# Patient Record
Sex: MALE | Race: WHITE | NOT HISPANIC OR LATINO | Employment: UNEMPLOYED | URBAN - METROPOLITAN AREA
[De-identification: names, ages, dates, MRNs, and addresses within clinical notes are randomized per-mention and may not be internally consistent; named-entity substitution may affect disease eponyms.]

---

## 2017-01-13 ENCOUNTER — DOCUMENTATION ONLY (OUTPATIENT)
Dept: FAMILY MEDICINE | Facility: CLINIC | Age: 52
End: 2017-01-13

## 2017-01-13 ENCOUNTER — OFFICE VISIT (OUTPATIENT)
Dept: FAMILY MEDICINE | Facility: CLINIC | Age: 52
End: 2017-01-13
Payer: COMMERCIAL

## 2017-01-13 VITALS
BODY MASS INDEX: 29.38 KG/M2 | HEIGHT: 71 IN | WEIGHT: 209.88 LBS | TEMPERATURE: 98 F | SYSTOLIC BLOOD PRESSURE: 141 MMHG | DIASTOLIC BLOOD PRESSURE: 75 MMHG | HEART RATE: 69 BPM | OXYGEN SATURATION: 96 %

## 2017-01-13 DIAGNOSIS — J20.9 BRONCHITIS, ACUTE, WITH BRONCHOSPASM: ICD-10-CM

## 2017-01-13 DIAGNOSIS — J02.9 SORE THROAT: Primary | ICD-10-CM

## 2017-01-13 LAB
CTP QC/QA: YES
S PYO RRNA THROAT QL PROBE: NEGATIVE

## 2017-01-13 PROCEDURE — 3077F SYST BP >= 140 MM HG: CPT | Mod: S$GLB,,, | Performed by: NURSE PRACTITIONER

## 2017-01-13 PROCEDURE — 1159F MED LIST DOCD IN RCRD: CPT | Mod: S$GLB,,, | Performed by: NURSE PRACTITIONER

## 2017-01-13 PROCEDURE — 3078F DIAST BP <80 MM HG: CPT | Mod: S$GLB,,, | Performed by: NURSE PRACTITIONER

## 2017-01-13 PROCEDURE — 87880 STREP A ASSAY W/OPTIC: CPT | Mod: ,,, | Performed by: NURSE PRACTITIONER

## 2017-01-13 PROCEDURE — 99213 OFFICE O/P EST LOW 20 MIN: CPT | Mod: S$GLB,,, | Performed by: NURSE PRACTITIONER

## 2017-01-13 RX ORDER — PREDNISONE 5 MG/1
TABLET ORAL
Qty: 21 TABLET | Refills: 0 | Status: SHIPPED | OUTPATIENT
Start: 2017-01-13 | End: 2017-08-14 | Stop reason: ALTCHOICE

## 2017-01-13 RX ORDER — BENZONATATE 200 MG/1
200 CAPSULE ORAL 3 TIMES DAILY PRN
Qty: 30 CAPSULE | Refills: 0 | Status: SHIPPED | OUTPATIENT
Start: 2017-01-13 | End: 2017-08-14 | Stop reason: ALTCHOICE

## 2017-01-13 NOTE — PATIENT INSTRUCTIONS
Get generic Tussin cough syrup and use as directed on the bottle with the tessalon and it will help with cough.   Continue honey and lemon    Take a whole (washed) lemon, put in a sauce pan, cover with water, boil for 10 minutes, let it cool and when cool enough to handle the lemon, remove and cut in half. Put all the juice into the water in the pan. Mix the lemon juice/water with the same amount of honey. Put in a covered jar in the refrigerator and take 1 tablespoon every 4 hours as needed for cough.

## 2017-01-13 NOTE — PROGRESS NOTES
Subjective:       Patient ID: Abimael Armendariz is a 51 y.o. male.    Chief Complaint: Sore Throat and Cough    Sore Throat    This is a new problem. The current episode started in the past 7 days. The problem has been rapidly worsening. There has been no fever. The pain is at a severity of 4/10. Associated symptoms include coughing and swollen glands. Pertinent negatives include no headaches.   Cough   This is a new problem. The current episode started in the past 7 days. The problem has been rapidly worsening. Associated symptoms include a sore throat. Pertinent negatives include no headaches. He has tried OTC cough suppressant (used jessica seltzer plus for cold and cough) for the symptoms. The treatment provided mild relief.     Review of Systems   HENT: Positive for sore throat.    Respiratory: Positive for cough.    Neurological: Negative for headaches.       Objective:    strep negative  Physical Exam   Constitutional: He appears well-developed and well-nourished. No distress.   HENT:   Head: Normocephalic and atraumatic.   Right Ear: External ear normal.   Left Ear: External ear normal.   Nose: Nose normal.   Mouth/Throat: No oropharyngeal exudate.   Oropharynx erythematous   Eyes: Conjunctivae are normal. Right eye exhibits no discharge. Left eye exhibits no discharge. No scleral icterus.   Neck: Normal range of motion. Neck supple.   Cardiovascular: Normal rate and regular rhythm.  Exam reveals no gallop and no friction rub.    Murmur heard.  Pulmonary/Chest: Effort normal and breath sounds normal. No respiratory distress. He has no wheezes. He has no rales.   Lymphadenopathy:     He has no cervical adenopathy.   Skin: Skin is warm and dry. He is not diaphoretic.   Psychiatric: He has a normal mood and affect. His behavior is normal.       Assessment:       1. Sore throat    2. Bronchitis, acute, with bronchospasm        Plan:       Sore throat  -     POCT Rapid Strep A    Bronchitis, acute, with  bronchospasm  -     benzonatate (TESSALON) 200 MG capsule; Take 1 capsule (200 mg total) by mouth 3 (three) times daily as needed for Cough.  Dispense: 30 capsule; Refill: 0  -     predniSONE (DELTASONE) 5 MG tablet; 6 tabs the first day, then 5 the next, then 4, 3, 2, 1  Dispense: 21 tablet; Refill: 0    Get generic Tussin cough syrup and use as directed on the bottle with the tessalon and it will help with cough.      Told by his physician not to use dextromethorphan.  1 week if not better

## 2017-01-13 NOTE — MR AVS SNAPSHOT
[facility]  [address] [address] [address]  Phone: [phone]  Fax: [phone]                  Abimael Armendariz   2017 2:00 PM   Office Visit    Description:  Male : 1965   Provider:  LINN Allen   Department:  [facility]           Reason for Visit     Sore Throat     Cough           Diagnoses this Visit        Comments    Sore throat    -  Primary     Bronchitis, acute, with bronchospasm                To Do List           Goals (5 Years of Data)     None      Follow-Up and Disposition     Return if symptoms worsen or fail to improve in 1 week.    Follow-up and Disposition History       These Medications        Disp Refills Start End    benzonatate (TESSALON) 200 MG capsule 30 capsule 0 2017     Take 1 capsule (200 mg total) by mouth 3 (three) times daily as needed for Cough. - Oral    Pharmacy: [facility]/pharmacy #[id number] - [place] [address] Ph #: [phone]       predniSONE (DELTASONE) 5 MG tablet 21 tablet 0 2017     6 tabs the first day, then 5 the next, then 4, 3, 2, 1    Pharmacy: [facility]/pharmacy #[id number] - [place], [place] - [address] [facility]. Ph #: [phone]         [facility] On Call     [facility] On Call Nurse Care Line - 24/ Assistance  Registered nurses in the [facility] provide clinical advisement, health education, appointment booking, and other advisory services.  Call for this free service at [phone].             Medications           Message regarding Medications     Verify the changes and/or additions to your medication regime listed below are the same as discussed with your clinician today.  If any of these changes or additions are incorrect, please notify your healthcare provider.        START taking these NEW medications        Refills    benzonatate (TESSALON) 200 MG capsule 0    Sig: Take 1 capsule (200 mg total) by mouth 3 (three) times daily as needed for Cough.    Class: Normal  "   Route: Oral    predniSONE (DELTASONE) 5 MG tablet 0    Si tabs the first day, then 5 the next, then 4, 3, 2, 1    Class: Normal           Verify that the below list of medications is an accurate representation of the medications you are currently taking.  If none reported, the list may be blank. If incorrect, please contact your healthcare provider. Carry this list with you in case of emergency.           Current Medications     aspirin 81 mg Tab Take 81 mg by mouth once daily. Every day    buPROPion (WELLBUTRIN XL) 150 MG TB24 tablet Take 1 tablet (150 mg total) by mouth once daily.    cetirizine (ZYRTEC) 10 MG tablet Take 10 mg by mouth once daily. Every day    citalopram (CELEXA) 20 MG tablet Take 1 tablet (20 mg total) by mouth once daily.    fosinopril (MONOPRIL) 40 MG tablet Take 1 tablet (40 mg total) by mouth once daily.    pramipexole (MIRAPEX) 0.25 MG tablet TAKE 1 TABLET (0.25 MG TOTAL) BY MOUTH EVERY EVENING.    trazodone (DESYREL) 50 MG tablet TAKE 1 TABLET (50 MG TOTAL) BY MOUTH EVERY EVENING.    benzonatate (TESSALON) 200 MG capsule Take 1 capsule (200 mg total) by mouth 3 (three) times daily as needed for Cough.    predniSONE (DELTASONE) 5 MG tablet 6 tabs the first day, then 5 the next, then 4, 3, 2, 1           Clinical Reference Information           Vital Signs - Last Recorded  Most recent update: 2017  2:10 PM by Kristina Andino MA    BP Pulse Temp Ht    (!) 141/75 (BP Location: Left arm, Patient Position: Sitting, BP Method: Automatic) 69 97.6 °F (36.4 °C) (Oral) 5' 11" (1.803 m)    Wt SpO2 BMI    95.2 kg (209 lb 14.1 oz) 96% 29.27 kg/m2      Blood Pressure          Most Recent Value    BP  (!)  141/75      Allergies as of 2017     No Known Drug Allergies      Immunizations Administered on Date of Encounter - 2017     None      Orders Placed During Today's Visit      Normal Orders This Visit    POCT Rapid Strep A       Instructions    Get generic Tussin cough syrup and use " as directed on the bottle with the tessalon and it will help with cough.   Continue honey and lemon    Take a whole (washed) lemon, put in a sauce pan, cover with water, boil for 10 minutes, let it cool and when cool enough to handle the lemon, remove and cut in half. Put all the juice into the water in the pan. Mix the lemon juice/water with the same amount of honey. Put in a covered jar in the refrigerator and take 1 tablespoon every 4 hours as needed for cough.        Smoking Cessation     If you would like to quit smoking:   You may be eligible for free services if you are a [place] resident and started smoking cigarettes before September 1, 1988.  Call the [employer]oking [employer]essation [employer] (S[employer]) toll free at (644) 892-3076 or (786) 544-6764.   Call 4-800-QUIT-NOW if you do not meet the above criteria.

## 2017-01-13 NOTE — PROGRESS NOTES
Pre-Visit Chart Review  For Appointment Scheduled on 1/13/17    Health Maintenance Due   Topic Date Due    TETANUS VACCINE  02/06/1982    Colonoscopy  02/06/2014    Influenza Vaccine  08/01/2016

## 2017-03-20 ENCOUNTER — TELEPHONE (OUTPATIENT)
Dept: FAMILY MEDICINE | Facility: CLINIC | Age: 52
End: 2017-03-20

## 2017-03-20 NOTE — TELEPHONE ENCOUNTER
----- Message from Eulalia Abreu sent at 3/20/2017 10:05 AM CDT -----  Contact: self  Patient called regarding enrolling in a  school. A form was submitted but not received back in order for the physical information needed. Cannot complete the  school without it. Please contact [phone] (home)

## 2017-03-20 NOTE — TELEPHONE ENCOUNTER
Patient request for Dr. Stone's staff to call him about clearance for [employer] -call [phone]

## 2017-03-20 NOTE — TELEPHONE ENCOUNTER
----- Message from Eulalia Abreu sent at 3/20/2017 10:03 AM CDT -----  Contact: self  Patient called regarding enrolling in a  school. A form was submitted but not received back in order for the physical information needed. Cannot complete the  school without it. Please contact [phone] (home)

## 2017-03-20 NOTE — TELEPHONE ENCOUNTER
Left voice mail for patient-form filled out and faxed back to [facility] at [phone]-copy sent to scanning.

## 2017-03-20 NOTE — TELEPHONE ENCOUNTER
Patient informed fax received today and will call him when Dr. Trinidad has a chance to look at it.

## 2017-03-21 ENCOUNTER — TELEPHONE (OUTPATIENT)
Dept: CARDIOLOGY | Facility: CLINIC | Age: 52
End: 2017-03-21

## 2017-03-21 NOTE — TELEPHONE ENCOUNTER
----- Message from Eulalia Abreu sent at 3/21/2017  8:16 AM CDT -----  Contact: self  Patient called regarding paperwork to be faxed over for physical to be sent to [employer] so he can finish the program. Need it to be sent over today or will no longer be able to participate in the program. Sent numerous messages yesterday regarding. Patient called regarding [phone] (home) and leave vm no answer.

## 2017-03-21 NOTE — TELEPHONE ENCOUNTER
Spoke to pt and advised that it has been faxed a totaol of 6 times to provided number. Requested another fax number. Will send to original number and new fax number

## 2017-03-21 NOTE — TELEPHONE ENCOUNTER
----- Message from Jennie Lyles sent at 3/21/2017  2:41 PM CDT -----  Contact: elio   Needs to try to fax document   School states they have not received     [phone]     Call back

## 2017-03-21 NOTE — TELEPHONE ENCOUNTER
----- Message from Yarelis Zimmerman sent at 3/21/2017  2:45 PM CDT -----  Patient just missed a call from office. He says he is by the phone waiting. Please call back at [phone].

## 2017-03-21 NOTE — TELEPHONE ENCOUNTER
----- Message from Gina Bland sent at 3/20/2017  2:24 PM CDT -----  Patient is requesting a call back to confirm status of a fax he sent this morning regarding his medication, his company is requesting a release concerning his medication, contact patient at [phone].    Thank you

## 2017-03-21 NOTE — TELEPHONE ENCOUNTER
----- Message from Sharri Landin sent at 3/21/2017 10:04 AM CDT -----  Contact: self  Patient is returning call regarding paper work. His employer leaves before 4:00.  Please call patient at [phone].  Thanks!

## 2017-03-21 NOTE — TELEPHONE ENCOUNTER
----- Message from Gina Bland sent at 3/20/2017  2:24 PM CDT -----  Patient is requesting a call back to confirm status of a fax he sent this morning regarding his medication, his company is requesting a release concerning his medication, contact patient at [phone].    Thank you

## 2017-06-04 DIAGNOSIS — G47.00 INSOMNIA: ICD-10-CM

## 2017-06-05 RX ORDER — TRAZODONE HYDROCHLORIDE 50 MG/1
TABLET ORAL
Qty: 30 TABLET | Refills: 1 | Status: SHIPPED | OUTPATIENT
Start: 2017-06-05 | End: 2017-08-06 | Stop reason: SDUPTHER

## 2017-06-12 ENCOUNTER — TELEPHONE (OUTPATIENT)
Dept: FAMILY MEDICINE | Facility: CLINIC | Age: 52
End: 2017-06-12

## 2017-06-12 NOTE — TELEPHONE ENCOUNTER
----- Message from Eva Murray sent at 6/12/2017 12:39 PM CDT -----  Contact: self   Patient need orders for blood work, any questions please call back at [phone] (home)

## 2017-06-28 DIAGNOSIS — F32.A DEPRESSION: ICD-10-CM

## 2017-06-28 DIAGNOSIS — F41.9 ANXIETY: ICD-10-CM

## 2017-06-28 RX ORDER — CITALOPRAM 20 MG/1
20 TABLET, FILM COATED ORAL DAILY
Qty: 90 TABLET | Refills: 0 | Status: SHIPPED | OUTPATIENT
Start: 2017-06-28 | End: 2017-08-14 | Stop reason: SDUPTHER

## 2017-06-28 RX ORDER — BUPROPION HYDROCHLORIDE 150 MG/1
150 TABLET ORAL DAILY
Qty: 90 TABLET | Refills: 0 | Status: SHIPPED | OUTPATIENT
Start: 2017-06-28 | End: 2017-08-14 | Stop reason: SDUPTHER

## 2017-07-03 DIAGNOSIS — G25.81 RESTLESS LEG SYNDROME: ICD-10-CM

## 2017-07-05 RX ORDER — PRAMIPEXOLE DIHYDROCHLORIDE 0.25 MG/1
TABLET ORAL
Qty: 90 TABLET | Refills: 0 | Status: SHIPPED | OUTPATIENT
Start: 2017-07-05 | End: 2017-08-14 | Stop reason: SDUPTHER

## 2017-07-10 RX ORDER — FOSINOPRIL SODIUM 40 MG/1
40 TABLET ORAL DAILY
Qty: 90 TABLET | Refills: 0 | Status: SHIPPED | OUTPATIENT
Start: 2017-07-10 | End: 2017-08-14 | Stop reason: SDUPTHER

## 2017-08-06 DIAGNOSIS — G47.00 INSOMNIA: ICD-10-CM

## 2017-08-06 RX ORDER — TRAZODONE HYDROCHLORIDE 50 MG/1
TABLET ORAL
Qty: 30 TABLET | Refills: 0 | Status: SHIPPED | OUTPATIENT
Start: 2017-08-06 | End: 2017-08-14 | Stop reason: SDUPTHER

## 2017-08-11 ENCOUNTER — DOCUMENTATION ONLY (OUTPATIENT)
Dept: FAMILY MEDICINE | Facility: CLINIC | Age: 52
End: 2017-08-11

## 2017-08-11 NOTE — PROGRESS NOTES
Pre-Visit Chart Review  For Appointment Scheduled on 08/14/2017      Health Maintenance Due   Topic Date Due    TETANUS VACCINE  02/06/1982    Colonoscopy  02/06/2014    Lipid Panel  06/15/2017    Influenza Vaccine  08/01/2017

## 2017-08-14 ENCOUNTER — OFFICE VISIT (OUTPATIENT)
Dept: CARDIOLOGY | Facility: CLINIC | Age: 52
End: 2017-08-14
Payer: COMMERCIAL

## 2017-08-14 ENCOUNTER — LAB VISIT (OUTPATIENT)
Dept: LAB | Facility: HOSPITAL | Age: 52
End: 2017-08-14
Attending: PHYSICIAN ASSISTANT
Payer: COMMERCIAL

## 2017-08-14 ENCOUNTER — OFFICE VISIT (OUTPATIENT)
Dept: FAMILY MEDICINE | Facility: CLINIC | Age: 52
End: 2017-08-14
Payer: COMMERCIAL

## 2017-08-14 VITALS
WEIGHT: 201.75 LBS | TEMPERATURE: 98 F | HEIGHT: 71 IN | HEART RATE: 59 BPM | DIASTOLIC BLOOD PRESSURE: 72 MMHG | SYSTOLIC BLOOD PRESSURE: 124 MMHG | BODY MASS INDEX: 28.24 KG/M2

## 2017-08-14 VITALS
HEART RATE: 63 BPM | SYSTOLIC BLOOD PRESSURE: 138 MMHG | WEIGHT: 202.63 LBS | HEIGHT: 71 IN | DIASTOLIC BLOOD PRESSURE: 81 MMHG | RESPIRATION RATE: 15 BRPM | BODY MASS INDEX: 28.37 KG/M2

## 2017-08-14 DIAGNOSIS — Z00.00 ANNUAL PHYSICAL EXAM: Primary | ICD-10-CM

## 2017-08-14 DIAGNOSIS — F41.9 ANXIETY: ICD-10-CM

## 2017-08-14 DIAGNOSIS — Z23 NEED FOR DIPHTHERIA-TETANUS-PERTUSSIS (TDAP) VACCINE: ICD-10-CM

## 2017-08-14 DIAGNOSIS — I34.0 MITRAL VALVE INSUFFICIENCY, UNSPECIFIED ETIOLOGY: Primary | ICD-10-CM

## 2017-08-14 DIAGNOSIS — Z00.00 ANNUAL PHYSICAL EXAM: ICD-10-CM

## 2017-08-14 DIAGNOSIS — G25.81 RESTLESS LEG SYNDROME: ICD-10-CM

## 2017-08-14 DIAGNOSIS — G47.00 INSOMNIA, UNSPECIFIED TYPE: ICD-10-CM

## 2017-08-14 DIAGNOSIS — F32.A DEPRESSION, UNSPECIFIED DEPRESSION TYPE: ICD-10-CM

## 2017-08-14 LAB
ALBUMIN SERPL BCP-MCNC: 4 G/DL
ALP SERPL-CCNC: 78 U/L
ALT SERPL W/O P-5'-P-CCNC: 20 U/L
ANION GAP SERPL CALC-SCNC: 8 MMOL/L
AST SERPL-CCNC: 19 U/L
BASOPHILS # BLD AUTO: 0.03 K/UL
BASOPHILS NFR BLD: 0.5 %
BILIRUB SERPL-MCNC: 0.8 MG/DL
BUN SERPL-MCNC: 22 MG/DL
CALCIUM SERPL-MCNC: 9.3 MG/DL
CHLORIDE SERPL-SCNC: 106 MMOL/L
CHOLEST/HDLC SERPL: 5.3 {RATIO}
CO2 SERPL-SCNC: 28 MMOL/L
COMPLEXED PSA SERPL-MCNC: 0.57 NG/ML
CREAT SERPL-MCNC: 1.3 MG/DL
DIFFERENTIAL METHOD: ABNORMAL
EOSINOPHIL # BLD AUTO: 0.1 K/UL
EOSINOPHIL NFR BLD: 2.3 %
ERYTHROCYTE [DISTWIDTH] IN BLOOD BY AUTOMATED COUNT: 12.7 %
EST. GFR  (AFRICAN AMERICAN): >60 ML/MIN/1.73 M^2
EST. GFR  (NON AFRICAN AMERICAN): >60 ML/MIN/1.73 M^2
GLUCOSE SERPL-MCNC: 93 MG/DL
HCT VFR BLD AUTO: 45 %
HDL/CHOLESTEROL RATIO: 19 %
HDLC SERPL-MCNC: 142 MG/DL
HDLC SERPL-MCNC: 27 MG/DL
HGB BLD-MCNC: 15.2 G/DL
LDLC SERPL CALC-MCNC: 67.6 MG/DL
LYMPHOCYTES # BLD AUTO: 2.4 K/UL
LYMPHOCYTES NFR BLD: 38 %
MCH RBC QN AUTO: 30.1 PG
MCHC RBC AUTO-ENTMCNC: 33.8 G/DL
MCV RBC AUTO: 89 FL
MONOCYTES # BLD AUTO: 0.5 K/UL
MONOCYTES NFR BLD: 7.9 %
NEUTROPHILS # BLD AUTO: 3.2 K/UL
NEUTROPHILS NFR BLD: 51.1 %
NONHDLC SERPL-MCNC: 115 MG/DL
PLATELET # BLD AUTO: 141 K/UL
PMV BLD AUTO: 10.9 FL
POTASSIUM SERPL-SCNC: 4.6 MMOL/L
PROT SERPL-MCNC: 6.6 G/DL
RBC # BLD AUTO: 5.05 M/UL
SODIUM SERPL-SCNC: 142 MMOL/L
TRIGL SERPL-MCNC: 237 MG/DL
WBC # BLD AUTO: 6.18 K/UL

## 2017-08-14 PROCEDURE — 90715 TDAP VACCINE 7 YRS/> IM: CPT | Mod: S$GLB,,, | Performed by: PHYSICIAN ASSISTANT

## 2017-08-14 PROCEDURE — 3079F DIAST BP 80-89 MM HG: CPT | Mod: S$GLB,,, | Performed by: INTERNAL MEDICINE

## 2017-08-14 PROCEDURE — 3008F BODY MASS INDEX DOCD: CPT | Mod: S$GLB,,, | Performed by: PHYSICIAN ASSISTANT

## 2017-08-14 PROCEDURE — 99214 OFFICE O/P EST MOD 30 MIN: CPT | Mod: S$GLB,,, | Performed by: INTERNAL MEDICINE

## 2017-08-14 PROCEDURE — 3074F SYST BP LT 130 MM HG: CPT | Mod: S$GLB,,, | Performed by: PHYSICIAN ASSISTANT

## 2017-08-14 PROCEDURE — 80061 LIPID PANEL: CPT

## 2017-08-14 PROCEDURE — 3078F DIAST BP <80 MM HG: CPT | Mod: S$GLB,,, | Performed by: PHYSICIAN ASSISTANT

## 2017-08-14 PROCEDURE — 99999 PR PBB SHADOW E&M-EST. PATIENT-LVL III: CPT | Mod: PBBFAC,,, | Performed by: PHYSICIAN ASSISTANT

## 2017-08-14 PROCEDURE — 90471 IMMUNIZATION ADMIN: CPT | Mod: S$GLB,,, | Performed by: PHYSICIAN ASSISTANT

## 2017-08-14 PROCEDURE — 84153 ASSAY OF PSA TOTAL: CPT

## 2017-08-14 PROCEDURE — 36415 COLL VENOUS BLD VENIPUNCTURE: CPT | Mod: PO

## 2017-08-14 PROCEDURE — 99396 PREV VISIT EST AGE 40-64: CPT | Mod: 25,S$GLB,, | Performed by: PHYSICIAN ASSISTANT

## 2017-08-14 PROCEDURE — 99999 PR PBB SHADOW E&M-EST. PATIENT-LVL II: CPT | Mod: PBBFAC,,, | Performed by: INTERNAL MEDICINE

## 2017-08-14 PROCEDURE — 3075F SYST BP GE 130 - 139MM HG: CPT | Mod: S$GLB,,, | Performed by: INTERNAL MEDICINE

## 2017-08-14 PROCEDURE — 80053 COMPREHEN METABOLIC PANEL: CPT

## 2017-08-14 PROCEDURE — 85025 COMPLETE CBC W/AUTO DIFF WBC: CPT

## 2017-08-14 PROCEDURE — 3008F BODY MASS INDEX DOCD: CPT | Mod: S$GLB,,, | Performed by: INTERNAL MEDICINE

## 2017-08-14 RX ORDER — CITALOPRAM 20 MG/1
20 TABLET, FILM COATED ORAL DAILY
Qty: 90 TABLET | Refills: 3 | Status: SHIPPED | OUTPATIENT
Start: 2017-08-14 | End: 2018-07-17

## 2017-08-14 RX ORDER — PRAMIPEXOLE DIHYDROCHLORIDE 0.25 MG/1
TABLET ORAL
Qty: 90 TABLET | Refills: 3 | Status: SHIPPED | OUTPATIENT
Start: 2017-08-14 | End: 2018-08-27 | Stop reason: SDUPTHER

## 2017-08-14 RX ORDER — FOSINOPRIL SODIUM 40 MG/1
40 TABLET ORAL DAILY
Qty: 90 TABLET | Refills: 3 | Status: SHIPPED | OUTPATIENT
Start: 2017-08-14 | End: 2018-08-09 | Stop reason: SDUPTHER

## 2017-08-14 RX ORDER — BUPROPION HYDROCHLORIDE 150 MG/1
150 TABLET ORAL DAILY
Qty: 90 TABLET | Refills: 3 | Status: SHIPPED | OUTPATIENT
Start: 2017-08-14 | End: 2018-04-24 | Stop reason: SDUPTHER

## 2017-08-14 RX ORDER — TRAZODONE HYDROCHLORIDE 50 MG/1
TABLET ORAL
Qty: 90 TABLET | Refills: 3 | Status: SHIPPED | OUTPATIENT
Start: 2017-08-14 | End: 2018-08-09 | Stop reason: SDUPTHER

## 2017-08-14 NOTE — PROGRESS NOTES
Subjective:       Patient ID: Abimael Armendariz is a 52 y.o. male.    Chief Complaint: Annual Exam and Medication Refill    Patient is here for annual exam.  He is due for med refills and labs.  He is out of date for tetanus.  He refuses colonoscopy.      Review of Systems   Constitutional: Negative for activity change, appetite change, chills, fatigue, fever and unexpected weight change.   HENT: Negative.    Eyes: Negative for photophobia, pain, discharge, redness and visual disturbance.   Respiratory: Negative for cough, chest tightness and shortness of breath.    Cardiovascular: Negative for chest pain, palpitations and leg swelling.   Gastrointestinal: Negative for abdominal distention, abdominal pain, blood in stool, constipation, diarrhea, nausea and vomiting.   Genitourinary: Negative for decreased urine volume, difficulty urinating, dysuria, frequency, hematuria and urgency.   Musculoskeletal: Negative for arthralgias, back pain, gait problem and myalgias.   Neurological: Negative for dizziness, weakness, light-headedness, numbness and headaches.       Objective:      Physical Exam   Constitutional: He is oriented to person, place, and time. He appears well-developed and well-nourished. No distress.   HENT:   Head: Normocephalic and atraumatic.   Eyes: Conjunctivae are normal. Pupils are equal, round, and reactive to light.   Neck: Normal range of motion. Neck supple. No JVD present. No thyromegaly present.   Cardiovascular: Normal rate and regular rhythm.  Exam reveals no gallop and no friction rub.    Murmur heard.  Pulmonary/Chest: Effort normal. No respiratory distress. He has no wheezes. He has no rales. He exhibits no tenderness.   Neurological: He is alert and oriented to person, place, and time.   Skin: He is not diaphoretic.       Assessment:       1. Annual physical exam    2. Depression, unspecified depression type    3. Anxiety    4. Restless leg syndrome    5. Insomnia, unspecified type    6.  Need for diphtheria-tetanus-pertussis (Tdap) vaccine        Plan:       Abimael was seen today for annual exam and medication refill.    Diagnoses and all orders for this visit:    Annual physical exam  -     CBC auto differential; Future  -     Comprehensive metabolic panel; Future  -     Lipid panel; Future  -     PSA, Screening; Future    Depression, unspecified depression type  -     buPROPion (WELLBUTRIN XL) 150 MG TB24 tablet; Take 1 tablet (150 mg total) by mouth once daily.    Anxiety  -     citalopram (CELEXA) 20 MG tablet; Take 1 tablet (20 mg total) by mouth once daily.    Restless leg syndrome  -     pramipexole (MIRAPEX) 0.25 MG tablet; TAKE 1 TABLET (0.25 MG TOTAL) BY MOUTH EVERY EVENING.    Insomnia, unspecified type  -     trazodone (DESYREL) 50 MG tablet; TAKE 1 TABLET (50 MG TOTAL) BY MOUTH EVERY EVENING.    Need for diphtheria-tetanus-pertussis (Tdap) vaccine  -     (In Office Administered) Tdap Vaccine    Other orders  -     fosinopril (MONOPRIL) 40 MG tablet; Take 1 tablet (40 mg total) by mouth once daily.    Patient readiness: acceptance and barriers:none    During the course of the visit the patient was educated and counseled about the following:     Hypertension:   Regular aerobic exercise.    Goals: Hypertension: Reduce Blood Pressure    Did patient meet goals/outcomes: Yes    The following self management tools provided: declined    Patient Instructions (the written plan) was given to the patient/family.     Time spent with patient: 30 minutes

## 2017-08-14 NOTE — PROGRESS NOTES
Two Patient identifiers used, Name and . VIS information given to patient and procedure was explained. Patient verbalized understanding. Tdap administered IM in the left deltoid using sterile technique.  No residual bleeding noted. Patient tolerated procedure well.

## 2017-08-14 NOTE — PROGRESS NOTES
Subjective:    Patient ID:  Abimael Armendariz is a 52 y.o. male who presents for follow-up of mitral regurgitation    HPI  He comes for follow up with no major problems, no chest pain, no shortness of breath.  FC I-II    Review of Systems   Constitution: Negative for decreased appetite, weakness, malaise/fatigue, weight gain and weight loss.   Cardiovascular: Negative for chest pain, dyspnea on exertion, leg swelling, palpitations and syncope.   Respiratory: Negative for cough and shortness of breath.    Gastrointestinal: Negative.    All other systems reviewed and are negative.       Objective:    Physical Exam   Constitutional: He is oriented to person, place, and time. He appears well-developed and well-nourished.   HENT:   Head: Normocephalic.   Eyes: Pupils are equal, round, and reactive to light.   Neck: Normal range of motion. Neck supple. No JVD present. Carotid bruit is not present. No thyromegaly present.   Cardiovascular: Normal rate, regular rhythm, intact distal pulses and normal pulses.  PMI is not displaced.  Exam reveals no gallop.    Murmur heard.  High-pitched blowing holosystolic murmur is present with a grade of 3/6  at the apex  Pulmonary/Chest: Effort normal and breath sounds normal.   Abdominal: Soft. Normal appearance. He exhibits no mass. There is no hepatosplenomegaly. There is no tenderness.   Musculoskeletal: Normal range of motion. He exhibits no edema.   Neurological: He is alert and oriented to person, place, and time. He has normal strength and normal reflexes. No sensory deficit.   Skin: Skin is warm and intact.   Psychiatric: He has a normal mood and affect.   Nursing note and vitals reviewed.        Assessment:       1. Mitral valve insufficiency, unspecified etiology         Plan:   Echo; call with results  Continue all cardiac medications  Regular exercise program  Weight loss  1 yr f/u if echo unchanged

## 2018-04-18 DIAGNOSIS — M79.604 RIGHT LEG PAIN: Primary | ICD-10-CM

## 2018-04-19 ENCOUNTER — HOSPITAL ENCOUNTER (OUTPATIENT)
Dept: RADIOLOGY | Facility: HOSPITAL | Age: 53
Discharge: HOME | End: 2018-04-19
Attending: ORTHOPAEDIC SURGERY
Payer: COMMERCIAL

## 2018-04-19 ENCOUNTER — OFFICE VISIT (OUTPATIENT)
Dept: ORTHOPEDICS | Facility: CLINIC | Age: 53
End: 2018-04-19
Payer: COMMERCIAL

## 2018-04-19 VITALS — HEIGHT: 71 IN | BODY MASS INDEX: 28.28 KG/M2 | WEIGHT: 202 LBS

## 2018-04-19 DIAGNOSIS — S80.812A: Primary | ICD-10-CM

## 2018-04-19 DIAGNOSIS — M79.605 LEFT LEG PAIN: ICD-10-CM

## 2018-04-19 DIAGNOSIS — M79.604 RIGHT LEG PAIN: ICD-10-CM

## 2018-04-19 PROCEDURE — 99203 OFFICE O/P NEW LOW 30 MIN: CPT | Mod: S$GLB,,, | Performed by: ORTHOPAEDIC SURGERY

## 2018-04-19 PROCEDURE — 99999 PR PBB SHADOW E&M-EST. PATIENT-LVL II: CPT | Mod: PBBFAC,,, | Performed by: ORTHOPAEDIC SURGERY

## 2018-04-19 PROCEDURE — 73590 X-RAY EXAM OF LOWER LEG: CPT | Mod: TC,PO,LT

## 2018-04-19 PROCEDURE — 73590 X-RAY EXAM OF LOWER LEG: CPT | Mod: 26,LT,, | Performed by: RADIOLOGY

## 2018-04-19 NOTE — PROGRESS NOTES
Abimael Armendariz, 53 years old, complaining of pain in his left calf.  He has had   this now for a couple of weeks' time, at work, slipped, twisted his leg.  He   has some aching pain since then lot of swelling, which is improved.  Pain is   1/10 on good days, 10/10 on bad days.  Recently had an MRI.    PHYSICAL EXAMINATION:  Today shows he is tender at the proximal calf.  Knee exam   has good motion and strength.  He has laxity with Lachman testing.  Patellar   crunch test is positive.  Well perfused distally.  Compartment are soft.    X-rays negative.    ASSESSMENT:  Calf strain.    PLAN:  Symptomatic care, relative rest, reassurance.  Follow up as needed.      PBB/HN  dd: 04/19/2018 16:14:34 (CDT)  td: 04/20/2018 06:20:30 (CDT)  Doc ID   #[id number]  Job ID #[id number]    CC:     Further History  Aching pain  Worse with activity  Relieved with rest  No other associated symptoms  No other radiation    Further Exam  Alert and oriented  Pleasant  Contralateral limb has appropriate range of motion for age and condition  Contralateral limb has appropriate strength for age and condition  Contralateral limb has appropriate stability  for age and condition  No adenopathy  Pulses are appropriate for current condition  Skin is intact        Chief Complaint    Chief Complaint   Patient presents with    Left Leg - Pain, Injury       HPI  Abimael Armendariz is a 53 y.o.  male who presents with       Past Medical History  Past Medical History:   Diagnosis Date    ADHD (attention deficit hyperactivity disorder)     Anxiety     Depression     Hyperlipidemia     Hypertension     Insomnia     Mitral regurgitation        Past Surgical History  Past Surgical History:   Procedure Laterality Date    KNEE ARTHROSCOPY W/ ACL RECONSTRUCTION AND HAMSTRING GRAFT  2003    right       Medications  Current Outpatient Prescriptions   Medication Sig    aspirin 81 mg Tab Take 81 mg by mouth once daily. Every day    buPROPion (WELLBUTRIN XL)  150 MG TB24 tablet Take 1 tablet (150 mg total) by mouth once daily.    cetirizine (ZYRTEC) 10 MG tablet Take 10 mg by mouth once daily. Every day    citalopram (CELEXA) 20 MG tablet Take 1 tablet (20 mg total) by mouth once daily.    fosinopril (MONOPRIL) 40 MG tablet Take 1 tablet (40 mg total) by mouth once daily.    pramipexole (MIRAPEX) 0.25 MG tablet TAKE 1 TABLET (0.25 MG TOTAL) BY MOUTH EVERY EVENING.    trazodone (DESYREL) 50 MG tablet TAKE 1 TABLET (50 MG TOTAL) BY MOUTH EVERY EVENING.     No current facility-administered medications for this visit.        Allergies  Review of patient's allergies indicates:   Allergen Reactions    No known drug allergies        Family History  Family History   Problem Relation Age of Onset    Stroke Mother     Heart attack Father     Heart disease Father     Heart attack Paternal Grandfather     No Known Problems Sister     No Known Problems Brother     No Known Problems Daughter     No Known Problems Son     Alzheimer's disease Maternal Grandmother     No Known Problems Maternal Grandfather     No Known Problems Paternal Grandmother     No Known Problems Maternal Aunt     No Known Problems Maternal Uncle     No Known Problems Paternal Aunt     No Known Problems Paternal Uncle        Social History  Social History     Social History    Marital status:      Spouse name: N/A    Number of children: N/A    Years of education: N/A     Occupational History     [employer]     Social History Main Topics    Smoking status: Never Smoker    Smokeless tobacco: Current User     Types: Chew    Alcohol use No    Drug use: No    Sexual activity: Yes     Partners: Female     Other Topics Concern    Not on file     Social History Narrative    No narrative on file               Review of Systems     Constitutional: Negative    HENT: Negative  Eyes: Negative  Respiratory: Negative  Cardiovascular: Negative  Musculoskeletal: HPI  Skin:  Negative  Neurological: Negative  Hematological: Negative  Endocrine: Negative                 Physical Exam    There were no vitals filed for this visit.  Body mass index is 28.17 kg/m².  Physical Examination:     General appearance -  well appearing, and in no distress  Mental status - awake  Neck - supple  Chest -  symmetric air entry  Heart - normal rate   Abdomen - soft      Assessment     1. Calf abrasion, left, initial encounter    2. Left leg pain          Plan

## 2018-04-23 ENCOUNTER — PATIENT MESSAGE (OUTPATIENT)
Dept: ORTHOPEDICS | Facility: CLINIC | Age: 53
End: 2018-04-23

## 2018-04-24 ENCOUNTER — TELEPHONE (OUTPATIENT)
Dept: ORTHOPEDICS | Facility: CLINIC | Age: 53
End: 2018-04-24

## 2018-04-24 DIAGNOSIS — F32.A DEPRESSION, UNSPECIFIED DEPRESSION TYPE: ICD-10-CM

## 2018-04-24 RX ORDER — BUPROPION HYDROCHLORIDE 150 MG/1
150 TABLET ORAL DAILY
Qty: 90 TABLET | Refills: 0 | Status: SHIPPED | OUTPATIENT
Start: 2018-04-24 | End: 2018-08-27 | Stop reason: SDUPTHER

## 2018-04-24 NOTE — TELEPHONE ENCOUNTER
----- Message from Raya Anaya sent at 4/24/2018  3:40 PM CDT -----  Contact: Patient's wife, Radha  Patient's wife would like to speak with someone regarding patient getting a light duty letter for work.  Call Back#[phone]  Thanks

## 2018-04-25 ENCOUNTER — TELEPHONE (OUTPATIENT)
Dept: ORTHOPEDICS | Facility: CLINIC | Age: 53
End: 2018-04-25

## 2018-04-25 NOTE — TELEPHONE ENCOUNTER
Spoke with Radha, pts wife,  requested the work note for the pt be faxed to PCP Dr. Trinidad. Note faxed.

## 2018-05-17 ENCOUNTER — OFFICE VISIT (OUTPATIENT)
Dept: SPINE | Facility: CLINIC | Age: 53
End: 2018-05-17
Payer: COMMERCIAL

## 2018-05-17 ENCOUNTER — OFFICE VISIT (OUTPATIENT)
Dept: ORTHOPEDICS | Facility: CLINIC | Age: 53
End: 2018-05-17
Payer: COMMERCIAL

## 2018-05-17 VITALS
BODY MASS INDEX: 28.27 KG/M2 | WEIGHT: 201.94 LBS | HEART RATE: 60 BPM | HEIGHT: 71 IN | SYSTOLIC BLOOD PRESSURE: 120 MMHG | DIASTOLIC BLOOD PRESSURE: 72 MMHG

## 2018-05-17 VITALS — BODY MASS INDEX: 28.28 KG/M2 | HEIGHT: 71 IN | WEIGHT: 202 LBS

## 2018-05-17 DIAGNOSIS — M79.606 PAIN OF LOWER EXTREMITY, UNSPECIFIED LATERALITY: Primary | ICD-10-CM

## 2018-05-17 DIAGNOSIS — M54.42 ACUTE LEFT-SIDED LOW BACK PAIN WITH LEFT-SIDED SCIATICA: Primary | ICD-10-CM

## 2018-05-17 DIAGNOSIS — R20.0 NUMBNESS OF LEFT FOOT: ICD-10-CM

## 2018-05-17 PROCEDURE — 99243 OFF/OP CNSLTJ NEW/EST LOW 30: CPT | Mod: S$GLB,,, | Performed by: PHYSICIAN ASSISTANT

## 2018-05-17 PROCEDURE — 99999 PR PBB SHADOW E&M-EST. PATIENT-LVL IV: CPT | Mod: PBBFAC,,, | Performed by: PHYSICIAN ASSISTANT

## 2018-05-17 PROCEDURE — 99213 OFFICE O/P EST LOW 20 MIN: CPT | Mod: S$GLB,,, | Performed by: ORTHOPAEDIC SURGERY

## 2018-05-17 PROCEDURE — 99999 PR PBB SHADOW E&M-EST. PATIENT-LVL II: CPT | Mod: PBBFAC,,, | Performed by: ORTHOPAEDIC SURGERY

## 2018-05-17 PROCEDURE — 3008F BODY MASS INDEX DOCD: CPT | Mod: CPTII,S$GLB,, | Performed by: ORTHOPAEDIC SURGERY

## 2018-05-17 NOTE — LETTER
May 17, 2018      Jonatan Reich MD  [address] [facility] [address] [place] [address]           [facility]  [address] [facility] [address]  [facility] [address]  Phone: [phone]  Fax: [phone]          Patient: Abimael Armendariz   MR Number: [id number]   YOB: 1965   Date of Visit: 5/17/2018       Dear Dr. Jonatan Reich:    Thank you for referring Abimael Armendariz to me for evaluation. Attached you will find relevant portions of my assessment and plan of care.    If you have questions, please do not hesitate to call me. I look forward to following Abimael Armendariz along with you.    Sincerely,    TANA Bowlingosure  CC:  No Recipients    If you would like to receive this communication electronically, please contact [email or web address] or [phone] to request more information on [facility] access.    For providers and/or their staff who would like to refer a patient to [facility], please contact us through our one-stop-shop provider referral line, [facility], at [phone].    If you feel you have received this communication in error or would no longer like to receive these types of communications, please e-mail [email or web address]

## 2018-05-17 NOTE — PROGRESS NOTES
HISTORY OF PRESENT ILLNESS:  53 years old, followup of his left calf pain.  It   is getting much better.  Does describe the numbness in the bottom of his foot   and pain that radiates from his low back down the back of his leg and into his   foot.  He is about six weeks out from his injury.    PLAN:  At this point, we are going to continue with conservative care.  We will   check him back in our clinic in about six weeks' time.      AUDRA/ESTER  dd: 05/17/2018 14:40:49 (CDT)  td: 05/18/2018 01:41:58 (CDT)  Doc ID   #[id number]  Job ID #[id number]    CC:

## 2018-05-17 NOTE — PROGRESS NOTES
Neurosurgery History & Physical    Patient ID: Abimael Armendariz is a 53 y.o. male.    Chief Complaint   Patient presents with    Low-back Pain     Has had pain in left side low back for 1 month. Bottom of left foot is numb. Pain comes and goes. Certain positions make pain better and some make it worse.       Review of Systems   Constitutional: Negative for activity change, chills, fatigue and unexpected weight change.   HENT: Negative for hearing loss, tinnitus, trouble swallowing and voice change.    Eyes: Negative for visual disturbance.   Respiratory: Negative for apnea, chest tightness and shortness of breath.    Cardiovascular: Negative for chest pain and palpitations.   Gastrointestinal: Negative for abdominal pain, constipation, diarrhea, nausea and vomiting.   Genitourinary: Negative for difficulty urinating, dysuria and frequency.   Musculoskeletal: Positive for back pain and myalgias. Negative for gait problem, neck pain and neck stiffness.   Skin: Negative for wound.   Neurological: Positive for numbness. Negative for dizziness, tremors, seizures, facial asymmetry, speech difficulty, weakness, light-headedness and headaches.   Psychiatric/Behavioral: Negative for confusion and decreased concentration.       Past Medical History:   Diagnosis Date    ADHD (attention deficit hyperactivity disorder)     Anxiety     Depression     Hyperlipidemia     Hypertension     Insomnia     Mitral regurgitation      Social History     Social History    Marital status:      Spouse name: N/A    Number of children: N/A    Years of education: N/A     Occupational History     [employer]     Social History Main Topics    Smoking status: Never Smoker    Smokeless tobacco: Current User     Types: Chew    Alcohol use No    Drug use: No    Sexual activity: Yes     Partners: Female     Other Topics Concern    Not on file     Social History Narrative    No narrative on file     Family History   Problem  "Relation Age of Onset    Stroke Mother     Heart attack Father     Heart disease Father     Heart attack Paternal Grandfather     No Known Problems Sister     No Known Problems Brother     No Known Problems Daughter     No Known Problems Son     Alzheimer's disease Maternal Grandmother     No Known Problems Maternal Grandfather     No Known Problems Paternal Grandmother     No Known Problems Maternal Aunt     No Known Problems Maternal Uncle     No Known Problems Paternal Aunt     No Known Problems Paternal Uncle      Review of patient's allergies indicates:   Allergen Reactions    No known drug allergies        Current Outpatient Prescriptions:     aspirin 81 mg Tab, Take 81 mg by mouth once daily. Every day, Disp: , Rfl:     buPROPion (WELLBUTRIN XL) 150 MG TB24 tablet, Take 1 tablet (150 mg total) by mouth once daily., Disp: 90 tablet, Rfl: 0    cetirizine (ZYRTEC) 10 MG tablet, Take 10 mg by mouth once daily. Every day, Disp: , Rfl:     citalopram (CELEXA) 20 MG tablet, Take 1 tablet (20 mg total) by mouth once daily., Disp: 90 tablet, Rfl: 3    fosinopril (MONOPRIL) 40 MG tablet, Take 1 tablet (40 mg total) by mouth once daily., Disp: 90 tablet, Rfl: 3    pramipexole (MIRAPEX) 0.25 MG tablet, TAKE 1 TABLET (0.25 MG TOTAL) BY MOUTH EVERY EVENING., Disp: 90 tablet, Rfl: 3    trazodone (DESYREL) 50 MG tablet, TAKE 1 TABLET (50 MG TOTAL) BY MOUTH EVERY EVENING., Disp: 90 tablet, Rfl: 3    Vitals:    05/17/18 1446   BP: 120/72   BP Location: Right arm   Patient Position: Sitting   BP Method: Medium (Manual)   Pulse: 60   Weight: 91.6 kg (201 lb 15.1 oz)   Height: 5' 11" (1.803 m)       Physical Exam   Constitutional: He is oriented to person, place, and time. He appears well-developed and well-nourished.   HENT:   Head: Normocephalic and atraumatic.   Eyes: Pupils are equal, round, and reactive to light.   Neck: Normal range of motion. Neck supple.   Cardiovascular: Normal rate.  "   Pulmonary/Chest: Effort normal.   Abdominal: He exhibits no distension.   Musculoskeletal: Normal range of motion. He exhibits no edema.   Neurological: He is alert and oriented to person, place, and time. He has a normal Finger-Nose-Finger Test, a normal Heel to Shin Test, a normal Romberg Test and a normal Tandem Gait Test. Gait normal.   Reflex Scores:       Tricep reflexes are 2+ on the right side and 2+ on the left side.       Bicep reflexes are 2+ on the right side and 2+ on the left side.       Brachioradialis reflexes are 2+ on the right side and 2+ on the left side.       Patellar reflexes are 2+ on the right side and 2+ on the left side.       Achilles reflexes are 2+ on the right side and 2+ on the left side.  Skin: Skin is warm and dry.   Psychiatric: He has a normal mood and affect. His speech is normal and behavior is normal. Judgment and thought content normal.   Nursing note and vitals reviewed.      Neurologic Exam     Mental Status   Oriented to person, place, and time.   Oriented to person.   Oriented to place.   Oriented to time.   Follows 3 step commands.   Attention: normal. Concentration: normal.   Speech: speech is normal   Level of consciousness: alert  Knowledge: consistent with education.   Able to name object. Able to read. Able to repeat. Able to write. Normal comprehension.     Cranial Nerves     CN II   Visual acuity: normal  Right visual field deficit: none  Left visual field deficit: none     CN III, IV, VI   Pupils are equal, round, and reactive to light.  Right pupil: Size: 3 mm. Shape: regular. Reactivity: brisk. Consensual response: intact.   Left pupil: Size: 3 mm. Shape: regular. Reactivity: brisk. Consensual response: intact.   CN III: no CN III palsy  CN VI: no CN VI palsy  Nystagmus: none   Diplopia: none  Ophthalmoparesis: none  Conjugate gaze: present    CN V   Right facial sensation deficit: none  Left facial sensation deficit: none    CN VII   Right facial weakness:  none  Left facial weakness: none    CN VIII   Hearing: intact    CN IX, X   CN IX normal.   CN X normal.     CN XI   Right sternocleidomastoid strength: normal  Left sternocleidomastoid strength: normal  Right trapezius strength: normal  Left trapezius strength: normal    CN XII   Fasciculations: absent  Tongue deviation: none    Motor Exam   Muscle bulk: normal  Overall muscle tone: normal  Right arm pronator drift: absent  Left arm pronator drift: absent    Strength   Right neck flexion: 5/5  Left neck flexion: 5/5  Right neck extension: 5/5  Left neck extension: 5/5  Right deltoid: 5/5  Left deltoid: 5/5  Right biceps: 5/5  Left biceps: 5/5  Right triceps: 5/5  Left triceps: 5/5  Right wrist flexion: 5/5  Left wrist flexion: 5/5  Right wrist extension: 5/5  Left wrist extension: 5/5  Right interossei: 5/5  Left interossei: 5/5  Right abdominals: 5/5  Left abdominals: 5/5  Right iliopsoas: 5/5  Left iliopsoas: 5/5  Right quadriceps: 5/5  Left quadriceps: 5/5  Right hamstrin/5  Left hamstrin/5  Right glutei: 5/5  Left glutei: 5/5  Right anterior tibial: 5/5  Left anterior tibial: 5/5  Right posterior tibial: 5/5  Left posterior tibial: 5/5  Right peroneal: 5/5  Left peroneal: 5/5  Right gastroc: 5/5  Left gastroc: 5/5    Sensory Exam   Right arm light touch: normal  Left arm light touch: normal  Right leg light touch: normal  Left leg light touch: normal  Right arm vibration: normal  Left arm vibration: normal  Right arm pinprick: normal  Left arm pinprick: normal  Sensory deficit distribution on left: S1    Gait, Coordination, and Reflexes     Gait  Gait: normal    Coordination   Romberg: negative  Finger to nose coordination: normal  Heel to shin coordination: normal  Tandem walking coordination: normal    Tremor   Resting tremor: absent  Intention tremor: absent  Action tremor: absent    Reflexes   Right brachioradialis: 2+  Left brachioradialis: 2+  Right biceps: 2+  Left biceps: 2+  Right triceps:  2+  Left triceps: 2+  Right patellar: 2+  Left patellar: 2+  Right achilles: 2+  Left achilles: 2+  Right Beltran: absent  Left Beltran: absent  Right ankle clonus: absent  Left ankle clonus: absent      Provider dictation:  53 year old male with history of anxiety, depression and hypertesnion is referred by Dr. Reich for evaluation of lower back and left leg pain.  Pain started 1 month ago after pulling a 100-150lb weight at work.  He tore his left calf muscle for which he has seen orthopedics for and it has healed.  He has continue to have left lower back pain with radiation to the left lateral leg.  It is associated with numbness/ tingling in the left plantar surface of the foot.  Aleve helps some with pain.  He has not had PT or NICOLE.  Oswestry score: 22%.  PHQ:  0.    He has decreased sensation a left L5 and S1 distribution with hyperesthesias in the left foot.    He has not had any imaging.    Mr. Armendariz likely has left L5 and/ or S1 radiculopathy/ nerve irritation.  He has numbness on exam.  At this time we will further assess with an MRI and xray of the lumbar spine to confirm any left L5, S1 neural compression.  Follow up in clinic after imaging is obtained to discuss results and further plan of care.    Visit Diagnosis:  Acute left-sided low back pain with left-sided sciatica  -     X-Ray Lumbar Complete With Flex And Ext; Future; Expected date: 05/17/2018  -     MRI Lumbar Spine Without Contrast; Future; Expected date: 05/17/2018    Numbness of left foot  -     X-Ray Lumbar Complete With Flex And Ext; Future; Expected date: 05/17/2018  -     MRI Lumbar Spine Without Contrast; Future; Expected date: 05/17/2018        Total time spent counseling greater than fifty percent of total visit time.  Counseling included discussion regarding imaging findings, diagnosis possibilities, treatment options, risks and benefits.   The patient had many questions regarding the options and long-term effects.

## 2018-05-21 ENCOUNTER — TELEPHONE (OUTPATIENT)
Dept: ORTHOPEDICS | Facility: CLINIC | Age: 53
End: 2018-05-21

## 2018-05-21 NOTE — TELEPHONE ENCOUNTER
----- Message from Marissa Chris LPN sent at 5/18/2018  3:21 PM CDT -----  Contact: Christina Whatley      ----- Message -----  From: Carmen Kaye  Sent: 5/18/2018   2:58 PM  To: Dam[facility] Staff    Christina Whatley with [facility] [phone]  Ext [phone] is calling to speak with the Nurse concerning this patient/she is also faxing information to nurse

## 2018-05-28 ENCOUNTER — HOSPITAL ENCOUNTER (OUTPATIENT)
Dept: RADIOLOGY | Facility: HOSPITAL | Age: 53
Discharge: HOME | End: 2018-05-28
Attending: PHYSICIAN ASSISTANT
Payer: COMMERCIAL

## 2018-05-28 DIAGNOSIS — R20.0 NUMBNESS OF LEFT FOOT: ICD-10-CM

## 2018-05-28 DIAGNOSIS — M54.42 ACUTE LEFT-SIDED LOW BACK PAIN WITH LEFT-SIDED SCIATICA: ICD-10-CM

## 2018-05-28 PROCEDURE — 72114 X-RAY EXAM L-S SPINE BENDING: CPT | Mod: 26,,, | Performed by: RADIOLOGY

## 2018-05-28 PROCEDURE — 72114 X-RAY EXAM L-S SPINE BENDING: CPT | Mod: TC,FY

## 2018-05-28 PROCEDURE — 72148 MRI LUMBAR SPINE W/O DYE: CPT | Mod: 26,,, | Performed by: RADIOLOGY

## 2018-05-28 PROCEDURE — 72148 MRI LUMBAR SPINE W/O DYE: CPT | Mod: TC

## 2018-05-30 ENCOUNTER — OFFICE VISIT (OUTPATIENT)
Dept: SPINE | Facility: CLINIC | Age: 53
End: 2018-05-30
Payer: COMMERCIAL

## 2018-05-30 VITALS
SYSTOLIC BLOOD PRESSURE: 140 MMHG | WEIGHT: 201.94 LBS | BODY MASS INDEX: 28.27 KG/M2 | HEIGHT: 71 IN | HEART RATE: 55 BPM | DIASTOLIC BLOOD PRESSURE: 68 MMHG

## 2018-05-30 DIAGNOSIS — R20.0 NUMBNESS OF LEFT FOOT: ICD-10-CM

## 2018-05-30 DIAGNOSIS — M54.42 ACUTE LEFT-SIDED LOW BACK PAIN WITH LEFT-SIDED SCIATICA: Primary | ICD-10-CM

## 2018-05-30 DIAGNOSIS — M47.816 LUMBAR SPONDYLOSIS: ICD-10-CM

## 2018-05-30 PROCEDURE — 99214 OFFICE O/P EST MOD 30 MIN: CPT | Mod: S$GLB,,, | Performed by: PHYSICIAN ASSISTANT

## 2018-05-30 PROCEDURE — 99999 PR PBB SHADOW E&M-EST. PATIENT-LVL III: CPT | Mod: PBBFAC,,, | Performed by: PHYSICIAN ASSISTANT

## 2018-05-30 NOTE — LETTER
June 2, 2018      Jonatan Reich MD  [address] [facility] [address] [place] [address]           [place] [facility]  [address] [facility] [address]  [facility] [address]  Phone: [phone]  Fax: [phone]          Patient: Abimael Armendariz   MR Number: [id number]   YOB: 1965   Date of Visit: 5/30/2018       Dear Dr. Jonatan Reich:    Thank you for referring Abimael Armendariz to me for evaluation. Attached you will find relevant portions of my assessment and plan of care.    If you have questions, please do not hesitate to call me. I look forward to following Abimael Armendariz along with you.    Sincerely,        Enclosure  CC:  No Recipients    If you would like to receive this communication electronically, please contact [email or web address] or [phone] to request more information on [employer] access.    For providers and/or their staff who would like to refer a patient to [facility], please contact us through our one-stop-shop provider referral line, [facility], at [phone].    If you feel you have received this communication in error or would no longer like to receive these types of communications, please e-mail [email or web address]

## 2018-05-31 ENCOUNTER — TELEPHONE (OUTPATIENT)
Dept: RHEUMATOLOGY | Facility: CLINIC | Age: 53
End: 2018-05-31

## 2018-05-31 NOTE — TELEPHONE ENCOUNTER
Spoke with patient and scheduled him for an EMG 6-22-18 with Dr. Carroll, appointment mailed, he indicated understanding.

## 2018-05-31 NOTE — TELEPHONE ENCOUNTER
----- Message from Adelfo Singh sent at 5/31/2018 12:20 PM CDT -----  Contact: Pt  Name of Who is Calling: Varghese      What is the request in detail: Pt is calling requesting to speak with a nurse about appointment access      Can the clinic reply by [facility]: no      [facility]: [phone] (home)

## 2018-06-04 NOTE — PROGRESS NOTES
Neurosurgery History & Physical    Patient ID: Abimael Armendariz is a 53 y.o. male.    Chief Complaint   Patient presents with    Follow-up     MRI AND X-ray       Review of Systems   Constitutional: Negative for activity change, chills, fatigue and unexpected weight change.   HENT: Negative for hearing loss, tinnitus, trouble swallowing and voice change.    Eyes: Negative for visual disturbance.   Respiratory: Negative for apnea, chest tightness and shortness of breath.    Cardiovascular: Negative for chest pain and palpitations.   Gastrointestinal: Negative for abdominal pain, constipation, diarrhea, nausea and vomiting.   Genitourinary: Negative for difficulty urinating, dysuria and frequency.   Musculoskeletal: Positive for back pain and myalgias. Negative for gait problem, neck pain and neck stiffness.   Skin: Negative for wound.   Neurological: Positive for numbness. Negative for dizziness, tremors, seizures, facial asymmetry, speech difficulty, weakness, light-headedness and headaches.   Psychiatric/Behavioral: Negative for confusion and decreased concentration.       Past Medical History:   Diagnosis Date    ADHD (attention deficit hyperactivity disorder)     Anxiety     Depression     Hyperlipidemia     Hypertension     Insomnia     Mitral regurgitation      Social History     Social History    Marital status:      Spouse name: N/A    Number of children: N/A    Years of education: N/A     Occupational History     [employer]     Social History Main Topics    Smoking status: Never Smoker    Smokeless tobacco: Current User     Types: Chew    Alcohol use No    Drug use: No    Sexual activity: Yes     Partners: Female     Other Topics Concern    Not on file     Social History Narrative    No narrative on file     Family History   Problem Relation Age of Onset    Stroke Mother     Heart attack Father     Heart disease Father     Heart attack Paternal Grandfather     No Known  "Problems Sister     No Known Problems Brother     No Known Problems Daughter     No Known Problems Son     Alzheimer's disease Maternal Grandmother     No Known Problems Maternal Grandfather     No Known Problems Paternal Grandmother     No Known Problems Maternal Aunt     No Known Problems Maternal Uncle     No Known Problems Paternal Aunt     No Known Problems Paternal Uncle      Review of patient's allergies indicates:   Allergen Reactions    No known drug allergies        Current Outpatient Prescriptions:     aspirin 81 mg Tab, Take 81 mg by mouth once daily. Every day, Disp: , Rfl:     buPROPion (WELLBUTRIN XL) 150 MG TB24 tablet, Take 1 tablet (150 mg total) by mouth once daily., Disp: 90 tablet, Rfl: 0    cetirizine (ZYRTEC) 10 MG tablet, Take 10 mg by mouth once daily. Every day, Disp: , Rfl:     citalopram (CELEXA) 20 MG tablet, Take 1 tablet (20 mg total) by mouth once daily., Disp: 90 tablet, Rfl: 3    fosinopril (MONOPRIL) 40 MG tablet, Take 1 tablet (40 mg total) by mouth once daily., Disp: 90 tablet, Rfl: 3    pramipexole (MIRAPEX) 0.25 MG tablet, TAKE 1 TABLET (0.25 MG TOTAL) BY MOUTH EVERY EVENING., Disp: 90 tablet, Rfl: 3    trazodone (DESYREL) 50 MG tablet, TAKE 1 TABLET (50 MG TOTAL) BY MOUTH EVERY EVENING., Disp: 90 tablet, Rfl: 3    Vitals:    05/30/18 1447   BP: (!) 140/68   BP Location: Right arm   Patient Position: Sitting   BP Method: Medium (Manual)   Pulse: (!) 55   Weight: 91.6 kg (201 lb 15.1 oz)   Height: 5' 11" (1.803 m)       Physical Exam   Constitutional: He is oriented to person, place, and time. He appears well-developed and well-nourished.   HENT:   Head: Normocephalic and atraumatic.   Eyes: Pupils are equal, round, and reactive to light.   Neck: Normal range of motion. Neck supple.   Cardiovascular: Normal rate.    Pulmonary/Chest: Effort normal.   Abdominal: He exhibits no distension.   Musculoskeletal: Normal range of motion. He exhibits no edema. "   Neurological: He is alert and oriented to person, place, and time. He has a normal Finger-Nose-Finger Test, a normal Heel to Shin Test, a normal Romberg Test and a normal Tandem Gait Test. Gait normal.   Reflex Scores:       Tricep reflexes are 2+ on the right side and 2+ on the left side.       Bicep reflexes are 2+ on the right side and 2+ on the left side.       Brachioradialis reflexes are 2+ on the right side and 2+ on the left side.       Patellar reflexes are 2+ on the right side and 2+ on the left side.       Achilles reflexes are 2+ on the right side and 2+ on the left side.  Skin: Skin is warm and dry.   Psychiatric: He has a normal mood and affect. His speech is normal and behavior is normal. Judgment and thought content normal.   Nursing note and vitals reviewed.      Neurologic Exam     Mental Status   Oriented to person, place, and time.   Oriented to person.   Oriented to place.   Oriented to time.   Follows 3 step commands.   Attention: normal. Concentration: normal.   Speech: speech is normal   Level of consciousness: alert  Knowledge: consistent with education.   Able to name object. Able to read. Able to repeat. Able to write. Normal comprehension.     Cranial Nerves     CN II   Visual acuity: normal  Right visual field deficit: none  Left visual field deficit: none     CN III, IV, VI   Pupils are equal, round, and reactive to light.  Right pupil: Size: 3 mm. Shape: regular. Reactivity: brisk. Consensual response: intact.   Left pupil: Size: 3 mm. Shape: regular. Reactivity: brisk. Consensual response: intact.   CN III: no CN III palsy  CN VI: no CN VI palsy  Nystagmus: none   Diplopia: none  Ophthalmoparesis: none  Conjugate gaze: present    CN V   Right facial sensation deficit: none  Left facial sensation deficit: none    CN VII   Right facial weakness: none  Left facial weakness: none    CN VIII   Hearing: intact    CN IX, X   CN IX normal.   CN X normal.     CN XI   Right  sternocleidomastoid strength: normal  Left sternocleidomastoid strength: normal  Right trapezius strength: normal  Left trapezius strength: normal    CN XII   Fasciculations: absent  Tongue deviation: none    Motor Exam   Muscle bulk: normal  Overall muscle tone: normal  Right arm pronator drift: absent  Left arm pronator drift: absent    Strength   Right neck flexion: 5/5  Left neck flexion: 5/5  Right neck extension: 5/5  Left neck extension: 5/5  Right deltoid: 5/5  Left deltoid: 5/5  Right biceps: 5/5  Left biceps: 5/5  Right triceps: 5/5  Left triceps: 5/5  Right wrist flexion: 5/5  Left wrist flexion: 5/5  Right wrist extension: 5/5  Left wrist extension: 5/5  Right interossei: 5/5  Left interossei: 5/5  Right abdominals: 5/5  Left abdominals: 5/5  Right iliopsoas: 5/5  Left iliopsoas: 5/5  Right quadriceps: 5/5  Left quadriceps: 5/5  Right hamstrin/5  Left hamstrin/5  Right glutei: 5/5  Left glutei: 5/5  Right anterior tibial: 5/5  Left anterior tibial: 5/5  Right posterior tibial: 5/5  Left posterior tibial: 5/5  Right peroneal: 5/5  Left peroneal: 5/5  Right gastroc: 5/5  Left gastroc: 5/5    Sensory Exam   Right arm light touch: normal  Left arm light touch: normal  Right leg light touch: normal  Left leg light touch: normal  Right arm vibration: normal  Left arm vibration: normal  Right arm pinprick: normal  Left arm pinprick: normal  Sensory deficit distribution on left: S1    Gait, Coordination, and Reflexes     Gait  Gait: normal    Coordination   Romberg: negative  Finger to nose coordination: normal  Heel to shin coordination: normal  Tandem walking coordination: normal    Tremor   Resting tremor: absent  Intention tremor: absent  Action tremor: absent    Reflexes   Right brachioradialis: 2+  Left brachioradialis: 2+  Right biceps: 2+  Left biceps: 2+  Right triceps: 2+  Left triceps: 2+  Right patellar: 2+  Left patellar: 2+  Right achilles: 2+  Left achilles: 2+  Right Beltran: absent  Left  "Beltran: absent  Right ankle clonus: absent  Left ankle clonus: absent      Provider dictation:  53 year old male with history of anxiety, depression and hypertesnion presents for follow up of lower back and left leg pain after undergoing and MRI and xrays of the lumbar spine.  He has not had any significant changes since my last visit.  Per my last note:  "Pain started 1 month ago after pulling a 100-150lb weight at work.  He tore his left calf muscle for which he has seen orthopedics (Dr. Reich) for and it has healed.  He has continue to have left lower back pain with radiation to the left lateral leg.  It is associated with numbness/ tingling in the left plantar surface of the foot.  Aleve helps some with pain.  He has not had PT or NICOLE."  Oswestry score: 22%.  PHQ:  0.    He has decreased sensation a left L5 and S1 distribution with hyperesthesias in the left foot.    I have reviewed xrays and an MRI lumbar spine from 5-28-18 demonstrating:  Good alignment of the spine without evidence of instability.  There is mild multilevel lumbar spondylosis with mild disc hernias and mild facet arthropathy.  There is no significant left sided foraminal narrowing seen.    Mr. Armendariz likely has left leg radicular pain in a left L5, S1 distribution, without focal left sided foraminal narrowing in the lumbar spine to explain his symptoms .  He has numbness on exam.  Pain may be myofascial.  The only further test to assess for cause of leg symptoms is an EMG NCV test - to determine if there is active lumbar radiculoapthy vs any peripheral nerve damage associated with the calf injury.  We will seek worker's comp authorization for the nerve testing.  PT could help back pain.  Follow up after nerve testing to discuss results and further recommendation.    Visit Diagnosis:  Acute left-sided low back pain with left-sided sciatica  -     EMG W/ ULTRASOUND AND NERVE CONDUCTION TEST 2 Extremities; Future    Numbness of left foot  -  "    EMG W/ ULTRASOUND AND NERVE CONDUCTION TEST 2 Extremities; Future    Lumbar spondylosis  -     EMG W/ ULTRASOUND AND NERVE CONDUCTION TEST 2 Extremities; Future        Total time spent counseling greater than fifty percent of total visit time.  Counseling included discussion regarding imaging findings, diagnosis possibilities, treatment options, risks and benefits.   The patient had many questions regarding the options and long-term effects.

## 2018-06-22 ENCOUNTER — OFFICE VISIT (OUTPATIENT)
Dept: PHYSICAL MEDICINE AND REHAB | Facility: CLINIC | Age: 53
End: 2018-06-22
Payer: COMMERCIAL

## 2018-06-22 ENCOUNTER — TELEPHONE (OUTPATIENT)
Dept: SPINE | Facility: CLINIC | Age: 53
End: 2018-06-22

## 2018-06-22 DIAGNOSIS — M47.816 LUMBAR SPONDYLOSIS: ICD-10-CM

## 2018-06-22 DIAGNOSIS — M54.42 ACUTE LEFT-SIDED LOW BACK PAIN WITH LEFT-SIDED SCIATICA: ICD-10-CM

## 2018-06-22 DIAGNOSIS — R20.0 NUMBNESS OF LEFT FOOT: ICD-10-CM

## 2018-06-22 PROCEDURE — 95886 MUSC TEST DONE W/N TEST COMP: CPT | Mod: S$GLB,,, | Performed by: PHYSICAL MEDICINE & REHABILITATION

## 2018-06-22 PROCEDURE — 95908 NRV CNDJ TST 3-4 STUDIES: CPT | Mod: S$GLB,,, | Performed by: PHYSICAL MEDICINE & REHABILITATION

## 2018-06-22 PROCEDURE — 99499 UNLISTED E&M SERVICE: CPT | Mod: S$GLB,,, | Performed by: PHYSICAL MEDICINE & REHABILITATION

## 2018-06-22 NOTE — PROGRESS NOTES
[facility]  Elizabeth Carroll D.O.  Physical Medicine and Rehab  Phone: [phone]  Fax: [phone]              Patient: Kala Varghese   Patient ID: [id number]   Sex:     Date of Birth:     Age:     Notes:     Last visit date: 6/22/2018             Visit date and time: 6/22/2018 07:49   Patient Age on Visit Date:     Referring Physician:     Diagnoses:               Sensory NCS      Nerve / Sites Rec. Site Onset Lat Peak Lat Ref. NP Amp Ref. PP Amp Ref. Segments Distance Velocity     ms ms ms µV µV µV µV  cm m/s   L Sural - Ankle (Calf)      Calf Ankle 3.07 3.96 ?4.20 10.7 ?5.0 10.1 ?5.0 Calf - Ankle 14 46   L Superficial peroneal - Ankle      Lat leg Ankle 1.67 2.55 ?4.40 37.4 ?5.0 21.8 ?5.0 Lat leg - Ankle 14 84           Motor NCS      Nerve / Sites Muscle Latency Ref. Amplitude Ref. Amp % Duration Segments Distance Lat Diff Velocity Ref.     ms ms mV mV % ms  cm ms m/s m/s   L Peroneal - EDB      Ankle EDB 3.96 ?6.20 5.5 ?2.0 100 6.56 Ankle - EDB 8         Fib head EDB 12.76  5.2  94.8 7.14 Fib head - Ankle 36 8.80 41 ?39      Pop fossa EDB 14.06  5.1  92.8  Pop fossa - Fib head 10 1.30 77 ?39   L Tibial - AH      Ankle AH 3.44 ?6.00 7.0 ?3.0 100 6.41 Ankle - AH 8         Pop fossa AH 11.51  2.7  38.1  Pop fossa - Ankle 34 8.07 42 ?39           F  Wave      Nerve Fmin Ref.    ms ms   L Tibial - AH 56.77 ?58.00           EMG          EMG Summary Table     Spontaneous MUAP Recruitment   Muscle IA Fib PSW Fasc H.F. Amp Dur. PPP Pattern   L. Vastus lateralis N None None None None 1+ 1+ 1+ Reduced   L. Biceps femoris (short head) N None None None None 1+ 1+ 1+ Reduced   L. Gastrocnemius (Medial head) N None None None None 1+ 1+ 1+ Reduced   L. Tibialis anterior N None None None None 1+ 1+ 1+ Reduced   L. Gluteus medius N None None None None 1+ 1+ 1+ Reduced   L. Lumbar paraspinals (mid) 1+ None None None None N N N N   L. Lumbar paraspinals (low) 1+ None None None None N N N N           Summary    The  motor conduction test was normal in all 2 of the tested nerves: L Peroneal - EDB, L Tibial - AH.    The sensory conduction test was normal in all 2 of the tested nerves: L Sural - Ankle (Calf), L Superficial peroneal - Ankle.    The F wave study was normal in all 1 of the tested nerves: L Tibial - AH.    The needle EMG study was abnormal in all 7 tested muscles.   Abnormal spontaneous/insertional activity was found in L. Lumbar paraspinals (mid), L. Lumbar paraspinals (low).   The MUP waveform abnormality was found in L. Vastus lateralis, L. Biceps femoris (short head), L. Gastrocnemius (Medial head), L. Tibialis anterior, L. Gluteus medius.   Abnormal interference pattern was found in L. Vastus lateralis, L. Biceps femoris (short head), L. Gastrocnemius (Medial head), L. Tibialis anterior, L. Gluteus medius.              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

## 2018-06-22 NOTE — TELEPHONE ENCOUNTER
Spoke with Abdirizak Kala and scheduled an appointment for the patient to see Faby Osorio 6-28-18 to discuss results of EMG, she indicated understanding.

## 2018-06-22 NOTE — LETTER
June 22, 2018      Faby Oosrio PA-C  [address] [facility] [address]  [address]  [facility] [address]           [facility]  [address] [place] [address]  [address]  [facility] [address]  Phone: [phone]  Fax: [phone]          Patient: Abimael Armendariz   MR Number: [id number]   YOB: 1965   Date of Visit: 6/22/2018       Dear Faby Osorio:    Thank you for referring Abimael Armendariz to me for evaluation. Attached you will find relevant portions of my assessment and plan of care.    If you have questions, please do not hesitate to call me. I look forward to following Aibmael Armendariz along with you.    Sincerely,    Elizabeth Carroll,     Enclosure  CC:  No Recipients    If you would like to receive this communication electronically, please contact [email or web address] or [phone] to request more information on [employer] access.    For providers and/or their staff who would like to refer a patient to [facility], please contact us through our one-stop-shop provider referral line, [facility], at [phone].    If you feel you have received this communication in error or would no longer like to receive these types of communications, please e-mail [email or web address]

## 2018-06-22 NOTE — TELEPHONE ENCOUNTER
----- Message from Faby Osorio PA-C sent at 6/22/2018  1:10 PM CDT -----  Patient is workers comp.     He has had nerve testing with Dr. Carroll.    He needs a follow up in clinic to discuss the results and final recommendations.  Please schedule appointment.    Thanks.

## 2018-06-28 ENCOUNTER — OFFICE VISIT (OUTPATIENT)
Dept: SPINE | Facility: CLINIC | Age: 53
End: 2018-06-28
Payer: COMMERCIAL

## 2018-06-28 ENCOUNTER — OFFICE VISIT (OUTPATIENT)
Dept: ORTHOPEDICS | Facility: CLINIC | Age: 53
End: 2018-06-28
Payer: COMMERCIAL

## 2018-06-28 VITALS
WEIGHT: 201.94 LBS | DIASTOLIC BLOOD PRESSURE: 70 MMHG | BODY MASS INDEX: 28.14 KG/M2 | HEIGHT: 71 IN | BODY MASS INDEX: 28.27 KG/M2 | SYSTOLIC BLOOD PRESSURE: 116 MMHG | HEART RATE: 72 BPM | WEIGHT: 201 LBS | HEIGHT: 71 IN

## 2018-06-28 DIAGNOSIS — M79.605 LEFT LEG PAIN: ICD-10-CM

## 2018-06-28 DIAGNOSIS — Z71.2 ENCOUNTER TO DISCUSS TEST RESULTS: Primary | ICD-10-CM

## 2018-06-28 DIAGNOSIS — S89.90XA INJURY OF CALF: ICD-10-CM

## 2018-06-28 DIAGNOSIS — M54.42 ACUTE LEFT-SIDED LOW BACK PAIN WITH LEFT-SIDED SCIATICA: Primary | ICD-10-CM

## 2018-06-28 DIAGNOSIS — R20.0 NUMBNESS OF LEFT FOOT: ICD-10-CM

## 2018-06-28 DIAGNOSIS — R20.0 NUMBNESS: ICD-10-CM

## 2018-06-28 PROCEDURE — 99999 PR PBB SHADOW E&M-EST. PATIENT-LVL II: CPT | Mod: PBBFAC,,, | Performed by: ORTHOPAEDIC SURGERY

## 2018-06-28 PROCEDURE — 99213 OFFICE O/P EST LOW 20 MIN: CPT | Mod: S$GLB,,, | Performed by: ORTHOPAEDIC SURGERY

## 2018-06-28 PROCEDURE — 99213 OFFICE O/P EST LOW 20 MIN: CPT | Mod: S$GLB,,, | Performed by: PHYSICIAN ASSISTANT

## 2018-06-28 PROCEDURE — 99999 PR PBB SHADOW E&M-EST. PATIENT-LVL III: CPT | Mod: PBBFAC,,, | Performed by: PHYSICIAN ASSISTANT

## 2018-06-28 NOTE — PROGRESS NOTES
Ms. Armendariz complaining of numbness in the plantar aspect of his left foot, has   been present now for several weeks' time after an injury that he had sustained   to his calf, who recently got a nerve conduction study, which showed   polyradiculopathies at L4, L5, and S1 with no active denervation.  At this   point, we are going to continue the basic conservative care, observation,   symptomatic care.  Recommend exercise for his back.  He did have MRI of his   lumbar spine already and we will see him back in several weeks' time, possibly   refer him for a second opinion elsewhere if needed.      AUDRA/ESTER  dd: 06/28/2018 14:28:45 (CDT)  td: 06/29/2018 01:39:08 (CDT)  Doc ID   #[id number]  Job ID #[id number]    CC:

## 2018-06-28 NOTE — PROGRESS NOTES
Neurosurgery History & Physical    Patient ID: Abimael Armendariz is a 53 y.o. male.    Chief Complaint   Patient presents with    Follow-up     EMG       Review of Systems   Constitutional: Negative for activity change, chills, fatigue and unexpected weight change.   HENT: Negative for hearing loss, tinnitus, trouble swallowing and voice change.    Eyes: Negative for visual disturbance.   Respiratory: Negative for apnea, chest tightness and shortness of breath.    Cardiovascular: Negative for chest pain and palpitations.   Gastrointestinal: Negative for abdominal pain, constipation, diarrhea, nausea and vomiting.   Genitourinary: Negative for difficulty urinating, dysuria and frequency.   Musculoskeletal: Positive for back pain and myalgias. Negative for gait problem, neck pain and neck stiffness.   Skin: Negative for wound.   Neurological: Positive for numbness. Negative for dizziness, tremors, seizures, facial asymmetry, speech difficulty, weakness, light-headedness and headaches.   Psychiatric/Behavioral: Negative for confusion and decreased concentration.       Past Medical History:   Diagnosis Date    ADHD (attention deficit hyperactivity disorder)     Anxiety     Depression     Hyperlipidemia     Hypertension     Insomnia     Mitral regurgitation      Social History     Social History    Marital status:      Spouse name: N/A    Number of children: N/A    Years of education: N/A     Occupational History     [employer]     Social History Main Topics    Smoking status: Never Smoker    Smokeless tobacco: Current User     Types: Chew    Alcohol use No    Drug use: No    Sexual activity: Yes     Partners: Female     Other Topics Concern    Not on file     Social History Narrative    No narrative on file     Family History   Problem Relation Age of Onset    Stroke Mother     Heart attack Father     Heart disease Father     Heart attack Paternal Grandfather     No Known Problems  "Sister     No Known Problems Brother     No Known Problems Daughter     No Known Problems Son     Alzheimer's disease Maternal Grandmother     No Known Problems Maternal Grandfather     No Known Problems Paternal Grandmother     No Known Problems Maternal Aunt     No Known Problems Maternal Uncle     No Known Problems Paternal Aunt     No Known Problems Paternal Uncle      Review of patient's allergies indicates:   Allergen Reactions    No known drug allergies        Current Outpatient Prescriptions:     aspirin 81 mg Tab, Take 81 mg by mouth once daily. Every day, Disp: , Rfl:     buPROPion (WELLBUTRIN XL) 150 MG TB24 tablet, Take 1 tablet (150 mg total) by mouth once daily., Disp: 90 tablet, Rfl: 0    cetirizine (ZYRTEC) 10 MG tablet, Take 10 mg by mouth once daily. Every day, Disp: , Rfl:     citalopram (CELEXA) 20 MG tablet, Take 1 tablet (20 mg total) by mouth once daily., Disp: 90 tablet, Rfl: 3    fosinopril (MONOPRIL) 40 MG tablet, Take 1 tablet (40 mg total) by mouth once daily., Disp: 90 tablet, Rfl: 3    pramipexole (MIRAPEX) 0.25 MG tablet, TAKE 1 TABLET (0.25 MG TOTAL) BY MOUTH EVERY EVENING., Disp: 90 tablet, Rfl: 3    trazodone (DESYREL) 50 MG tablet, TAKE 1 TABLET (50 MG TOTAL) BY MOUTH EVERY EVENING., Disp: 90 tablet, Rfl: 3    Vitals:    06/28/18 1352   BP: 116/70   BP Location: Right arm   Patient Position: Sitting   BP Method: Large (Automatic)   Pulse: 72   Weight: 91.6 kg (201 lb 15.1 oz)   Height: 5' 11" (1.803 m)       Physical Exam   Constitutional: He is oriented to person, place, and time. He appears well-developed and well-nourished.   HENT:   Head: Normocephalic and atraumatic.   Eyes: Pupils are equal, round, and reactive to light.   Neck: Normal range of motion. Neck supple.   Cardiovascular: Normal rate.    Pulmonary/Chest: Effort normal.   Abdominal: He exhibits no distension.   Musculoskeletal: Normal range of motion. He exhibits no edema.   Neurological: He is " alert and oriented to person, place, and time. He has a normal Finger-Nose-Finger Test, a normal Heel to Shin Test, a normal Romberg Test and a normal Tandem Gait Test. Gait normal.   Reflex Scores:       Tricep reflexes are 2+ on the right side and 2+ on the left side.       Bicep reflexes are 2+ on the right side and 2+ on the left side.       Brachioradialis reflexes are 2+ on the right side and 2+ on the left side.       Patellar reflexes are 2+ on the right side and 2+ on the left side.       Achilles reflexes are 2+ on the right side and 2+ on the left side.  Skin: Skin is warm and dry.   Psychiatric: He has a normal mood and affect. His speech is normal and behavior is normal. Judgment and thought content normal.   Nursing note and vitals reviewed.      Neurologic Exam     Mental Status   Oriented to person, place, and time.   Oriented to person.   Oriented to place.   Oriented to time.   Follows 3 step commands.   Attention: normal. Concentration: normal.   Speech: speech is normal   Level of consciousness: alert  Knowledge: consistent with education.   Able to name object. Able to read. Able to repeat. Able to write. Normal comprehension.     Cranial Nerves     CN II   Visual acuity: normal  Right visual field deficit: none  Left visual field deficit: none     CN III, IV, VI   Pupils are equal, round, and reactive to light.  Right pupil: Size: 3 mm. Shape: regular. Reactivity: brisk. Consensual response: intact.   Left pupil: Size: 3 mm. Shape: regular. Reactivity: brisk. Consensual response: intact.   CN III: no CN III palsy  CN VI: no CN VI palsy  Nystagmus: none   Diplopia: none  Ophthalmoparesis: none  Conjugate gaze: present    CN V   Right facial sensation deficit: none  Left facial sensation deficit: none    CN VII   Right facial weakness: none  Left facial weakness: none    CN VIII   Hearing: intact    CN IX, X   CN IX normal.   CN X normal.     CN XI   Right sternocleidomastoid strength:  normal  Left sternocleidomastoid strength: normal  Right trapezius strength: normal  Left trapezius strength: normal    CN XII   Fasciculations: absent  Tongue deviation: none    Motor Exam   Muscle bulk: normal  Overall muscle tone: normal  Right arm pronator drift: absent  Left arm pronator drift: absent    Strength   Right neck flexion: 5/5  Left neck flexion: 5/5  Right neck extension: 5/5  Left neck extension: 5/5  Right deltoid: 5/5  Left deltoid: 5/5  Right biceps: 5/5  Left biceps: 5/5  Right triceps: 5/5  Left triceps: 5/5  Right wrist flexion: 5/5  Left wrist flexion: 5/5  Right wrist extension: 5/5  Left wrist extension: 5/5  Right interossei: 5/5  Left interossei: 5/5  Right abdominals: 5/5  Left abdominals: 5/5  Right iliopsoas: 5/5  Left iliopsoas: 5/  Right quadriceps: 5/5  Left quadriceps: 5/5  Right hamstrin/5  Left hamstrin/5  Right glutei: 5/5  Left glutei: 5/5  Right anterior tibial: 5/5  Left anterior tibial: 5/5  Right posterior tibial: 5/5  Left posterior tibial: 5/5  Right peroneal: 5/5  Left peroneal: 5/5  Right gastroc: 5/5  Left gastroc: 5/5    Sensory Exam   Right arm light touch: normal  Left arm light touch: normal  Right leg light touch: normal  Left leg light touch: normal  Right arm vibration: normal  Left arm vibration: normal  Right arm pinprick: normal  Left arm pinprick: normal  Sensory deficit distribution on left: S1    Gait, Coordination, and Reflexes     Gait  Gait: normal    Coordination   Romberg: negative  Finger to nose coordination: normal  Heel to shin coordination: normal  Tandem walking coordination: normal    Tremor   Resting tremor: absent  Intention tremor: absent  Action tremor: absent    Reflexes   Right brachioradialis: 2+  Left brachioradialis: 2+  Right biceps: 2+  Left biceps: 2+  Right triceps: 2+  Left triceps: 2+  Right patellar: 2+  Left patellar: 2+  Right achilles: 2+  Left achilles: 2+  Right Beltran: absent  Left Beltran: absent  Right ankle  "clonus: absent  Left ankle clonus: absent      Provider dictation:  53 year old male with history of anxiety, depression and hypertesnion presents for follow up of lower back and left leg pain after undergoing nerve testing.  He has not had any significant changes since his last visit.  Recall:  Pain started in April 2018 after pulling a 100-150lb weight at work.  He tore his left calf muscle for which he has seen orthopedics (Dr. Reich) for and it has healed.  He has continue to have left lower back pain with radiation to the left lateral leg.  It is associated with numbness/ tingling in the left plantar surface of the foot.  Aleve helps some with pain.  He has not had PT or NICOLE."  Oswestry score: 22%.  PHQ:  0.    He has decreased sensation a left L5 and S1 distribution with hyperesthesias in the left foot.    I have reviewed xrays and an MRI lumbar spine from 5-28-18 demonstrating:  Good alignment of the spine without evidence of instability.  There is mild multilevel lumbar spondylosis with mild disc hernias and mild facet arthropathy.  There is no significant left sided foraminal narrowing seen.    I have reviewed an EMG/ NCV study of the bilateral lower extremities from 6-22-18 revealing chronic left mild to moderate multilevel polyradiculopathies at L4/L5/S1 with NO active denervation.    Mr. Armendariz likely has left leg radicular pain in a left L5, S1 distribution and numbness left foot, without focal explanation from nerve pathology as there is no left sided foraminal narrowing in the lumbar spine nor active ongoing lumbar radiculopathy to explain his symptoms. Pain likely myofascial and he can follow up with his PCP for other causes of foot numbness.  Numbness possibly related to calf injury, but no peripheral nerve damage was seen on nerve testing. I recommend PT to help with pain.  if no improvement we can refer to pain management, but surgical intervention is not warranted.   Visit Diagnosis:  There " are no diagnoses linked to this encounter.    Total time spent counseling greater than fifty percent of total visit time.  Counseling included discussion regarding imaging findings, diagnosis possibilities, treatment options, risks and benefits.   The patient had many questions regarding the options and long-term effects.

## 2018-06-29 ENCOUNTER — TELEPHONE (OUTPATIENT)
Dept: FAMILY MEDICINE | Facility: CLINIC | Age: 53
End: 2018-06-29

## 2018-06-29 NOTE — TELEPHONE ENCOUNTER
Spoke to patient 6/28/18- he was advised to have his wc dept contact our wc dept to approve a visit and get back with us.

## 2018-06-29 NOTE — TELEPHONE ENCOUNTER
----- Message from Marcos Alan sent at 6/28/2018  2:27 PM CDT -----  Contact: Patient  Type:  Sooner Apoointment Request    Caller is requesting a sooner appointment.  Caller declined first available appointment listed below.  Caller will not accept being placed on the waitlist and is requesting a message be sent to doctor.    Name of Caller:  Varghese  When is the first available appointment?  Currently scheduled 10/5  Symptoms:  f/u  Best Call Back Number:  [phone]  Additional Information:  Would like sometime in between 7/10 and 7/23. Out on workers comp and was told by Dr. Reich to follow up with his PCP

## 2018-07-10 ENCOUNTER — TELEPHONE (OUTPATIENT)
Dept: ADMINISTRATIVE | Facility: OTHER | Age: 53
End: 2018-07-10

## 2018-07-10 NOTE — TELEPHONE ENCOUNTER
----- Message from Amy Dooley sent at 7/10/2018  8:29 AM CDT -----  Good morning,    Received letter from Adj Christina Whatley @ [facility] clarifying actual body parts covered on claim#[id number]  Left leg/left knee-for 4/9/18 injury--left foot/low back not compensable on this claim per Christina  Letter Scanned in Media tab of chart for review      Thanks  Ivan  [facility]

## 2018-07-12 DIAGNOSIS — Z12.11 COLON CANCER SCREENING: Primary | ICD-10-CM

## 2018-07-12 DIAGNOSIS — Z11.59 NEED FOR HEPATITIS C SCREENING TEST: ICD-10-CM

## 2018-07-16 ENCOUNTER — TELEPHONE (OUTPATIENT)
Dept: SPINE | Facility: CLINIC | Age: 53
End: 2018-07-16

## 2018-07-16 NOTE — TELEPHONE ENCOUNTER
Spoke with patient and he said due to the way the clinic notes were worded Worker's Comp is denying his case. He will email me the letter he received so I can show it to Faby and set the record straight.

## 2018-07-16 NOTE — TELEPHONE ENCOUNTER
----- Message from Edel Maciel sent at 7/16/2018  4:29 PM CDT -----  Contact: self [phone]  Please call him regarding the workers comp forms that you completed.  He said there was a discrepancy.  Thank you!

## 2018-07-17 ENCOUNTER — OFFICE VISIT (OUTPATIENT)
Dept: FAMILY MEDICINE | Facility: CLINIC | Age: 53
End: 2018-07-17
Attending: FAMILY MEDICINE
Payer: COMMERCIAL

## 2018-07-17 VITALS
RESPIRATION RATE: 17 BRPM | DIASTOLIC BLOOD PRESSURE: 58 MMHG | TEMPERATURE: 98 F | WEIGHT: 203.06 LBS | HEART RATE: 78 BPM | BODY MASS INDEX: 28.43 KG/M2 | SYSTOLIC BLOOD PRESSURE: 122 MMHG | OXYGEN SATURATION: 95 % | HEIGHT: 71 IN

## 2018-07-17 DIAGNOSIS — G57.92 NEUROPATHY OF FOOT, LEFT: Primary | ICD-10-CM

## 2018-07-17 DIAGNOSIS — T79.A22A TRAUMATIC COMPARTMENT SYNDROME OF LEFT LOWER EXTREMITY, INITIAL ENCOUNTER: ICD-10-CM

## 2018-07-17 PROCEDURE — 3074F SYST BP LT 130 MM HG: CPT | Mod: CPTII,S$GLB,, | Performed by: FAMILY MEDICINE

## 2018-07-17 PROCEDURE — 3008F BODY MASS INDEX DOCD: CPT | Mod: CPTII,S$GLB,, | Performed by: FAMILY MEDICINE

## 2018-07-17 PROCEDURE — 99999 PR PBB SHADOW E&M-EST. PATIENT-LVL IV: CPT | Mod: PBBFAC,,, | Performed by: FAMILY MEDICINE

## 2018-07-17 PROCEDURE — 3078F DIAST BP <80 MM HG: CPT | Mod: CPTII,S$GLB,, | Performed by: FAMILY MEDICINE

## 2018-07-17 PROCEDURE — 99214 OFFICE O/P EST MOD 30 MIN: CPT | Mod: S$GLB,,, | Performed by: FAMILY MEDICINE

## 2018-07-17 RX ORDER — GABAPENTIN 300 MG/1
300 CAPSULE ORAL 3 TIMES DAILY
Qty: 90 CAPSULE | Refills: 5 | Status: SHIPPED | OUTPATIENT
Start: 2018-07-17 | End: 2018-08-27 | Stop reason: SINTOL

## 2018-07-17 NOTE — PROGRESS NOTES
Subjective:       Patient ID: Abimael Armendariz is a 53 y.o. male.    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  He felt an immediate pain in his left calf soon followed by numbness and burning in the sole of the left foot.  He dropped the load, got off the foot elevated and iced it.  A video conference with a medical office resulted in instructions to keep it elevated and iced and it took 4 days to get him out of the area he was in and another 5 days to get him home where Dr. Reich saw him on April 18.  At that time he reported only some mild swelling, most of the swelling having resolved in the interim.  Examination was relatively unremarkable.  An MRI of the lumbar spine was done which showed some diffuse disease mostly mild to moderate or moderate and mostly involving the right lumbar neural foramina.  There was some mild neural foraminal narrowing on several left-sided nerves most notably the S1.  When the patient continued having discomfort in his left foot including numbness and paresthesias across a nerve conduction was done by Dr. Carroll.  This showed L4, L5, and S1 polyneuropathy on the left.  The patient has been told to await spontaneous resolution.    Past Medical History:  No date: ADHD (attention deficit hyperactivity disorder)  No date: Anxiety  No date: Depression  No date: Hyperlipidemia  No date: Hypertension  No date: Insomnia  No date: Mitral regurgitation    Past Surgical History:  2003: KNEE ARTHROSCOPY W/ ACL RECONSTRUCTION AND HAM*      Comment: right    Current Outpatient Prescriptions on File Prior to Visit:  aspirin 81 mg Tab, Take 81 mg by mouth once daily. Every day, Disp: , Rfl:   buPROPion (WELLBUTRIN XL) 150 MG TB24 tablet, Take 1 tablet (150 mg total) by mouth once daily., Disp: 90 tablet, Rfl:  0  cetirizine (ZYRTEC) 10 MG tablet, Take 10 mg by mouth once daily. Every day, Disp: , Rfl:   fosinopril (MONOPRIL) 40 MG tablet, Take 1 tablet (40 mg total) by mouth once daily., Disp: 90 tablet, Rfl: 3  pramipexole (MIRAPEX) 0.25 MG tablet, TAKE 1 TABLET (0.25 MG TOTAL) BY MOUTH EVERY EVENING., Disp: 90 tablet, Rfl: 3  trazodone (DESYREL) 50 MG tablet, TAKE 1 TABLET (50 MG TOTAL) BY MOUTH EVERY EVENING., Disp: 90 tablet, Rfl: 3  (DISCONTINUED) citalopram (CELEXA) 20 MG tablet, Take 1 tablet (20 mg total) by mouth once daily., Disp: 90 tablet, Rfl: 3    No current facility-administered medications on file prior to visit.           Review of Systems   Constitutional: Positive for activity change. Negative for unexpected weight change.   HENT: Positive for rhinorrhea. Negative for hearing loss and trouble swallowing.    Eyes: Negative for discharge and visual disturbance.   Respiratory: Negative for chest tightness and wheezing.    Cardiovascular: Negative for chest pain and palpitations.   Gastrointestinal: Negative for blood in stool, constipation, diarrhea and vomiting.   Endocrine: Negative for polydipsia and polyuria.   Genitourinary: Negative for difficulty urinating, hematuria and urgency.   Musculoskeletal: Negative for arthralgias, joint swelling and neck pain.   Neurological: Positive for numbness (with paresthesias, tingling, burning). Negative for weakness and headaches.   Psychiatric/Behavioral: Negative for confusion and dysphoric mood.       Objective:      Physical Exam   Constitutional: He is oriented to person, place, and time. He appears well-developed. No distress.   Good blood pressure control  Mildly overweight with a BMI of 28.3 he is down 6.1 pounds from his last visit with me April 15, 2015   Musculoskeletal:        Left knee: Normal.        Left ankle: Normal.        Left lower leg: He exhibits no tenderness, no bony tenderness, no swelling, no edema and no deformity.        Left foot:  There is decreased range of motion and swelling (Very mild relative to the right side). There is no tenderness, no bony tenderness, normal capillary refill, no crepitus and no deformity.   Neurological: He is alert and oriented to person, place, and time. He has normal strength. A sensory deficit (Decreased monofilament sensation over the plantar aspect of the left foot with no feeling at the heel and paresthesias and tingling over the forefoot and up the lateral left foot) is present.   No foot drop noted   Skin: He is not diaphoretic.   Nursing note and vitals reviewed.      Assessment:       1. Neuropathy of foot, left    2. Traumatic compartment syndrome of left lower extremity, initial encounter        Plan:       1. Neuropathy of foot, left  I suspect he is suffering from the aftermath of an unrecognized compartment syndrome.  We'll try to get him some relief of his paresthesias with gabapentin.  If not tolerated we can try Cymbalta and/or amitriptyline.  He seems to be making some slow improvement at this time and I expect that to continue over a period of months  - gabapentin (NEURONTIN) 300 MG capsule; Take 1 capsule (300 mg total) by mouth 3 (three) times daily.  Dispense: 90 capsule; Refill: 5  - Ambulatory referral to Neurology    2. Traumatic compartment syndrome of left lower extremity, initial encounter  - Ambulatory referral to Neurology

## 2018-07-23 ENCOUNTER — TELEPHONE (OUTPATIENT)
Dept: NEUROLOGY | Facility: CLINIC | Age: 53
End: 2018-07-23

## 2018-07-23 ENCOUNTER — OFFICE VISIT (OUTPATIENT)
Dept: NEUROLOGY | Facility: CLINIC | Age: 53
End: 2018-07-23
Attending: FAMILY MEDICINE
Payer: COMMERCIAL

## 2018-07-23 VITALS
HEIGHT: 71 IN | HEART RATE: 61 BPM | DIASTOLIC BLOOD PRESSURE: 84 MMHG | SYSTOLIC BLOOD PRESSURE: 143 MMHG | WEIGHT: 208 LBS | BODY MASS INDEX: 29.12 KG/M2 | RESPIRATION RATE: 17 BRPM

## 2018-07-23 DIAGNOSIS — G57.92 NEUROPATHY OF LEFT LOWER EXTREMITY: Primary | ICD-10-CM

## 2018-07-23 DIAGNOSIS — R20.0 NUMBNESS OF LEFT FOOT: Primary | ICD-10-CM

## 2018-07-23 PROCEDURE — 99245 OFF/OP CONSLTJ NEW/EST HI 55: CPT | Mod: S$GLB,,, | Performed by: PSYCHIATRY & NEUROLOGY

## 2018-07-23 PROCEDURE — 99999 PR PBB SHADOW E&M-EST. PATIENT-LVL IV: CPT | Mod: PBBFAC,,, | Performed by: PSYCHIATRY & NEUROLOGY

## 2018-07-23 NOTE — PATIENT INSTRUCTIONS
See if your primary doctors office can get your MRI of the leg downloaded into the system for Dr. Gee to view.  If not, it will have to be brought to the neurology clinic.    If your foot sensation is not improved in 3-4 weeks, would advise a repeat EMG of the left foot with comparison to the right foot.  This will be done in the neurology clinic here.

## 2018-07-23 NOTE — LETTER
July 23, 2018      Sammy Trinidad MD  [address] [facility] [address]           [facility]  [address] [facility] [address] [place] [address]  Phone: [phone]  Fax: [phone]          Patient: Abimael Armendariz   MR Number: [id number]   YOB: 1965   Date of Visit: 7/23/2018       Dear Dr. Sammy Trinidad:    Thank you for referring Abimael Armendariz to me for evaluation. Attached you will find relevant portions of my assessment and plan of care.    If you have questions, please do not hesitate to call me. I look forward to following Abimael Armendariz along with you.    Sincerely,    Jennifer Gee,     Enclosure  CC:  No Recipients    If you would like to receive this communication electronically, please contact [email or web address] or [phone] to request more information on [employer] access.    For providers and/or their staff who would like to refer a patient to [facility], please contact us through our one-stop-shop provider referral line, [facility], at [phone].    If you feel you have received this communication in error or would no longer like to receive these types of communications, please e-mail [email or web address]

## 2018-07-23 NOTE — PROGRESS NOTES
NEUROLOGY  Outpatient CONSULT    [facility]  [address], [place] [address]  [phone] (office) / [phone] (fax)    Patient Name:  Abimael Armendariz  :  1965  MR #:  [id number]  Acct #:  [id number]    Date of Neurology Consult: 2018  Name of Neurologist: Jennifer Gee D.O, ABPN, AOBNP, ABEM    Other Physicians:  Sammy Trinidad MD (Primary Care Physician); Sammy Trinidad MD (Referring)      Chief Complaint: Peripheral Neuropathy (post injury (calf muscle tear) 18)      History of Present Illness (HPI):  Abimael Armendariz is a 53 y.o. male referred here by his primary physician for consultation regarding left foot numbness.    On , he was lifting heavy equipment and felt a tear in his left calf.  He could not walk on it.  He was in serious pain, like he was shot in the calf.  His leg swelled significantly.  He was seen by a doctor on telemedicine near the job he was working and was sent for an MRI.  He reports this revealed the tear in the calf muscle.  He states he told people that his foot was numb, but there was no further evaluation of this at the time.  His foot has remained numb and in fact he feels like it may be worse.  The more he walks or stands on it, the more it hurts.  This improves when he gets off his feet.  It is not a sharp pain, it is like his foot is sore.  He has pins and needles as well as burning at night when trying to sleep.  Sometimes he notices this when walking as well.  He states he could not feel on the bottom of his feet.  The other foot is fine.  He does not recall having back pain with his injury.    His leg swelled significantly, he felt that the skin became extremely tight.      He feels like the bottom of his foot is 3 inches thick.    He has no weakness.    He tried Gabapentin, but this made him feel funny at night.  He started 300mg at night, but did not like this.    Treatment to date:    Gabapentin    Review  of Systems:   General: Weight gain: No, Weight Loss: No, Fatigue: No,   Fever: No, Chills: No, Night Sweats: No, Insomnia: No, Excessive sleeping: No   Respiratory:  Cough: No, Shortness of Breath: No,   Wheezing: No, Excessive Snoring: Yes, Coughing up blood: No  Endocrine: Heat Intolerance: No, Cold Intolerance: No,   Excessive Thirst: No, Excessive Hunger: No,   Eyes:  Blurred Vision: No, Double Vision: No,   Light Sensitivity: No, Eye pain: No  Musculoskeletal: Muscle Aches/Pain: No, Joint Pain/Swelling: No, Muscle Cramps: No, Muscle Weakness: No, Neck Pain: No, Back Pain: No   Neurological: Difficulty Walking/Falls: No, Headache Migraine: No, Dizziness/Vertigo: No, Fainting: No, Difficulty with Speech: No, Weakness: No, Tingling/Numbness: Yes, Tremors: No, Memory Problems: No, Seizures: No, Difficulty Swallowing: No, Altered Taste: No.  Cardiovascular: Chest Pain: No, Shortness of Breath: No,   Palpitations: No,  Gastrointestinal: Nausea/Vomiting: No, Constipation: No, Diarrhea: No, Bloody Stools: No   Psych/Cog:  Depression: No, Anxiety: No, Hallucinations: No, Problems Concentrating: No  : Frequent Urination: No, Incontinence: No, Blood of Urine: No, Urinary Infections: No, Changes in Sex Drive: No   ENT:Hearing Loss: No, Earache: No, Ringing in Ears: No,   Facial Pain: No, Chronic Congestion: No   Immune: Seasonal Allergies: No, Hives and/or Rashes: No  The remainder of the review of twelve body systems was reviewed and normal.    Past Medical, Surgical, Family & Social History:   Past Medical History:   Diagnosis Date    ADHD (attention deficit hyperactivity disorder)     Anxiety     Depression     Hyperlipidemia     Hypertension     Insomnia     Mitral regurgitation      Past Surgical History:   Procedure Laterality Date    KNEE ARTHROSCOPY W/ ACL RECONSTRUCTION AND HAMSTRING GRAFT  2003    right     Family History   Problem Relation Age of Onset    Stroke Mother     Heart attack Father      "Heart disease Father     Heart attack Paternal Grandfather     No Known Problems Sister     No Known Problems Brother     No Known Problems Daughter     No Known Problems Son     Alzheimer's disease Maternal Grandmother     No Known Problems Maternal Grandfather     No Known Problems Paternal Grandmother     No Known Problems Maternal Aunt     No Known Problems Maternal Uncle     No Known Problems Paternal Aunt     No Known Problems Paternal Uncle      Alcohol use:  reports that he does not drink alcohol.   (Of note, 0.6 oz = 1 beer or 6 oz = 10 beers).  Tobacco use:  reports that he has never smoked. His smokeless tobacco use includes Chew.  Street drug use:  reports that he does not use drugs.  Allergies: No known drug allergies.    Home Medications:     Current Outpatient Prescriptions:     aspirin 81 mg Tab, Take 81 mg by mouth once daily. Every day, Disp: , Rfl:     buPROPion (WELLBUTRIN XL) 150 MG TB24 tablet, Take 1 tablet (150 mg total) by mouth once daily., Disp: 90 tablet, Rfl: 0    cetirizine (ZYRTEC) 10 MG tablet, Take 10 mg by mouth once daily. Every day, Disp: , Rfl:     fosinopril (MONOPRIL) 40 MG tablet, Take 1 tablet (40 mg total) by mouth once daily., Disp: 90 tablet, Rfl: 3    pramipexole (MIRAPEX) 0.25 MG tablet, TAKE 1 TABLET (0.25 MG TOTAL) BY MOUTH EVERY EVENING., Disp: 90 tablet, Rfl: 3    trazodone (DESYREL) 50 MG tablet, TAKE 1 TABLET (50 MG TOTAL) BY MOUTH EVERY EVENING., Disp: 90 tablet, Rfl: 3    gabapentin (NEURONTIN) 300 MG capsule, Take 1 capsule (300 mg total) by mouth 3 (three) times daily., Disp: 90 capsule, Rfl: 5    Physical Examination:  BP (!) 143/84 (BP Location: Right arm, Patient Position: Sitting, BP Method: Medium (Automatic))   Pulse 61   Resp 17   Ht 5' 11" (1.803 m)   Wt 94.3 kg (208 lb)   BMI 29.01 kg/m²     GENERAL:  General appearance: Well, non-toxic appearing.  No apparent distress.  Fundi exam: normal.  Neck: supple.  Carotid auscultation: " normal.  Heart auscultation: normal.  Peripheral pulses: normal.  Extremities: violaceous left foot, cool to touch, but capillary refill intact.    MENTAL STATUS:  Alertness, attention span & concentration: normal.  Language: normal.  Orientation to self, place & time:  normal.  Memory, recent & remote: normal.  Fund of knowledge: normal.    SPEECH:  Clear and fluent.  Follows complex commands.    CRANIAL NERVES:  Cranial Nerves II-XII were examined.  II - Visual fields: normal.  III, IV, VI: PERRL, EOMI, No ptosis, No nystagmus.  V - Facial sensation: normal.  VII - Face symmetry & mobility: normal.  VIII - Hearing: normal.  IX, X - Palate: mobile & midline.  XI - Shoulder shrug: normal.  XII - Tongue protrusion: normal.    GROSS MOTOR:  Gait & station: normal.  Tone: normal.  Abnormal movements: none.  Finger-nose & Heel-knee-shin: normal.  Rapid alternating movements & drift: normal.  Romberg: absent    MUSCLE STRENGTH:     Fascics Atrophy RIGHT    LEFT Atrophy Fascics     5 Neck Ext. 5       5 Neck Flex 5       5 Deltoids 5       5 Sh.Ext.Rot. 5       5 Sh.Int.Rot. 5       5 Biceps 5       5 Triceps 5       5 Forearm.Pr. 5       5 Wrist Ext. 5       5 Wrist Flex 5       5 Finger Ext. 5       5 Finger Flex 5       5 FPL 5       5 Inteross. 5                         5 Iliopsoas 5       5 Hip Abduct 5       5 Hip Adduct 5       5 Quads 5       5 Hams 5       5 Dorsiflex 5       5 Plantar Flex 5       5 Ankle Mike 5       5 Ankle Invert 5       5 Toe Ext. 5       5 Toe Flex 5                         REFLEXES:    RIGHT Reflex   LEFT   2+ Biceps 2+   2+ Brachiorad. 2+   2+ Triceps 2+        2+ Patellar 2+   0 Ankle 0        Down PLANTAR Down     SENSORY:  Sharp touch: decreased to lost sensation on dorsum of all 5 toes, the web between 1 and 2, and the top of the foot proximal to toes 2 and 3. In addition, there is loss of sensation under the foot encompassing almost the whole plantar surface.  There is numbness to  pin posterior to the medial malleolus, but not the lateral.  There is numbness to pin on the medial, posterior calf.  Vibration: Normal throughout.    Diagnostic Data Reviewed:   Records were reviewed.  Patient was seen by sports medicine for his calf injury.  He was put on conservative therapy for this. It was noted that his foot was numb.  He was sent for an MRI of the lumbar spine and an EMG.  The EMG did not include evaluation of the medial and lateral plantar nerves.  The needle study did not include the muscles of the foot.   There was no comparison to his healthy foot.  Overall there were chronic lumosacral changes evident on the EMG.  The MRI of the lumbar spine revealed degenerative changes, but no significant foraminal stenosis or disc herniation.    MRI lumbar spine 5/28/18:  1.  There is mild degenerative change in the lumbar spine.  There is no fracture or malalignment.  There is some degree of disc bulge with osteophytic ridging (with a without superimposed disc protrusion) in addition to facet joint arthropathy contributing to stenosis.  The pertinent findings are summarized below.  2.  There is no spinal canal or foraminal stenosis at the T11-12, T12-L1, L1-2 levels.  3.  At the L2-3 level, there is borderline spinal stenosis with moderate bilateral, right greater than left, foraminal stenosis.  This could irritate the L2 nerve roots.  4.  At the L3-4 level, there is no spinal stenosis.  There is moderate right and mild left foraminal stenosis.  There may be contact with the right L3 root in the foramen.  5.  At the L4-5 level, there is no spinal stenosis.  There is mild-to-moderate bilateral foraminal stenosis.  6.  At the L5-S1 level, there is no spinal stenosis.  There is mild left foraminal stenosis.    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     Assessment and Plan:  Abimael Armendariz is a 53 y.o.,  right handed man who sustained an injury to the left leg while working. He had pain in the left calf with numbness of the left foot.  The left calf has healed, the numbness of the foot has remained and he fells may be slightly worse.  I do not have the MRI of the leg for review today.  This has been requested.  Based on the described mechanism of the injury, this may have resulted in edematous injuries similar to a mild compartment syndrome.  The sural and fibular nerves travel in the region of the gastrocnemius muscle.  Significant edema in this region could result in nerve injury.  A partial nerve tear along with the muscle cannot entirely be excluded, but typically this pain would be fairly distinct.  His EMG did not reveal any evidence of peripheral nerve injury, but the nerves were not assessed all the way into the foot.  He also continues to have some edema and color changes to the left foot.  It would be reasonable to get a vascular consultation with dopplers to be sure this is not secondary to a peripheral vascular injury.  His neurological exam reveals abnormal sensation in the peripheral sural and fibular nerves.   He will return to clinic in 3-4 weeks for a repeat EMG looking at the more distal nerves of the foot.  His prognosis is uncertain at this time.    Important to note, also  has a past medical history of ADHD (attention deficit hyperactivity disorder); Anxiety; Depression; Hyperlipidemia; Hypertension; Insomnia; and Mitral regurgitation.        Jennifer Gee D.O, ABPN, AOBNP, ABEM    This note was created with voice recognition software.  Grammatical, syntax and spelling errors may be inevitable.

## 2018-07-23 NOTE — TELEPHONE ENCOUNTER
This pt has been referred to Vascular Medicine for numbness to the left foot. Please see description in referral for further details. Please contact our office with any further questions.

## 2018-07-25 ENCOUNTER — TELEPHONE (OUTPATIENT)
Dept: NEUROLOGY | Facility: CLINIC | Age: 53
End: 2018-07-25

## 2018-07-25 NOTE — TELEPHONE ENCOUNTER
Spoke with the pt, informed him a message has been sent. Provided him with the contact infor for Dr. Canas to call and schedule.

## 2018-07-25 NOTE — TELEPHONE ENCOUNTER
----- Message from Herminia Dove sent at 7/25/2018  4:56 PM CDT -----  Contact: Self  Patient states  the office was supposed to call him back to schedule an ultrasound  on his foot and has not heard from anyone     Please call back [phone]

## 2018-07-26 NOTE — TELEPHONE ENCOUNTER
The referral is NOT for compartment syndrome.  He has already been discharged by orthopedics.   The referral is to see if he has a vascular occlusion or stenosis in the leg resulting in leg numbness.

## 2018-07-26 NOTE — TELEPHONE ENCOUNTER
Referral reviewed by Dr Peterson, states he does not treat compartment syndrome, he recommends referring patient to ortho.

## 2018-07-27 NOTE — TELEPHONE ENCOUNTER
Guerita/Zuleyka:  Order is in for arterial ultrasound of left lower extremity-please schedule      FYI Dr. Gee.  Thanks for the help.  REVA

## 2018-07-27 NOTE — TELEPHONE ENCOUNTER
I was the neurology consultant.  I was trying to be helpful in expediting the patient's workup.  I don't think I should be ordering vascular ultrasounds of the leg.  I will defer to the patient's primary physician to pursue that portion of the evaluation at their discretion.  Sorry for the inconvenience.

## 2018-07-27 NOTE — TELEPHONE ENCOUNTER
Dr Peterson requires vascular ultrasounds prior to consultation for possible occlusion or stenosis.  Please reach out to me once this has been completed and I will contact patient to schedule consultation.

## 2018-07-30 ENCOUNTER — HOSPITAL ENCOUNTER (OUTPATIENT)
Dept: RADIOLOGY | Facility: CLINIC | Age: 53
Discharge: HOME | End: 2018-07-30
Attending: FAMILY MEDICINE
Payer: COMMERCIAL

## 2018-07-30 ENCOUNTER — PATIENT MESSAGE (OUTPATIENT)
Dept: FAMILY MEDICINE | Facility: CLINIC | Age: 53
End: 2018-07-30

## 2018-07-30 DIAGNOSIS — G57.92 NEUROPATHY OF LEFT LOWER EXTREMITY: ICD-10-CM

## 2018-07-30 PROCEDURE — 93926 LOWER EXTREMITY STUDY: CPT | Mod: TC,PO

## 2018-07-30 PROCEDURE — 93922 UPR/L XTREMITY ART 2 LEVELS: CPT | Mod: 26,,, | Performed by: RADIOLOGY

## 2018-07-30 PROCEDURE — 93922 UPR/L XTREMITY ART 2 LEVELS: CPT | Mod: TC,PO

## 2018-07-30 PROCEDURE — 93926 LOWER EXTREMITY STUDY: CPT | Mod: 26,,, | Performed by: RADIOLOGY

## 2018-07-31 ENCOUNTER — TELEPHONE (OUTPATIENT)
Dept: FAMILY MEDICINE | Facility: CLINIC | Age: 53
End: 2018-07-31

## 2018-07-31 NOTE — TELEPHONE ENCOUNTER
----- Message from Sammy Trinidad MD sent at 7/30/2018  9:39 PM CDT -----  Arterial ultrasound of left leg and bilateral ankle-brachial indices shows no evidence of any significant blockage.    Patient was notified by email

## 2018-07-31 NOTE — TELEPHONE ENCOUNTER
Patient informed. What now, bottom of foot is still numb for 2 months. Tore calf muscle 4 months. Has seen Dr Reich and Dr Gee. They can't figure out why bottom of foot is numb. Please advise

## 2018-08-01 NOTE — TELEPHONE ENCOUNTER
That is a suspicion, not a conclusion.  Neurology is asking for his mri films and wants to recheck him and possible get another emg.

## 2018-08-01 NOTE — TELEPHONE ENCOUNTER
----- Message from Adelfo Singh sent at 8/1/2018 12:06 PM CDT -----  Contact: Pt  .Type:  Patient Returning Call    Who Called:  Zuleyka  Who Left Message for Patient:  Varghese  Does the patient know what this is regarding?:  Instructions on how to treat foot   Best Call Back Number: [phone] (home)   Additional Information:

## 2018-08-01 NOTE — TELEPHONE ENCOUNTER
I think it is nerve damage from the crush injury.  Neurologist thought it was possible.  Nothing to be done, just wait and give it time for the nerves to grow back, if they do.  They grow slowly.

## 2018-08-01 NOTE — TELEPHONE ENCOUNTER
Patient says he stopped the Gabapentin because it was making his body jump when trying to sleep. He is asking for a letter stating his foot problem is caused by fall at work.

## 2018-08-09 ENCOUNTER — OFFICE VISIT (OUTPATIENT)
Dept: ORTHOPEDICS | Facility: CLINIC | Age: 53
End: 2018-08-09
Payer: COMMERCIAL

## 2018-08-09 VITALS — WEIGHT: 208 LBS | HEIGHT: 71 IN | BODY MASS INDEX: 29.12 KG/M2

## 2018-08-09 DIAGNOSIS — R20.0 NUMBNESS OF LEFT FOOT: Primary | ICD-10-CM

## 2018-08-09 DIAGNOSIS — S89.90XA INJURY OF CALF: ICD-10-CM

## 2018-08-09 DIAGNOSIS — G47.00 INSOMNIA, UNSPECIFIED TYPE: ICD-10-CM

## 2018-08-09 PROCEDURE — 99212 OFFICE O/P EST SF 10 MIN: CPT | Mod: S$GLB,,, | Performed by: ORTHOPAEDIC SURGERY

## 2018-08-09 PROCEDURE — 99999 PR PBB SHADOW E&M-EST. PATIENT-LVL II: CPT | Mod: PBBFAC,,, | Performed by: ORTHOPAEDIC SURGERY

## 2018-08-09 RX ORDER — FOSINOPRIL SODIUM 40 MG/1
TABLET ORAL
Qty: 90 TABLET | Refills: 2 | Status: SHIPPED | OUTPATIENT
Start: 2018-08-09 | End: 2018-08-27 | Stop reason: SDUPTHER

## 2018-08-09 RX ORDER — TRAZODONE HYDROCHLORIDE 50 MG/1
TABLET ORAL
Qty: 90 TABLET | Refills: 3 | Status: SHIPPED | OUTPATIENT
Start: 2018-08-09 | End: 2018-08-27 | Stop reason: SDUPTHER

## 2018-08-09 NOTE — PROGRESS NOTES
HISTORY OF PRESENT ILLNESS:  Abimael Armendariz is 53 years old, persistent   numbness in the plantar aspect of his foot, feels that it started after an   injury to his calf.  At this point, he does not feel comfortable returning to   work.  We are going to keep him on light duty.  Hopefully, this is a neuropraxia   that will resolve itself.  Recommend he follow with Neurology.  Will see back   in our clinic as needed.      AUDRA/ESTER  dd: 08/09/2018 14:08:57 (CDT)  td: 08/10/2018 04:05:27 (CDT)  Doc ID   #[id number]  Job ID #[id number]    CC:        Negative Tinnel's at the Tarsal Tunnel

## 2018-08-14 ENCOUNTER — TELEPHONE (OUTPATIENT)
Dept: NEUROLOGY | Facility: CLINIC | Age: 53
End: 2018-08-14

## 2018-08-14 NOTE — TELEPHONE ENCOUNTER
Spoke with the pt, informed him we do not yet have the EMG machine and that I have put him at the top of our wait list. Verbalized understanding.

## 2018-08-23 ENCOUNTER — DOCUMENTATION ONLY (OUTPATIENT)
Dept: FAMILY MEDICINE | Facility: CLINIC | Age: 53
End: 2018-08-23

## 2018-08-23 NOTE — PROGRESS NOTES
Pre-Visit Chart Review  For Appointment Scheduled on 8-27-18    Health Maintenance Due   Topic Date Due    Colonoscopy  02/06/2014    Influenza Vaccine  08/01/2018

## 2018-08-27 ENCOUNTER — OFFICE VISIT (OUTPATIENT)
Dept: FAMILY MEDICINE | Facility: CLINIC | Age: 53
End: 2018-08-27
Attending: FAMILY MEDICINE
Payer: COMMERCIAL

## 2018-08-27 VITALS
HEIGHT: 71 IN | SYSTOLIC BLOOD PRESSURE: 130 MMHG | TEMPERATURE: 98 F | RESPIRATION RATE: 12 BRPM | BODY MASS INDEX: 29.02 KG/M2 | HEART RATE: 67 BPM | DIASTOLIC BLOOD PRESSURE: 76 MMHG | WEIGHT: 207.25 LBS | OXYGEN SATURATION: 96 %

## 2018-08-27 DIAGNOSIS — G25.81 RESTLESS LEG SYNDROME: ICD-10-CM

## 2018-08-27 DIAGNOSIS — I34.0 MITRAL VALVE INSUFFICIENCY, UNSPECIFIED ETIOLOGY: ICD-10-CM

## 2018-08-27 DIAGNOSIS — Z00.00 ENCOUNTER FOR PREVENTIVE HEALTH EXAMINATION: Primary | ICD-10-CM

## 2018-08-27 DIAGNOSIS — Z12.11 COLON CANCER SCREENING: ICD-10-CM

## 2018-08-27 DIAGNOSIS — I10 HYPERTENSION, UNSPECIFIED TYPE: ICD-10-CM

## 2018-08-27 DIAGNOSIS — E78.5 HYPERLIPIDEMIA, UNSPECIFIED HYPERLIPIDEMIA TYPE: ICD-10-CM

## 2018-08-27 DIAGNOSIS — F32.A DEPRESSION, UNSPECIFIED DEPRESSION TYPE: ICD-10-CM

## 2018-08-27 DIAGNOSIS — G47.00 INSOMNIA, UNSPECIFIED TYPE: ICD-10-CM

## 2018-08-27 PROCEDURE — 3075F SYST BP GE 130 - 139MM HG: CPT | Mod: CPTII,S$GLB,, | Performed by: FAMILY MEDICINE

## 2018-08-27 PROCEDURE — 99396 PREV VISIT EST AGE 40-64: CPT | Mod: S$GLB,,, | Performed by: FAMILY MEDICINE

## 2018-08-27 PROCEDURE — 3078F DIAST BP <80 MM HG: CPT | Mod: CPTII,S$GLB,, | Performed by: FAMILY MEDICINE

## 2018-08-27 PROCEDURE — 99999 PR PBB SHADOW E&M-EST. PATIENT-LVL IV: CPT | Mod: PBBFAC,,, | Performed by: FAMILY MEDICINE

## 2018-08-27 RX ORDER — PRAMIPEXOLE DIHYDROCHLORIDE 0.25 MG/1
TABLET ORAL
Qty: 90 TABLET | Refills: 3 | Status: SHIPPED | OUTPATIENT
Start: 2018-08-27 | End: 2019-08-20 | Stop reason: SDUPTHER

## 2018-08-27 RX ORDER — TRAZODONE HYDROCHLORIDE 50 MG/1
50 TABLET ORAL NIGHTLY
Qty: 90 TABLET | Refills: 3 | Status: SHIPPED | OUTPATIENT
Start: 2018-08-27 | End: 2019-09-16 | Stop reason: SDUPTHER

## 2018-08-27 RX ORDER — FOSINOPRIL SODIUM 40 MG/1
40 TABLET ORAL DAILY
Qty: 90 TABLET | Refills: 3 | Status: SHIPPED | OUTPATIENT
Start: 2018-08-27 | End: 2018-10-24 | Stop reason: SDUPTHER

## 2018-08-27 RX ORDER — BUPROPION HYDROCHLORIDE 150 MG/1
150 TABLET ORAL DAILY
Qty: 90 TABLET | Refills: 3 | Status: SHIPPED | OUTPATIENT
Start: 2018-08-27 | End: 2019-08-13 | Stop reason: SDUPTHER

## 2018-08-27 NOTE — PROGRESS NOTES
Subjective:       Patient ID: Abimael Armendariz is a 53 y.o. male.    Chief Complaint: Annual Exam    53-year-old male coming in for annual exam.  He is due for a colonoscopy which needs to be done with Dr. Salter.  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  He will be off shore for 4-5 weeks at a time and then back home for an equal time.  The patient reports he is not taking fosinopril for hypertension but was put on it for mitral regurgitation.  Review of what records are available indicates multiple episodes of mild hypertension while on fosinopril currently at maximum dose.  History includes mitral valve prolapse and regurgitation, ADHD and anxiety and depression and hyperlipidemia.  He uses trazodone for insomnia.    Past Medical History:  No date: ADHD (attention deficit hyperactivity disorder)  No date: Anxiety  No date: Depression  No date: Hyperlipidemia  No date: Hypertension      Comment:  patient states he uses fosinopril for mitral                regurgitation, not hypertension  No date: Insomnia  No date: Mitral regurgitation    Past Surgical History:  2003: KNEE ARTHROSCOPY W/ ACL RECONSTRUCTION AND HAMSTRING GRAFT      Comment:  right    Review of patient's family history indicates:  Problem: Stroke      Relation: Mother          Age of Onset: (Not Specified)  Problem: Heart attack      Relation: Father          Age of Onset: (Not Specified)  Problem: Heart disease      Relation: Father          Age of Onset: (Not Specified)  Problem: Heart attack      Relation: Paternal Grandfather          Age of Onset: (Not Specified)  Problem: No Known Problems      Relation: Sister          Age of Onset: (Not Specified)  Problem: No Known Problems      Relation: Brother          Age of  Onset: (Not Specified)  Problem: No Known Problems      Relation: Daughter          Age of Onset: (Not Specified)  Problem: No Known Problems      Relation: Son          Age of Onset: (Not Specified)  Problem: Alzheimer's disease      Relation: Maternal Grandmother          Age of Onset: (Not Specified)    Social History    Tobacco Use      Smoking status: Never Smoker      Smokeless tobacco: Current User        Types: Chew    Alcohol use: No    Drug use: No    Current Outpatient Medications on File Prior to Visit:  aspirin 81 mg Tab, Take 81 mg by mouth once daily. Every day, Disp: , Rfl:   cetirizine (ZYRTEC) 10 MG tablet, Take 10 mg by mouth once daily. Every day, Disp: , Rfl:   (DISCONTINUED) buPROPion (WELLBUTRIN XL) 150 MG TB24 tablet, Take 1 tablet (150 mg total) by mouth once daily., Disp: 90 tablet, Rfl: 0  (DISCONTINUED) fosinopril (MONOPRIL) 40 MG tablet, TAKE 1 TABLET EVERY DAY, Disp: 90 tablet, Rfl: 2  (DISCONTINUED) pramipexole (MIRAPEX) 0.25 MG tablet, TAKE 1 TABLET (0.25 MG TOTAL) BY MOUTH EVERY EVENING., Disp: 90 tablet, Rfl: 3  (DISCONTINUED) traZODone (DESYREL) 50 MG tablet, TAKE 1 TABLET (50 MG TOTAL) BY MOUTH EVERY EVENING., Disp: 90 tablet, Rfl: 3  (DISCONTINUED) gabapentin (NEURONTIN) 300 MG capsule, Take 1 capsule (300 mg total) by mouth 3 (three) times daily., Disp: 90 capsule, Rfl: 5    No current facility-administered medications on file prior to visit.     Colonoscopy due on 02/06/2014  Influenza Vaccine due on 08/01/2018        Review of Systems   Constitutional: Negative for activity change and unexpected weight change.   HENT: Negative for hearing loss, rhinorrhea and trouble swallowing.    Eyes: Negative for discharge and visual disturbance.   Respiratory: Negative for chest tightness and wheezing.    Cardiovascular: Negative for chest pain and palpitations.   Gastrointestinal: Negative for blood in stool, constipation, diarrhea and vomiting.   Endocrine: Negative for polydipsia and  polyuria.   Genitourinary: Negative for difficulty urinating, hematuria and urgency.   Musculoskeletal: Negative for arthralgias, joint swelling and neck pain.   Neurological: Negative for weakness and headaches.   Psychiatric/Behavioral: Negative for confusion and dysphoric mood.       Objective:      Physical Exam   Constitutional: He is oriented to person, place, and time. He appears well-developed and well-nourished. No distress.   Good blood pressure control  Mildly overweight with a BMI of 28.9 is up 4.1 lb from his last visit July 17, 2018   HENT:   Head: Normocephalic and atraumatic.   Right Ear: External ear normal.   Left Ear: External ear normal.   Nose: Nose normal.   Mouth/Throat: Oropharynx is clear and moist. No oropharyngeal exudate.   Eyes: Conjunctivae and EOM are normal. Pupils are equal, round, and reactive to light. No scleral icterus.   Neck: Normal range of motion. Neck supple. No JVD present. No tracheal deviation present. No thyromegaly present.   Cardiovascular: Normal rate and regular rhythm. Exam reveals no gallop and no friction rub.   Murmur (Prominent 2 to 3/6 upper left sternal border holosystolic murmur) heard.  Pulmonary/Chest: Effort normal and breath sounds normal. No stridor. No respiratory distress. He has no wheezes. He has no rales. He exhibits no tenderness.   Abdominal: Soft. Bowel sounds are normal. He exhibits no distension and no mass. There is no tenderness. There is no rebound and no guarding.   Musculoskeletal: Normal range of motion. He exhibits no edema.   Lymphadenopathy:     He has no cervical adenopathy.   Neurological: He is alert and oriented to person, place, and time. He has normal reflexes. He displays normal reflexes. No cranial nerve deficit or sensory deficit. He exhibits normal muscle tone.   Skin: Skin is warm and dry. No rash noted. He is not diaphoretic. No erythema.   Psychiatric: He has a normal mood and affect. His behavior is normal. Judgment and  thought content normal.   Nursing note and vitals reviewed.      Assessment:       1. Encounter for preventive health examination    2. Hyperlipidemia, unspecified hyperlipidemia type    3. Mitral valve insufficiency, unspecified etiology    4. Insomnia, unspecified type    5. Depression, unspecified depression type    6. Restless leg syndrome    7. Colon cancer screening    8. Hypertension, unspecified type    9. BMI 28.0-28.9,adult        Plan:       1. Encounter for preventive health examination    2. Hyperlipidemia, unspecified hyperlipidemia type  No changes, patient will be getting lab and his employment physical tomorrow and will bring copies to us the    3. Mitral valve insufficiency, unspecified etiology  Followed by Dr. Pretty    4. Insomnia, unspecified type  Does well with trazodone  - traZODone (DESYREL) 50 MG tablet; Take 1 tablet (50 mg total) by mouth every evening.  Dispense: 90 tablet; Refill: 3    5. Depression, unspecified depression type  Controlled and stable  - buPROPion (WELLBUTRIN XL) 150 MG TB24 tablet; Take 1 tablet (150 mg total) by mouth once daily.  Dispense: 90 tablet; Refill: 3    6. Restless leg syndrome  Stable  - pramipexole (MIRAPEX) 0.25 MG tablet; TAKE 1 TABLET (0.25 MG TOTAL) BY MOUTH EVERY EVENING.  Dispense: 90 tablet; Refill: 3    7. Colon cancer screening  Referral placed for Dr. Salter  - Ambulatory referral to Gastroenterology    8. Hypertension, unspecified type  See note, continue Monopril 40 mg daily    9. BMI 28.0-28.9,adult

## 2018-08-28 ENCOUNTER — PATIENT MESSAGE (OUTPATIENT)
Dept: CARDIOLOGY | Facility: CLINIC | Age: 53
End: 2018-08-28

## 2018-08-28 ENCOUNTER — TELEPHONE (OUTPATIENT)
Dept: CARDIOLOGY | Facility: CLINIC | Age: 53
End: 2018-08-28

## 2018-08-28 NOTE — TELEPHONE ENCOUNTER
----- Message from Bela Mota sent at 8/28/2018 12:30 PM CDT -----  Contact: spouse   Patient's former employer is requesting a letter today regarding his heart murmur. Callback number [phone] or [phone]

## 2018-08-28 NOTE — TELEPHONE ENCOUNTER
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    Please call the patient upon request at phone number [phone].

## 2018-08-29 ENCOUNTER — OFFICE VISIT (OUTPATIENT)
Dept: CARDIOLOGY | Facility: CLINIC | Age: 53
End: 2018-08-29
Payer: COMMERCIAL

## 2018-08-29 VITALS
DIASTOLIC BLOOD PRESSURE: 84 MMHG | WEIGHT: 207.25 LBS | HEIGHT: 71 IN | BODY MASS INDEX: 29.02 KG/M2 | HEART RATE: 66 BPM | SYSTOLIC BLOOD PRESSURE: 141 MMHG

## 2018-08-29 DIAGNOSIS — I10 HYPERTENSION, UNSPECIFIED TYPE: ICD-10-CM

## 2018-08-29 DIAGNOSIS — E78.5 HYPERLIPIDEMIA, UNSPECIFIED HYPERLIPIDEMIA TYPE: ICD-10-CM

## 2018-08-29 DIAGNOSIS — I34.0 MITRAL VALVE INSUFFICIENCY, UNSPECIFIED ETIOLOGY: Primary | ICD-10-CM

## 2018-08-29 PROCEDURE — 3077F SYST BP >= 140 MM HG: CPT | Mod: CPTII,S$GLB,, | Performed by: INTERNAL MEDICINE

## 2018-08-29 PROCEDURE — 3079F DIAST BP 80-89 MM HG: CPT | Mod: CPTII,S$GLB,, | Performed by: INTERNAL MEDICINE

## 2018-08-29 PROCEDURE — 99214 OFFICE O/P EST MOD 30 MIN: CPT | Mod: S$GLB,,, | Performed by: INTERNAL MEDICINE

## 2018-08-29 PROCEDURE — 3008F BODY MASS INDEX DOCD: CPT | Mod: CPTII,S$GLB,, | Performed by: INTERNAL MEDICINE

## 2018-08-29 PROCEDURE — 99999 PR PBB SHADOW E&M-EST. PATIENT-LVL III: CPT | Mod: PBBFAC,,, | Performed by: INTERNAL MEDICINE

## 2018-08-29 NOTE — PROGRESS NOTES
Subjective:    Patient ID:  Abimael Armendariz is a 53 y.o. male who presents for follow-up of MR    URSULA  He comes for follow up with no major problems, no chest pain, no shortness of breath.  FC I-II    Review of Systems   Constitution: Negative for decreased appetite, weakness, malaise/fatigue, weight gain and weight loss.   Cardiovascular: Negative for chest pain, dyspnea on exertion, leg swelling, palpitations and syncope.   Respiratory: Negative for cough and shortness of breath.    Gastrointestinal: Negative.    All other systems reviewed and are negative.       Objective:      Physical Exam   Constitutional: He is oriented to person, place, and time. He appears well-developed and well-nourished.   HENT:   Head: Normocephalic.   Eyes: Pupils are equal, round, and reactive to light.   Neck: Normal range of motion. Neck supple. No JVD present. Carotid bruit is not present. No thyromegaly present.   Cardiovascular: Normal rate, regular rhythm, intact distal pulses and normal pulses. PMI is not displaced. Exam reveals no gallop.   Murmur heard.  High-pitched blowing holosystolic murmur is present with a grade of 3/6 at the apex.  Pulmonary/Chest: Effort normal and breath sounds normal.   Abdominal: Soft. Normal appearance. He exhibits no mass. There is no hepatosplenomegaly. There is no tenderness.   Musculoskeletal: Normal range of motion. He exhibits no edema.   Neurological: He is alert and oriented to person, place, and time. He has normal strength and normal reflexes. No sensory deficit.   Skin: Skin is warm and intact.   Psychiatric: He has a normal mood and affect.   Nursing note and vitals reviewed.        Assessment:       1. Mitral valve insufficiency, unspecified etiology    2. Hypertension, unspecified type    3. Hyperlipidemia, unspecified hyperlipidemia type         Plan:   Echocardiogram; call with results  Continue all cardiac medications  Regular exercise program  1 yr f/u

## 2018-08-31 ENCOUNTER — OFFICE VISIT (OUTPATIENT)
Dept: ORTHOPEDICS | Facility: CLINIC | Age: 53
End: 2018-08-31
Payer: COMMERCIAL

## 2018-08-31 VITALS — WEIGHT: 207 LBS | HEIGHT: 71 IN | BODY MASS INDEX: 28.98 KG/M2

## 2018-08-31 DIAGNOSIS — M79.673 PAIN OF FOOT, UNSPECIFIED LATERALITY: Primary | ICD-10-CM

## 2018-08-31 PROCEDURE — 99213 OFFICE O/P EST LOW 20 MIN: CPT | Mod: S$GLB,,, | Performed by: ORTHOPAEDIC SURGERY

## 2018-08-31 PROCEDURE — 99999 PR PBB SHADOW E&M-EST. PATIENT-LVL II: CPT | Mod: PBBFAC,,, | Performed by: ORTHOPAEDIC SURGERY

## 2018-08-31 NOTE — PROGRESS NOTES
Mr. Armendarzi is seen here in followup.  He is requesting a full release to work.    He says he is asymptomatic and doing well.  Does not report any numbness in   his foot.  He has no pain.    PLAN:  At this point, we will allow him to resume activities to tolerance.  We   will give him release to work.  We will see him back as needed.      AUDRA/SETER  dd: 08/31/2018 12:31:16 (CDT)  td: 09/01/2018 07:08:10 (CDT)  Doc ID   #[id number]  Job ID #[id number]    CC:

## 2018-09-10 DIAGNOSIS — G25.81 RESTLESS LEG SYNDROME: ICD-10-CM

## 2018-09-10 RX ORDER — PRAMIPEXOLE DIHYDROCHLORIDE 0.25 MG/1
TABLET ORAL
Qty: 90 TABLET | Refills: 3 | OUTPATIENT
Start: 2018-09-10

## 2018-10-17 ENCOUNTER — TELEPHONE (OUTPATIENT)
Dept: FAMILY MEDICINE | Facility: CLINIC | Age: 53
End: 2018-10-17

## 2018-10-17 NOTE — TELEPHONE ENCOUNTER
----- Message from Shanique Rey sent at 10/17/2018 10:13 AM CDT -----  Contact: Letha/ Dr Lidia Salter [phone]  States that she is calling to speak to nurse regarding pt phone and ins. Please call back at [phone]//thank you acc

## 2018-10-24 NOTE — TELEPHONE ENCOUNTER
----- Message from Ya Agosto sent at 10/24/2018  1:56 PM CDT -----  Contact: Self   Patient says he need to speak with you about getting an ultrasound of his heart. Please call at [phone].

## 2018-10-25 RX ORDER — FOSINOPRIL SODIUM 40 MG/1
40 TABLET ORAL DAILY
Qty: 90 TABLET | Refills: 3 | Status: SHIPPED | OUTPATIENT
Start: 2018-10-25 | End: 2019-12-27

## 2018-10-30 ENCOUNTER — TELEPHONE (OUTPATIENT)
Dept: FAMILY MEDICINE | Facility: CLINIC | Age: 53
End: 2018-10-30

## 2018-10-30 NOTE — TELEPHONE ENCOUNTER
----- Message from Michaela Tobin sent at 10/30/2018  1:39 PM CDT -----  Contact: patient  Patient needs refill of pramipexole (MIRAPEX) 0.25 MG tablet    Thanks  KB

## 2018-12-27 ENCOUNTER — CLINICAL SUPPORT (OUTPATIENT)
Dept: CARDIOLOGY | Facility: CLINIC | Age: 53
End: 2018-12-27
Attending: INTERNAL MEDICINE
Payer: COMMERCIAL

## 2018-12-27 VITALS
WEIGHT: 207 LBS | DIASTOLIC BLOOD PRESSURE: 78 MMHG | HEIGHT: 71 IN | SYSTOLIC BLOOD PRESSURE: 137 MMHG | BODY MASS INDEX: 28.98 KG/M2 | HEART RATE: 71 BPM

## 2018-12-27 DIAGNOSIS — I34.0 MITRAL VALVE INSUFFICIENCY, UNSPECIFIED ETIOLOGY: ICD-10-CM

## 2018-12-27 LAB
ASCENDING AORTA: 2.42 CM
AV INDEX (PROSTH): 0.86
AV MEAN GRADIENT: 3.15 MMHG
AV PEAK GRADIENT: 5.2 MMHG
AV VALVE AREA: 3.52 CM2
BSA FOR ECHO PROCEDURE: 2.17 M2
CV ECHO LV RWT: 0.32 CM
DOP CALC AO PEAK VEL: 1.14 M/S
DOP CALC AO VTI: 20.18 CM
DOP CALC LVOT AREA: 4.08 CM2
DOP CALC LVOT DIAMETER: 2.28 CM
DOP CALC LVOT STROKE VOLUME: 71 CM3
DOP CALCLVOT PEAK VEL VTI: 17.4 CM
E WAVE DECELERATION TIME: 142.69 MSEC
E/A RATIO: 2.17
E/E' RATIO: 12.82
ECHO LV POSTERIOR WALL: 0.98 CM (ref 0.6–1.1)
FRACTIONAL SHORTENING: 46 % (ref 28–44)
INTERVENTRICULAR SEPTUM: 1 CM (ref 0.6–1.1)
IVRT: 0.07 MSEC
LA MAJOR: 4.73 CM
LA MINOR: 6.17 CM
LA WIDTH: 4.65 CM
LEFT ATRIUM SIZE: 4.81 CM
LEFT ATRIUM VOLUME INDEX: 47.6 ML/M2
LEFT ATRIUM VOLUME: 101.8 CM3
LEFT INTERNAL DIMENSION IN SYSTOLE: 3.34 CM (ref 2.1–4)
LEFT VENTRICLE DIASTOLIC VOLUME INDEX: 98.9 ML/M2
LEFT VENTRICLE DIASTOLIC VOLUME: 211.65 ML
LEFT VENTRICLE MASS INDEX: 120.4 G/M2
LEFT VENTRICLE SYSTOLIC VOLUME INDEX: 21.3 ML/M2
LEFT VENTRICLE SYSTOLIC VOLUME: 45.55 ML
LEFT VENTRICULAR INTERNAL DIMENSION IN DIASTOLE: 6.2 CM (ref 3.5–6)
LEFT VENTRICULAR MASS: 257.7 G
LV LATERAL E/E' RATIO: 10.85
LV SEPTAL E/E' RATIO: 15.67
MV PEAK A VEL: 0.65 M/S
MV PEAK E VEL: 1.41 M/S
PISA TR MAX VEL: 2.88 M/S
RA MAJOR: 5.42 CM
RA PRESSURE: 3 MMHG
RA WIDTH: 3.97 CM
RIGHT VENTRICULAR END-DIASTOLIC DIMENSION: 3.47 CM
RV TISSUE DOPPLER FREE WALL SYSTOLIC VELOCITY 1 (APICAL 4 CHAMBER VIEW): 14.15 M/S
SINUS: 3.17 CM
STJ: 2.57 CM
TDI LATERAL: 0.13
TDI SEPTAL: 0.09
TDI: 0.11
TR MAX PG: 33.18 MMHG
TRICUSPID ANNULAR PLANE SYSTOLIC EXCURSION: 2.47 CM
TV REST PULMONARY ARTERY PRESSURE: 36 MMHG

## 2018-12-27 PROCEDURE — 93306 TTE W/DOPPLER COMPLETE: CPT | Mod: S$GLB,,, | Performed by: INTERNAL MEDICINE

## 2018-12-27 PROCEDURE — 99999 PR PBB SHADOW E&M-EST. PATIENT-LVL II: CPT | Mod: PBBFAC,,,

## 2018-12-28 ENCOUNTER — TELEPHONE (OUTPATIENT)
Dept: CARDIOLOGY | Facility: CLINIC | Age: 53
End: 2018-12-28

## 2018-12-28 ENCOUNTER — PATIENT MESSAGE (OUTPATIENT)
Dept: CARDIOLOGY | Facility: CLINIC | Age: 53
End: 2018-12-28

## 2018-12-28 NOTE — TELEPHONE ENCOUNTER
----- Message from Ilsa Jones sent at 12/28/2018  3:08 PM CST -----  Contact: patient  Type: Needs Medical Advice    Who Called:  patient  Symptoms (please be specific):    How long has patient had these symptoms:    Pharmacy name and phone #:    Best Call Back Number: [phone]  Additional Information: requesting a call back regarding ultrasound results,patient is leaving for [facility] on 12/31/18, place call to pod,

## 2019-02-04 ENCOUNTER — OFFICE VISIT (OUTPATIENT)
Dept: CARDIOLOGY | Facility: CLINIC | Age: 54
End: 2019-02-04
Payer: COMMERCIAL

## 2019-02-04 VITALS
HEART RATE: 65 BPM | SYSTOLIC BLOOD PRESSURE: 137 MMHG | DIASTOLIC BLOOD PRESSURE: 81 MMHG | HEIGHT: 71 IN | BODY MASS INDEX: 29.38 KG/M2 | WEIGHT: 209.88 LBS

## 2019-02-04 DIAGNOSIS — E78.5 HYPERLIPIDEMIA, UNSPECIFIED HYPERLIPIDEMIA TYPE: ICD-10-CM

## 2019-02-04 DIAGNOSIS — I34.0 MITRAL VALVE INSUFFICIENCY, UNSPECIFIED ETIOLOGY: Primary | ICD-10-CM

## 2019-02-04 DIAGNOSIS — I10 ESSENTIAL HYPERTENSION: ICD-10-CM

## 2019-02-04 PROCEDURE — 99214 PR OFFICE/OUTPT VISIT, EST, LEVL IV, 30-39 MIN: ICD-10-PCS | Mod: S$GLB,,, | Performed by: INTERNAL MEDICINE

## 2019-02-04 PROCEDURE — 99999 PR PBB SHADOW E&M-EST. PATIENT-LVL III: ICD-10-PCS | Mod: PBBFAC,,, | Performed by: INTERNAL MEDICINE

## 2019-02-04 PROCEDURE — 99999 PR PBB SHADOW E&M-EST. PATIENT-LVL III: CPT | Mod: PBBFAC,,, | Performed by: INTERNAL MEDICINE

## 2019-02-04 PROCEDURE — 99214 OFFICE O/P EST MOD 30 MIN: CPT | Mod: S$GLB,,, | Performed by: INTERNAL MEDICINE

## 2019-02-04 NOTE — PROGRESS NOTES
Subjective:    Patient ID:  Abimael Armendariz is a 53 y.o. male who presents for follow-up of MR    URSULA  He comes for follow up with no major problems, no chest pain, no shortness of breath.  FC I    Review of Systems   Constitution: Negative for decreased appetite, weakness, malaise/fatigue, weight gain and weight loss.   Cardiovascular: Negative for chest pain, dyspnea on exertion, leg swelling, palpitations and syncope.   Respiratory: Negative for cough and shortness of breath.    Gastrointestinal: Negative.    All other systems reviewed and are negative.       Objective:      Physical Exam   Constitutional: He is oriented to person, place, and time. He appears well-developed and well-nourished.   HENT:   Head: Normocephalic.   Eyes: Pupils are equal, round, and reactive to light.   Neck: Normal range of motion. Neck supple. No JVD present. Carotid bruit is not present. No thyromegaly present.   Cardiovascular: Normal rate, regular rhythm, intact distal pulses and normal pulses. PMI is not displaced. Exam reveals no gallop.   Murmur heard.  High-pitched blowing holosystolic murmur is present with a grade of 3/6 at the apex.  Pulmonary/Chest: Effort normal and breath sounds normal.   Abdominal: Soft. Normal appearance. He exhibits no mass. There is no hepatosplenomegaly. There is no tenderness.   Musculoskeletal: Normal range of motion. He exhibits no edema.   Neurological: He is alert and oriented to person, place, and time. He has normal strength and normal reflexes. No sensory deficit.   Skin: Skin is warm and intact.   Psychiatric: He has a normal mood and affect.   Nursing note and vitals reviewed.    Echo reviewed      Assessment:       1. Mitral valve insufficiency, unspecified etiology    2. Essential hypertension    3. Hyperlipidemia, unspecified hyperlipidemia type         Plan:     Continue all cardiac medications  Regular exercise program  6 m f/u with [facility]

## 2019-03-04 ENCOUNTER — DOCUMENTATION ONLY (OUTPATIENT)
Dept: FAMILY MEDICINE | Facility: CLINIC | Age: 54
End: 2019-03-04

## 2019-03-04 NOTE — NURSING
Letter written for light duty x 3 weeks at work placed at  for pt to  04/25/2018.  Letter never picked up and has been shredded.

## 2019-08-06 ENCOUNTER — CLINICAL SUPPORT (OUTPATIENT)
Dept: CARDIOLOGY | Facility: CLINIC | Age: 54
End: 2019-08-06
Attending: INTERNAL MEDICINE
Payer: COMMERCIAL

## 2019-08-06 VITALS
HEART RATE: 65 BPM | DIASTOLIC BLOOD PRESSURE: 81 MMHG | HEIGHT: 71 IN | WEIGHT: 209 LBS | SYSTOLIC BLOOD PRESSURE: 137 MMHG | BODY MASS INDEX: 29.26 KG/M2

## 2019-08-06 DIAGNOSIS — I10 ESSENTIAL HYPERTENSION: ICD-10-CM

## 2019-08-06 DIAGNOSIS — I34.0 MITRAL VALVE INSUFFICIENCY, UNSPECIFIED ETIOLOGY: ICD-10-CM

## 2019-08-06 DIAGNOSIS — E78.5 HYPERLIPIDEMIA, UNSPECIFIED HYPERLIPIDEMIA TYPE: ICD-10-CM

## 2019-08-06 PROCEDURE — 93306 TRANSTHORACIC ECHO (TTE) COMPLETE (CUPID ONLY): ICD-10-PCS | Mod: S$GLB,,, | Performed by: INTERNAL MEDICINE

## 2019-08-06 PROCEDURE — 93306 TTE W/DOPPLER COMPLETE: CPT | Mod: S$GLB,,, | Performed by: INTERNAL MEDICINE

## 2019-08-06 PROCEDURE — 99999 PR PBB SHADOW E&M-EST. PATIENT-LVL II: CPT | Mod: PBBFAC,,,

## 2019-08-06 PROCEDURE — 99999 PR PBB SHADOW E&M-EST. PATIENT-LVL II: ICD-10-PCS | Mod: PBBFAC,,,

## 2019-08-07 LAB
ASCENDING AORTA: 2.91 CM
AV INDEX (PROSTH): 0.92
AV MEAN GRADIENT: 3 MMHG
AV PEAK GRADIENT: 7 MMHG
AV VALVE AREA: 3.8 CM2
AV VELOCITY RATIO: 0.85
BSA FOR ECHO PROCEDURE: 2.18 M2
CV ECHO LV RWT: 0.35 CM
DOP CALC AO PEAK VEL: 1.29 M/S
DOP CALC AO VTI: 22.17 CM
DOP CALC LVOT AREA: 4.2 CM2
DOP CALC LVOT DIAMETER: 2.3 CM
DOP CALC LVOT PEAK VEL: 1.1 M/S
DOP CALC LVOT STROKE VOLUME: 84.3 CM3
DOP CALCLVOT PEAK VEL VTI: 20.3 CM
E WAVE DECELERATION TIME: 158.87 MSEC
E/A RATIO: 1.86
E/E' RATIO: 8.29 M/S
ECHO LV POSTERIOR WALL: 1.01 CM (ref 0.6–1.1)
FRACTIONAL SHORTENING: 39 % (ref 28–44)
INTERVENTRICULAR SEPTUM: 1.09 CM (ref 0.6–1.1)
IVRT: 0.07 MSEC
LA MAJOR: 6.4 CM
LA MINOR: 5.94 CM
LA WIDTH: 6.2 CM
LEFT ATRIUM SIZE: 5.5 CM
LEFT ATRIUM VOLUME INDEX: 83.1 ML/M2
LEFT ATRIUM VOLUME: 178.59 CM3
LEFT INTERNAL DIMENSION IN SYSTOLE: 3.53 CM (ref 2.1–4)
LEFT VENTRICLE DIASTOLIC VOLUME INDEX: 106.91 ML/M2
LEFT VENTRICLE DIASTOLIC VOLUME: 229.71 ML
LEFT VENTRICLE MASS INDEX: 116 G/M2
LEFT VENTRICLE SYSTOLIC VOLUME INDEX: 24.2 ML/M2
LEFT VENTRICLE SYSTOLIC VOLUME: 51.9 ML
LEFT VENTRICULAR INTERNAL DIMENSION IN DIASTOLE: 5.8 CM (ref 3.5–6)
LEFT VENTRICULAR MASS: 248.48 G
LV LATERAL E/E' RATIO: 6.71 M/S
LV SEPTAL E/E' RATIO: 10.85 M/S
MV PEAK A VEL: 0.76 M/S
MV PEAK E VEL: 1.41 M/S
PISA TR MAX VEL: 2.34 M/S
PULM VEIN S/D RATIO: 0.96
PV PEAK D VEL: 0.57 M/S
PV PEAK S VEL: 0.55 M/S
RA MAJOR: 4.98 CM
RA PRESSURE: 3 MMHG
RA WIDTH: 2.8 CM
RIGHT VENTRICULAR END-DIASTOLIC DIMENSION: 3.86 CM
RV TISSUE DOPPLER FREE WALL SYSTOLIC VELOCITY 1 (APICAL 4 CHAMBER VIEW): 19.75 CM/S
SINUS: 3.1 CM
STJ: 2.66 CM
TDI LATERAL: 0.21 M/S
TDI SEPTAL: 0.13 M/S
TDI: 0.17 M/S
TR MAX PG: 22 MMHG
TRICUSPID ANNULAR PLANE SYSTOLIC EXCURSION: 2.43 CM
TV REST PULMONARY ARTERY PRESSURE: 25 MMHG

## 2019-08-09 ENCOUNTER — OFFICE VISIT (OUTPATIENT)
Dept: CARDIOLOGY | Facility: CLINIC | Age: 54
End: 2019-08-09
Attending: INTERNAL MEDICINE
Payer: COMMERCIAL

## 2019-08-09 VITALS
WEIGHT: 213.19 LBS | BODY MASS INDEX: 29.85 KG/M2 | HEIGHT: 71 IN | DIASTOLIC BLOOD PRESSURE: 85 MMHG | HEART RATE: 70 BPM | SYSTOLIC BLOOD PRESSURE: 139 MMHG

## 2019-08-09 DIAGNOSIS — I34.0 MITRAL VALVE INSUFFICIENCY, UNSPECIFIED ETIOLOGY: Primary | ICD-10-CM

## 2019-08-09 DIAGNOSIS — I10 ESSENTIAL HYPERTENSION: ICD-10-CM

## 2019-08-09 DIAGNOSIS — E78.5 HYPERLIPIDEMIA, UNSPECIFIED HYPERLIPIDEMIA TYPE: ICD-10-CM

## 2019-08-09 PROCEDURE — 99214 PR OFFICE/OUTPT VISIT, EST, LEVL IV, 30-39 MIN: ICD-10-PCS | Mod: S$GLB,,, | Performed by: INTERNAL MEDICINE

## 2019-08-09 PROCEDURE — 99999 PR PBB SHADOW E&M-EST. PATIENT-LVL III: CPT | Mod: PBBFAC,,, | Performed by: INTERNAL MEDICINE

## 2019-08-09 PROCEDURE — 99999 PR PBB SHADOW E&M-EST. PATIENT-LVL III: ICD-10-PCS | Mod: PBBFAC,,, | Performed by: INTERNAL MEDICINE

## 2019-08-09 PROCEDURE — 99214 OFFICE O/P EST MOD 30 MIN: CPT | Mod: S$GLB,,, | Performed by: INTERNAL MEDICINE

## 2019-08-09 NOTE — PROGRESS NOTES
Subjective:    Patient ID:  Abimael Armendariz is a 54 y.o. male who presents for follow-up of MR    URSULA  He comes for follow up with no major problems, no chest pain, no shortness of breath.  FC II    Review of Systems   Constitution: Negative for decreased appetite, malaise/fatigue, weight gain and weight loss.   Cardiovascular: Negative for chest pain, dyspnea on exertion, leg swelling, palpitations and syncope.   Respiratory: Negative for cough and shortness of breath.    Gastrointestinal: Negative.    Neurological: Negative for weakness.   All other systems reviewed and are negative.       Objective:      Physical Exam   Constitutional: He is oriented to person, place, and time. He appears well-developed and well-nourished.   HENT:   Head: Normocephalic.   Eyes: Pupils are equal, round, and reactive to light.   Neck: Normal range of motion. Neck supple. No JVD present. Carotid bruit is not present. No thyromegaly present.   Cardiovascular: Normal rate, regular rhythm, intact distal pulses and normal pulses. PMI is not displaced. Exam reveals no gallop.   Murmur heard.  High-pitched blowing holosystolic murmur is present with a grade of 3/6 at the apex.  Pulmonary/Chest: Effort normal and breath sounds normal.   Abdominal: Soft. Normal appearance. He exhibits no mass. There is no hepatosplenomegaly. There is no tenderness.   Musculoskeletal: Normal range of motion. He exhibits no edema.   Neurological: He is alert and oriented to person, place, and time. He has normal strength and normal reflexes. No sensory deficit.   Skin: Skin is warm and intact.   Psychiatric: He has a normal mood and affect.   Nursing note and vitals reviewed.    Echo reviewed      Assessment:       1. Mitral valve insufficiency, unspecified etiology    2. Essential hypertension    3. Hyperlipidemia, unspecified hyperlipidemia type         Plan:     Continue all cardiac medications  Regular exercise program  Weight loss  9 m f/u with  [facility]

## 2019-08-13 ENCOUNTER — PATIENT MESSAGE (OUTPATIENT)
Dept: FAMILY MEDICINE | Facility: CLINIC | Age: 54
End: 2019-08-13

## 2019-08-13 DIAGNOSIS — F32.A DEPRESSION, UNSPECIFIED DEPRESSION TYPE: ICD-10-CM

## 2019-08-13 RX ORDER — BUPROPION HYDROCHLORIDE 150 MG/1
150 TABLET ORAL DAILY
Qty: 90 TABLET | Refills: 0 | Status: SHIPPED | OUTPATIENT
Start: 2019-08-13 | End: 2019-10-17 | Stop reason: SDUPTHER

## 2019-08-20 DIAGNOSIS — G25.81 RESTLESS LEG SYNDROME: ICD-10-CM

## 2019-08-20 RX ORDER — PRAMIPEXOLE DIHYDROCHLORIDE 0.25 MG/1
TABLET ORAL
Qty: 90 TABLET | Refills: 0 | Status: SHIPPED | OUTPATIENT
Start: 2019-08-20 | End: 2019-10-17 | Stop reason: SDUPTHER

## 2019-09-16 DIAGNOSIS — G47.00 INSOMNIA, UNSPECIFIED TYPE: ICD-10-CM

## 2019-09-16 RX ORDER — TRAZODONE HYDROCHLORIDE 50 MG/1
50 TABLET ORAL NIGHTLY
Qty: 90 TABLET | Refills: 0 | Status: SHIPPED | OUTPATIENT
Start: 2019-09-16 | End: 2019-10-17 | Stop reason: SDUPTHER

## 2019-09-27 DIAGNOSIS — Z12.11 COLON CANCER SCREENING: ICD-10-CM

## 2019-10-03 ENCOUNTER — PATIENT OUTREACH (OUTPATIENT)
Dept: ADMINISTRATIVE | Facility: HOSPITAL | Age: 54
End: 2019-10-03

## 2019-10-03 NOTE — LETTER
AUTHORIZATION FOR RELEASE OF   CONFIDENTIAL INFORMATION    Dear Dr. Lidia Salter,    We are seeing Abimael Armendariz, date of birth 1965, in the clinic at [facility]. Sammy Trinidad MD is the patient's PCP. Abimael Armendariz has an outstanding lab/procedure at the time we reviewed his chart. In order to help keep his health information updated, he has authorized us to request the following medical record(s):        (  )  MAMMOGRAM                                      ( X )  COLONOSCOPY      (  )  PAP SMEAR                                          (  )  OUTSIDE LAB RESULTS     (  )  DEXA SCAN                                          (  )  EYE EXAM            (  )  FOOT EXAM                                          (  )  ENTIRE RECORD     (  )  OUTSIDE IMMUNIZATIONS                 (  )  _______________         Please fax records to [facility], Robert W Taylor, MD, [phone]    Bebe Hoffman LPN  Clinical Care Coordinator  [address] [address]  P: [phone]  F: [phone]            Patient Name: Abimael Armendariz  : 1965  Patient Phone #: [phone]

## 2019-10-03 NOTE — LETTER
"October 10, 2019    Abimael Armendariz  [address] [place] [address]             [facility]  [address]  [facility] [address]  Phone: [phone] [facility] is committed to your overall health.  To help you get the most out of each of your visits, we will review your information to make sure you are up to date on all of your recommended tests and/or procedures.       Dr. Sammy Trinidad MD has found that your chart shows you may be due for a:     COLORECTAL CANCER SCREENING     Our records show you are due for colon cancer screening.  If you have already had your screening, or you have made an appointment for your screening, congratulations!  You're on the road to good health. If you haven't signed up for a colorectal screening please accept this invitation to be screened.       According to the American Cancer Society, colon cancer is the third most common cancer for people in the [place].  A simple screening test "Fit Kit" - done in your own home - can help find colon cancer at an e sonia stage when it can be treated, even before any signs or symptoms develop. THIS IS A YEARLY TEST.     Testing for blood in your stool (feces or bowel movement) is the first step. If you have an upcoming visit with your Primary Care Physician you can  a Fit Kit during your visit or you can  a Fit Kit at your Primary Care Clinic prior to your visit.     The Fit Kit includes:     Instructions on how to perform the test   (1) Sheet of tissue paper   (1) Small Absorption pad   (1) Bottle to hold the sample and a small probe to help you take the sample   (1) Small plastic bio-hazard bag   (1) Postage-paid return envelope     Please do your test (the instructions will tell you how) and then return your sample in the postage-paid return envelope within 24 hours of collection.     If your test results are negative, you won't need testing again for another year.  If results show you need more " "testing, we will call you with next steps.     Regular colorectal cancer screening is one of the most powerful weapons for preventing colon cancer.  The website https://www.cancer.org/cancer/colon-rectal-cancer.html can answer many of your questions about this cancer and its prevention.  Just search for "colorectal cancer".         If you have had any of the above done at another facility, please bring the records or information with you so that your record at [facility] will be complete.  If you would like to schedule any of these, please contact the clinic at [phone].     If you are currently taking medication, please bring it with you to your appointment for review.     Also, if you have any type of Advanced Directives, please bring them with you to your office visit so we may scan them into your chart.     Thank You,     Your [facility] Team,   MD Bebe Lewis LPN Clinical Care Coordinator   [facility] [address] [place] [address]   Phone [phone]   Fax [phone]        "

## 2019-10-03 NOTE — PROGRESS NOTES
Health Maintenance Due   Topic Date Due    Shingles Vaccine (1 of 2) 02/06/2014    Colonoscopy  02/06/2014    Influenza Vaccine (1) 09/01/2019

## 2019-10-10 ENCOUNTER — PATIENT OUTREACH (OUTPATIENT)
Dept: ADMINISTRATIVE | Facility: HOSPITAL | Age: 54
End: 2019-10-10

## 2019-10-17 ENCOUNTER — OFFICE VISIT (OUTPATIENT)
Dept: FAMILY MEDICINE | Facility: CLINIC | Age: 54
End: 2019-10-17
Payer: COMMERCIAL

## 2019-10-17 ENCOUNTER — LAB VISIT (OUTPATIENT)
Dept: LAB | Facility: HOSPITAL | Age: 54
End: 2019-10-17
Attending: NURSE PRACTITIONER
Payer: COMMERCIAL

## 2019-10-17 VITALS
HEIGHT: 71 IN | HEART RATE: 58 BPM | DIASTOLIC BLOOD PRESSURE: 72 MMHG | TEMPERATURE: 98 F | SYSTOLIC BLOOD PRESSURE: 134 MMHG | BODY MASS INDEX: 29.54 KG/M2 | OXYGEN SATURATION: 96 % | WEIGHT: 211 LBS

## 2019-10-17 DIAGNOSIS — F51.01 PRIMARY INSOMNIA: ICD-10-CM

## 2019-10-17 DIAGNOSIS — I10 ESSENTIAL HYPERTENSION: ICD-10-CM

## 2019-10-17 DIAGNOSIS — Z12.11 COLON CANCER SCREENING: ICD-10-CM

## 2019-10-17 DIAGNOSIS — Z00.00 ANNUAL PHYSICAL EXAM: Primary | ICD-10-CM

## 2019-10-17 DIAGNOSIS — Z12.5 SCREENING FOR PROSTATE CANCER: ICD-10-CM

## 2019-10-17 DIAGNOSIS — I34.0 MITRAL VALVE INSUFFICIENCY, UNSPECIFIED ETIOLOGY: ICD-10-CM

## 2019-10-17 DIAGNOSIS — Z79.01 ANTICOAGULANT LONG-TERM USE: ICD-10-CM

## 2019-10-17 DIAGNOSIS — J30.9 ALLERGIC SINUSITIS: ICD-10-CM

## 2019-10-17 DIAGNOSIS — Z23 NEEDS FLU SHOT: ICD-10-CM

## 2019-10-17 DIAGNOSIS — E78.5 DYSLIPIDEMIA: ICD-10-CM

## 2019-10-17 DIAGNOSIS — Z00.00 ANNUAL PHYSICAL EXAM: ICD-10-CM

## 2019-10-17 DIAGNOSIS — Z79.899 HIGH RISK MEDICATION USE: ICD-10-CM

## 2019-10-17 DIAGNOSIS — G25.81 RESTLESS LEG SYNDROME: ICD-10-CM

## 2019-10-17 DIAGNOSIS — F32.5 MAJOR DEPRESSIVE DISORDER WITH SINGLE EPISODE, IN FULL REMISSION: ICD-10-CM

## 2019-10-17 LAB
ALBUMIN SERPL BCP-MCNC: 4.2 G/DL (ref 3.5–5.2)
ALP SERPL-CCNC: 80 U/L (ref 55–135)
ALT SERPL W/O P-5'-P-CCNC: 31 U/L (ref 10–44)
ANION GAP SERPL CALC-SCNC: 11 MMOL/L (ref 8–16)
AST SERPL-CCNC: 27 U/L (ref 10–40)
BASOPHILS # BLD AUTO: 0.06 K/UL (ref 0–0.2)
BASOPHILS NFR BLD: 0.9 % (ref 0–1.9)
BILIRUB SERPL-MCNC: 0.7 MG/DL (ref 0.1–1)
BUN SERPL-MCNC: 12 MG/DL (ref 6–20)
CALCIUM SERPL-MCNC: 9.6 MG/DL (ref 8.7–10.5)
CHLORIDE SERPL-SCNC: 104 MMOL/L (ref 95–110)
CHOLEST SERPL-MCNC: 152 MG/DL (ref 120–199)
CHOLEST/HDLC SERPL: 5.6 {RATIO} (ref 2–5)
CO2 SERPL-SCNC: 28 MMOL/L (ref 23–29)
COMPLEXED PSA SERPL-MCNC: 0.52 NG/ML (ref 0–4)
CREAT SERPL-MCNC: 1.1 MG/DL (ref 0.5–1.4)
DIFFERENTIAL METHOD: NORMAL
EOSINOPHIL # BLD AUTO: 0.2 K/UL (ref 0–0.5)
EOSINOPHIL NFR BLD: 3.3 % (ref 0–8)
ERYTHROCYTE [DISTWIDTH] IN BLOOD BY AUTOMATED COUNT: 12.3 % (ref 11.5–14.5)
EST. GFR  (AFRICAN AMERICAN): >60 ML/MIN/1.73 M^2
EST. GFR  (NON AFRICAN AMERICAN): >60 ML/MIN/1.73 M^2
GLUCOSE SERPL-MCNC: 80 MG/DL (ref 70–110)
HCT VFR BLD AUTO: 49.3 % (ref 40–54)
HDLC SERPL-MCNC: 27 MG/DL (ref 40–75)
HDLC SERPL: 17.8 % (ref 20–50)
HGB BLD-MCNC: 15.9 G/DL (ref 14–18)
IMM GRANULOCYTES # BLD AUTO: 0.02 K/UL (ref 0–0.04)
IMM GRANULOCYTES NFR BLD AUTO: 0.3 % (ref 0–0.5)
LDLC SERPL CALC-MCNC: 69.6 MG/DL (ref 63–159)
LYMPHOCYTES # BLD AUTO: 2.9 K/UL (ref 1–4.8)
LYMPHOCYTES NFR BLD: 41.6 % (ref 18–48)
MCH RBC QN AUTO: 30.4 PG (ref 27–31)
MCHC RBC AUTO-ENTMCNC: 32.3 G/DL (ref 32–36)
MCV RBC AUTO: 94 FL (ref 82–98)
MONOCYTES # BLD AUTO: 0.6 K/UL (ref 0.3–1)
MONOCYTES NFR BLD: 8.8 % (ref 4–15)
NEUTROPHILS # BLD AUTO: 3.2 K/UL (ref 1.8–7.7)
NEUTROPHILS NFR BLD: 45.1 % (ref 38–73)
NONHDLC SERPL-MCNC: 125 MG/DL
NRBC BLD-RTO: 0 /100 WBC
PLATELET # BLD AUTO: 153 K/UL (ref 150–350)
PMV BLD AUTO: 10.7 FL (ref 9.2–12.9)
POTASSIUM SERPL-SCNC: 4.3 MMOL/L (ref 3.5–5.1)
PROT SERPL-MCNC: 6.9 G/DL (ref 6–8.4)
RBC # BLD AUTO: 5.23 M/UL (ref 4.6–6.2)
SODIUM SERPL-SCNC: 143 MMOL/L (ref 136–145)
TRIGL SERPL-MCNC: 277 MG/DL (ref 30–150)
WBC # BLD AUTO: 7.05 K/UL (ref 3.9–12.7)

## 2019-10-17 PROCEDURE — 90471 FLU VACCINE (QUAD) GREATER THAN OR EQUAL TO 3YO PRESERVATIVE FREE IM: ICD-10-PCS | Mod: S$GLB,,, | Performed by: NURSE PRACTITIONER

## 2019-10-17 PROCEDURE — 80053 COMPREHEN METABOLIC PANEL: CPT

## 2019-10-17 PROCEDURE — 99999 PR PBB SHADOW E&M-EST. PATIENT-LVL IV: ICD-10-PCS | Mod: PBBFAC,,, | Performed by: NURSE PRACTITIONER

## 2019-10-17 PROCEDURE — 90471 IMMUNIZATION ADMIN: CPT | Mod: S$GLB,,, | Performed by: NURSE PRACTITIONER

## 2019-10-17 PROCEDURE — 99396 PR PREVENTIVE VISIT,EST,40-64: ICD-10-PCS | Mod: 25,S$GLB,, | Performed by: NURSE PRACTITIONER

## 2019-10-17 PROCEDURE — 80061 LIPID PANEL: CPT

## 2019-10-17 PROCEDURE — 99999 PR PBB SHADOW E&M-EST. PATIENT-LVL IV: CPT | Mod: PBBFAC,,, | Performed by: NURSE PRACTITIONER

## 2019-10-17 PROCEDURE — 90686 IIV4 VACC NO PRSV 0.5 ML IM: CPT | Mod: S$GLB,,, | Performed by: NURSE PRACTITIONER

## 2019-10-17 PROCEDURE — 90686 FLU VACCINE (QUAD) GREATER THAN OR EQUAL TO 3YO PRESERVATIVE FREE IM: ICD-10-PCS | Mod: S$GLB,,, | Performed by: NURSE PRACTITIONER

## 2019-10-17 PROCEDURE — 84153 ASSAY OF PSA TOTAL: CPT

## 2019-10-17 PROCEDURE — 36415 COLL VENOUS BLD VENIPUNCTURE: CPT | Mod: PO

## 2019-10-17 PROCEDURE — 85025 COMPLETE CBC W/AUTO DIFF WBC: CPT

## 2019-10-17 PROCEDURE — 99396 PREV VISIT EST AGE 40-64: CPT | Mod: 25,S$GLB,, | Performed by: NURSE PRACTITIONER

## 2019-10-17 RX ORDER — TRAZODONE HYDROCHLORIDE 100 MG/1
100 TABLET ORAL NIGHTLY
Qty: 90 TABLET | Refills: 3 | Status: SHIPPED | OUTPATIENT
Start: 2019-10-17 | End: 2020-09-11 | Stop reason: SDUPTHER

## 2019-10-17 RX ORDER — MONTELUKAST SODIUM 10 MG/1
10 TABLET ORAL NIGHTLY
Qty: 30 TABLET | Refills: 0 | Status: SHIPPED | OUTPATIENT
Start: 2019-10-17 | End: 2019-10-25 | Stop reason: SDUPTHER

## 2019-10-17 RX ORDER — PRAMIPEXOLE DIHYDROCHLORIDE 0.25 MG/1
TABLET ORAL
Qty: 90 TABLET | Refills: 3 | Status: SHIPPED | OUTPATIENT
Start: 2019-10-17 | End: 2020-10-15 | Stop reason: SDUPTHER

## 2019-10-17 RX ORDER — FOSINOPRIL SODIUM 40 MG/1
40 TABLET ORAL DAILY
Qty: 90 TABLET | Refills: 3 | Status: CANCELLED | OUTPATIENT
Start: 2019-10-17

## 2019-10-17 RX ORDER — FLUTICASONE PROPIONATE 50 MCG
1 SPRAY, SUSPENSION (ML) NASAL DAILY PRN
COMMUNITY

## 2019-10-17 RX ORDER — BUPROPION HYDROCHLORIDE 150 MG/1
150 TABLET ORAL DAILY
Qty: 90 TABLET | Refills: 3 | Status: SHIPPED | OUTPATIENT
Start: 2019-10-17 | End: 2020-10-19 | Stop reason: SDUPTHER

## 2019-10-17 NOTE — PATIENT INSTRUCTIONS

## 2019-10-17 NOTE — PROGRESS NOTES
Subjective:       Patient ID: Abimael Armendariz is a 54 y.o. male.    Chief Complaint: Annual Exam    Chief Complaint  Chief Complaint   Patient presents with    Annual Exam       HPI  Abimael Armendariz is a 54 y.o. male with medical diagnoses as listed in the medical history and problem list that presents for an annual exam. Patient is routinely followed for hypertension, dyslipidemia, depression, anxiety, and insomnia. Today's blood pressure is 134/72. Current BMI of 29.43 and the patient has gained 4 lbs since his last visit on 8/27/18.  Patient was recently treated for URI by urgent care with amoxicillin which he finished a few days ago.       PAST MEDICAL HISTORY:  Past Medical History:   Diagnosis Date    ADHD (attention deficit hyperactivity disorder)     Anxiety     Depression     Hyperlipidemia     Hypertension     patient states he uses fosinopril for mitral regurgitation, not hypertension    Insomnia     Mitral regurgitation        PAST SURGICAL HISTORY:  Past Surgical History:   Procedure Laterality Date    KNEE ARTHROSCOPY W/ ACL RECONSTRUCTION AND HAMSTRING GRAFT  2003    right       SOCIAL HISTORY:  Social History     Socioeconomic History    Marital status:      Spouse name: Not on file    Number of children: Not on file    Years of education: Not on file    Highest education level: Not on file   Occupational History     Employer: [employer]   Social Needs    Financial resource strain: Not hard at all    Food insecurity:     Worry: Never true     Inability: Never true    Transportation needs:     Medical: No     Non-medical: No   Tobacco Use    Smoking status: Never Smoker    Smokeless tobacco: Current User     Types: Chew   Substance and Sexual Activity    Alcohol use: No     Frequency: Never     Drinks per session: Patient refused     Binge frequency: Never    Drug use: No    Sexual activity: Yes     Partners: Female   Lifestyle    Physical activity:     Days per  week: 7 days     Minutes per session: 90 min    Stress: Only a little   Relationships    Social connections:     Talks on phone: More than three times a week     Gets together: More than three times a week     Attends [employer] service: Not on file     Active member of club or organization: No     Attends meetings of clubs or organizations: Never     Relationship status:    Other Topics Concern    Not on file   Social History Narrative    Not on file       FAMILY HISTORY:  Family History   Problem Relation Age of Onset    Stroke Mother     Heart attack Father     Heart disease Father     Heart attack Paternal Grandfather     No Known Problems Sister     No Known Problems Brother     No Known Problems Daughter     No Known Problems Son     Alzheimer's disease Maternal Grandmother        ALLERGIES AND MEDICATIONS: updated and reviewed.  Review of patient's allergies indicates:  No Known Allergies  Current Outpatient Medications   Medication Sig Dispense Refill    aspirin 81 mg Tab Take 81 mg by mouth once daily. Every day      buPROPion (WELLBUTRIN XL) 150 MG TB24 tablet Take 1 tablet (150 mg total) by mouth once daily. 90 tablet 3    cetirizine (ZYRTEC) 10 MG tablet Take 10 mg by mouth once daily. Every day      fluticasone propionate (FLONASE) 50 mcg/actuation nasal spray 1 spray by Each Nostril route once daily.      fosinopril (MONOPRIL) 40 MG tablet Take 1 tablet (40 mg total) by mouth once daily. 90 tablet 3    pramipexole (MIRAPEX) 0.25 MG tablet Take one tablet daily 90 tablet 3    traZODone (DESYREL) 100 MG tablet Take 1 tablet (100 mg total) by mouth every evening. 90 tablet 3    montelukast (SINGULAIR) 10 mg tablet Take 1 tablet (10 mg total) by mouth every evening. 30 tablet 0     No current facility-administered medications for this visit.        I have reviewed the patient's medical, family, and social history in detail and updated the computerized patient record.    Review of  Systems   Constitutional: Negative for activity change, appetite change, chills, fatigue, fever and unexpected weight change.        No regular exercise, active lifestyle.   HENT: Positive for postnasal drip, sinus pressure and sinus pain. Negative for congestion, ear pain, hearing loss, rhinorrhea, sneezing, sore throat and trouble swallowing.         Patient states he has seasonal allergies, using zyrtec daily with minimal relief.   Eyes: Negative for discharge and visual disturbance.        Wears glasses for reading.    Respiratory: Positive for cough. Negative for chest tightness, shortness of breath, wheezing and stridor.         Patient states he has a cough, non-productive during the day but productive at night and in the morning, white sputum.    Cardiovascular: Positive for leg swelling. Negative for chest pain and palpitations.        Patient states his legs swell when he flies on airplane about once a month.    Gastrointestinal: Negative for abdominal pain, blood in stool, constipation, diarrhea, nausea and vomiting.   Endocrine: Negative for polydipsia, polyphagia and polyuria.   Genitourinary: Negative for difficulty urinating, dysuria, hematuria and urgency.        Nocturia x1.    Musculoskeletal: Negative for arthralgias, back pain, gait problem, joint swelling, myalgias and neck pain.   Skin: Negative for rash and wound.   Neurological: Positive for numbness. Negative for dizziness, weakness, light-headedness and headaches.        Patient states his arms go to sleep while sleeping, states he sleeps on his arms.    Hematological: Does not bruise/bleed easily.   Psychiatric/Behavioral: Negative for confusion, dysphoric mood and sleep disturbance. The patient is not nervous/anxious.         Patient states that he feels that his trazodone needs to be increased, states is sleeping through the night but does not feel that he is sleeping as well as he would like.         Objective:      Vitals:    10/17/19  "1052   BP: 134/72   BP Location: Right arm   Patient Position: Sitting   BP Method: Large (Manual)   Pulse: (!) 58   Temp: 98 °F (36.7 °C)   TempSrc: Oral   SpO2: 96%   Weight: 95.7 kg (211 lb)   Height: 5' 11" (1.803 m)     Physical Exam   Constitutional: He is oriented to person, place, and time. Vital signs are normal. He appears well-developed. No distress.   blood pressure 134/72.    HENT:   Head: Normocephalic and atraumatic.   Right Ear: Hearing, external ear and ear canal normal. Tympanic membrane is erythematous.   Left Ear: Hearing, tympanic membrane, external ear and ear canal normal.   Nose: Mucosal edema present. Right sinus exhibits no maxillary sinus tenderness and no frontal sinus tenderness. Left sinus exhibits no maxillary sinus tenderness and no frontal sinus tenderness.   Mouth/Throat: Oropharynx is clear and moist and mucous membranes are normal. No oropharyngeal exudate.   Eyes: Pupils are equal, round, and reactive to light. Conjunctivae and lids are normal. Right eye exhibits no discharge. Left eye exhibits no discharge. Right conjunctiva is not injected. Left conjunctiva is not injected.   Neck: Normal range of motion. Neck supple. Normal carotid pulses present. Carotid bruit is present. No thyromegaly present.   Bilateral carotid bruits, referred from heart murmur.    Cardiovascular: Normal rate, regular rhythm, S1 normal, S2 normal, intact distal pulses and normal pulses.   Murmur heard.   Systolic murmur is present with a grade of 3/6.  Pulses:       Carotid pulses are 2+ on the right side, and 2+ on the left side.       Radial pulses are 2+ on the right side, and 2+ on the left side.        Posterior tibial pulses are 2+ on the right side, and 2+ on the left side.   No edema. Systolic murmur 3/6 to RUSB and LUSB   Pulmonary/Chest: Effort normal and breath sounds normal. No respiratory distress. He has no decreased breath sounds. He has no wheezes. He has no rhonchi. He has no rales. "   Abdominal: Soft. Normal appearance and bowel sounds are normal. He exhibits no distension, no abdominal bruit, no pulsatile midline mass and no mass. There is no hepatosplenomegaly. There is no tenderness. No hernia.   Musculoskeletal: Normal range of motion. He exhibits no edema, tenderness or deformity.   Lymphadenopathy:        Head (right side): No submental, no submandibular and no tonsillar adenopathy present.        Head (left side): No submental, no submandibular and no tonsillar adenopathy present.     He has no cervical adenopathy.        Right: No supraclavicular adenopathy present.        Left: No supraclavicular adenopathy present.   Neurological: He is alert and oriented to person, place, and time. He has normal strength.   Skin: Skin is warm, dry and intact. No rash noted. He is not diaphoretic. No erythema. No pallor.   Psychiatric: He has a normal mood and affect. His speech is normal and behavior is normal. Judgment and thought content normal. His mood appears not anxious. Cognition and memory are normal. He does not exhibit a depressed mood.   Nursing note and vitals reviewed.        Assessment:       1. Annual physical exam    2. Essential hypertension    3. Mitral valve insufficiency, unspecified etiology    4. Dyslipidemia    5. Restless leg syndrome    6. Major depressive disorder with single episode, in full remission    7. Primary insomnia    8. Allergic sinusitis    9. Anticoagulant long-term use    10. BMI 29.0-29.9,adult    11. Needs flu shot    12. High risk medication use    13. Colon cancer screening    14. Screening for prostate cancer          Plan:       Abimael was seen today for annual exam.  Discussed age and gender appropriate health maintenance recommendations, healthy diet, regular exercise, necessary labs, age appropriate cancer screening, and routine vaccinations.  Discussed recommendation for Shingrix, check with health insurance provider for coverage.    Diagnoses and all  orders for this visit:    Annual physical exam  -     PSA, Screening; Future        - Discussed age and gender appropriate health maintenance recommendations, healthy diet, regular exercise, necessary labs, age appropriate cancer screening, and routine vaccinations.  Discussed recommendation for Shingrix, check with health insurance provider for coverage, not available in office today. The natural history of prostate cancer and ongoing controversy regarding screening and potential treatment outcomes of prostate cancer has been discussed with the patient. The meaning of a false positive PSA and a false negative PSA has been discussed. He indicates understanding of the limitations of this screening test and wishes to proceed with screening PSA testing.        - Educational handouts provided.  Prevention guidelines men age 50-64    Essential hypertension  -     CBC auto differential; Future  -     Comprehensive metabolic panel; Future  - Blood pressure controlled 134/72, continue current medication without change.   - Discussed changing fosinopril which is an ACE inhibitor due to possible increased risk of lung cancer. Patient will discuss the change with his cardiologist, Dr. Stone at his visit in December. Patient states fosinopril is for treatment of mitral regurgitation and that he does not have hypertension. However, cardiology office note includes a diagnosis hypertension  - Continue routine follow-ups with cardiology.     Mitral valve insufficiency, unspecified etiology   Continue current medications and routine follow-ups with cardiology.     Dyslipidemia  -     Comprehensive metabolic panel; Future  -     Lipid panel; Future  Lab Results   Component Value Date    CHOL 142 08/14/2017    CHOL 173 06/15/2016    CHOL 151 04/15/2015     Lab Results   Component Value Date    HDL 27 (L) 08/14/2017    HDL 31 (L) 06/15/2016    HDL 25 (L) 04/15/2015     Lab Results   Component Value Date    LDLCALC 67.6 08/14/2017     LDLCALC 89.4 06/15/2016    LDLCALC 100.8 04/15/2015     Lab Results   Component Value Date    TRIG 237 (H) 08/14/2017    TRIG 263 (H) 06/15/2016    TRIG 126 04/15/2015     Lab Results   Component Value Date    CHOLHDL 19.0 (L) 08/14/2017    CHOLHDL 17.9 (L) 06/15/2016    CHOLHDL 16.6 (L) 04/15/2015   The 10-year ASCVD risk score (Lakshmi TINSLEY Jr., et al., 2013) is: 8.2%    Values used to calculate the score:      Age: 54 years      Sex: Male      Is Non- : No      Diabetic: No      Tobacco smoker: No      Systolic Blood Pressure: 134 mmHg      Is BP treated: Yes      HDL Cholesterol: 27 mg/dL      Total Cholesterol: 142 mg/dL   Will reassess need for statin when new lipid panel results are available    Restless leg syndrome  -     pramipexole (MIRAPEX) 0.25 MG tablet; Take one tablet daily  - Medication effective in relieving symptoms, continue as is    Major depressive disorder with single episode, in full remission  -     buPROPion (WELLBUTRIN XL) 150 MG TB24 tablet; Take 1 tablet (150 mg total) by mouth once daily.  - Symptoms in remission on current medication, continue medication as prescribed.     Primary insomnia  -     traZODone (DESYREL) 100 MG tablet; Take 1 tablet (100 mg total) by mouth every evening.  - Dose increased from 50 mg to 100 mg at HS per patient request    Allergic sinusitis  -     montelukast (SINGULAIR) 10 mg tablet; Take 1 tablet (10 mg total) by mouth every evening.  - Has been taking zyrtec without relief, will try singulair    Anticoagulant long-term use  -     CBC auto differential; Future  - Stable on aspirin 81 mg daily, continue as is    BMI 29.0-29.9,adult   Current BMI of 29.43 and the patient has gained 4 lbs since his last visit on 8/27/18.     Weight loss recommended with well balanced diet and portion controlled meals and increased activity.     Needs flu shot  -     Influenza - Quadrivalent (6 months+) (PF)    High risk medication use  -     CBC auto  differential; Future  -     Comprehensive metabolic panel; Future    Colon cancer screening  -     Fecal Immunochemical Test (iFOBT); Future    Screening for prostate cancer  -     PSA, Screening; Future  -   Lab Results   Component Value Date    PSA 0.57 08/14/2017    PSA 0.52 06/15/2016    PSA 0.46 04/15/2015       Follow up in about 1 year (around 10/17/2020) for Dr. Trinidad, annual, Labs today.      Patient readiness: acceptance and barriers:none    During the course of the visit the patient was educated and counseled about the following:     Hypertension:   Medication: no change.  Dietary sodium restriction.  Regular aerobic exercise.  Follow up: 1 year and as needed.    Goals: Hypertension: Reduce Blood Pressure    Did patient meet goals/outcomes: Yes    The following self management tools provided: declined    Patient Instructions (the written plan) was given to the patient/family.     Time spent with patient: 30 minutes    Barriers to medications present (no )    Adverse reactions to current medications (no)    Over the counter medications reviewed (Yes)

## 2019-10-18 ENCOUNTER — TELEPHONE (OUTPATIENT)
Dept: FAMILY MEDICINE | Facility: CLINIC | Age: 54
End: 2019-10-18

## 2019-10-18 ENCOUNTER — LAB VISIT (OUTPATIENT)
Dept: LAB | Facility: HOSPITAL | Age: 54
End: 2019-10-18
Attending: FAMILY MEDICINE
Payer: COMMERCIAL

## 2019-10-18 DIAGNOSIS — Z12.11 COLON CANCER SCREENING: ICD-10-CM

## 2019-10-18 PROCEDURE — 82274 ASSAY TEST FOR BLOOD FECAL: CPT

## 2019-10-18 NOTE — TELEPHONE ENCOUNTER
----- Message from Xin Andersen NP sent at 10/18/2019  7:41 AM CDT -----  ## is he willing to start Crestor 10 mg at this time?##    Dear Mr. Armendariz,   I have reviewed the results of your recent blood work.  The prostate specific antigen or PSA is normal at 0.52.  There is no indication of an enlarged prostate or prostate cancer.  The blood sugar is normal at 80.  All other electrolytes, kidney function tests, and liver function tests are also normal.  The complete blood count is normal.  There is no anemia.  The white blood cell and platelet counts are normal.  Previously, your platelet count had been a bit decreased but is normal with this blood work.  Your total cholesterol is normal at 152.  Your triglyceride level is elevated at 277, goal is less than 150.  The HDL, or good cholesterol, is very low at 27, goal in men is greater than 40.  Having a low HDL cholesterol increases your risk of heart disease. HDL cholesterol may be increased by eating good fats like fatty fish such as fresh tuna or salmon and with exercise.  The LDL, or bad cholesterol, is excellent at 69.6.  The 10-year ASCVD risk score (Lakshmi ALVINA Jr., et al., 2013) is: 8.8% which indicates that you have an 8.8% chance of having a heart attack or stroke in the next 10 years.  Whenever the atherosclerotic cardiovascular disease risk score is 7.5% or higher statin medication to lower cardiovascular disease risk, lower bad cholesterol, and increase good cholesterol is recommended.  Crestor is a statin that usually raise his HDL.  Are you willing to start a statin medication at this time?    Values used to calculate the score:      Age: 54 years      Sex: Male      Is Non- : No      Diabetic: No      Tobacco smoker: No      Systolic Blood Pressure: 134 mmHg      Is BP treated: Yes      HDL Cholesterol: 27 mg/dL      Total Cholesterol: 152 mg/dL  If you have any questions, please call or email the office.  Thank  you.  Xin

## 2019-10-18 NOTE — TELEPHONE ENCOUNTER
Patient of notified of results, patient does not want to start a statin medication at this time.           ----- Message from Xin Andersen NP sent at 10/18/2019  7:41 AM CDT -----  ## is he willing to start Crestor 10 mg at this time?##    Dear Mr. Armendariz,   I have reviewed the results of your recent blood work.  The prostate specific antigen or PSA is normal at 0.52.  There is no indication of an enlarged prostate or prostate cancer.  The blood sugar is normal at 80.  All other electrolytes, kidney function tests, and liver function tests are also normal.  The complete blood count is normal.  There is no anemia.  The white blood cell and platelet counts are normal.  Previously, your platelet count had been a bit decreased but is normal with this blood work.  Your total cholesterol is normal at 152.  Your triglyceride level is elevated at 277, goal is less than 150.  The HDL, or good cholesterol, is very low at 27, goal in men is greater than 40.  Having a low HDL cholesterol increases your risk of heart disease. HDL cholesterol may be increased by eating good fats like fatty fish such as fresh tuna or salmon and with exercise.  The LDL, or bad cholesterol, is excellent at 69.6.  The 10-year ASCVD risk score (Lakshmi DC Jr., et al., 2013) is: 8.8% which indicates that you have an 8.8% chance of having a heart attack or stroke in the next 10 years.  Whenever the atherosclerotic cardiovascular disease risk score is 7.5% or higher statin medication to lower cardiovascular disease risk, lower bad cholesterol, and increase good cholesterol is recommended.  Crestor is a statin that usually raise his HDL.  Are you willing to start a statin medication at this time?    Values used to calculate the score:      Age: 54 years      Sex: Male      Is Non- : No      Diabetic: No      Tobacco smoker: No      Systolic Blood Pressure: 134 mmHg      Is BP treated: Yes      HDL Cholesterol: 27 mg/dL       Total Cholesterol: 152 mg/dL  If you have any questions, please call or email the office.  Thank you.  Xin

## 2019-10-23 ENCOUNTER — TELEPHONE (OUTPATIENT)
Dept: FAMILY MEDICINE | Facility: CLINIC | Age: 54
End: 2019-10-23

## 2019-10-23 DIAGNOSIS — J30.9 ALLERGIC SINUSITIS: Primary | ICD-10-CM

## 2019-10-23 DIAGNOSIS — F51.01 PRIMARY INSOMNIA: ICD-10-CM

## 2019-10-23 NOTE — TELEPHONE ENCOUNTER
----- Message from Eva Murray sent at 10/23/2019  3:43 PM CDT -----  Contact: Wife, Jonas  Patient need a refill on montelukast 10 mg please send to [facility], any questions please call back at [phone] (home)     [id number] - [place] - [address]  [address] [address]  Phone: [phone] Fax: [phone]

## 2019-10-24 LAB — HEMOCCULT STL QL IA: NEGATIVE

## 2019-10-24 NOTE — TELEPHONE ENCOUNTER
The prescription was sent to that pharmacy on 10/17/19. He should not need a refill yet. This was a new prescription that he was trying in place of zyrtec. It has only been a week.   Thanks.  Xin

## 2019-10-25 ENCOUNTER — PATIENT MESSAGE (OUTPATIENT)
Dept: FAMILY MEDICINE | Facility: CLINIC | Age: 54
End: 2019-10-25

## 2019-10-25 ENCOUNTER — TELEPHONE (OUTPATIENT)
Dept: FAMILY MEDICINE | Facility: CLINIC | Age: 54
End: 2019-10-25

## 2019-10-25 DIAGNOSIS — Z76.0 MEDICATION REFILL: Primary | ICD-10-CM

## 2019-10-25 DIAGNOSIS — J30.9 ALLERGIC SINUSITIS: ICD-10-CM

## 2019-10-25 NOTE — TELEPHONE ENCOUNTER
Patient needs RX refilled of his singular 10 mg nightly LOV 10/17/2019 with Ms. Andersen please advise

## 2019-10-25 NOTE — TELEPHONE ENCOUNTER
Patient is wanting a 90 day supply sent into his mail order of the Zyrtec he stated it was working good for him. please advise

## 2019-10-25 NOTE — TELEPHONE ENCOUNTER
Patient emailed stating that the medicine is working fine for his sinus. He would like a 90 day supply.

## 2019-10-27 RX ORDER — MONTELUKAST SODIUM 10 MG/1
10 TABLET ORAL NIGHTLY
Qty: 30 TABLET | Refills: 0 | Status: SHIPPED | OUTPATIENT
Start: 2019-10-27 | End: 2019-10-28 | Stop reason: SDUPTHER

## 2019-10-28 RX ORDER — MONTELUKAST SODIUM 10 MG/1
10 TABLET ORAL NIGHTLY
Qty: 90 TABLET | Refills: 1 | Status: SHIPPED | OUTPATIENT
Start: 2019-10-28 | End: 2020-03-23 | Stop reason: SDUPTHER

## 2019-11-11 RX ORDER — CETIRIZINE HYDROCHLORIDE 10 MG/1
10 TABLET ORAL DAILY
Qty: 90 TABLET | Refills: 1 | Status: SHIPPED | OUTPATIENT
Start: 2019-11-11 | End: 2019-12-10 | Stop reason: ALTCHOICE

## 2019-11-21 ENCOUNTER — PATIENT OUTREACH (OUTPATIENT)
Dept: ADMINISTRATIVE | Facility: HOSPITAL | Age: 54
End: 2019-11-21

## 2019-12-04 ENCOUNTER — OFFICE VISIT (OUTPATIENT)
Dept: FAMILY MEDICINE | Facility: CLINIC | Age: 54
End: 2019-12-04
Payer: COMMERCIAL

## 2019-12-04 VITALS
BODY MASS INDEX: 29.14 KG/M2 | WEIGHT: 208.13 LBS | HEIGHT: 71 IN | RESPIRATION RATE: 18 BRPM | HEART RATE: 63 BPM | SYSTOLIC BLOOD PRESSURE: 128 MMHG | TEMPERATURE: 98 F | DIASTOLIC BLOOD PRESSURE: 74 MMHG | OXYGEN SATURATION: 95 %

## 2019-12-04 DIAGNOSIS — N48.89 PENILE LUMP: Primary | ICD-10-CM

## 2019-12-04 PROCEDURE — 99214 PR OFFICE/OUTPT VISIT, EST, LEVL IV, 30-39 MIN: ICD-10-PCS | Mod: S$GLB,,, | Performed by: NURSE PRACTITIONER

## 2019-12-04 PROCEDURE — 99999 PR PBB SHADOW E&M-EST. PATIENT-LVL V: ICD-10-PCS | Mod: PBBFAC,,, | Performed by: NURSE PRACTITIONER

## 2019-12-04 PROCEDURE — 99999 PR PBB SHADOW E&M-EST. PATIENT-LVL V: CPT | Mod: PBBFAC,,, | Performed by: NURSE PRACTITIONER

## 2019-12-04 PROCEDURE — 99214 OFFICE O/P EST MOD 30 MIN: CPT | Mod: S$GLB,,, | Performed by: NURSE PRACTITIONER

## 2019-12-04 NOTE — PROGRESS NOTES
"Subjective:       Patient ID: Abmiael Armendariz is a 54 y.o. male.    Chief Complaint: No chief complaint on file.    Patient who is new to me presents with a nodule to the penis. Patient states its hard "like a marble." He denies any discharge. He notices discomfort to his the area and gets numb at time. He has noticed this for about a week.     Review of Systems   Constitutional: Negative for activity change and unexpected weight change.   HENT: Negative for hearing loss, rhinorrhea and trouble swallowing.    Eyes: Negative for discharge and visual disturbance.   Respiratory: Negative for chest tightness and wheezing.    Cardiovascular: Negative for chest pain and palpitations.   Gastrointestinal: Negative for blood in stool, constipation, diarrhea and vomiting.   Endocrine: Negative for polydipsia and polyuria.   Genitourinary: Negative for difficulty urinating, hematuria and urgency.   Musculoskeletal: Negative for arthralgias, joint swelling and neck pain.   Neurological: Negative for weakness and headaches.   Psychiatric/Behavioral: Negative for confusion and dysphoric mood.       Objective:      Physical Exam   Constitutional: He is oriented to person, place, and time. He appears well-developed and well-nourished.   HENT:   Head: Normocephalic and atraumatic.   Right Ear: External ear normal.   Left Ear: External ear normal.   Eyes: Pupils are equal, round, and reactive to light. Conjunctivae and EOM are normal.   Neck: Normal range of motion. Neck supple.   Cardiovascular: Normal rate and regular rhythm.   Pulmonary/Chest: Effort normal and breath sounds normal.   Abdominal: Soft. Bowel sounds are normal.   Genitourinary: Circumcised. Penile tenderness present.         Musculoskeletal: Normal range of motion.   Neurological: He is alert and oriented to person, place, and time.   Skin: Skin is warm and dry.   Nursing note and vitals reviewed.      Assessment:       1. Penile lump        Plan:       Diagnoses " and all orders for this visit:    Penile lump  -     Cancel: US Soft Tissue Misc; Future  -     Ambulatory referral to Urology  -     US Soft Tissue Penis; Future      Ultrasound shows calcification to area of concern. Advised to follow up with urology for further work up.

## 2019-12-09 ENCOUNTER — HOSPITAL ENCOUNTER (OUTPATIENT)
Dept: RADIOLOGY | Facility: CLINIC | Age: 54
Discharge: HOME | End: 2019-12-09
Attending: NURSE PRACTITIONER
Payer: COMMERCIAL

## 2019-12-09 DIAGNOSIS — N48.89 PENILE LUMP: ICD-10-CM

## 2019-12-09 PROCEDURE — 76857 US EXAM PELVIC LIMITED: CPT | Mod: TC,PO

## 2019-12-09 PROCEDURE — 76857 US EXAM PELVIC LIMITED: CPT | Mod: 26,,, | Performed by: RADIOLOGY

## 2019-12-09 PROCEDURE — 76857 US SOFT TISSUE PENIS: ICD-10-PCS | Mod: 26,,, | Performed by: RADIOLOGY

## 2019-12-10 ENCOUNTER — OFFICE VISIT (OUTPATIENT)
Dept: UROLOGY | Facility: CLINIC | Age: 54
End: 2019-12-10
Payer: COMMERCIAL

## 2019-12-10 VITALS
DIASTOLIC BLOOD PRESSURE: 78 MMHG | HEART RATE: 74 BPM | WEIGHT: 211.44 LBS | BODY MASS INDEX: 29.6 KG/M2 | SYSTOLIC BLOOD PRESSURE: 141 MMHG | RESPIRATION RATE: 18 BRPM | TEMPERATURE: 98 F | HEIGHT: 71 IN

## 2019-12-10 DIAGNOSIS — N48.89 PENILE LUMP: Primary | ICD-10-CM

## 2019-12-10 DIAGNOSIS — N48.89 PENILE PAIN: ICD-10-CM

## 2019-12-10 DIAGNOSIS — G47.00 INSOMNIA, UNSPECIFIED TYPE: ICD-10-CM

## 2019-12-10 PROCEDURE — 99999 PR PBB SHADOW E&M-EST. PATIENT-LVL IV: ICD-10-PCS | Mod: PBBFAC,,, | Performed by: NURSE PRACTITIONER

## 2019-12-10 PROCEDURE — 99204 OFFICE O/P NEW MOD 45 MIN: CPT | Mod: S$GLB,,, | Performed by: NURSE PRACTITIONER

## 2019-12-10 PROCEDURE — 99204 PR OFFICE/OUTPT VISIT, NEW, LEVL IV, 45-59 MIN: ICD-10-PCS | Mod: S$GLB,,, | Performed by: NURSE PRACTITIONER

## 2019-12-10 PROCEDURE — 99999 PR PBB SHADOW E&M-EST. PATIENT-LVL IV: CPT | Mod: PBBFAC,,, | Performed by: NURSE PRACTITIONER

## 2019-12-10 RX ORDER — IBUPROFEN 800 MG/1
800 TABLET ORAL 3 TIMES DAILY
Qty: 90 TABLET | Refills: 0 | Status: SHIPPED | OUTPATIENT
Start: 2019-12-10 | End: 2020-01-09

## 2019-12-10 NOTE — Clinical Note
NP came in to see me for new finding on his US of Penis-significant calcification.  Can you please review US and let me know if we need to do any further treatment with this finding?  Thanks.

## 2019-12-10 NOTE — PROGRESS NOTES
"[facility] Note  Staff: ISABELA IsaacsC    PCP: Dr. Sammy Trinidad    Chief Complaint: Penile lump    Subjective:        HPI: Abimael Armendariz is a 54 y.o. male NEW PT presents for evaluation and US review of recent onset of lump within body of penis.    On 12/04/2019 pt was evaluated by PCP for "penile lump" that pt initially noticed few weeks prior to initial office visit.      "Patient states its hard "like a marble." He denies any discharge. He notices discomfort to his the area and gets numb at time. He has noticed this for about a week."      US of Soft Tissue of Penis done on 12/09/2019:  FINDINGS:  There is a 6 x 1 x 2 mm echogenic shadowing structure corresponding to the palpable abnormality consistent with a calcification.  There is no soft tissue mass.  There is no abnormal blood flow.      Impression       1. Calcification corresponds to the palpable abnormality.       *Pt did see Dr. Guzman once in 2016 for problems with ejaculation.    Family Hx of  Cancers:  None  No hx of kidney stones    Last PSA Screening:   Lab Results   Component Value Date    PSA 0.52 10/17/2019    PSA 0.57 08/14/2017    PSA 0.52 06/15/2016     REVIEW OF SYSTEMS:  A comprehensive 10 system review was performed and is negative except as noted above in HPI    PMHx:  Past Medical History:   Diagnosis Date    ADHD (attention deficit hyperactivity disorder)     Anxiety     Depression     Hyperlipidemia     Hypertension     patient states he uses fosinopril for mitral regurgitation, not hypertension    Insomnia     Mitral regurgitation      PSHx:  Past Surgical History:   Procedure Laterality Date    KNEE ARTHROSCOPY W/ ACL RECONSTRUCTION AND HAMSTRING GRAFT  2003    right     Allergies:  Patient has no known allergies.    Medications: reviewed   Objective:     Vitals:    12/10/19 1308   BP: (!) 141/78   Pulse: 74   Resp: 18   Temp: 97.8 °F (36.6 °C)     General:WDWN in NAD  Eyes: PERRLA, normal " conjunctiva  Respiratory: no increased work on breathing, clear to auscultation  Cardiovascular: regular rate and rhythm. No obvious extremity edema.  GI: palpation of masses. No tenderness. No hepatosplenomegaly to palpation.  Musculoskeletal: normal range of motion of bilateral upper extremities. Normal muscle strength and tone.  Skin: no obvious rashes or lesions. No tightening of skin noted.  Neurologic: CN grossly normal. Normal sensation.   Psychiatric: awake, alert and oriented x 3. Mood and affect normal. Cooperative.    :  No scrotal rashes, cysts or lesions  Epididymis normal in size, no tenderness  Testes normal and size, equal size bilaterally, no masses  Urethral meatus normal without discharge  Penis is circumcised, hardened area within body of penis-Mid-Left side palpated on exam today, with tenderness on palpation noted.     LABS REVIEW:  UA today:  Color:Clear, Yellow  Spec. Grav.  1.030  PH  5  Negative for leukocytes, nitrates, protein, glucose, ketones, urobili, bili, and blood.    Assessment:       1. Penile lump    2. Penile pain          Plan:     The following instructions were given to the patient to assist with discomfort:  -Use jockstrap or the best supportive underwear he can get for relief of pressure.  -Alternate ice packs/heating pad to areas.  -Take OTC anti-inflammatories  -Sit in a warm tub of water greater than 15 minutes  -Elevate Scrotal Sac      F/u should area get larger, pain worsen, or develop Peyronie's then will refer to specialist.    [facility]: Active    Bebe Aguilar, ISABELAC

## 2019-12-10 NOTE — LETTER
December 11, 2019      Rosa Jerez NP  [address] [facility] [address]  [place] [address]           [facility]  [address] [place] [address] DR. TRINIDAD  [facility] [address]  Phone: [phone]  Fax: [phone]          Patient: Abimael Armendariz   MR Number: [id number]   YOB: 1965   Date of Visit: 12/10/2019       Dear Rosa Jerez:    Thank you for referring Abimael Armendariz to me for evaluation. Attached you will find relevant portions of my assessment and plan of care.    If you have questions, please do not hesitate to call me. I look forward to following Abimael Armendariz along with you.    Sincerely,    Bebe Aguilar, [facility]    Enclosure  CC:  No Recipients    If you would like to receive this communication electronically, please contact [email or web address] or [phone] to request more information on [facility] Link access.    For providers and/or their staff who would like to refer a patient to [facility], please contact us through our one-stop-shop provider referral line, [facility], at [phone].    If you feel you have received this communication in error or would no longer like to receive these types of communications, please e-mail [email or web address]

## 2019-12-11 ENCOUNTER — TELEPHONE (OUTPATIENT)
Dept: UROLOGY | Facility: CLINIC | Age: 54
End: 2019-12-11

## 2019-12-11 RX ORDER — TRAZODONE HYDROCHLORIDE 50 MG/1
50 TABLET ORAL NIGHTLY
Qty: 90 TABLET | Refills: 0 | OUTPATIENT
Start: 2019-12-11

## 2019-12-11 NOTE — TELEPHONE ENCOUNTER
Please call and advise pt of the following:    I did have one of our Urologists review his recent ultrasound for their opinion (Dr. Guzman) on penile calcification.    Dr. Guzman states:  Calcifications are usually benign and no further treatment needed   Monitor for growth   If any penile curvature associated could consider specialist to eval for peyronies   If not, he should just observe and return if getting bigger or more uncomfortable.    Thanks.

## 2019-12-11 NOTE — TELEPHONE ENCOUNTER
Patient informed of imaging result and voiced understanding. He stated that he will keep track and inform office of any further abnormal changes.

## 2019-12-27 RX ORDER — FOSINOPRIL SODIUM 40 MG/1
TABLET ORAL
Qty: 90 TABLET | Refills: 3 | Status: SHIPPED | OUTPATIENT
Start: 2019-12-27 | End: 2020-10-19 | Stop reason: SDUPTHER

## 2020-02-06 RX ORDER — IBUPROFEN 800 MG/1
TABLET ORAL
Qty: 90 TABLET | Refills: 0 | OUTPATIENT
Start: 2020-02-06

## 2020-03-23 DIAGNOSIS — J30.9 ALLERGIC SINUSITIS: ICD-10-CM

## 2020-03-23 RX ORDER — MONTELUKAST SODIUM 10 MG/1
10 TABLET ORAL NIGHTLY
Qty: 90 TABLET | Refills: 1 | Status: SHIPPED | OUTPATIENT
Start: 2020-03-23 | End: 2020-09-11

## 2020-05-05 ENCOUNTER — PATIENT MESSAGE (OUTPATIENT)
Dept: ADMINISTRATIVE | Facility: HOSPITAL | Age: 55
End: 2020-05-05

## 2020-05-18 RX ORDER — IBUPROFEN 800 MG/1
TABLET ORAL
Qty: 90 TABLET | Refills: 2 | OUTPATIENT
Start: 2020-05-18

## 2020-09-11 DIAGNOSIS — F51.01 PRIMARY INSOMNIA: ICD-10-CM

## 2020-09-11 DIAGNOSIS — J30.9 ALLERGIC SINUSITIS: ICD-10-CM

## 2020-09-11 RX ORDER — MONTELUKAST SODIUM 10 MG/1
TABLET ORAL
Qty: 90 TABLET | Refills: 0 | Status: SHIPPED | OUTPATIENT
Start: 2020-09-11 | End: 2020-10-19 | Stop reason: SDUPTHER

## 2020-09-11 NOTE — TELEPHONE ENCOUNTER
No new care gaps identified.  Powered by [employer] [facility] [employer]. Reference number: [id number]. 9/11/2020 12:09:16 PM   CDT

## 2020-09-11 NOTE — PROGRESS NOTES
Refill Routing Note   Medication(s) are not appropriate for processing by [facility] for the following reason(s):        - Patient has not been seen in over 15 months by PCP         Medication Therapy Plan: CDMR. FOVS (10/2020-Vivi);  appt critieria > 15 months defer to PCP     Medication reconciliation completed: No   Automatic Epic Generated Protocol Data:        Requested Prescriptions   Pending Prescriptions Disp Refills    montelukast (SINGULAIR) 10 mg tablet [Pharmacy Med Name: MONTELUKAST SOD 10 MG TABLET] 90 tablet 0     Sig: TAKE 1 TABLET BY MOUTH EVERY DAY IN THE EVENING       Pulmonology:  Leukotriene Inhibitors Passed - 9/11/2020 11:19 AM        Passed - Patient is at least 18 years old        Passed - Office visit in past 12 months or future 90 days.     Recent Outpatient Visits            9 months ago Penile lump    [place] [facility] Bebe Aguilar, ASIAP    9 months ago Penile lump    [place] [facility] Rosa Jerez NP    11 months ago Annual physical exam    [place] [facility] Xin Andersen NP    1 year ago Mitral valve insufficiency, unspecified etiology    [place] [facility] Hilario Pretty MD    1 year ago Mitral valve insufficiency, unspecified etiology    [place] [facility] Hilario Pretty MD          Future Appointments              In 1 month MD [facility], [place]                Passed - An appropriate indication is on the problem list     Allergic Rhinitis  Sinusitis  Seasonal Allergies   Asthma                    Appointments     Date Provider   Last Visit   8/27/2018 Sammy Trinidad MD   Next Visit   10/19/2020 Sammy Trinidad MD   ED visits in past 90 days: 0    Note composed:2:48 PM 09/11/2020

## 2020-09-11 NOTE — TELEPHONE ENCOUNTER
No new care gaps identified.  Powered by [employer]. Reference number: [id number]. 9/11/2020 11:19:44 AM LIZZETHT

## 2020-09-12 NOTE — PROGRESS NOTES
Refill Routing Note   Medication(s) are not appropriate for processing by [facility]:       - Outside of protocol           Medication reconciliation completed: No      Automatic Epic Generated Protocol Data:        Requested Prescriptions   Pending Prescriptions Disp Refills    traZODone (DESYREL) 100 MG tablet 90 tablet 3     Sig: Take 1 tablet (100 mg total) by mouth every evening.       Psychiatry: Antidepressants - Serotonin Modulator Passed - 9/11/2020 12:08 PM        Passed - Patient is at least 18 years old        Passed - Office visit in past 6 months or future 90 days.     Recent Outpatient Visits            9 months ago Penile lump    [place] [facility] SHEYLA Lucas    9 months ago Penile lump    [place] [facility] Rosa Jerez, DANIELLA    11 months ago Annual physical exam    [place] [facility] Xin Andersen NP    1 year ago Mitral valve insufficiency, unspecified etiology    [place] - [facility] Hilario Pretty MD    1 year ago Mitral valve insufficiency, unspecified etiology    [place] [facility] Hilario Pretty MD          Future Appointments              In 1 month [facility]                      Appointments  past 12m or future 3m with PCP    Date Provider   Last Visit   8/27/2018 Sammy Trinidad MD   Next Visit   10/19/2020 Sammy Trinidad MD   ED visits in past 90 days: 0     Note composed:12:00 AM 09/12/2020

## 2020-09-13 RX ORDER — TRAZODONE HYDROCHLORIDE 100 MG/1
100 TABLET ORAL NIGHTLY
Qty: 90 TABLET | Refills: 3 | Status: SHIPPED | OUTPATIENT
Start: 2020-09-13 | End: 2020-10-19 | Stop reason: SDUPTHER

## 2020-10-03 ENCOUNTER — PATIENT MESSAGE (OUTPATIENT)
Dept: FAMILY MEDICINE | Facility: CLINIC | Age: 55
End: 2020-10-03

## 2020-10-06 ENCOUNTER — OFFICE VISIT (OUTPATIENT)
Dept: FAMILY MEDICINE | Facility: CLINIC | Age: 55
End: 2020-10-06
Attending: FAMILY MEDICINE
Payer: COMMERCIAL

## 2020-10-06 VITALS
BODY MASS INDEX: 27.4 KG/M2 | HEART RATE: 66 BPM | SYSTOLIC BLOOD PRESSURE: 110 MMHG | OXYGEN SATURATION: 97 % | HEIGHT: 71 IN | WEIGHT: 195.75 LBS | TEMPERATURE: 98 F | DIASTOLIC BLOOD PRESSURE: 64 MMHG

## 2020-10-06 DIAGNOSIS — K57.33 DIVERTICULITIS LARGE INTESTINE W/O PERFORATION OR ABSCESS W/BLEEDING: Primary | ICD-10-CM

## 2020-10-06 DIAGNOSIS — I10 ESSENTIAL HYPERTENSION: ICD-10-CM

## 2020-10-06 PROCEDURE — 99999 PR PBB SHADOW E&M-EST. PATIENT-LVL IV: ICD-10-PCS | Mod: PBBFAC,,, | Performed by: FAMILY MEDICINE

## 2020-10-06 PROCEDURE — 99213 OFFICE O/P EST LOW 20 MIN: CPT | Mod: S$GLB,,, | Performed by: FAMILY MEDICINE

## 2020-10-06 PROCEDURE — 99213 PR OFFICE/OUTPT VISIT, EST, LEVL III, 20-29 MIN: ICD-10-PCS | Mod: S$GLB,,, | Performed by: FAMILY MEDICINE

## 2020-10-06 PROCEDURE — 99999 PR PBB SHADOW E&M-EST. PATIENT-LVL IV: CPT | Mod: PBBFAC,,, | Performed by: FAMILY MEDICINE

## 2020-10-06 RX ORDER — CIPROFLOXACIN 500 MG/1
500 TABLET ORAL
COMMUNITY
Start: 2020-10-02 | End: 2020-10-19 | Stop reason: ALTCHOICE

## 2020-10-06 RX ORDER — METRONIDAZOLE 500 MG/1
500 TABLET ORAL
COMMUNITY
Start: 2020-10-02 | End: 2021-01-07

## 2020-10-06 RX ORDER — TRAMADOL HYDROCHLORIDE 50 MG/1
100 TABLET ORAL
COMMUNITY
Start: 2020-10-02 | End: 2021-12-07

## 2020-10-06 NOTE — PROGRESS NOTES
Subjective:       Patient ID: Abimael Armendariz is a 55 y.o. male.    Chief Complaint: Follow-up (diverticulitis flare up. 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  The medic offshore under the direction of video conference diagnosed him with diverticulitis and he was taken by helicopter for evaluation.  He was found to have diverticulitis without abscess and without bleeding.  Previously identified right inguinal hernia along with a varicocele and hydrocele were noted on ultrasound but we did not appear to be involved and were unchanged from previous evaluations here.  He was given IV Levaquin and IV Flagyl and discharged from the emergency room on p.o. Cipro and p.o. Flagyl.  He has one week of antibiotics which will be completed in the next two days.  His symptoms are improving.  He took some MiraLax yesterday and cleared out a large amount of stool which also helped him feel better.  He is still quite tender but has no fever, chills, or night sweats.    Past Medical History:  No date: ADHD (attention deficit hyperactivity disorder)  No date: Anxiety  No date: Depression  No date: Hyperlipidemia  No date: Hypertension      Comment:  patient states he uses fosinopril for mitral                regurgitation, not hypertension  No date: Insomnia  No date: Mitral regurgitation    Past Surgical History:  2003: KNEE ARTHROSCOPY W/ ACL RECONSTRUCTION AND HAMSTRING GRAFT      Comment:  right    Current Outpatient Medications on File Prior to Visit:  aspirin 81 mg Tab, Take 81 mg by mouth once daily. Every day, Disp: , Rfl:   buPROPion (WELLBUTRIN XL)  150 MG TB24 tablet, Take 1 tablet (150 mg total) by mouth once daily., Disp: 90 tablet, Rfl: 3  ciprofloxacin HCl (CIPRO) 500 MG tablet, Take 500 mg by mouth., Disp: , Rfl:   fluticasone propionate (FLONASE) 50 mcg/actuation nasal spray, 1 spray by Each Nostril route once daily., Disp: , Rfl:   fosinopril (MONOPRIL) 40 MG tablet, TAKE 1 TABLET BY MOUTH EVERY DAY, Disp: 90 tablet, Rfl: 3  metroNIDAZOLE (FLAGYL) 500 MG tablet, Take 500 mg by mouth., Disp: , Rfl:   montelukast (SINGULAIR) 10 mg tablet, TAKE 1 TABLET BY MOUTH EVERY DAY IN THE EVENING, Disp: 90 tablet, Rfl: 0  pramipexole (MIRAPEX) 0.25 MG tablet, Take one tablet daily, Disp: 90 tablet, Rfl: 3  traMADoL (ULTRAM) 50 mg tablet, Take 100 mg by mouth., Disp: , Rfl:   traZODone (DESYREL) 100 MG tablet, Take 1 tablet (100 mg total) by mouth every evening., Disp: 90 tablet, Rfl: 3    No current facility-administered medications on file prior to visit.         Abdominal Pain  This is a new problem. The current episode started in the past 7 days. The onset quality is gradual. The problem occurs constantly. The most recent episode lasted 8 hours. The problem has been gradually improving. The pain is located in the LLQ and LUQ. The pain is at a severity of 9/10. The pain is severe. The quality of the pain is cramping. Associated symptoms include constipation, flatus and myalgias. Pertinent negatives include no anorexia, arthralgias, diarrhea, fever, headaches, hematochezia, melena, vomiting or weight loss. The pain is aggravated by certain positions. The pain is relieved by recumbency. He has tried antibiotics for the symptoms. The treatment provided mild relief. Prior diagnostic workup includes CT scan. There is no history of abdominal surgery, colon cancer, Crohn's disease, gallstones, GERD or irritable bowel syndrome.     Review of Systems   Constitutional: Negative for chills, diaphoresis, fever and weight loss.   Gastrointestinal: Positive for abdominal pain,  constipation and flatus. Negative for anorexia, blood in stool, diarrhea, hematochezia, melena and vomiting.   Musculoskeletal: Positive for myalgias. Negative for arthralgias.   Neurological: Negative for headaches.       Objective:      Physical Exam  Vitals signs and nursing note reviewed.   Constitutional:       General: He is not in acute distress.     Appearance: He is not ill-appearing, toxic-appearing or diaphoretic.      Comments: Good blood pressure and pulse  Afebrile  Mildly overweight with a BMI of 27.3 he is down 15.3 lb since his last checkup with Xin October 17, 2019.  He reports 9 lb were lost since onset of his symptoms   Abdominal:      General: Abdomen is flat. Bowel sounds are decreased. There is no distension.      Palpations: Abdomen is soft. There is no hepatomegaly, splenomegaly, mass or pulsatile mass.      Tenderness: There is abdominal tenderness in the suprapubic area and left lower quadrant. Negative signs include Schneider's sign and McBurney's sign.   Skin:     General: Skin is warm and dry.      Findings: No erythema or rash.   Neurological:      Mental Status: He is alert.         Assessment:       1. Diverticulitis large intestine w/o perforation or abscess w/bleeding    2. Essential hypertension    3. BMI 27.0-27.9,adult        Plan:       1. Diverticulitis large intestine w/o perforation or abscess w/bleeding  Reviewed records labs and imaging from [facility].  Recommend stay on liquids and low residue diet until symptoms resolved after which he may advance diet gradually.  After 1 to 2 weeks he can start some fiber supplement such as Metamucil with one rounded tsp daily and push fluids.  Due to working offshore we want to be completely certain that he is resolved.  He is actually scheduled for a colonoscopy in the near future which I believe will have to be pushed out about six weeks.  Recommend early us return to work date as October 19.  He is usual end  of shift date would have been October 22nd.    2. Essential hypertension  Good control no changes needed    3. BMI 27.0-27.9,adult

## 2020-10-14 ENCOUNTER — LAB VISIT (OUTPATIENT)
Dept: LAB | Facility: HOSPITAL | Age: 55
End: 2020-10-14
Attending: INTERNAL MEDICINE
Payer: COMMERCIAL

## 2020-10-14 DIAGNOSIS — K57.32 DIVERTICULITIS OF LARGE INTESTINE: Primary | ICD-10-CM

## 2020-10-14 PROCEDURE — 80053 COMPREHEN METABOLIC PANEL: CPT

## 2020-10-14 PROCEDURE — 36415 COLL VENOUS BLD VENIPUNCTURE: CPT | Mod: PO

## 2020-10-14 PROCEDURE — 85025 COMPLETE CBC W/AUTO DIFF WBC: CPT

## 2020-10-15 DIAGNOSIS — G25.81 RESTLESS LEG SYNDROME: ICD-10-CM

## 2020-10-15 LAB
ALBUMIN SERPL BCP-MCNC: 4.2 G/DL (ref 3.5–5.2)
ALP SERPL-CCNC: 77 U/L (ref 55–135)
ALT SERPL W/O P-5'-P-CCNC: 35 U/L (ref 10–44)
ANION GAP SERPL CALC-SCNC: 8 MMOL/L (ref 8–16)
AST SERPL-CCNC: 22 U/L (ref 10–40)
BASOPHILS # BLD AUTO: 0.05 K/UL (ref 0–0.2)
BASOPHILS NFR BLD: 0.7 % (ref 0–1.9)
BILIRUB SERPL-MCNC: 0.7 MG/DL (ref 0.1–1)
BUN SERPL-MCNC: 11 MG/DL (ref 6–20)
CALCIUM SERPL-MCNC: 9.5 MG/DL (ref 8.7–10.5)
CHLORIDE SERPL-SCNC: 102 MMOL/L (ref 95–110)
CO2 SERPL-SCNC: 28 MMOL/L (ref 23–29)
CREAT SERPL-MCNC: 1 MG/DL (ref 0.5–1.4)
DIFFERENTIAL METHOD: NORMAL
EOSINOPHIL # BLD AUTO: 0.1 K/UL (ref 0–0.5)
EOSINOPHIL NFR BLD: 1.7 % (ref 0–8)
ERYTHROCYTE [DISTWIDTH] IN BLOOD BY AUTOMATED COUNT: 11.9 % (ref 11.5–14.5)
EST. GFR  (AFRICAN AMERICAN): >60 ML/MIN/1.73 M^2
EST. GFR  (NON AFRICAN AMERICAN): >60 ML/MIN/1.73 M^2
GLUCOSE SERPL-MCNC: 82 MG/DL (ref 70–110)
HCT VFR BLD AUTO: 48.5 % (ref 40–54)
HGB BLD-MCNC: 15.8 G/DL (ref 14–18)
IMM GRANULOCYTES # BLD AUTO: 0.02 K/UL (ref 0–0.04)
IMM GRANULOCYTES NFR BLD AUTO: 0.3 % (ref 0–0.5)
LYMPHOCYTES # BLD AUTO: 2.3 K/UL (ref 1–4.8)
LYMPHOCYTES NFR BLD: 33.3 % (ref 18–48)
MCH RBC QN AUTO: 29.9 PG (ref 27–31)
MCHC RBC AUTO-ENTMCNC: 32.6 G/DL (ref 32–36)
MCV RBC AUTO: 92 FL (ref 82–98)
MONOCYTES # BLD AUTO: 0.4 K/UL (ref 0.3–1)
MONOCYTES NFR BLD: 6.1 % (ref 4–15)
NEUTROPHILS # BLD AUTO: 4.1 K/UL (ref 1.8–7.7)
NEUTROPHILS NFR BLD: 57.9 % (ref 38–73)
NRBC BLD-RTO: 0 /100 WBC
PLATELET # BLD AUTO: 208 K/UL (ref 150–350)
PMV BLD AUTO: 10.7 FL (ref 9.2–12.9)
POTASSIUM SERPL-SCNC: 4.8 MMOL/L (ref 3.5–5.1)
PROT SERPL-MCNC: 7.1 G/DL (ref 6–8.4)
RBC # BLD AUTO: 5.29 M/UL (ref 4.6–6.2)
SODIUM SERPL-SCNC: 138 MMOL/L (ref 136–145)
WBC # BLD AUTO: 7.02 K/UL (ref 3.9–12.7)

## 2020-10-15 RX ORDER — PRAMIPEXOLE DIHYDROCHLORIDE 0.25 MG/1
TABLET ORAL
Qty: 90 TABLET | Refills: 3 | Status: SHIPPED | OUTPATIENT
Start: 2020-10-15 | End: 2020-10-19 | Stop reason: SDUPTHER

## 2020-10-15 NOTE — PROGRESS NOTES
Refill Routing Note   Medication(s) are not appropriate for processing by [facility] for the following reason(s):     - Outside of protocol    [facility] actions taken in this encounter: Route          Medication reconciliation completed: No   Automatic Epic Generated Protocol Data:        Requested Prescriptions   Pending Prescriptions Disp Refills    pramipexole (MIRAPEX) 0.25 MG tablet 90 tablet 3     Sig: Take one tablet daily       There is no refill protocol information for this order           Appointments  past 12m or future 3m with PCP    Date Provider   Last Visit   10/6/2020 Sammy Trinidad MD   Next Visit   10/19/2020 Sammy Trinidad MD   ED visits in past 90 days: 0        Note composed:3:58 PM 10/15/2020

## 2020-10-19 ENCOUNTER — HOSPITAL ENCOUNTER (OUTPATIENT)
Dept: RADIOLOGY | Facility: HOSPITAL | Age: 55
Discharge: HOME | End: 2020-10-19
Attending: INTERNAL MEDICINE
Payer: COMMERCIAL

## 2020-10-19 ENCOUNTER — OFFICE VISIT (OUTPATIENT)
Dept: FAMILY MEDICINE | Facility: CLINIC | Age: 55
End: 2020-10-19
Attending: FAMILY MEDICINE
Payer: COMMERCIAL

## 2020-10-19 VITALS
SYSTOLIC BLOOD PRESSURE: 102 MMHG | HEIGHT: 71 IN | TEMPERATURE: 97 F | WEIGHT: 193.56 LBS | OXYGEN SATURATION: 97 % | DIASTOLIC BLOOD PRESSURE: 64 MMHG | BODY MASS INDEX: 27.1 KG/M2 | HEART RATE: 57 BPM

## 2020-10-19 DIAGNOSIS — F32.5 MAJOR DEPRESSIVE DISORDER WITH SINGLE EPISODE, IN FULL REMISSION: ICD-10-CM

## 2020-10-19 DIAGNOSIS — K57.32 DIVERTICULITIS OF LARGE INTESTINE WITHOUT PERFORATION OR ABSCESS WITHOUT BLEEDING: ICD-10-CM

## 2020-10-19 DIAGNOSIS — I10 ESSENTIAL HYPERTENSION: ICD-10-CM

## 2020-10-19 DIAGNOSIS — Z12.5 PROSTATE CANCER SCREENING: ICD-10-CM

## 2020-10-19 DIAGNOSIS — J30.9 ALLERGIC SINUSITIS: ICD-10-CM

## 2020-10-19 DIAGNOSIS — F51.01 PRIMARY INSOMNIA: ICD-10-CM

## 2020-10-19 DIAGNOSIS — G25.81 RESTLESS LEG SYNDROME: ICD-10-CM

## 2020-10-19 DIAGNOSIS — E78.5 DYSLIPIDEMIA: ICD-10-CM

## 2020-10-19 DIAGNOSIS — Z00.00 ENCOUNTER FOR PREVENTIVE HEALTH EXAMINATION: Primary | ICD-10-CM

## 2020-10-19 DIAGNOSIS — F41.9 ANXIETY: ICD-10-CM

## 2020-10-19 DIAGNOSIS — I34.0 MITRAL VALVE INSUFFICIENCY, UNSPECIFIED ETIOLOGY: ICD-10-CM

## 2020-10-19 PROCEDURE — 99999 PR PBB SHADOW E&M-EST. PATIENT-LVL IV: CPT | Mod: PBBFAC,,, | Performed by: FAMILY MEDICINE

## 2020-10-19 PROCEDURE — 99999 PR PBB SHADOW E&M-EST. PATIENT-LVL IV: ICD-10-PCS | Mod: PBBFAC,,, | Performed by: FAMILY MEDICINE

## 2020-10-19 PROCEDURE — 25500020 PHARM REV CODE 255: Performed by: INTERNAL MEDICINE

## 2020-10-19 PROCEDURE — 74177 CT ABD & PELVIS W/CONTRAST: CPT | Mod: 26,,, | Performed by: RADIOLOGY

## 2020-10-19 PROCEDURE — A9698 NON-RAD CONTRAST MATERIALNOC: HCPCS | Performed by: INTERNAL MEDICINE

## 2020-10-19 PROCEDURE — 74177 CT ABDOMEN PELVIS WITH CONTRAST: ICD-10-PCS | Mod: 26,,, | Performed by: RADIOLOGY

## 2020-10-19 PROCEDURE — 99214 OFFICE O/P EST MOD 30 MIN: CPT | Mod: S$GLB,,, | Performed by: FAMILY MEDICINE

## 2020-10-19 PROCEDURE — 74177 CT ABD & PELVIS W/CONTRAST: CPT | Mod: TC

## 2020-10-19 PROCEDURE — 99214 PR OFFICE/OUTPT VISIT, EST, LEVL IV, 30-39 MIN: ICD-10-PCS | Mod: S$GLB,,, | Performed by: FAMILY MEDICINE

## 2020-10-19 RX ORDER — FOSINOPRIL SODIUM 40 MG/1
40 TABLET ORAL DAILY
Qty: 90 TABLET | Refills: 3 | Status: SHIPPED | OUTPATIENT
Start: 2020-10-19 | End: 2021-11-10 | Stop reason: SDUPTHER

## 2020-10-19 RX ORDER — TRAZODONE HYDROCHLORIDE 100 MG/1
100 TABLET ORAL NIGHTLY
Qty: 90 TABLET | Refills: 3 | Status: SHIPPED | OUTPATIENT
Start: 2020-10-19 | End: 2021-12-07

## 2020-10-19 RX ORDER — PRAMIPEXOLE DIHYDROCHLORIDE 0.25 MG/1
TABLET ORAL
Qty: 90 TABLET | Refills: 3 | Status: SHIPPED | OUTPATIENT
Start: 2020-10-19 | End: 2021-11-02

## 2020-10-19 RX ORDER — MONTELUKAST SODIUM 10 MG/1
10 TABLET ORAL NIGHTLY
Qty: 90 TABLET | Refills: 3 | Status: SHIPPED | OUTPATIENT
Start: 2020-10-19 | End: 2022-11-15

## 2020-10-19 RX ORDER — BUPROPION HYDROCHLORIDE 150 MG/1
150 TABLET ORAL DAILY
Qty: 90 TABLET | Refills: 3 | Status: SHIPPED | OUTPATIENT
Start: 2020-10-19 | End: 2021-10-05

## 2020-10-19 RX ADMIN — IOHEXOL 1000 ML: 9 SOLUTION ORAL at 02:10

## 2020-10-19 RX ADMIN — IOHEXOL 100 ML: 350 INJECTION, SOLUTION INTRAVENOUS at 02:10

## 2020-10-19 NOTE — PROGRESS NOTES
Subjective:       Patient ID: Abimael Armendariz is a 55 y.o. male.    Chief Complaint: Annual Exam    55-year-old male coming in for annual exam.  He is fasting for a CT scan he has this afternoon for his abdominal pain and diverticular disease.  He is scheduled for a colonoscopy with Dr. Johnson in December.  He is having persistent abdominal pain but it is shifted more to the right side in the right lower quadrant thus the CT scan that is scheduled this afternoon.  He has a history of anxiety and depression, insomnia, hyperlipidemia, hypertension, mitral regurgitation and will be seeing Dr. Pretty in the near future as well.  He has no blood in the stool, no melanotic stool, and no fever chills or night sweats.    Past Medical History:  No date: ADHD (attention deficit hyperactivity disorder)  No date: Anxiety  No date: Depression  No date: Hyperlipidemia  No date: Hypertension      Comment:  patient states he uses fosinopril for mitral                regurgitation, not hypertension  No date: Insomnia  No date: Mitral regurgitation    Past Surgical History:  2003: KNEE ARTHROSCOPY W/ ACL RECONSTRUCTION AND HAMSTRING GRAFT      Comment:  right    Review of patient's family history indicates:  Problem: Stroke      Relation: Mother          Age of Onset: (Not Specified)  Problem: Heart attack      Relation: Father          Age of Onset: (Not Specified)  Problem: Heart disease      Relation: Father          Age of Onset: (Not Specified)  Problem: Heart attack      Relation: Paternal Grandfather          Age of Onset: (Not Specified)  Problem: No Known Problems      Relation: Sister          Age of Onset: (Not Specified)  Problem: No Known Problems      Relation: Brother          Age of Onset: (Not Specified)  Problem: No Known Problems      Relation: Daughter          Age of Onset: (Not Specified)  Problem: No Known Problems      Relation: Son          Age of Onset: (Not Specified)  Problem: Alzheimer's disease       Relation: Maternal Grandmother          Age of Onset: (Not Specified)    Social History    Tobacco Use      Smoking status: Never Smoker      Smokeless tobacco: Current User        Types: Chew    Alcohol use: No      Frequency: Never      Drinks per session: Patient refused      Binge frequency: Never    Drug use: No    Current Outpatient Medications on File Prior to Visit:  aspirin 81 mg Tab, Take 81 mg by mouth once daily. Every day, Disp: , Rfl:   fluticasone propionate (FLONASE) 50 mcg/actuation nasal spray, 1 spray by Each Nostril route once daily., Disp: , Rfl:   metroNIDAZOLE (FLAGYL) 500 MG tablet, Take 500 mg by mouth., Disp: , Rfl:   traMADoL (ULTRAM) 50 mg tablet, Take 100 mg by mouth., Disp: , Rfl:   (DISCONTINUED) buPROPion (WELLBUTRIN XL) 150 MG TB24 tablet, Take 1 tablet (150 mg total) by mouth once daily., Disp: 90 tablet, Rfl: 3  (DISCONTINUED) fosinopril (MONOPRIL) 40 MG tablet, TAKE 1 TABLET BY MOUTH EVERY DAY, Disp: 90 tablet, Rfl: 3  (DISCONTINUED) montelukast (SINGULAIR) 10 mg tablet, TAKE 1 TABLET BY MOUTH EVERY DAY IN THE EVENING, Disp: 90 tablet, Rfl: 0  (DISCONTINUED) pramipexole (MIRAPEX) 0.25 MG tablet, Take one tablet daily, Disp: 90 tablet, Rfl: 3  (DISCONTINUED) traZODone (DESYREL) 100 MG tablet, Take 1 tablet (100 mg total) by mouth every evening., Disp: 90 tablet, Rfl: 3  (DISCONTINUED) ciprofloxacin HCl (CIPRO) 500 MG tablet, Take 500 mg by mouth., Disp: , Rfl:     No current facility-administered medications on file prior to visit.     HIV Screening due on 02/06/1979  Shingles Vaccine(1 of 2) due on 02/06/2014  Influenza Vaccine(1) due on 08/01/2020  PROSTATE-SPECIFIC ANTIGEN due on 10/17/2020  Colorectal Cancer Screening due on 10/18/2020      Review of Systems   Constitutional: Negative for activity change, chills, diaphoresis, fever and unexpected weight change.   HENT: Negative for hearing loss, rhinorrhea and trouble swallowing.    Eyes: Negative for discharge and visual  disturbance.   Respiratory: Negative for chest tightness and wheezing.    Cardiovascular: Negative for chest pain and palpitations.   Gastrointestinal: Positive for constipation. Negative for blood in stool, diarrhea and vomiting.   Endocrine: Negative for polydipsia and polyuria.   Genitourinary: Negative for difficulty urinating, hematuria and urgency.   Musculoskeletal: Negative for arthralgias, joint swelling and neck pain.   Neurological: Negative for weakness and headaches.   Psychiatric/Behavioral: Negative for confusion and dysphoric mood.       Objective:      Physical Exam  Vitals signs and nursing note reviewed.   Constitutional:       General: He is not in acute distress.     Appearance: Normal appearance. He is well-developed. He is not ill-appearing or toxic-appearing.      Comments: Good blood pressure control  Overweight with a BMI of 27.0 is down 2.1 lb from his October 6, 2020 visit   HENT:      Head: Normocephalic and atraumatic.      Right Ear: Tympanic membrane, ear canal and external ear normal. There is no impacted cerumen.      Left Ear: Ear canal and external ear normal. There is no impacted cerumen.      Nose: Nose normal. No congestion or rhinorrhea.      Mouth/Throat:      Mouth: Mucous membranes are moist.      Pharynx: Oropharynx is clear. No oropharyngeal exudate or posterior oropharyngeal erythema.   Eyes:      General: No scleral icterus.     Conjunctiva/sclera: Conjunctivae normal.      Pupils: Pupils are equal, round, and reactive to light.   Neck:      Musculoskeletal: Normal range of motion and neck supple. No neck rigidity or muscular tenderness.      Thyroid: No thyromegaly.      Vascular: No carotid bruit or JVD.      Trachea: No tracheal deviation.   Cardiovascular:      Rate and Rhythm: Normal rate and regular rhythm.      Heart sounds: Murmur (2/6 systolic murmur greatest in the upper left sternal border and not radiating to the right or neck) present. No friction rub. No  gallop.    Pulmonary:      Effort: Pulmonary effort is normal. No respiratory distress.      Breath sounds: Normal breath sounds. No stridor. No wheezing, rhonchi or rales.   Chest:      Chest wall: No tenderness.   Abdominal:      General: Bowel sounds are normal. There is no distension.      Palpations: Abdomen is soft. There is no mass.      Tenderness: There is abdominal tenderness (Greatest tenderness in left lower quadrant somewhat radiating to the right side) in the right lower quadrant and left lower quadrant. There is no guarding or rebound. Negative signs include Schneider's sign and McBurney's sign.      Hernia: No hernia is present.       Musculoskeletal: Normal range of motion.   Lymphadenopathy:      Cervical: No cervical adenopathy.   Skin:     General: Skin is warm and dry.      Coloration: Skin is not jaundiced or pale.      Findings: No bruising, erythema, lesion or rash.   Neurological:      General: No focal deficit present.      Mental Status: He is alert and oriented to person, place, and time. Mental status is at baseline.      Cranial Nerves: No cranial nerve deficit.      Sensory: No sensory deficit.      Motor: No weakness.      Coordination: Coordination normal.      Gait: Gait normal.      Deep Tendon Reflexes: Reflexes are normal and symmetric. Reflexes normal.   Psychiatric:         Mood and Affect: Mood normal.         Behavior: Behavior normal.         Thought Content: Thought content normal.         Judgment: Judgment normal.         Assessment:       1. Encounter for preventive health examination    2. Allergic sinusitis    3. Mitral valve insufficiency, unspecified etiology    4. Essential hypertension    5. Dyslipidemia    6. Major depressive disorder with single episode, in full remission    7. Anxiety    8. Prostate cancer screening    9. Primary insomnia    10. Restless leg syndrome    11. BMI 27.0-27.9,adult        Plan:       1. Encounter for preventive health examination  -  PSA, Screening; Future  - Lipid Panel; Future  - CBC auto differential; Future  - Comprehensive Metabolic Panel; Future    2. Allergic sinusitis  Refill Singulair, recommend he use saline spray as a cleaning agent, continue Flonase  - montelukast (SINGULAIR) 10 mg tablet; Take 1 tablet (10 mg total) by mouth every evening.  Dispense: 90 tablet; Refill: 3    3. Mitral valve insufficiency, unspecified etiology  Followed by Dr. Pretty in Cardiology    4. Essential hypertension  Well controlled  - Comprehensive Metabolic Panel; Future    5. Dyslipidemia  Await lab results  - Lipid Panel; Future  - Comprehensive Metabolic Panel; Future    6. Major depressive disorder with single episode, in full remission  Controlled stable  - buPROPion (WELLBUTRIN XL) 150 MG TB24 tablet; Take 1 tablet (150 mg total) by mouth once daily.  Dispense: 90 tablet; Refill: 3    7. Anxiety  Stable    8. Prostate cancer screening  Await lab  - PSA, Screening; Future    9. Primary insomnia  Refill trazodone  - traZODone (DESYREL) 100 MG tablet; Take 1 tablet (100 mg total) by mouth every evening.  Dispense: 90 tablet; Refill: 3    10. Restless leg syndrome  Refill Mirapex  - pramipexole (MIRAPEX) 0.25 MG tablet; Take one tablet daily  Dispense: 90 tablet; Refill: 3    11. BMI 27.0-27.9,adult  Continue slow weight loss    Will be following up with  after is CT scan this afternoon

## 2020-11-13 DIAGNOSIS — Z12.11 COLON CANCER SCREENING: ICD-10-CM

## 2020-12-02 ENCOUNTER — PATIENT MESSAGE (OUTPATIENT)
Dept: ADMINISTRATIVE | Facility: HOSPITAL | Age: 55
End: 2020-12-02

## 2020-12-31 ENCOUNTER — PATIENT OUTREACH (OUTPATIENT)
Dept: ADMINISTRATIVE | Facility: HOSPITAL | Age: 55
End: 2020-12-31

## 2021-01-07 ENCOUNTER — HOSPITAL ENCOUNTER (EMERGENCY)
Facility: HOSPITAL | Age: 56
Discharge: HOME | End: 2021-01-07
Attending: EMERGENCY MEDICINE
Payer: COMMERCIAL

## 2021-01-07 VITALS
HEART RATE: 69 BPM | RESPIRATION RATE: 18 BRPM | BODY MASS INDEX: 28.28 KG/M2 | TEMPERATURE: 98 F | WEIGHT: 202 LBS | OXYGEN SATURATION: 97 % | HEIGHT: 71 IN | DIASTOLIC BLOOD PRESSURE: 80 MMHG | SYSTOLIC BLOOD PRESSURE: 164 MMHG

## 2021-01-07 DIAGNOSIS — K57.32 DIVERTICULITIS OF LARGE INTESTINE WITHOUT PERFORATION OR ABSCESS WITHOUT BLEEDING: Primary | ICD-10-CM

## 2021-01-07 LAB
ALBUMIN SERPL BCP-MCNC: 4.2 G/DL (ref 3.5–5.2)
ALP SERPL-CCNC: 70 U/L (ref 55–135)
ALT SERPL W/O P-5'-P-CCNC: 27 U/L (ref 10–44)
ANION GAP SERPL CALC-SCNC: 9 MMOL/L (ref 8–16)
AST SERPL-CCNC: 20 U/L (ref 10–40)
BASOPHILS # BLD AUTO: 0.03 K/UL (ref 0–0.2)
BASOPHILS NFR BLD: 0.5 % (ref 0–1.9)
BILIRUB SERPL-MCNC: 1 MG/DL (ref 0.1–1)
BILIRUB UR QL STRIP: NEGATIVE
BUN SERPL-MCNC: 15 MG/DL (ref 6–20)
CALCIUM SERPL-MCNC: 9.3 MG/DL (ref 8.7–10.5)
CHLORIDE SERPL-SCNC: 100 MMOL/L (ref 95–110)
CLARITY UR: CLEAR
CO2 SERPL-SCNC: 27 MMOL/L (ref 23–29)
COLOR UR: YELLOW
CREAT SERPL-MCNC: 1 MG/DL (ref 0.5–1.4)
DIFFERENTIAL METHOD: NORMAL
EOSINOPHIL # BLD AUTO: 0.2 K/UL (ref 0–0.5)
EOSINOPHIL NFR BLD: 2.6 % (ref 0–8)
ERYTHROCYTE [DISTWIDTH] IN BLOOD BY AUTOMATED COUNT: 12.1 % (ref 11.5–14.5)
EST. GFR  (AFRICAN AMERICAN): >60 ML/MIN/1.73 M^2
EST. GFR  (NON AFRICAN AMERICAN): >60 ML/MIN/1.73 M^2
GLUCOSE SERPL-MCNC: 118 MG/DL (ref 70–110)
GLUCOSE UR QL STRIP: NEGATIVE
HCT VFR BLD AUTO: 46.8 % (ref 40–54)
HGB BLD-MCNC: 15.8 G/DL (ref 14–18)
HGB UR QL STRIP: NEGATIVE
IMM GRANULOCYTES # BLD AUTO: 0.02 K/UL (ref 0–0.04)
IMM GRANULOCYTES NFR BLD AUTO: 0.3 % (ref 0–0.5)
KETONES UR QL STRIP: NEGATIVE
LEUKOCYTE ESTERASE UR QL STRIP: NEGATIVE
LIPASE SERPL-CCNC: 34 U/L (ref 4–60)
LYMPHOCYTES # BLD AUTO: 2.3 K/UL (ref 1–4.8)
LYMPHOCYTES NFR BLD: 40 % (ref 18–48)
MCH RBC QN AUTO: 30.4 PG (ref 27–31)
MCHC RBC AUTO-ENTMCNC: 33.8 G/DL (ref 32–36)
MCV RBC AUTO: 90 FL (ref 82–98)
MONOCYTES # BLD AUTO: 0.5 K/UL (ref 0.3–1)
MONOCYTES NFR BLD: 9.4 % (ref 4–15)
NEUTROPHILS # BLD AUTO: 2.7 K/UL (ref 1.8–7.7)
NEUTROPHILS NFR BLD: 47.2 % (ref 38–73)
NITRITE UR QL STRIP: NEGATIVE
NRBC BLD-RTO: 0 /100 WBC
PH UR STRIP: 6 [PH] (ref 5–8)
PLATELET # BLD AUTO: 154 K/UL (ref 150–350)
PMV BLD AUTO: 10.3 FL (ref 9.2–12.9)
POTASSIUM SERPL-SCNC: 4.6 MMOL/L (ref 3.5–5.1)
PROT SERPL-MCNC: 6.9 G/DL (ref 6–8.4)
PROT UR QL STRIP: NEGATIVE
RBC # BLD AUTO: 5.2 M/UL (ref 4.6–6.2)
SODIUM SERPL-SCNC: 136 MMOL/L (ref 136–145)
SP GR UR STRIP: >=1.03 (ref 1–1.03)
URN SPEC COLLECT METH UR: ABNORMAL
UROBILINOGEN UR STRIP-ACNC: NEGATIVE EU/DL
WBC # BLD AUTO: 5.77 K/UL (ref 3.9–12.7)

## 2021-01-07 PROCEDURE — 99285 EMERGENCY DEPT VISIT HI MDM: CPT | Mod: 25

## 2021-01-07 PROCEDURE — 25500020 PHARM REV CODE 255: Performed by: NURSE PRACTITIONER

## 2021-01-07 PROCEDURE — 85025 COMPLETE CBC W/AUTO DIFF WBC: CPT

## 2021-01-07 PROCEDURE — 81003 URINALYSIS AUTO W/O SCOPE: CPT

## 2021-01-07 PROCEDURE — 36415 COLL VENOUS BLD VENIPUNCTURE: CPT

## 2021-01-07 PROCEDURE — 80053 COMPREHEN METABOLIC PANEL: CPT

## 2021-01-07 PROCEDURE — 83690 ASSAY OF LIPASE: CPT

## 2021-01-07 RX ORDER — METRONIDAZOLE 500 MG/1
500 TABLET ORAL 3 TIMES DAILY
Qty: 30 TABLET | Refills: 0 | Status: SHIPPED | OUTPATIENT
Start: 2021-01-07 | End: 2021-01-17

## 2021-01-07 RX ORDER — CIPROFLOXACIN 500 MG/1
500 TABLET ORAL 2 TIMES DAILY
Qty: 20 TABLET | Refills: 0 | Status: SHIPPED | OUTPATIENT
Start: 2021-01-07 | End: 2021-01-17

## 2021-01-07 RX ORDER — HYDROCODONE BITARTRATE AND ACETAMINOPHEN 5; 325 MG/1; MG/1
1 TABLET ORAL EVERY 6 HOURS PRN
Qty: 12 TABLET | Refills: 0 | Status: SHIPPED | OUTPATIENT
Start: 2021-01-07 | End: 2022-11-15

## 2021-01-07 RX ADMIN — IOHEXOL 100 ML: 350 INJECTION, SOLUTION INTRAVENOUS at 04:01

## 2021-03-31 ENCOUNTER — IMMUNIZATION (OUTPATIENT)
Dept: PRIMARY CARE CLINIC | Facility: CLINIC | Age: 56
End: 2021-03-31

## 2021-03-31 DIAGNOSIS — Z23 NEED FOR VACCINATION: Primary | ICD-10-CM

## 2021-03-31 PROCEDURE — 0031A PR IMMUNIZ ADMIN, SARS-COV-2 COVID-19 VACC, 5X10VP/0.5ML: CPT | Mod: CV19,S$GLB,, | Performed by: NURSE PRACTITIONER

## 2021-03-31 PROCEDURE — 0031A PR IMMUNIZ ADMIN, SARS-COV-2 COVID-19 VACC, 5X10VP/0.5ML: ICD-10-PCS | Mod: CV19,S$GLB,, | Performed by: NURSE PRACTITIONER

## 2021-03-31 PROCEDURE — 91303 PR SARSCOV2 VAC AD26 .5ML IM: ICD-10-PCS | Mod: S$GLB,,, | Performed by: NURSE PRACTITIONER

## 2021-03-31 PROCEDURE — 91303 PR SARSCOV2 VAC AD26 .5ML IM: CPT | Mod: S$GLB,,, | Performed by: NURSE PRACTITIONER

## 2021-04-14 ENCOUNTER — HOSPITAL ENCOUNTER (OUTPATIENT)
Dept: RADIOLOGY | Facility: CLINIC | Age: 56
Discharge: HOME | End: 2021-04-14
Attending: FAMILY MEDICINE
Payer: COMMERCIAL

## 2021-04-14 ENCOUNTER — OFFICE VISIT (OUTPATIENT)
Dept: FAMILY MEDICINE | Facility: CLINIC | Age: 56
End: 2021-04-14
Attending: FAMILY MEDICINE
Payer: COMMERCIAL

## 2021-04-14 VITALS
WEIGHT: 205.25 LBS | BODY MASS INDEX: 28.73 KG/M2 | HEART RATE: 68 BPM | DIASTOLIC BLOOD PRESSURE: 54 MMHG | TEMPERATURE: 98 F | SYSTOLIC BLOOD PRESSURE: 116 MMHG | OXYGEN SATURATION: 96 % | HEIGHT: 71 IN

## 2021-04-14 DIAGNOSIS — R07.89 LEFT-SIDED CHEST WALL PAIN: Primary | ICD-10-CM

## 2021-04-14 DIAGNOSIS — R07.89 LEFT-SIDED CHEST WALL PAIN: ICD-10-CM

## 2021-04-14 PROCEDURE — 71046 X-RAY EXAM CHEST 2 VIEWS: CPT | Mod: 26,,, | Performed by: RADIOLOGY

## 2021-04-14 PROCEDURE — 71100 X-RAY EXAM RIBS UNI 2 VIEWS: CPT | Mod: 26,LT,S$GLB, | Performed by: RADIOLOGY

## 2021-04-14 PROCEDURE — 99213 OFFICE O/P EST LOW 20 MIN: CPT | Mod: S$GLB,,, | Performed by: FAMILY MEDICINE

## 2021-04-14 PROCEDURE — 71046 XR CHEST PA AND LATERAL: ICD-10-PCS | Mod: 26,,, | Performed by: RADIOLOGY

## 2021-04-14 PROCEDURE — 71100 X-RAY EXAM RIBS UNI 2 VIEWS: CPT | Mod: TC,FY,PO,LT

## 2021-04-14 PROCEDURE — 99999 PR PBB SHADOW E&M-EST. PATIENT-LVL IV: ICD-10-PCS | Mod: PBBFAC,,, | Performed by: FAMILY MEDICINE

## 2021-04-14 PROCEDURE — 99999 PR PBB SHADOW E&M-EST. PATIENT-LVL IV: CPT | Mod: PBBFAC,,, | Performed by: FAMILY MEDICINE

## 2021-04-14 PROCEDURE — 71100 XR RIBS 2 VIEW LEFT: ICD-10-PCS | Mod: 26,LT,S$GLB, | Performed by: RADIOLOGY

## 2021-04-14 PROCEDURE — 99213 PR OFFICE/OUTPT VISIT, EST, LEVL III, 20-29 MIN: ICD-10-PCS | Mod: S$GLB,,, | Performed by: FAMILY MEDICINE

## 2021-04-14 PROCEDURE — 71046 X-RAY EXAM CHEST 2 VIEWS: CPT | Mod: TC,FY,PO

## 2021-09-02 ENCOUNTER — PATIENT MESSAGE (OUTPATIENT)
Dept: FAMILY MEDICINE | Facility: CLINIC | Age: 56
End: 2021-09-02

## 2021-09-03 ENCOUNTER — OFFICE VISIT (OUTPATIENT)
Dept: FAMILY MEDICINE | Facility: CLINIC | Age: 56
End: 2021-09-03
Attending: FAMILY MEDICINE
Payer: COMMERCIAL

## 2021-09-03 ENCOUNTER — TELEPHONE (OUTPATIENT)
Dept: FAMILY MEDICINE | Facility: CLINIC | Age: 56
End: 2021-09-03

## 2021-09-03 VITALS
BODY MASS INDEX: 27.72 KG/M2 | SYSTOLIC BLOOD PRESSURE: 132 MMHG | HEIGHT: 71 IN | OXYGEN SATURATION: 97 % | TEMPERATURE: 98 F | WEIGHT: 198 LBS | HEART RATE: 75 BPM | DIASTOLIC BLOOD PRESSURE: 78 MMHG

## 2021-09-03 DIAGNOSIS — S06.0X0A CONCUSSION WITHOUT LOSS OF CONSCIOUSNESS, INITIAL ENCOUNTER: ICD-10-CM

## 2021-09-03 DIAGNOSIS — S80.02XA CONTUSION OF LEFT KNEE, INITIAL ENCOUNTER: ICD-10-CM

## 2021-09-03 DIAGNOSIS — S80.01XA CONTUSION OF RIGHT KNEE, INITIAL ENCOUNTER: Primary | ICD-10-CM

## 2021-09-03 PROCEDURE — 99999 PR PBB SHADOW E&M-EST. PATIENT-LVL III: ICD-10-PCS | Mod: PBBFAC,,, | Performed by: FAMILY MEDICINE

## 2021-09-03 PROCEDURE — 99213 OFFICE O/P EST LOW 20 MIN: CPT | Mod: S$GLB,,, | Performed by: FAMILY MEDICINE

## 2021-09-03 PROCEDURE — 99213 PR OFFICE/OUTPT VISIT, EST, LEVL III, 20-29 MIN: ICD-10-PCS | Mod: S$GLB,,, | Performed by: FAMILY MEDICINE

## 2021-09-03 PROCEDURE — 99999 PR PBB SHADOW E&M-EST. PATIENT-LVL III: CPT | Mod: PBBFAC,,, | Performed by: FAMILY MEDICINE

## 2021-09-07 ENCOUNTER — PATIENT MESSAGE (OUTPATIENT)
Dept: FAMILY MEDICINE | Facility: CLINIC | Age: 56
End: 2021-09-07

## 2021-09-08 ENCOUNTER — PATIENT OUTREACH (OUTPATIENT)
Dept: ADMINISTRATIVE | Facility: OTHER | Age: 56
End: 2021-09-08

## 2021-09-10 DIAGNOSIS — M54.42 ACUTE MIDLINE LOW BACK PAIN WITH LEFT-SIDED SCIATICA: Primary | ICD-10-CM

## 2021-09-14 ENCOUNTER — TELEPHONE (OUTPATIENT)
Dept: ORTHOPEDICS | Facility: CLINIC | Age: 56
End: 2021-09-14

## 2021-10-04 DIAGNOSIS — F32.5 MAJOR DEPRESSIVE DISORDER WITH SINGLE EPISODE, IN FULL REMISSION: ICD-10-CM

## 2021-10-05 RX ORDER — BUPROPION HYDROCHLORIDE 150 MG/1
150 TABLET ORAL DAILY
Qty: 90 TABLET | Refills: 3 | Status: SHIPPED | OUTPATIENT
Start: 2021-10-05 | End: 2021-12-07 | Stop reason: SDUPTHER

## 2021-11-01 DIAGNOSIS — G25.81 RESTLESS LEG SYNDROME: ICD-10-CM

## 2021-11-02 RX ORDER — PRAMIPEXOLE DIHYDROCHLORIDE 0.25 MG/1
TABLET ORAL
Qty: 90 TABLET | Refills: 3 | Status: SHIPPED | OUTPATIENT
Start: 2021-11-02 | End: 2021-12-07 | Stop reason: SDUPTHER

## 2021-11-10 ENCOUNTER — OFFICE VISIT (OUTPATIENT)
Dept: CARDIOLOGY | Facility: CLINIC | Age: 56
End: 2021-11-10
Payer: COMMERCIAL

## 2021-11-10 VITALS
HEART RATE: 70 BPM | DIASTOLIC BLOOD PRESSURE: 78 MMHG | WEIGHT: 211.63 LBS | SYSTOLIC BLOOD PRESSURE: 143 MMHG | HEIGHT: 71 IN | BODY MASS INDEX: 29.63 KG/M2

## 2021-11-10 DIAGNOSIS — I10 PRIMARY HYPERTENSION: ICD-10-CM

## 2021-11-10 DIAGNOSIS — E78.5 DYSLIPIDEMIA: ICD-10-CM

## 2021-11-10 DIAGNOSIS — I34.0 NONRHEUMATIC MITRAL VALVE REGURGITATION: Primary | ICD-10-CM

## 2021-11-10 PROCEDURE — 99999 PR PBB SHADOW E&M-EST. PATIENT-LVL III: CPT | Mod: PBBFAC,,, | Performed by: INTERNAL MEDICINE

## 2021-11-10 PROCEDURE — 99999 PR PBB SHADOW E&M-EST. PATIENT-LVL III: ICD-10-PCS | Mod: PBBFAC,,, | Performed by: INTERNAL MEDICINE

## 2021-11-10 PROCEDURE — 99214 OFFICE O/P EST MOD 30 MIN: CPT | Mod: S$GLB,,, | Performed by: INTERNAL MEDICINE

## 2021-11-10 PROCEDURE — 99214 PR OFFICE/OUTPT VISIT, EST, LEVL IV, 30-39 MIN: ICD-10-PCS | Mod: S$GLB,,, | Performed by: INTERNAL MEDICINE

## 2021-11-10 RX ORDER — FOSINOPRIL SODIUM 40 MG/1
40 TABLET ORAL DAILY
Qty: 90 TABLET | Refills: 3 | Status: SHIPPED | OUTPATIENT
Start: 2021-11-10 | End: 2021-12-07 | Stop reason: SDUPTHER

## 2021-12-07 ENCOUNTER — OFFICE VISIT (OUTPATIENT)
Dept: FAMILY MEDICINE | Facility: CLINIC | Age: 56
End: 2021-12-07
Attending: FAMILY MEDICINE
Payer: COMMERCIAL

## 2021-12-07 VITALS
DIASTOLIC BLOOD PRESSURE: 84 MMHG | TEMPERATURE: 98 F | BODY MASS INDEX: 29.49 KG/M2 | WEIGHT: 210.63 LBS | SYSTOLIC BLOOD PRESSURE: 136 MMHG | RESPIRATION RATE: 18 BRPM | HEART RATE: 65 BPM | OXYGEN SATURATION: 95 % | HEIGHT: 71 IN

## 2021-12-07 DIAGNOSIS — I34.0 NONRHEUMATIC MITRAL VALVE REGURGITATION: ICD-10-CM

## 2021-12-07 DIAGNOSIS — F32.5 MAJOR DEPRESSIVE DISORDER WITH SINGLE EPISODE, IN FULL REMISSION: ICD-10-CM

## 2021-12-07 DIAGNOSIS — Z12.5 PROSTATE CANCER SCREENING: ICD-10-CM

## 2021-12-07 DIAGNOSIS — M25.561 CHRONIC PAIN OF RIGHT KNEE: ICD-10-CM

## 2021-12-07 DIAGNOSIS — F51.01 PRIMARY INSOMNIA: ICD-10-CM

## 2021-12-07 DIAGNOSIS — J30.9 ALLERGIC SINUSITIS: ICD-10-CM

## 2021-12-07 DIAGNOSIS — Z00.00 ENCOUNTER FOR PREVENTIVE HEALTH EXAMINATION: Primary | ICD-10-CM

## 2021-12-07 DIAGNOSIS — J32.3 CHRONIC SPHENOIDAL SINUSITIS: ICD-10-CM

## 2021-12-07 DIAGNOSIS — G89.29 CHRONIC PAIN OF RIGHT KNEE: ICD-10-CM

## 2021-12-07 DIAGNOSIS — E78.5 DYSLIPIDEMIA: ICD-10-CM

## 2021-12-07 DIAGNOSIS — K57.92 DIVERTICULITIS: ICD-10-CM

## 2021-12-07 DIAGNOSIS — F41.9 ANXIETY: ICD-10-CM

## 2021-12-07 DIAGNOSIS — G25.81 RESTLESS LEG SYNDROME: ICD-10-CM

## 2021-12-07 DIAGNOSIS — I10 PRIMARY HYPERTENSION: ICD-10-CM

## 2021-12-07 PROCEDURE — 90686 IIV4 VACC NO PRSV 0.5 ML IM: CPT | Mod: S$GLB,,, | Performed by: FAMILY MEDICINE

## 2021-12-07 PROCEDURE — 90471 FLU VACCINE (QUAD) GREATER THAN OR EQUAL TO 3YO PRESERVATIVE FREE IM: ICD-10-PCS | Mod: S$GLB,,, | Performed by: FAMILY MEDICINE

## 2021-12-07 PROCEDURE — 90471 IMMUNIZATION ADMIN: CPT | Mod: S$GLB,,, | Performed by: FAMILY MEDICINE

## 2021-12-07 PROCEDURE — 99214 PR OFFICE/OUTPT VISIT, EST, LEVL IV, 30-39 MIN: ICD-10-PCS | Mod: 25,S$GLB,, | Performed by: FAMILY MEDICINE

## 2021-12-07 PROCEDURE — 90686 FLU VACCINE (QUAD) GREATER THAN OR EQUAL TO 3YO PRESERVATIVE FREE IM: ICD-10-PCS | Mod: S$GLB,,, | Performed by: FAMILY MEDICINE

## 2021-12-07 PROCEDURE — 99999 PR PBB SHADOW E&M-EST. PATIENT-LVL III: CPT | Mod: PBBFAC,,, | Performed by: FAMILY MEDICINE

## 2021-12-07 PROCEDURE — 99214 OFFICE O/P EST MOD 30 MIN: CPT | Mod: 25,S$GLB,, | Performed by: FAMILY MEDICINE

## 2021-12-07 PROCEDURE — 99999 PR PBB SHADOW E&M-EST. PATIENT-LVL III: ICD-10-PCS | Mod: PBBFAC,,, | Performed by: FAMILY MEDICINE

## 2021-12-07 RX ORDER — TRAZODONE HYDROCHLORIDE 150 MG/1
150 TABLET ORAL NIGHTLY
Qty: 90 TABLET | Refills: 1 | Status: SHIPPED | OUTPATIENT
Start: 2021-12-07 | End: 2022-05-17

## 2021-12-07 RX ORDER — BUPROPION HYDROCHLORIDE 150 MG/1
150 TABLET ORAL DAILY
Qty: 90 TABLET | Refills: 3 | Status: SHIPPED | OUTPATIENT
Start: 2021-12-07 | End: 2022-11-15

## 2021-12-07 RX ORDER — PRAMIPEXOLE DIHYDROCHLORIDE 0.25 MG/1
TABLET ORAL
Qty: 90 TABLET | Refills: 3 | Status: SHIPPED | OUTPATIENT
Start: 2021-12-07 | End: 2022-11-15 | Stop reason: SDUPTHER

## 2021-12-07 RX ORDER — AMOXICILLIN AND CLAVULANATE POTASSIUM 875; 125 MG/1; MG/1
1 TABLET, FILM COATED ORAL 2 TIMES DAILY
Qty: 20 TABLET | Refills: 0 | Status: SHIPPED | OUTPATIENT
Start: 2021-12-07 | End: 2021-12-17

## 2021-12-07 RX ORDER — PREDNISONE 20 MG/1
TABLET ORAL
Qty: 8 TABLET | Refills: 0 | Status: SHIPPED | OUTPATIENT
Start: 2021-12-07 | End: 2022-11-15

## 2021-12-07 RX ORDER — FOSINOPRIL SODIUM 40 MG/1
40 TABLET ORAL DAILY
Qty: 90 TABLET | Refills: 3 | Status: SHIPPED | OUTPATIENT
Start: 2021-12-07 | End: 2022-11-15 | Stop reason: SDUPTHER

## 2021-12-07 RX ORDER — MULTIVITAMIN
1 TABLET ORAL NIGHTLY
COMMUNITY

## 2021-12-09 ENCOUNTER — LAB VISIT (OUTPATIENT)
Dept: LAB | Facility: HOSPITAL | Age: 56
End: 2021-12-09
Attending: FAMILY MEDICINE
Payer: COMMERCIAL

## 2021-12-09 DIAGNOSIS — Z00.00 ENCOUNTER FOR PREVENTIVE HEALTH EXAMINATION: ICD-10-CM

## 2021-12-09 DIAGNOSIS — E78.5 DYSLIPIDEMIA: ICD-10-CM

## 2021-12-09 DIAGNOSIS — Z12.5 PROSTATE CANCER SCREENING: ICD-10-CM

## 2021-12-09 DIAGNOSIS — I10 PRIMARY HYPERTENSION: ICD-10-CM

## 2021-12-09 LAB
ALBUMIN SERPL BCP-MCNC: 4.1 G/DL (ref 3.5–5.2)
ALP SERPL-CCNC: 66 U/L (ref 55–135)
ALT SERPL W/O P-5'-P-CCNC: 57 U/L (ref 10–44)
ANION GAP SERPL CALC-SCNC: 11 MMOL/L (ref 8–16)
AST SERPL-CCNC: 32 U/L (ref 10–40)
BILIRUB SERPL-MCNC: 1 MG/DL (ref 0.1–1)
BUN SERPL-MCNC: 12 MG/DL (ref 6–20)
CALCIUM SERPL-MCNC: 9.3 MG/DL (ref 8.7–10.5)
CHLORIDE SERPL-SCNC: 104 MMOL/L (ref 95–110)
CHOLEST SERPL-MCNC: 134 MG/DL (ref 120–199)
CHOLEST/HDLC SERPL: 4.2 {RATIO} (ref 2–5)
CO2 SERPL-SCNC: 27 MMOL/L (ref 23–29)
CREAT SERPL-MCNC: 1 MG/DL (ref 0.5–1.4)
EST. GFR  (AFRICAN AMERICAN): >60 ML/MIN/1.73 M^2
EST. GFR  (NON AFRICAN AMERICAN): >60 ML/MIN/1.73 M^2
GLUCOSE SERPL-MCNC: 108 MG/DL (ref 70–110)
HDLC SERPL-MCNC: 32 MG/DL (ref 40–75)
HDLC SERPL: 23.9 % (ref 20–50)
LDLC SERPL CALC-MCNC: 79 MG/DL (ref 63–159)
NONHDLC SERPL-MCNC: 102 MG/DL
POTASSIUM SERPL-SCNC: 4.4 MMOL/L (ref 3.5–5.1)
PROT SERPL-MCNC: 6.7 G/DL (ref 6–8.4)
SODIUM SERPL-SCNC: 142 MMOL/L (ref 136–145)
TRIGL SERPL-MCNC: 115 MG/DL (ref 30–150)

## 2021-12-09 PROCEDURE — 80053 COMPREHEN METABOLIC PANEL: CPT | Performed by: FAMILY MEDICINE

## 2021-12-09 PROCEDURE — 36415 COLL VENOUS BLD VENIPUNCTURE: CPT | Mod: PO | Performed by: FAMILY MEDICINE

## 2021-12-09 PROCEDURE — 80061 LIPID PANEL: CPT | Performed by: FAMILY MEDICINE

## 2021-12-09 PROCEDURE — 85025 COMPLETE CBC W/AUTO DIFF WBC: CPT | Performed by: FAMILY MEDICINE

## 2021-12-09 PROCEDURE — 84153 ASSAY OF PSA TOTAL: CPT | Performed by: FAMILY MEDICINE

## 2021-12-10 LAB
BASOPHILS # BLD AUTO: 0.05 K/UL (ref 0–0.2)
BASOPHILS NFR BLD: 0.7 % (ref 0–1.9)
COMPLEXED PSA SERPL-MCNC: 0.61 NG/ML (ref 0–4)
DIFFERENTIAL METHOD: ABNORMAL
EOSINOPHIL # BLD AUTO: 0.1 K/UL (ref 0–0.5)
EOSINOPHIL NFR BLD: 1.9 % (ref 0–8)
ERYTHROCYTE [DISTWIDTH] IN BLOOD BY AUTOMATED COUNT: 12.6 % (ref 11.5–14.5)
HCT VFR BLD AUTO: 46.6 % (ref 40–54)
HGB BLD-MCNC: 15.7 G/DL (ref 14–18)
IMM GRANULOCYTES # BLD AUTO: 0.02 K/UL (ref 0–0.04)
IMM GRANULOCYTES NFR BLD AUTO: 0.3 % (ref 0–0.5)
LYMPHOCYTES # BLD AUTO: 2.9 K/UL (ref 1–4.8)
LYMPHOCYTES NFR BLD: 39.5 % (ref 18–48)
MCH RBC QN AUTO: 30.7 PG (ref 27–31)
MCHC RBC AUTO-ENTMCNC: 33.7 G/DL (ref 32–36)
MCV RBC AUTO: 91 FL (ref 82–98)
MONOCYTES # BLD AUTO: 0.6 K/UL (ref 0.3–1)
MONOCYTES NFR BLD: 7.8 % (ref 4–15)
NEUTROPHILS # BLD AUTO: 3.7 K/UL (ref 1.8–7.7)
NEUTROPHILS NFR BLD: 49.8 % (ref 38–73)
NRBC BLD-RTO: 0 /100 WBC
PLATELET # BLD AUTO: 139 K/UL (ref 150–450)
PMV BLD AUTO: 11 FL (ref 9.2–12.9)
RBC # BLD AUTO: 5.11 M/UL (ref 4.6–6.2)
WBC # BLD AUTO: 7.41 K/UL (ref 3.9–12.7)

## 2021-12-14 ENCOUNTER — TELEPHONE (OUTPATIENT)
Dept: CARDIOLOGY | Facility: CLINIC | Age: 56
End: 2021-12-14
Payer: COMMERCIAL

## 2022-03-11 ENCOUNTER — TELEPHONE (OUTPATIENT)
Dept: CARDIOLOGY | Facility: CLINIC | Age: 57
End: 2022-03-11
Payer: COMMERCIAL

## 2022-03-11 NOTE — TELEPHONE ENCOUNTER
REC FAX FROM [facility] REQUESTING CLEARANCE FOR LUMBAR NICOLE AND LEFT SI JOINT INJ UNDER TIVA (ANESTHESIA) AND TO HOLD ASA X 5 DAYS PRIOR  THX

## 2022-05-11 ENCOUNTER — CLINICAL SUPPORT (OUTPATIENT)
Dept: CARDIOLOGY | Facility: HOSPITAL | Age: 57
End: 2022-05-11
Attending: INTERNAL MEDICINE
Payer: COMMERCIAL

## 2022-05-11 VITALS
BODY MASS INDEX: 29.4 KG/M2 | WEIGHT: 210 LBS | SYSTOLIC BLOOD PRESSURE: 138 MMHG | DIASTOLIC BLOOD PRESSURE: 76 MMHG | HEIGHT: 71 IN

## 2022-05-11 DIAGNOSIS — E78.5 DYSLIPIDEMIA: ICD-10-CM

## 2022-05-11 DIAGNOSIS — I34.0 NONRHEUMATIC MITRAL VALVE REGURGITATION: ICD-10-CM

## 2022-05-11 DIAGNOSIS — I10 PRIMARY HYPERTENSION: ICD-10-CM

## 2022-05-11 PROCEDURE — 93306 TTE W/DOPPLER COMPLETE: CPT | Mod: PO

## 2022-05-11 PROCEDURE — 93306 ECHO (CUPID ONLY): ICD-10-PCS | Mod: 26,,, | Performed by: INTERNAL MEDICINE

## 2022-05-11 PROCEDURE — 93306 TTE W/DOPPLER COMPLETE: CPT | Mod: 26,,, | Performed by: INTERNAL MEDICINE

## 2022-05-12 LAB
ASCENDING AORTA: 2.84 CM
AV INDEX (PROSTH): 0.72
AV MEAN GRADIENT: 4 MMHG
AV PEAK GRADIENT: 7 MMHG
AV VALVE AREA: 3.26 CM2
AV VELOCITY RATIO: 0.7
BSA FOR ECHO PROCEDURE: 2.18 M2
CV ECHO LV RWT: 0.3 CM
DOP CALC AO PEAK VEL: 1.36 M/S
DOP CALC AO VTI: 26.1 CM
DOP CALC LVOT AREA: 4.6 CM2
DOP CALC LVOT DIAMETER: 2.41 CM
DOP CALC LVOT PEAK VEL: 0.95 M/S
DOP CALC LVOT STROKE VOLUME: 85.17 CM3
DOP CALCLVOT PEAK VEL VTI: 18.68 CM
E WAVE DECELERATION TIME: 234.19 MSEC
E/A RATIO: 1.32
E/E' RATIO: 7.93 M/S
ECHO LV POSTERIOR WALL: 0.89 CM (ref 0.6–1.1)
EJECTION FRACTION: 65 %
FRACTIONAL SHORTENING: 38 % (ref 28–44)
INTERVENTRICULAR SEPTUM: 1.07 CM (ref 0.6–1.1)
IVRT: 85.63 MSEC
LA MAJOR: 6.04 CM
LA MINOR: 6.13 CM
LA WIDTH: 5.42 CM
LEFT ATRIUM SIZE: 5.93 CM
LEFT ATRIUM VOLUME INDEX: 77.3 ML/M2
LEFT ATRIUM VOLUME: 166.23 CM3
LEFT INTERNAL DIMENSION IN SYSTOLE: 3.68 CM (ref 2.1–4)
LEFT VENTRICLE DIASTOLIC VOLUME INDEX: 81.71 ML/M2
LEFT VENTRICLE DIASTOLIC VOLUME: 175.68 ML
LEFT VENTRICLE MASS INDEX: 110 G/M2
LEFT VENTRICLE SYSTOLIC VOLUME INDEX: 26.7 ML/M2
LEFT VENTRICLE SYSTOLIC VOLUME: 57.38 ML
LEFT VENTRICULAR INTERNAL DIMENSION IN DIASTOLE: 5.94 CM (ref 3.5–6)
LEFT VENTRICULAR MASS: 236.43 G
LV LATERAL E/E' RATIO: 6.76 M/S
LV SEPTAL E/E' RATIO: 9.58 M/S
MV PEAK A VEL: 0.87 M/S
MV PEAK E VEL: 1.15 M/S
MV STENOSIS PRESSURE HALF TIME: 67.91 MS
MV VALVE AREA P 1/2 METHOD: 3.24 CM2
PISA MRMAX VEL: 0.05 M/S
PISA RADIUS: 1.13 CM
PISA TR MAX VEL: 2.5 M/S
PISA TR VN NYQUIST: 0 M/S
RA MAJOR: 5.1 CM
RA PRESSURE: 8 MMHG
RA WIDTH: 3.73 CM
RIGHT VENTRICULAR END-DIASTOLIC DIMENSION: 4.17 CM
RV TISSUE DOPPLER FREE WALL SYSTOLIC VELOCITY 1 (APICAL 4 CHAMBER VIEW): 16.71 CM/S
SINUS: 3.62 CM
STJ: 2.79 CM
TDI LATERAL: 0.17 M/S
TDI SEPTAL: 0.12 M/S
TDI: 0.15 M/S
TR MAX PG: 25 MMHG
TRICUSPID ANNULAR PLANE SYSTOLIC EXCURSION: 2.59 CM
TV REST PULMONARY ARTERY PRESSURE: 33 MMHG

## 2022-05-16 DIAGNOSIS — F51.01 PRIMARY INSOMNIA: ICD-10-CM

## 2022-05-17 RX ORDER — TRAZODONE HYDROCHLORIDE 150 MG/1
TABLET ORAL
Qty: 90 TABLET | Refills: 1 | Status: SHIPPED | OUTPATIENT
Start: 2022-05-17 | End: 2022-11-06

## 2022-05-17 NOTE — TELEPHONE ENCOUNTER
No new care gaps identified.  [facility] Embedded Care Gaps. Reference number: [id number]. 5/16/2022   9:13:59 PM CDT

## 2022-05-17 NOTE — TELEPHONE ENCOUNTER
Refill Routing Note   Medication(s) are not appropriate for processing by [facility] for the following reason(s):      - Indication is outside of scope for [facility]    [facility] action(s):  Defer          Medication reconciliation completed: No     Appointments  past 12m or future 3m with PCP    Date Provider   Last Visit   12/7/2021 Sammy Trinidad MD   Next Visit   Visit date not found Sammy Trinidad MD   ED visits in past 90 days: 0        Note composed:4:57 PM 05/17/2022

## 2022-05-31 ENCOUNTER — PATIENT MESSAGE (OUTPATIENT)
Dept: CARDIOLOGY | Facility: CLINIC | Age: 57
End: 2022-05-31
Payer: COMMERCIAL

## 2022-05-31 ENCOUNTER — TELEPHONE (OUTPATIENT)
Dept: CARDIOLOGY | Facility: CLINIC | Age: 57
End: 2022-05-31
Payer: COMMERCIAL
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
## 2022-07-05 ENCOUNTER — OFFICE VISIT (OUTPATIENT)
Dept: CARDIOLOGY | Facility: CLINIC | Age: 57
End: 2022-07-05
Payer: COMMERCIAL

## 2022-07-05 VITALS
SYSTOLIC BLOOD PRESSURE: 145 MMHG | HEIGHT: 71 IN | DIASTOLIC BLOOD PRESSURE: 86 MMHG | HEART RATE: 70 BPM | WEIGHT: 210.13 LBS | BODY MASS INDEX: 29.42 KG/M2

## 2022-07-05 DIAGNOSIS — I34.0 NONRHEUMATIC MITRAL VALVE REGURGITATION: Primary | ICD-10-CM

## 2022-07-05 DIAGNOSIS — I10 PRIMARY HYPERTENSION: ICD-10-CM

## 2022-07-05 PROCEDURE — 99214 PR OFFICE/OUTPT VISIT, EST, LEVL IV, 30-39 MIN: ICD-10-PCS | Mod: S$GLB,,, | Performed by: INTERNAL MEDICINE

## 2022-07-05 PROCEDURE — 99214 OFFICE O/P EST MOD 30 MIN: CPT | Mod: S$GLB,,, | Performed by: INTERNAL MEDICINE

## 2022-07-05 PROCEDURE — 99999 PR PBB SHADOW E&M-EST. PATIENT-LVL III: CPT | Mod: PBBFAC,,, | Performed by: INTERNAL MEDICINE

## 2022-07-05 PROCEDURE — 99999 PR PBB SHADOW E&M-EST. PATIENT-LVL III: ICD-10-PCS | Mod: PBBFAC,,, | Performed by: INTERNAL MEDICINE

## 2022-07-05 RX ORDER — IBUPROFEN 800 MG/1
800 TABLET ORAL 2 TIMES DAILY
Status: ON HOLD | COMMUNITY
Start: 2022-04-26 | End: 2023-08-26 | Stop reason: HOSPADM

## 2022-07-05 RX ORDER — PREGABALIN 75 MG/1
75 CAPSULE ORAL 2 TIMES DAILY
COMMUNITY
Start: 2022-06-10 | End: 2023-02-27

## 2022-07-05 RX ORDER — MELOXICAM 15 MG/1
15 TABLET ORAL DAILY
COMMUNITY
Start: 2022-02-22 | End: 2022-11-15

## 2022-07-05 RX ORDER — METHYLPREDNISOLONE 4 MG/1
TABLET ORAL
COMMUNITY
Start: 2022-02-22 | End: 2022-11-15 | Stop reason: ALTCHOICE

## 2022-07-05 NOTE — PROGRESS NOTES
Subjective:    Patient ID:  Abimael Armendariz is a 57 y.o. male who presents for follow-up of MR    URSULA  He comes for follow up with no major problems, no chest pain, no shortness of breath. FC I  Back pain is main problem at this time      Review of Systems   Constitutional: Negative for decreased appetite, malaise/fatigue, weight gain and weight loss.   Cardiovascular: Negative for chest pain, dyspnea on exertion, leg swelling, palpitations and syncope.   Respiratory: Negative for cough and shortness of breath.    Gastrointestinal: Negative.    Neurological: Negative for weakness.   All other systems reviewed and are negative.       Objective:      Physical Exam  Vitals and nursing note reviewed.   Constitutional:       Appearance: Normal appearance. He is well-developed.   HENT:      Head: Normocephalic.   Eyes:      Pupils: Pupils are equal, round, and reactive to light.   Neck:      Thyroid: No thyromegaly.      Vascular: No carotid bruit or JVD.   Cardiovascular:      Rate and Rhythm: Normal rate and regular rhythm.      Chest Wall: PMI is not displaced.      Pulses: Normal pulses and intact distal pulses.      Heart sounds: Murmur heard.   High-pitched blowing holosystolic murmur is present with a grade of 3/6 at the apex.    No gallop.   Pulmonary:      Effort: Pulmonary effort is normal.      Breath sounds: Normal breath sounds.   Abdominal:      Palpations: Abdomen is soft. There is no mass.      Tenderness: There is no abdominal tenderness.   Musculoskeletal:         General: Normal range of motion.      Cervical back: Normal range of motion and neck supple.   Skin:     General: Skin is warm.   Neurological:      Mental Status: He is alert and oriented to person, place, and time.      Sensory: No sensory deficit.      Deep Tendon Reflexes: Reflexes are normal and symmetric.     echo reviewed      Assessment:       1. Nonrheumatic mitral valve regurgitation    2. Primary hypertension         Plan:      Continue all cardiac medications  Regular exercise program  Weight loss  9 m f/u with [facility]

## 2022-11-06 DIAGNOSIS — F51.01 PRIMARY INSOMNIA: ICD-10-CM

## 2022-11-06 RX ORDER — TRAZODONE HYDROCHLORIDE 150 MG/1
TABLET ORAL
Qty: 90 TABLET | Refills: 0 | Status: SHIPPED | OUTPATIENT
Start: 2022-11-06 | End: 2022-11-15

## 2022-11-06 NOTE — TELEPHONE ENCOUNTER
Care Due:                  Date            Visit Type   Department     Provider  --------------------------------------------------------------------------------                                MYCHART                              ANNUAL                              CHECKUP/PHY  [facility]  Last Visit: 12-      S            NANCY Trinidad  Next Visit: None Scheduled  None         None Found                                                            Last  Test          Frequency    Reason                     Performed    Due Date  --------------------------------------------------------------------------------    Office Visit  12 months..  buPROPion, fosinopriL,     12- 12-                             traZODone................    CMP.........  12 months..  fosinopriL...............  12- 12-    [facility] Embedded Care Gaps. Reference number: [id number]. 11/06/2022   12:15:12 AM CDT

## 2022-11-06 NOTE — TELEPHONE ENCOUNTER
Refill Decision Note   Abimael Armendariz  is requesting a refill authorization.  Brief Assessment and Rationale for Refill:  Approve    -Medication-Related Problems Identified:   Requires labs  Requires appointment  Medication Therapy Plan:       Medication Reconciliation Completed: No   Comments:     Provider Staff:     Action is required for this patient.   Please see care gap opportunities below in Care Due Message.     Thanks!  [facility]     Appointments      Date Provider   Last Visit   12/7/2021 Sammy Trinidad MD   Next Visit   Visit date not found Sammy Trinidad MD     Note composed:2:56 PM 11/06/2022           Note composed:2:56 PM 11/06/2022

## 2022-11-10 ENCOUNTER — TELEPHONE (OUTPATIENT)
Dept: CARDIOLOGY | Facility: CLINIC | Age: 57
End: 2022-11-10
Payer: COMMERCIAL

## 2022-11-11 ENCOUNTER — TELEPHONE (OUTPATIENT)
Dept: CARDIOLOGY | Facility: CLINIC | Age: 57
End: 2022-11-11
Payer: COMMERCIAL

## 2022-11-11 NOTE — TELEPHONE ENCOUNTER
REC FAX FROM [facility] / DR. AUSTIN REQUESTING CLEARANCE FOR LUMBAR LAMINECTOMY, FACETECTOMY, FORAMINOTOMY LEFT L4 - L5, L5-S1 SCHEDULED 12/12/22 AND TO HOLD ASA X 5 DAYS PRIOR  THX

## 2022-11-15 ENCOUNTER — OFFICE VISIT (OUTPATIENT)
Dept: FAMILY MEDICINE | Facility: CLINIC | Age: 57
End: 2022-11-15
Payer: COMMERCIAL

## 2022-11-15 ENCOUNTER — LAB VISIT (OUTPATIENT)
Dept: LAB | Facility: HOSPITAL | Age: 57
End: 2022-11-15
Attending: PHYSICIAN ASSISTANT
Payer: COMMERCIAL

## 2022-11-15 VITALS
TEMPERATURE: 98 F | SYSTOLIC BLOOD PRESSURE: 136 MMHG | OXYGEN SATURATION: 95 % | HEART RATE: 70 BPM | WEIGHT: 216.5 LBS | HEIGHT: 71 IN | DIASTOLIC BLOOD PRESSURE: 84 MMHG | BODY MASS INDEX: 30.31 KG/M2

## 2022-11-15 DIAGNOSIS — G47.00 INSOMNIA, UNSPECIFIED TYPE: ICD-10-CM

## 2022-11-15 DIAGNOSIS — F32.A DEPRESSION, UNSPECIFIED DEPRESSION TYPE: ICD-10-CM

## 2022-11-15 DIAGNOSIS — R41.840 ATTENTION DEFICIT: ICD-10-CM

## 2022-11-15 DIAGNOSIS — I10 PRIMARY HYPERTENSION: Primary | ICD-10-CM

## 2022-11-15 DIAGNOSIS — G25.81 RESTLESS LEG SYNDROME: ICD-10-CM

## 2022-11-15 DIAGNOSIS — I34.0 NONRHEUMATIC MITRAL VALVE REGURGITATION: ICD-10-CM

## 2022-11-15 DIAGNOSIS — I10 PRIMARY HYPERTENSION: ICD-10-CM

## 2022-11-15 LAB
ALBUMIN SERPL BCP-MCNC: 4.3 G/DL (ref 3.5–5.2)
ALP SERPL-CCNC: 75 U/L (ref 55–135)
ALT SERPL W/O P-5'-P-CCNC: 35 U/L (ref 10–44)
ANION GAP SERPL CALC-SCNC: 10 MMOL/L (ref 8–16)
AST SERPL-CCNC: 22 U/L (ref 10–40)
BASOPHILS # BLD AUTO: 0.05 K/UL (ref 0–0.2)
BASOPHILS NFR BLD: 0.8 % (ref 0–1.9)
BILIRUB SERPL-MCNC: 1.1 MG/DL (ref 0.1–1)
BUN SERPL-MCNC: 18 MG/DL (ref 6–20)
CALCIUM SERPL-MCNC: 9.7 MG/DL (ref 8.7–10.5)
CHLORIDE SERPL-SCNC: 102 MMOL/L (ref 95–110)
CHOLEST SERPL-MCNC: 154 MG/DL (ref 120–199)
CHOLEST/HDLC SERPL: 5.5 {RATIO} (ref 2–5)
CO2 SERPL-SCNC: 27 MMOL/L (ref 23–29)
CREAT SERPL-MCNC: 1.1 MG/DL (ref 0.5–1.4)
DIFFERENTIAL METHOD: ABNORMAL
EOSINOPHIL # BLD AUTO: 0.2 K/UL (ref 0–0.5)
EOSINOPHIL NFR BLD: 2.8 % (ref 0–8)
ERYTHROCYTE [DISTWIDTH] IN BLOOD BY AUTOMATED COUNT: 11.9 % (ref 11.5–14.5)
EST. GFR  (NO RACE VARIABLE): >60 ML/MIN/1.73 M^2
ESTIMATED AVG GLUCOSE: 160 MG/DL (ref 68–131)
GLUCOSE SERPL-MCNC: 145 MG/DL (ref 70–110)
HBA1C MFR BLD: 7.2 % (ref 4–5.6)
HCT VFR BLD AUTO: 47.2 % (ref 40–54)
HDLC SERPL-MCNC: 28 MG/DL (ref 40–75)
HDLC SERPL: 18.2 % (ref 20–50)
HGB BLD-MCNC: 15.8 G/DL (ref 14–18)
IMM GRANULOCYTES # BLD AUTO: 0.01 K/UL (ref 0–0.04)
IMM GRANULOCYTES NFR BLD AUTO: 0.2 % (ref 0–0.5)
LDLC SERPL CALC-MCNC: 76.2 MG/DL (ref 63–159)
LYMPHOCYTES # BLD AUTO: 2.3 K/UL (ref 1–4.8)
LYMPHOCYTES NFR BLD: 36.9 % (ref 18–48)
MCH RBC QN AUTO: 30.8 PG (ref 27–31)
MCHC RBC AUTO-ENTMCNC: 33.5 G/DL (ref 32–36)
MCV RBC AUTO: 92 FL (ref 82–98)
MONOCYTES # BLD AUTO: 0.5 K/UL (ref 0.3–1)
MONOCYTES NFR BLD: 8 % (ref 4–15)
NEUTROPHILS # BLD AUTO: 3.2 K/UL (ref 1.8–7.7)
NEUTROPHILS NFR BLD: 51.3 % (ref 38–73)
NONHDLC SERPL-MCNC: 126 MG/DL
NRBC BLD-RTO: 0 /100 WBC
PLATELET # BLD AUTO: 144 K/UL (ref 150–450)
PMV BLD AUTO: 11 FL (ref 9.2–12.9)
POTASSIUM SERPL-SCNC: 4.8 MMOL/L (ref 3.5–5.1)
PROT SERPL-MCNC: 6.9 G/DL (ref 6–8.4)
RBC # BLD AUTO: 5.13 M/UL (ref 4.6–6.2)
SODIUM SERPL-SCNC: 139 MMOL/L (ref 136–145)
TRIGL SERPL-MCNC: 249 MG/DL (ref 30–150)
TSH SERPL DL<=0.005 MIU/L-ACNC: 2.35 UIU/ML (ref 0.4–4)
WBC # BLD AUTO: 6.13 K/UL (ref 3.9–12.7)

## 2022-11-15 PROCEDURE — 80061 LIPID PANEL: CPT | Performed by: PHYSICIAN ASSISTANT

## 2022-11-15 PROCEDURE — 99214 OFFICE O/P EST MOD 30 MIN: CPT | Mod: S$GLB,,, | Performed by: PHYSICIAN ASSISTANT

## 2022-11-15 PROCEDURE — 99214 PR OFFICE/OUTPT VISIT, EST, LEVL IV, 30-39 MIN: ICD-10-PCS | Mod: S$GLB,,, | Performed by: PHYSICIAN ASSISTANT

## 2022-11-15 PROCEDURE — 84443 ASSAY THYROID STIM HORMONE: CPT | Performed by: PHYSICIAN ASSISTANT

## 2022-11-15 PROCEDURE — 99999 PR PBB SHADOW E&M-EST. PATIENT-LVL IV: ICD-10-PCS | Mod: PBBFAC,,, | Performed by: PHYSICIAN ASSISTANT

## 2022-11-15 PROCEDURE — 85025 COMPLETE CBC W/AUTO DIFF WBC: CPT | Performed by: PHYSICIAN ASSISTANT

## 2022-11-15 PROCEDURE — 80053 COMPREHEN METABOLIC PANEL: CPT | Performed by: PHYSICIAN ASSISTANT

## 2022-11-15 PROCEDURE — 36415 COLL VENOUS BLD VENIPUNCTURE: CPT | Mod: PO | Performed by: PHYSICIAN ASSISTANT

## 2022-11-15 PROCEDURE — 83036 HEMOGLOBIN GLYCOSYLATED A1C: CPT | Performed by: PHYSICIAN ASSISTANT

## 2022-11-15 PROCEDURE — 99999 PR PBB SHADOW E&M-EST. PATIENT-LVL IV: CPT | Mod: PBBFAC,,, | Performed by: PHYSICIAN ASSISTANT

## 2022-11-15 RX ORDER — TRAZODONE HYDROCHLORIDE 100 MG/1
200 TABLET ORAL NIGHTLY
Qty: 180 TABLET | Refills: 3 | Status: SHIPPED | OUTPATIENT
Start: 2022-11-15 | End: 2024-02-12 | Stop reason: SDUPTHER

## 2022-11-15 RX ORDER — BUPROPION HYDROCHLORIDE 300 MG/1
300 TABLET ORAL DAILY
Qty: 90 TABLET | Refills: 3 | Status: SHIPPED | OUTPATIENT
Start: 2022-11-15 | End: 2023-12-28

## 2022-11-15 RX ORDER — PRAMIPEXOLE DIHYDROCHLORIDE 0.25 MG/1
TABLET ORAL
Qty: 90 TABLET | Refills: 3 | Status: SHIPPED | OUTPATIENT
Start: 2022-11-15 | End: 2023-12-08 | Stop reason: SDUPTHER

## 2022-11-15 RX ORDER — FOSINOPRIL SODIUM 40 MG/1
40 TABLET ORAL DAILY
Qty: 90 TABLET | Refills: 3 | Status: ON HOLD | OUTPATIENT
Start: 2022-11-15 | End: 2023-08-26 | Stop reason: HOSPADM
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
## 2022-11-16 ENCOUNTER — TELEPHONE (OUTPATIENT)
Dept: FAMILY MEDICINE | Facility: CLINIC | Age: 57
End: 2022-11-16
Payer: COMMERCIAL

## 2022-11-16 ENCOUNTER — PATIENT MESSAGE (OUTPATIENT)
Dept: FAMILY MEDICINE | Facility: CLINIC | Age: 57
End: 2022-11-16
Payer: COMMERCIAL

## 2022-11-16 DIAGNOSIS — E11.9 TYPE 2 DIABETES MELLITUS WITHOUT COMPLICATION, WITHOUT LONG-TERM CURRENT USE OF INSULIN: Primary | ICD-10-CM

## 2022-11-16 RX ORDER — METFORMIN HYDROCHLORIDE 500 MG/1
500 TABLET ORAL 2 TIMES DAILY WITH MEALS
Qty: 180 TABLET | Refills: 3 | Status: SHIPPED | OUTPATIENT
Start: 2022-11-16 | End: 2023-08-29 | Stop reason: SDUPTHER

## 2022-11-16 RX ORDER — INSULIN PUMP SYRINGE, 3 ML
EACH MISCELLANEOUS
Qty: 1 EACH | Refills: 0 | Status: SHIPPED | OUTPATIENT
Start: 2022-11-16 | End: 2023-12-28

## 2022-11-16 RX ORDER — LANCETS
EACH MISCELLANEOUS
Qty: 100 EACH | Refills: 2 | Status: SHIPPED | OUTPATIENT
Start: 2022-11-16

## 2022-11-28 ENCOUNTER — PATIENT MESSAGE (OUTPATIENT)
Dept: FAMILY MEDICINE | Facility: CLINIC | Age: 57
End: 2022-11-28
Payer: COMMERCIAL

## 2022-11-30 ENCOUNTER — OFFICE VISIT (OUTPATIENT)
Dept: FAMILY MEDICINE | Facility: CLINIC | Age: 57
End: 2022-11-30
Payer: COMMERCIAL

## 2022-11-30 ENCOUNTER — HOSPITAL ENCOUNTER (OUTPATIENT)
Dept: RADIOLOGY | Facility: CLINIC | Age: 57
Discharge: HOME | End: 2022-11-30
Attending: PHYSICIAN ASSISTANT
Payer: COMMERCIAL

## 2022-11-30 VITALS
SYSTOLIC BLOOD PRESSURE: 128 MMHG | WEIGHT: 209.69 LBS | TEMPERATURE: 98 F | DIASTOLIC BLOOD PRESSURE: 82 MMHG | HEIGHT: 71 IN | HEART RATE: 78 BPM | BODY MASS INDEX: 29.35 KG/M2 | OXYGEN SATURATION: 96 %

## 2022-11-30 DIAGNOSIS — E11.9 NEWLY DIAGNOSED DIABETES: ICD-10-CM

## 2022-11-30 DIAGNOSIS — Z01.818 PRE-OP EVALUATION: ICD-10-CM

## 2022-11-30 DIAGNOSIS — I34.0 NONRHEUMATIC MITRAL VALVE REGURGITATION: ICD-10-CM

## 2022-11-30 DIAGNOSIS — Z01.818 PRE-OP EVALUATION: Primary | ICD-10-CM

## 2022-11-30 PROCEDURE — 99214 PR OFFICE/OUTPT VISIT, EST, LEVL IV, 30-39 MIN: ICD-10-PCS | Mod: S$GLB,,, | Performed by: PHYSICIAN ASSISTANT

## 2022-11-30 PROCEDURE — 99999 PR PBB SHADOW E&M-EST. PATIENT-LVL V: ICD-10-PCS | Mod: PBBFAC,,, | Performed by: PHYSICIAN ASSISTANT

## 2022-11-30 PROCEDURE — 93010 ELECTROCARDIOGRAM REPORT: CPT | Mod: S$GLB,,, | Performed by: INTERNAL MEDICINE

## 2022-11-30 PROCEDURE — 93005 ELECTROCARDIOGRAM TRACING: CPT | Mod: S$GLB,,, | Performed by: PHYSICIAN ASSISTANT

## 2022-11-30 PROCEDURE — 99999 PR PBB SHADOW E&M-EST. PATIENT-LVL V: CPT | Mod: PBBFAC,,, | Performed by: PHYSICIAN ASSISTANT

## 2022-11-30 PROCEDURE — 71046 X-RAY EXAM CHEST 2 VIEWS: CPT | Mod: 26,,, | Performed by: RADIOLOGY

## 2022-11-30 PROCEDURE — 93005 EKG 12-LEAD: ICD-10-PCS | Mod: S$GLB,,, | Performed by: PHYSICIAN ASSISTANT

## 2022-11-30 PROCEDURE — 71046 X-RAY EXAM CHEST 2 VIEWS: CPT | Mod: TC,FY,PO

## 2022-11-30 PROCEDURE — 93010 EKG 12-LEAD: ICD-10-PCS | Mod: S$GLB,,, | Performed by: INTERNAL MEDICINE

## 2022-11-30 PROCEDURE — 71046 XR CHEST PA AND LATERAL: ICD-10-PCS | Mod: 26,,, | Performed by: RADIOLOGY

## 2022-11-30 PROCEDURE — 99214 OFFICE O/P EST MOD 30 MIN: CPT | Mod: S$GLB,,, | Performed by: PHYSICIAN ASSISTANT

## 2022-11-30 NOTE — PROGRESS NOTES
Patient ID: Abimael Armendariz is a 57 y.o. male.    Chief Complaint: Pre-op Exam      Abimael Armendariz is in the office for medical optimization prior to lumbar surgery under general anesthesia.     Active Problem List with Overview Notes    Diagnosis Date Noted    Newly diagnosed diabetes 11/30/2022    Restless leg syndrome 10/17/2019    Allergic sinusitis 10/17/2019    Anticoagulant long-term use 10/17/2019    BMI 29.0-29.9,adult 10/17/2019    Insomnia     Hypertension      Patient states he does not have hypertension but uses fosinopril for mitral regurgitation      Depression     Anxiety     Mitral regurgitation     Dyslipidemia 04/25/2012         Past Medical History:   Diagnosis Date    ADHD (attention deficit hyperactivity disorder)     Anxiety     Depression     Hyperlipidemia     Hypertension     patient states he uses fosinopril for mitral regurgitation, not hypertension    Insomnia     Mitral regurgitation        STOP BANG Questionnaire  Patient diagnosed with Obstructive Sleep Apnea?: No  Has loud snoring: Yes  Disturbed sleep, daytime fatigue, daytime somnolence: No  Observed to have interrupted breathing during sleep: No  Takes medication for high blood pressure: Yes  Not taking BP medication but supposed to be: No  Recent BMI (Calculated): 29.3  Is BMI greater than 35 kg/m2?: 0=No  Age older than 50 years old?: 1=Yes  Has large neck size >40cm (15.7in., large male shirt size, large male collar size >16): No  Gender - Male: 1=Yes  STOP-Bang Total Score: 4        Current Outpatient Medications:     aspirin 81 mg Tab, Take 81 mg by mouth once daily. Every day, Disp: , Rfl:     blood sugar diagnostic Strp, To check BG qd times daily, to use with insurance preferred meter, Disp: 30 strip, Rfl: 11    blood-glucose meter kit, To check BG qd times daily, to use with insurance preferred meter, Disp: 1 each, Rfl: 0    buPROPion (WELLBUTRIN XL) 300 MG 24 hr tablet, Take 1 tablet (300 mg total) by mouth once  daily., Disp: 90 tablet, Rfl: 3    fluticasone propionate (FLONASE) 50 mcg/actuation nasal spray, 1 spray by Each Nostril route once daily., Disp: , Rfl:     fosinopriL (MONOPRIL) 40 MG tablet, Take 1 tablet (40 mg total) by mouth once daily., Disp: 90 tablet, Rfl: 3    ibuprofen (ADVIL,MOTRIN) 800 MG tablet, Take 800 mg by mouth 2 (two) times daily., Disp: , Rfl:     lancets Misc, To check BG qd times daily, to use with insurance preferred meter, Disp: 100 each, Rfl: 2    metFORMIN (GLUCOPHAGE) 500 MG tablet, Take 1 tablet (500 mg total) by mouth 2 (two) times daily with meals., Disp: 180 tablet, Rfl: 3    multivitamin (THERAGRAN) per tablet, Take 1 tablet by mouth once daily., Disp: , Rfl:     pramipexole (MIRAPEX) 0.25 MG tablet, TAKE 1 TABLET BY MOUTH EVERY DAY, Disp: 90 tablet, Rfl: 3    pregabalin (LYRICA) 75 MG capsule, Take 75 mg by mouth 2 (two) times daily., Disp: , Rfl:     traZODone (DESYREL) 100 MG tablet, Take 2 tablets (200 mg total) by mouth every evening., Disp: 180 tablet, Rfl: 3    The 10-year ASCVD risk score (Milton RIOS, et al., 2019) is: 10.3%    Values used to calculate the score:      Age: 57 years      Sex: Male      Is Non- : No      Diabetic: No      Tobacco smoker: No      Systolic Blood Pressure: 128 mmHg      Is BP treated: Yes      HDL Cholesterol: 28 mg/dL      Total Cholesterol: 154 mg/dL     Wt Readings from Last 3 Encounters:   11/30/22 95.1 kg (209 lb 10.5 oz)   11/15/22 98.2 kg (216 lb 7.9 oz)   07/05/22 95.3 kg (210 lb 1.6 oz)     Temp Readings from Last 3 Encounters:   11/30/22 98 °F (36.7 °C)   11/15/22 98 °F (36.7 °C)   12/07/21 97.8 °F (36.6 °C) (Oral)     BP Readings from Last 3 Encounters:   11/30/22 128/82   11/15/22 136/84   07/05/22 (!) 145/86     Pulse Readings from Last 3 Encounters:   11/30/22 78   11/15/22 70   07/05/22 70     Resp Readings from Last 3 Encounters:   12/07/21 18   01/07/21 18   12/10/19 18     PF Readings from Last 3  Encounters:   No data found for PF     SpO2 Readings from Last 3 Encounters:   11/30/22 96%   11/15/22 95%   12/07/21 95%        Lab Results   Component Value Date    HGBA1C 7.2 (H) 11/15/2022     Lab Results   Component Value Date    LDLCALC 76.2 11/15/2022    CREATININE 1.1 11/15/2022       Review of Systems   Constitutional:  Negative for activity change, appetite change, fatigue and unexpected weight change.   Respiratory:  Negative for cough, chest tightness and shortness of breath.    Cardiovascular:  Negative for chest pain, palpitations and leg swelling.   Gastrointestinal:  Negative for abdominal pain, anal bleeding, blood in stool, constipation, diarrhea and nausea.   Endocrine: Negative for polydipsia and polyuria.   Genitourinary:  Negative for difficulty urinating, frequency, hematuria and urgency.   Musculoskeletal:  Positive for back pain. Negative for arthralgias.   Skin:  Negative for rash.   Neurological:  Negative for dizziness and headaches.   Psychiatric/Behavioral:  Negative for dysphoric mood. The patient is not nervous/anxious.          Objective:      Physical Exam  Constitutional:       General: He is not in acute distress.     Appearance: He is well-developed.   HENT:      Head: Normocephalic and atraumatic.   Cardiovascular:      Rate and Rhythm: Normal rate and regular rhythm.      Heart sounds: Normal heart sounds.   Pulmonary:      Effort: Pulmonary effort is normal.      Breath sounds: Normal breath sounds.   Musculoskeletal:      Right lower leg: No edema.      Left lower leg: No edema.   Skin:     General: Skin is warm and dry.      Capillary Refill: Capillary refill takes less than 2 seconds.   Neurological:      Mental Status: He is alert.   Psychiatric:         Behavior: Behavior is cooperative.           Screening recommendations appropriate to age and health status were reviewed.    Pre-op evaluation  -     IN OFFICE EKG 12-LEAD (to Muse)  -     Cancel: X-Ray Chest PA And  Lateral; Future; Expected date: 11/30/2022  -     X-Ray Chest PA And Lateral; Future; Expected date: 11/30/2022    Newly diagnosed diabetes    Nonrheumatic mitral valve regurgitation      RCRI risk factors include: no known RCRI risk factors. As such, per RCRI the risk of cardiac death, nonfatal myocardial infarction, or nonfatal cardiac arrest is 0.4% and the risk of myocardial infarction, pulmonary edema, ventricular fibrillation, primary cardiac arrest, or complete heart block is 0.5%.  Overall this patient can be considered low risk for this low risk procedure. No further cardiac testing is recommended at this time.     Patient denies any symptoms (as per HPI) concerning for undiagnosed lung disease including MONTSERRAT. Would not recommend obtaining chest X-ray, sleep study, or PFTs at this time. Patient is a non-smoker. We discussed the benefits of early mobilization and deep breathing after surgery.      Screened patient for alcohol misuse, use of illicit drugs, and personal or family history of anesthetic complications or bleeding diathesis and no substantial concerns were identified.     All current medications were reviewed and at this time no changes to medications are recommended prior to surgery.     I recommend use of standard pre-op and post-op precautions for this patient. In my opinion, he is medically optimized for this procedure, and can proceed without further evaluation.

## 2022-12-01 ENCOUNTER — TELEPHONE (OUTPATIENT)
Dept: FAMILY MEDICINE | Facility: CLINIC | Age: 57
End: 2022-12-01
Payer: COMMERCIAL

## 2022-12-01 NOTE — TELEPHONE ENCOUNTER
----- Message from Hilario Pretty MD sent at 12/1/2022  1:14 PM CST -----  Regarding: RE: EKG  I did review the EKG, I think it is fine  ----- Message -----  From: MAURICIO Weber  Sent: 11/30/2022   3:33 PM CST  To: Hilario Pretty MD  Subject: EKG                                              patient was seen in primary care for pre op prior to lumbar surgery.    he is asymptomatic.  I saw that you already cleared per chart but please review EKG and advise.   Much appreciated   Kyara Graham PA-C   [facility]

## 2023-02-01 DIAGNOSIS — F51.01 PRIMARY INSOMNIA: ICD-10-CM

## 2023-02-01 RX ORDER — TRAZODONE HYDROCHLORIDE 150 MG/1
TABLET ORAL
Qty: 90 TABLET | Refills: 0 | OUTPATIENT
Start: 2023-02-01

## 2023-02-01 NOTE — TELEPHONE ENCOUNTER
No new care gaps identified.  [facility] Embedded Care Gaps. Reference number: [id number]. 2/01/2023   1:19:51 AM CST

## 2023-02-01 NOTE — TELEPHONE ENCOUNTER
Refill Decision Note   Abimael Greenntyre  is requesting a refill authorization.  Brief Assessment and Rationale for Refill:  Quick Discontinue     Medication Therapy Plan:  150 MG DISCONTINUED AND CHANGED  MG ON 11/15/22    Medication Reconciliation Completed: No   Comments:     No Care Gaps recommended.     Note composed:2:52 AM 02/01/2023

## 2023-02-27 ENCOUNTER — OFFICE VISIT (OUTPATIENT)
Dept: FAMILY MEDICINE | Facility: CLINIC | Age: 58
End: 2023-02-27
Attending: FAMILY MEDICINE
Payer: COMMERCIAL

## 2023-02-27 ENCOUNTER — LAB VISIT (OUTPATIENT)
Dept: LAB | Facility: HOSPITAL | Age: 58
End: 2023-02-27
Attending: FAMILY MEDICINE
Payer: COMMERCIAL

## 2023-02-27 VITALS
WEIGHT: 192.69 LBS | TEMPERATURE: 98 F | OXYGEN SATURATION: 97 % | RESPIRATION RATE: 18 BRPM | HEIGHT: 71 IN | BODY MASS INDEX: 26.98 KG/M2 | SYSTOLIC BLOOD PRESSURE: 131 MMHG | HEART RATE: 73 BPM | DIASTOLIC BLOOD PRESSURE: 73 MMHG

## 2023-02-27 DIAGNOSIS — E11.9 NEWLY DIAGNOSED DIABETES: ICD-10-CM

## 2023-02-27 DIAGNOSIS — F52.32 DELAYED EJACULATION: ICD-10-CM

## 2023-02-27 DIAGNOSIS — E11.9 NEWLY DIAGNOSED DIABETES: Primary | ICD-10-CM

## 2023-02-27 DIAGNOSIS — E78.5 DYSLIPIDEMIA: ICD-10-CM

## 2023-02-27 DIAGNOSIS — I10 PRIMARY HYPERTENSION: ICD-10-CM

## 2023-02-27 DIAGNOSIS — J01.00 SUBACUTE MAXILLARY SINUSITIS: ICD-10-CM

## 2023-02-27 LAB
ESTIMATED AVG GLUCOSE: 108 MG/DL (ref 68–131)
HBA1C MFR BLD: 5.4 % (ref 4–5.6)

## 2023-02-27 PROCEDURE — 99214 PR OFFICE/OUTPT VISIT, EST, LEVL IV, 30-39 MIN: ICD-10-PCS | Mod: S$GLB,,, | Performed by: FAMILY MEDICINE

## 2023-02-27 PROCEDURE — 99214 OFFICE O/P EST MOD 30 MIN: CPT | Mod: S$GLB,,, | Performed by: FAMILY MEDICINE

## 2023-02-27 PROCEDURE — 36415 COLL VENOUS BLD VENIPUNCTURE: CPT | Mod: PO | Performed by: FAMILY MEDICINE

## 2023-02-27 PROCEDURE — 83036 HEMOGLOBIN GLYCOSYLATED A1C: CPT | Performed by: FAMILY MEDICINE

## 2023-02-27 PROCEDURE — 99999 PR PBB SHADOW E&M-EST. PATIENT-LVL V: ICD-10-PCS | Mod: PBBFAC,,, | Performed by: FAMILY MEDICINE

## 2023-02-27 PROCEDURE — 99999 PR PBB SHADOW E&M-EST. PATIENT-LVL V: CPT | Mod: PBBFAC,,, | Performed by: FAMILY MEDICINE

## 2023-02-27 RX ORDER — AMOXICILLIN AND CLAVULANATE POTASSIUM 875; 125 MG/1; MG/1
1 TABLET, FILM COATED ORAL 2 TIMES DAILY
Qty: 20 TABLET | Refills: 0 | Status: SHIPPED | OUTPATIENT
Start: 2023-02-27 | End: 2023-03-09

## 2023-02-27 RX ORDER — ACETAMINOPHEN AND CODEINE PHOSPHATE 300; 60 MG/1; MG/1
1 TABLET ORAL EVERY 6 HOURS PRN
COMMUNITY
Start: 2022-12-13 | End: 2023-08-20

## 2023-02-27 NOTE — PROGRESS NOTES
Subjective:       Patient ID: Abimael Armendariz is a 58 y.o. male.    Chief Complaint: Hypertension (Pt states that he is here for his fu )    On year old male coming in for follow-up from surgery done in [place] for a lumbar laminectomy that resulted from an industrial accident and a fall sometime ago.  The patient reports he is doing well postop with no back pain and all of his radicular symptoms down the left leg appear to have resolved.  Since I last saw him to his preop visit he had gained 6 lb and since surgery he has dropped 24 lb currently at 192.7 with a BMI of 26.9.  The patient had been eating chocolate donuts and chocolate milk on a daily basis prior to the surgery and on his preop lab work his fasting glucose was up to 145 with an A1c of 7.2 making him a new onset diabetic.  He has been checking his blood sugar and it has improved greatly with his weight loss.  He has been eating right and he is starting to get more exercise as his back improves.  History includes restless leg syndrome, anxiety and depression, allergic sinusitis, hypertension, hyperlipidemia, mitral regurgitation, and new onset of diabetes.  He has noted also some numbness and tingling in the hands when he awakens in the morning and that seems to have improved also since he started losing weight.  He has increased his Wellbutrin from 150 mg daily to 300 mg daily and is also taking trazodone 200 mg at bedtime for sleep.  He is reporting some delayed ejaculation which may be a combination effect or simply the Wellbutrin which has less than 1% incidence.  He is tolerating the metformin 500 mg b.i.d. but he has not been taking it daily, more taking it on a p.r.n. basis when his glucose is elevated.  It was stressed that he should take it regularly, he is not having any GI problems.    Past Medical History:  No date: ADHD (attention deficit hyperactivity disorder)  No date: Anxiety  No date: Depression  No date: Hyperlipidemia  No date:  Hypertension      Comment:  patient states he uses fosinopril for mitral                regurgitation, not hypertension  No date: Insomnia  No date: Mitral regurgitation    Past Surgical History:  2003: KNEE ARTHROSCOPY W/ ACL RECONSTRUCTION AND HAMSTRING GRAFT      Comment:  right    Current Outpatient Medications on File Prior to Visit:  aspirin 81 mg Tab, Take 81 mg by mouth once daily. Every day, Disp: , Rfl:   blood sugar diagnostic Strp, To check BG qd times daily, to use with insurance preferred meter, Disp: 30 strip, Rfl: 11  blood-glucose meter kit, To check BG qd times daily, to use with insurance preferred meter, Disp: 1 each, Rfl: 0  buPROPion (WELLBUTRIN XL) 300 MG 24 hr tablet, Take 1 tablet (300 mg total) by mouth once daily., Disp: 90 tablet, Rfl: 3  fluticasone propionate (FLONASE) 50 mcg/actuation nasal spray, 1 spray by Each Nostril route once daily., Disp: , Rfl:   fosinopriL (MONOPRIL) 40 MG tablet, Take 1 tablet (40 mg total) by mouth once daily., Disp: 90 tablet, Rfl: 3  ibuprofen (ADVIL,MOTRIN) 800 MG tablet, Take 800 mg by mouth 2 (two) times daily., Disp: , Rfl:   lancets Misc, To check BG qd times daily, to use with insurance preferred meter, Disp: 100 each, Rfl: 2  metFORMIN (GLUCOPHAGE) 500 MG tablet, Take 1 tablet (500 mg total) by mouth 2 (two) times daily with meals., Disp: 180 tablet, Rfl: 3  multivitamin (THERAGRAN) per tablet, Take 1 tablet by mouth once daily., Disp: , Rfl:   pramipexole (MIRAPEX) 0.25 MG tablet, TAKE 1 TABLET BY MOUTH EVERY DAY, Disp: 90 tablet, Rfl: 3  traZODone (DESYREL) 100 MG tablet, Take 2 tablets (200 mg total) by mouth every evening., Disp: 180 tablet, Rfl: 3  acetaminophen-codeine 300-60mg (TYLENOL #4) 300-60 mg Tab, Take 1 tablet by mouth every 6 (six) hours as needed., Disp: , Rfl:     No current facility-administered medications on file prior to visit.        Review of Systems   Constitutional:  Negative for chills, diaphoresis and fever.   HENT:   Positive for congestion, postnasal drip, sinus pressure and sinus pain.    Respiratory:  Positive for cough (He developed a sinus infection with facial pain and a sensation of a toothache later going into his chest bringing up some brownish sputum). Negative for chest tightness and shortness of breath.    Cardiovascular:  Negative for chest pain and palpitations.   Genitourinary:         Delayed ejaculation   Neurological:  Negative for dizziness, weakness, light-headedness, numbness and headaches.     Objective:      Physical Exam  Vitals and nursing note reviewed.   Constitutional:       General: He is not in acute distress.     Appearance: Normal appearance. He is not ill-appearing, toxic-appearing or diaphoretic.      Comments: Fair blood pressure control on fosinopril 40 mg daily  Mildly overweight with a BMI of 26.9 he is down 18 lb since I last saw him in 24 lb since his preop physical in November 2022   HENT:      Head: Normocephalic and atraumatic.      Right Ear: Tympanic membrane, ear canal and external ear normal. There is no impacted cerumen.      Left Ear: Tympanic membrane, ear canal and external ear normal. There is no impacted cerumen.      Nose: Congestion and rhinorrhea present.      Right Sinus: Maxillary sinus tenderness and frontal sinus tenderness present.      Left Sinus: No maxillary sinus tenderness or frontal sinus tenderness.   Eyes:      General: Scleral icterus present.      Extraocular Movements: Extraocular movements intact.      Pupils: Pupils are equal, round, and reactive to light.   Cardiovascular:      Rate and Rhythm: Normal rate and regular rhythm.      Pulses:           Dorsalis pedis pulses are 2+ on the right side and 2+ on the left side.        Posterior tibial pulses are 2+ on the right side and 2+ on the left side.      Heart sounds: Murmur (2/6 systolic murmur at the left lateral chest/anterior axillary area) heard.     No friction rub. No gallop.   Pulmonary:       Effort: Pulmonary effort is normal. No respiratory distress.      Breath sounds: Normal breath sounds. No wheezing, rhonchi or rales.      Comments: Somewhat coarse breath sounds in the bases  Chest:      Chest wall: No tenderness.   Musculoskeletal:      Cervical back: Normal range of motion and neck supple. No rigidity or tenderness.      Right lower leg: No edema.      Left lower leg: No edema.      Right foot: Normal range of motion. No deformity, bunion, Charcot foot, foot drop or prominent metatarsal heads.      Left foot: Normal range of motion. No deformity, bunion, Charcot foot, foot drop or prominent metatarsal heads.   Feet:      Right foot:      Protective Sensation: 9 sites tested.  9 sites sensed.      Skin integrity: Skin integrity normal. No ulcer, blister, skin breakdown, erythema, warmth, callus, dry skin or fissure.      Toenail Condition: Right toenails are normal.      Left foot:      Protective Sensation: 9 sites tested.  9 sites sensed.      Skin integrity: Skin integrity normal. No ulcer, blister, skin breakdown, erythema, warmth, callus, dry skin or fissure.      Toenail Condition: Left toenails are normal.   Lymphadenopathy:      Cervical: No cervical adenopathy.   Skin:     Capillary Refill: Capillary refill takes less than 2 seconds.   Neurological:      Mental Status: He is alert.      Comments: Proprioception intact all 10 toes       Assessment:       1. Newly diagnosed diabetes    2. Primary hypertension    3. Dyslipidemia    4. Subacute maxillary sinusitis    5. Delayed ejaculation    6. BMI 26.0-26.9,adult          Plan:       1. Newly diagnosed diabetes  Appears to be doing well with weight loss since his back surgery.  Will check A1c and perhaps reduce metformin to one daily.  Stressed regular use.  Recommended annual eye exam and the reason for it, recommended nightly foot checks.  I do not believe he has seen the diabetic educator as of yet, I do not think he had time to before  his surgery.  We have no record of it in any event.  He is doing well so will plan on setting that up perhaps after his next check in three months or if he is not responding as I expect he will with his A1c.  - Hemoglobin A1C; Future    2. Primary hypertension  Adequate control, should improve as well with the weight loss    3. Dyslipidemia  Lab Results   Component Value Date    CHOL 154 11/15/2022    CHOL 134 12/09/2021    CHOL 149 10/19/2020     Lab Results   Component Value Date    HDL 28 (L) 11/15/2022    HDL 32 (L) 12/09/2021    HDL 26 (L) 10/19/2020     Lab Results   Component Value Date    LDLCALC 76.2 11/15/2022    LDLCALC 79.0 12/09/2021    LDLCALC 86.8 10/19/2020     Lab Results   Component Value Date    TRIG 249 (H) 11/15/2022    TRIG 115 12/09/2021    TRIG 181 (H) 10/19/2020     Lab Results   Component Value Date    CHOLHDL 18.2 (L) 11/15/2022    CHOLHDL 23.9 12/09/2021    CHOLHDL 17.4 (L) 10/19/2020     Very good control without medications.  Should improve with his weight loss, will hold off on statin medications at this time    4. Subacute maxillary sinusitis  Augmentin 875 b.i.d. for 10 days  - amoxicillin-clavulanate 875-125mg (AUGMENTIN) 875-125 mg per tablet; Take 1 tablet by mouth 2 (two) times daily. for 10 days  Dispense: 20 tablet; Refill: 0    5. Delayed ejaculation  Likely due to Wellbutrin plus/minus trazodone effect.  As he continues to improve hopefully we can reduce the Wellbutrin back to 150 which should help    6. BMI 26.0-26.9,adult  Improving rapidly

## 2023-03-01 ENCOUNTER — TELEPHONE (OUTPATIENT)
Dept: FAMILY MEDICINE | Facility: CLINIC | Age: 58
End: 2023-03-01
Payer: MEDICAID

## 2023-03-01 DIAGNOSIS — E11.9 TYPE 2 DIABETES MELLITUS WITHOUT COMPLICATION: ICD-10-CM

## 2023-03-01 DIAGNOSIS — E11.9 NEWLY DIAGNOSED DIABETES: Primary | ICD-10-CM

## 2023-03-01 DIAGNOSIS — Z12.5 PROSTATE CANCER SCREENING: ICD-10-CM

## 2023-03-02 NOTE — TELEPHONE ENCOUNTER
Orders in for BMP, A1c, PSA, and urine microalbumin/creatinine ratio all to be done in three months

## 2023-03-02 NOTE — TELEPHONE ENCOUNTER
----- Message from Jeanne Boeckelman, MA sent at 3/1/2023 10:41 AM CST -----  I called the patient in regards to his lab results, patient states that he will definitely start taking the metformin daily, patient states that he would like to handle his PSA in 3 months with the labs your suggesting, please advise once orders are placed and I will schedule the patient for the labs.

## 2023-04-12 ENCOUNTER — PATIENT MESSAGE (OUTPATIENT)
Dept: ADMINISTRATIVE | Facility: HOSPITAL | Age: 58
End: 2023-04-12
Payer: MEDICAID

## 2023-05-11 DIAGNOSIS — E11.9 TYPE 2 DIABETES MELLITUS WITHOUT COMPLICATION, UNSPECIFIED WHETHER LONG TERM INSULIN USE: ICD-10-CM

## 2023-05-15 ENCOUNTER — PATIENT MESSAGE (OUTPATIENT)
Dept: ADMINISTRATIVE | Facility: HOSPITAL | Age: 58
End: 2023-05-15
Payer: MEDICAID

## 2023-08-09 ENCOUNTER — TELEPHONE (OUTPATIENT)
Dept: CARDIOLOGY | Facility: CLINIC | Age: 58
End: 2023-08-09
Payer: MEDICAID

## 2023-08-09 NOTE — TELEPHONE ENCOUNTER
REC FAX FROM [facility] / [place], [place]. REQUESTING CLEARANCE FOR LUMBAR FACET RHIZOTOMY LEFT L5 - S1 UNDER TIVA ANESTHESIA AND TO HOLD ASA X 5 DAYS PRIOR  THX

## 2023-08-15 ENCOUNTER — OFFICE VISIT (OUTPATIENT)
Dept: CARDIOLOGY | Facility: CLINIC | Age: 58
End: 2023-08-15
Payer: MEDICAID

## 2023-08-15 VITALS
HEIGHT: 71 IN | SYSTOLIC BLOOD PRESSURE: 140 MMHG | HEART RATE: 59 BPM | WEIGHT: 198.63 LBS | BODY MASS INDEX: 27.81 KG/M2 | DIASTOLIC BLOOD PRESSURE: 83 MMHG

## 2023-08-15 DIAGNOSIS — E11.9 NEWLY DIAGNOSED DIABETES: ICD-10-CM

## 2023-08-15 DIAGNOSIS — E78.5 DYSLIPIDEMIA: ICD-10-CM

## 2023-08-15 DIAGNOSIS — Z00.00 ANNUAL PHYSICAL EXAM: Primary | ICD-10-CM

## 2023-08-15 DIAGNOSIS — I10 PRIMARY HYPERTENSION: ICD-10-CM

## 2023-08-15 DIAGNOSIS — I34.0 NONRHEUMATIC MITRAL VALVE REGURGITATION: ICD-10-CM

## 2023-08-15 DIAGNOSIS — I48.19 PERSISTENT ATRIAL FIBRILLATION: ICD-10-CM

## 2023-08-15 DIAGNOSIS — Z79.01 ANTICOAGULANT LONG-TERM USE: ICD-10-CM

## 2023-08-15 PROCEDURE — 99215 PR OFFICE/OUTPT VISIT, EST, LEVL V, 40-54 MIN: ICD-10-PCS | Mod: S$PBB,,, | Performed by: INTERNAL MEDICINE

## 2023-08-15 PROCEDURE — 99999 PR PBB SHADOW E&M-EST. PATIENT-LVL IV: ICD-10-PCS | Mod: PBBFAC,,, | Performed by: INTERNAL MEDICINE

## 2023-08-15 PROCEDURE — 99215 OFFICE O/P EST HI 40 MIN: CPT | Mod: S$PBB,,, | Performed by: INTERNAL MEDICINE

## 2023-08-15 PROCEDURE — 99999 PR PBB SHADOW E&M-EST. PATIENT-LVL IV: CPT | Mod: PBBFAC,,, | Performed by: INTERNAL MEDICINE

## 2023-08-15 RX ORDER — METOPROLOL TARTRATE 25 MG/1
25 TABLET, FILM COATED ORAL 2 TIMES DAILY
Qty: 60 TABLET | Refills: 3 | Status: SHIPPED | OUTPATIENT
Start: 2023-08-15 | End: 2023-08-18 | Stop reason: SDUPTHER

## 2023-08-15 NOTE — PROGRESS NOTES
Subjective:    Patient ID:  Abimael Armendariz is a 58 y.o. male patient here for evaluation Pre-op Exam (clearance)      History of Present Illness:  Cardiology follow-up the last visit 1 a year ago,  history of anterior mitral valve leaflet prolapse moderate severe MR, preserved ejection fraction, echo 1 year ago.     EKG today with AFib, increased ventricular response.  Duration on certain.  Overall patient asymptomatic.  Stable ARANDA.  No PND orthopnea.  No edema.  No syncope/presyncope.    History of diabetes mellitus hypertension dyslipidemia.  Patient on forsinopril 40 mg daily for blood pressure.        Review of patient's allergies indicates:  No Known Allergies    Past Medical History:   Diagnosis Date    ADHD (attention deficit hyperactivity disorder)     Anxiety     Depression     Hyperlipidemia     Hypertension     patient states he uses fosinopril for mitral regurgitation, not hypertension    Insomnia     Mitral regurgitation      Past Surgical History:   Procedure Laterality Date    KNEE ARTHROSCOPY W/ ACL RECONSTRUCTION AND HAMSTRING GRAFT  2003    right     Social History     Tobacco Use    Smoking status: Never    Smokeless tobacco: Current     Types: Chew   Substance Use Topics    Alcohol use: No    Drug use: No        Review of Systems:    As noted in HPI in addition      REVIEW OF SYSTEMS  Review of Systems   Constitutional: Negative for decreased appetite, diaphoresis, night sweats, weight gain and weight loss.   HENT:  Negative for nosebleeds and odynophagia.    Eyes:  Negative for double vision and photophobia.   Cardiovascular:  Negative for chest pain, claudication, cyanosis, dyspnea on exertion, irregular heartbeat, leg swelling, near-syncope, orthopnea, palpitations, paroxysmal nocturnal dyspnea and syncope.   Respiratory:  Negative for cough, hemoptysis, shortness of breath and wheezing.    Hematologic/Lymphatic: Negative for adenopathy.   Skin:  Negative for flushing, skin cancer and  suspicious lesions.   Musculoskeletal:  Negative for gout, myalgias and neck pain.   Gastrointestinal:  Negative for abdominal pain, heartburn, hematemesis and hematochezia.   Genitourinary:  Negative for bladder incontinence, hesitancy and nocturia.   Neurological:  Negative for focal weakness, headaches, light-headedness and paresthesias.   Psychiatric/Behavioral:  Negative for memory loss and substance abuse.               Objective:        Vitals:    08/15/23 0921   BP: (!) 140/83   Pulse: (!) 59       Lab Results   Component Value Date    WBC 6.13 11/15/2022    HGB 15.8 11/15/2022    HCT 47.2 11/15/2022     (L) 11/15/2022    CHOL 154 11/15/2022    TRIG 249 (H) 11/15/2022    HDL 28 (L) 11/15/2022    ALT 35 11/15/2022    AST 22 11/15/2022     11/15/2022    K 4.8 11/15/2022     11/15/2022    CREATININE 1.1 11/15/2022    BUN 18 11/15/2022    CO2 27 11/15/2022    TSH 2.345 11/15/2022    PSA 0.61 12/09/2021    HGBA1C 5.4 02/27/2023        ECHOCARDIOGRAM RESULTS  Results for orders placed in visit on 05/11/22    Echo    Interpretation Summary  · The left ventricle is mildly enlarged with mild eccentric hypertrophy and normal systolic function.  · The estimated ejection fraction is 65%.  · Normal left ventricular diastolic function.  · Normal right ventricular size with normal right ventricular systolic function.  · Severe left atrial enlargement.  · There is mild mitral prolapse of the anterior mitral leaflet.  · Moderate-to-severe mitral regurgitation.  · Intermediate central venous pressure (8 mmHg).  · The estimated PA systolic pressure is 33 mmHg.        CURRENT/PREVIOUS VISIT EKG  Results for orders placed or performed in visit on 11/30/22   IN OFFICE EKG 12-LEAD (to [place])    Collection Time: 11/30/22 10:54 AM    Narrative    Test Reason : Z01.818,    Vent. Rate : 062 BPM     Atrial Rate : 062 BPM     P-R Int : 206 ms          QRS Dur : 096 ms      QT Int : 406 ms       P-R-T Axes : 024 008 011  degrees     QTc Int : 412 ms    Normal sinus rhythm  Normal ECG  When compared with ECG of 26-JUL-2007 13:09,  Inverted T waves have replaced nonspecific T wave abnormality in Inferior  leads  Confirmed by Ishmael Kaufman MD ([id number]) on 11/30/2022 12:51:35 PM    Referred By: Kyara Graham           Confirmed By:Ishmael Kaufman MD     No valid procedures specified.   No results found for this or any previous visit.    No valid procedures specified.    PHYSICAL EXAM  CONSTITUTIONAL: Well built, well nourished in no apparent distress  NECK: no carotid bruit, no JVD  LUNGS: CTA  CHEST WALL: no tenderness,  HEART:  Irregular rhythm.  Variable intensity S1-S2.  Tachycardia.  2/6 crescendo systolic murmur mitral area.  ABDOMEN: soft, non-tender; bowel sounds normal; no masses,  no organomegaly  EXTREMITIES: Extremities normal, no edema, no calf tenderness noted  NEURO: AAO X 3    I HAVE REVIEWED :    The vital signs, nurses notes, and all the pertinent radiology and labs.         Current Outpatient Medications   Medication Instructions    acetaminophen-codeine 300-60mg (TYLENOL #4) 300-60 mg Tab 1 tablet, Oral, Every 6 hours PRN    aspirin 81 mg, Daily    blood sugar diagnostic Strp To check BG qd times daily, to use with insurance preferred meter    blood-glucose meter kit To check BG qd times daily, to use with insurance preferred meter    buPROPion (WELLBUTRIN XL) 300 mg, Oral, Daily    fluticasone propionate (FLONASE) 50 mcg/actuation nasal spray 1 spray, Each Nostril, Daily    fosinopriL (MONOPRIL) 40 mg, Oral, Daily    ibuprofen (ADVIL,MOTRIN) 800 mg, Oral, 2 times daily    lancets Misc To check BG qd times daily, to use with insurance preferred meter    metFORMIN (GLUCOPHAGE) 500 mg, Oral, 2 times daily with meals    multivitamin (THERAGRAN) per tablet 1 tablet, Oral, Daily    pramipexole (MIRAPEX) 0.25 MG tablet TAKE 1 TABLET BY MOUTH EVERY DAY    traZODone (DESYREL) 200 mg, Oral, Nightly          Assessment:   Hypertension,  dyslipidemia, diabetes mellitus.  Moderate/ severe MR with mitral valve prolapse, last echo 1 year ago.  Atrial fibrillation, increased response, duration uncertain.        Plan:   Holter monitor, echo.  Lexiscan.  Add Lopressor 25 b.i.d..  5 b.i.d..  Follow up again in about 1 month.  Consider DC cardioversion.            No follow-ups on file.

## 2023-08-17 ENCOUNTER — HOSPITAL ENCOUNTER (EMERGENCY)
Facility: HOSPITAL | Age: 58
Discharge: HOME | End: 2023-08-18
Attending: EMERGENCY MEDICINE
Payer: MEDICAID

## 2023-08-17 DIAGNOSIS — Z00.00 ANNUAL PHYSICAL EXAM: ICD-10-CM

## 2023-08-17 DIAGNOSIS — Z79.01 ANTICOAGULANT LONG-TERM USE: ICD-10-CM

## 2023-08-17 DIAGNOSIS — I34.0 NONRHEUMATIC MITRAL VALVE REGURGITATION: ICD-10-CM

## 2023-08-17 DIAGNOSIS — R07.9 CHEST PAIN: ICD-10-CM

## 2023-08-17 DIAGNOSIS — E11.9 NEWLY DIAGNOSED DIABETES: ICD-10-CM

## 2023-08-17 DIAGNOSIS — I48.19 PERSISTENT ATRIAL FIBRILLATION: ICD-10-CM

## 2023-08-17 DIAGNOSIS — I10 PRIMARY HYPERTENSION: ICD-10-CM

## 2023-08-17 DIAGNOSIS — I48.91 ATRIAL FIBRILLATION WITH RVR: Primary | ICD-10-CM

## 2023-08-17 DIAGNOSIS — E78.5 DYSLIPIDEMIA: ICD-10-CM

## 2023-08-17 LAB
ALBUMIN SERPL BCP-MCNC: 4.3 G/DL (ref 3.5–5.2)
ALP SERPL-CCNC: 67 U/L (ref 55–135)
ALT SERPL W/O P-5'-P-CCNC: 27 U/L (ref 10–44)
ANION GAP SERPL CALC-SCNC: 13 MMOL/L (ref 8–16)
AST SERPL-CCNC: 27 U/L (ref 10–40)
BASOPHILS # BLD AUTO: 0.06 K/UL (ref 0–0.2)
BASOPHILS NFR BLD: 0.7 % (ref 0–1.9)
BILIRUB SERPL-MCNC: 0.7 MG/DL (ref 0.1–1)
BNP SERPL-MCNC: 306 PG/ML (ref 0–99)
BUN SERPL-MCNC: 28 MG/DL (ref 6–20)
CALCIUM SERPL-MCNC: 9.4 MG/DL (ref 8.7–10.5)
CHLORIDE SERPL-SCNC: 104 MMOL/L (ref 95–110)
CO2 SERPL-SCNC: 23 MMOL/L (ref 23–29)
CREAT SERPL-MCNC: 1.6 MG/DL (ref 0.5–1.4)
D DIMER PPP IA.FEU-MCNC: 0.25 MG/L FEU
DIFFERENTIAL METHOD: NORMAL
EOSINOPHIL # BLD AUTO: 0.2 K/UL (ref 0–0.5)
EOSINOPHIL NFR BLD: 1.6 % (ref 0–8)
ERYTHROCYTE [DISTWIDTH] IN BLOOD BY AUTOMATED COUNT: 11.9 % (ref 11.5–14.5)
EST. GFR  (NO RACE VARIABLE): 49 ML/MIN/1.73 M^2
GLUCOSE SERPL-MCNC: 105 MG/DL (ref 70–110)
HCT VFR BLD AUTO: 46.2 % (ref 40–54)
HGB BLD-MCNC: 15.7 G/DL (ref 14–18)
IMM GRANULOCYTES # BLD AUTO: 0.02 K/UL (ref 0–0.04)
IMM GRANULOCYTES NFR BLD AUTO: 0.2 % (ref 0–0.5)
INR PPP: 1 (ref 0.8–1.2)
LYMPHOCYTES # BLD AUTO: 3.3 K/UL (ref 1–4.8)
LYMPHOCYTES NFR BLD: 35.7 % (ref 18–48)
MCH RBC QN AUTO: 30.8 PG (ref 27–31)
MCHC RBC AUTO-ENTMCNC: 34 G/DL (ref 32–36)
MCV RBC AUTO: 91 FL (ref 82–98)
MONOCYTES # BLD AUTO: 0.6 K/UL (ref 0.3–1)
MONOCYTES NFR BLD: 7 % (ref 4–15)
NEUTROPHILS # BLD AUTO: 5 K/UL (ref 1.8–7.7)
NEUTROPHILS NFR BLD: 54.8 % (ref 38–73)
NRBC BLD-RTO: 0 /100 WBC
PLATELET # BLD AUTO: 161 K/UL (ref 150–450)
PMV BLD AUTO: 10.6 FL (ref 9.2–12.9)
POCT GLUCOSE: 110 MG/DL (ref 70–110)
POTASSIUM SERPL-SCNC: 4.7 MMOL/L (ref 3.5–5.1)
PROT SERPL-MCNC: 7 G/DL (ref 6–8.4)
PROTHROMBIN TIME: 11.1 SEC (ref 9–12.5)
RBC # BLD AUTO: 5.09 M/UL (ref 4.6–6.2)
SODIUM SERPL-SCNC: 140 MMOL/L (ref 136–145)
TROPONIN I SERPL DL<=0.01 NG/ML-MCNC: <0.006 NG/ML (ref 0–0.03)
WBC # BLD AUTO: 9.1 K/UL (ref 3.9–12.7)

## 2023-08-17 PROCEDURE — 96374 THER/PROPH/DIAG INJ IV PUSH: CPT

## 2023-08-17 PROCEDURE — 93010 EKG 12-LEAD: ICD-10-PCS | Mod: ,,, | Performed by: SPECIALIST

## 2023-08-17 PROCEDURE — 93010 ELECTROCARDIOGRAM REPORT: CPT | Mod: ,,, | Performed by: SPECIALIST

## 2023-08-17 PROCEDURE — 85025 COMPLETE CBC W/AUTO DIFF WBC: CPT | Performed by: NURSE PRACTITIONER

## 2023-08-17 PROCEDURE — 25000003 PHARM REV CODE 250: Performed by: EMERGENCY MEDICINE

## 2023-08-17 PROCEDURE — 36415 COLL VENOUS BLD VENIPUNCTURE: CPT | Performed by: EMERGENCY MEDICINE

## 2023-08-17 PROCEDURE — 80053 COMPREHEN METABOLIC PANEL: CPT | Performed by: NURSE PRACTITIONER

## 2023-08-17 PROCEDURE — 83880 ASSAY OF NATRIURETIC PEPTIDE: CPT | Performed by: NURSE PRACTITIONER

## 2023-08-17 PROCEDURE — 85610 PROTHROMBIN TIME: CPT | Performed by: NURSE PRACTITIONER

## 2023-08-17 PROCEDURE — 99285 EMERGENCY DEPT VISIT HI MDM: CPT | Mod: 25

## 2023-08-17 PROCEDURE — 36415 COLL VENOUS BLD VENIPUNCTURE: CPT | Performed by: NURSE PRACTITIONER

## 2023-08-17 PROCEDURE — 82962 GLUCOSE BLOOD TEST: CPT

## 2023-08-17 PROCEDURE — 93005 ELECTROCARDIOGRAM TRACING: CPT

## 2023-08-17 PROCEDURE — 84484 ASSAY OF TROPONIN QUANT: CPT | Performed by: NURSE PRACTITIONER

## 2023-08-17 PROCEDURE — 96361 HYDRATE IV INFUSION ADD-ON: CPT

## 2023-08-17 PROCEDURE — 85379 FIBRIN DEGRADATION QUANT: CPT | Performed by: EMERGENCY MEDICINE

## 2023-08-17 RX ORDER — DILTIAZEM HYDROCHLORIDE 5 MG/ML
0.25 INJECTION INTRAVENOUS
Status: COMPLETED | OUTPATIENT
Start: 2023-08-17 | End: 2023-08-17

## 2023-08-17 RX ORDER — METOPROLOL TARTRATE 25 MG/1
25 TABLET, FILM COATED ORAL
Status: COMPLETED | OUTPATIENT
Start: 2023-08-17 | End: 2023-08-18

## 2023-08-17 RX ADMIN — DILTIAZEM HYDROCHLORIDE 22 MG: 5 INJECTION INTRAVENOUS at 09:08

## 2023-08-17 RX ADMIN — SODIUM CHLORIDE 1000 ML: 9 INJECTION, SOLUTION INTRAVENOUS at 11:08

## 2023-08-18 ENCOUNTER — PATIENT MESSAGE (OUTPATIENT)
Dept: CARDIOLOGY | Facility: CLINIC | Age: 58
End: 2023-08-18

## 2023-08-18 ENCOUNTER — CLINICAL SUPPORT (OUTPATIENT)
Dept: CARDIOLOGY | Facility: HOSPITAL | Age: 58
End: 2023-08-18
Attending: INTERNAL MEDICINE
Payer: MEDICAID

## 2023-08-18 VITALS — BODY MASS INDEX: 27.16 KG/M2 | WEIGHT: 194 LBS | HEIGHT: 71 IN

## 2023-08-18 VITALS
BODY MASS INDEX: 27.06 KG/M2 | SYSTOLIC BLOOD PRESSURE: 114 MMHG | DIASTOLIC BLOOD PRESSURE: 81 MMHG | HEART RATE: 87 BPM | RESPIRATION RATE: 18 BRPM | WEIGHT: 194 LBS | OXYGEN SATURATION: 95 % | TEMPERATURE: 99 F

## 2023-08-18 DIAGNOSIS — E11.9 NEWLY DIAGNOSED DIABETES: ICD-10-CM

## 2023-08-18 DIAGNOSIS — I10 PRIMARY HYPERTENSION: ICD-10-CM

## 2023-08-18 DIAGNOSIS — Z00.00 ANNUAL PHYSICAL EXAM: ICD-10-CM

## 2023-08-18 DIAGNOSIS — R07.9 CHEST PAIN, UNSPECIFIED TYPE: Primary | ICD-10-CM

## 2023-08-18 DIAGNOSIS — I34.0 NONRHEUMATIC MITRAL VALVE REGURGITATION: ICD-10-CM

## 2023-08-18 DIAGNOSIS — Z79.01 ANTICOAGULANT LONG-TERM USE: ICD-10-CM

## 2023-08-18 DIAGNOSIS — E78.5 DYSLIPIDEMIA: ICD-10-CM

## 2023-08-18 LAB
ASCENDING AORTA: 2.7 CM
AV INDEX (PROSTH): 0.81
AV MEAN GRADIENT: 2 MMHG
AV PEAK GRADIENT: 3 MMHG
AV VALVE AREA BY VELOCITY RATIO: 3.49 CM²
AV VALVE AREA: 3.65 CM²
AV VELOCITY RATIO: 0.77
BSA FOR ECHO PROCEDURE: 2.1 M2
CV ECHO LV RWT: 0.42 CM
DOP CALC AO PEAK VEL: 0.88 M/S
DOP CALC AO VTI: 14.5 CM
DOP CALC LVOT AREA: 4.5 CM2
DOP CALC LVOT DIAMETER: 2.4 CM
DOP CALC LVOT PEAK VEL: 0.68 M/S
DOP CALC LVOT STROKE VOLUME: 52.9 CM3
DOP CALCLVOT PEAK VEL VTI: 11.7 CM
E/E' RATIO: 10.22 M/S
ECHO LV POSTERIOR WALL: 1 CM (ref 0.6–1.1)
FRACTIONAL SHORTENING: 27 % (ref 28–44)
INTERVENTRICULAR SEPTUM: 1.21 CM (ref 0.6–1.1)
LA MAJOR: 6.79 CM
LA MINOR: 5.7 CM
LA WIDTH: 7.3 CM
LEFT ATRIUM SIZE: 5.76 CM
LEFT ATRIUM VOLUME INDEX: 106.5 ML/M2
LEFT ATRIUM VOLUME: 221.5 CM3
LEFT INTERNAL DIMENSION IN SYSTOLE: 3.46 CM (ref 2.1–4)
LEFT VENTRICLE DIASTOLIC VOLUME INDEX: 50.82 ML/M2
LEFT VENTRICLE DIASTOLIC VOLUME: 105.7 ML
LEFT VENTRICLE MASS INDEX: 93 G/M2
LEFT VENTRICLE SYSTOLIC VOLUME INDEX: 23.8 ML/M2
LEFT VENTRICLE SYSTOLIC VOLUME: 49.46 ML
LEFT VENTRICULAR INTERNAL DIMENSION IN DIASTOLE: 4.76 CM (ref 3.5–6)
LEFT VENTRICULAR MASS: 192.59 G
LV LATERAL E/E' RATIO: 9.86 M/S
LV SEPTAL E/E' RATIO: 10.62 M/S
LVOT MG: 1.11 MMHG
LVOT MV: 0.5 CM/S
MV PEAK E VEL: 1.38 M/S
PISA MRMAX VEL: 4.15 M/S
PISA TR MAX VEL: 2.1 M/S
RA MAJOR: 5.6 CM
RA PRESSURE ESTIMATED: 8 MMHG
RIGHT VENTRICULAR END-DIASTOLIC DIMENSION: 3.65 CM
RIGHT VENTRICULAR LENGTH IN DIASTOLE (APICAL 4-CHAMBER VIEW): 6.15 CM
RV MID DIAMA: 1.89 CM
RV TB RVSP: 10 MMHG
RV TISSUE DOPPLER FREE WALL SYSTOLIC VELOCITY 1 (APICAL 4 CHAMBER VIEW): 12.39 CM/S
SINUS: 3.28 CM
STJ: 2.51 CM
TDI LATERAL: 0.14 M/S
TDI SEPTAL: 0.13 M/S
TDI: 0.14 M/S
TR MAX PG: 18 MMHG
TRICUSPID ANNULAR PLANE SYSTOLIC EXCURSION: 1.95 CM
TROPONIN I SERPL DL<=0.01 NG/ML-MCNC: <0.006 NG/ML (ref 0–0.03)
TV REST PULMONARY ARTERY PRESSURE: 26 MMHG
Z-SCORE OF LEFT VENTRICULAR DIMENSION IN END DIASTOLE: -2.91
Z-SCORE OF LEFT VENTRICULAR DIMENSION IN END SYSTOLE: -0.94

## 2023-08-18 PROCEDURE — 93306 TTE W/DOPPLER COMPLETE: CPT | Mod: PO

## 2023-08-18 PROCEDURE — 84484 ASSAY OF TROPONIN QUANT: CPT | Performed by: NURSE PRACTITIONER

## 2023-08-18 PROCEDURE — 36415 COLL VENOUS BLD VENIPUNCTURE: CPT | Performed by: NURSE PRACTITIONER

## 2023-08-18 PROCEDURE — 93306 TTE W/DOPPLER COMPLETE: CPT | Mod: 26,,, | Performed by: INTERNAL MEDICINE

## 2023-08-18 PROCEDURE — 25000003 PHARM REV CODE 250: Performed by: EMERGENCY MEDICINE

## 2023-08-18 PROCEDURE — 93306 ECHO (CUPID ONLY): ICD-10-PCS | Mod: 26,,, | Performed by: INTERNAL MEDICINE

## 2023-08-18 RX ORDER — METOPROLOL TARTRATE 25 MG/1
25 TABLET, FILM COATED ORAL 3 TIMES DAILY
Qty: 60 TABLET | Refills: 3 | Status: ON HOLD | OUTPATIENT
Start: 2023-08-18 | End: 2023-08-26 | Stop reason: SDUPTHER

## 2023-08-18 RX ADMIN — METOPROLOL TARTRATE 25 MG: 25 TABLET, FILM COATED ORAL at 12:08

## 2023-08-18 RX ADMIN — APIXABAN 5 MG: 2.5 TABLET, FILM COATED ORAL at 12:08

## 2023-08-18 NOTE — DISCHARGE INSTRUCTIONS
Future Appointments   Date Time Provider Department Center   8/18/2023  8:30 AM ECHO, [facility]   8/24/2023  1:30 PM NM1 [facility]   8/24/2023  3:30 PM STRESS, [facility]   8/24/2023  3:35 PM NM1 [facility]   8/30/2023 11:30 AM HOLTER, [facility]   9/18/2023 11:45 AM Albaro Watts MD [facility] CARDIO [place]

## 2023-08-18 NOTE — ED PROVIDER NOTES
Encounter Date: 8/17/2023       History     Chief Complaint   Patient presents with    Atrial Fibrillation     C/o tightness in chest and HR at 130's x 3 hours. Newly diagnosed with AFib     HPI patient is a 58-year-old man who presents emergency department for evaluation of chest tightness, feeling fatigued, occasional shortness of breath for the past 2 days more pronounced over the past 3 hours with heart rates in the 130s..  He also began taking Eliquis around that time.  He has a new diagnosis of atrial fibrillation, sees Dr. Watts.  Takes Lopressor and has been compliant.  Review of patient's allergies indicates:  No Known Allergies  Past Medical History:   Diagnosis Date    ADHD (attention deficit hyperactivity disorder)     Anxiety     Depression     Hyperlipidemia     Hypertension     patient states he uses fosinopril for mitral regurgitation, not hypertension    Insomnia     Mitral regurgitation      Past Surgical History:   Procedure Laterality Date    KNEE ARTHROSCOPY W/ ACL RECONSTRUCTION AND HAMSTRING GRAFT  2003    right     Family History   Problem Relation Age of Onset    Stroke Mother     Heart attack Father     Heart disease Father     Heart attack Paternal Grandfather     No Known Problems Sister     No Known Problems Brother     No Known Problems Daughter     No Known Problems Son     Alzheimer's disease Maternal Grandmother      Social History     Tobacco Use    Smoking status: Never    Smokeless tobacco: Current     Types: Chew   Substance Use Topics    Alcohol use: No    Drug use: No     Review of Systems   Constitutional:  Positive for fatigue. Negative for fever.   HENT:  Negative for sore throat.    Respiratory:  Positive for chest tightness and shortness of breath.    Cardiovascular:  Positive for palpitations. Negative for chest pain.   Gastrointestinal:  Negative for nausea.   Genitourinary:  Negative for dysuria.   Musculoskeletal:  Negative for back pain.   Skin:  Negative for rash.    Neurological:  Negative for weakness.   Hematological:  Does not bruise/bleed easily.       Physical Exam     Initial Vitals [08/17/23 2053]   BP Pulse Resp Temp SpO2   117/71 84 20 97.1 °F (36.2 °C) 97 %      MAP       --         Physical Exam    Constitutional: Vital signs are normal. He appears well-developed and well-nourished.  Non-toxic appearance. No distress.   HENT:   Head: Normocephalic and atraumatic.   Eyes: EOM are normal. Pupils are equal, round, and reactive to light.   Neck: Neck supple. No JVD present.   Normal range of motion.  Cardiovascular:  Normal heart sounds and intact distal pulses. An irregularly irregular rhythm present.   Tachycardia present.   Exam reveals no gallop and no friction rub.       No murmur heard.  Pulmonary/Chest: Breath sounds normal. He has no wheezes. He has no rhonchi. He has no rales.   Abdominal: Abdomen is soft. Bowel sounds are normal. There is no abdominal tenderness. There is no rebound and no guarding.   Musculoskeletal:         General: Normal range of motion.      Cervical back: Normal range of motion and neck supple. No rigidity.     Neurological: He is alert and oriented to person, place, and time. He has normal strength and normal reflexes. No cranial nerve deficit or sensory deficit. He exhibits normal muscle tone. Coordination normal. GCS eye subscore is 4. GCS verbal subscore is 5. GCS motor subscore is 6.   Skin: Skin is warm and dry.   Psychiatric: He has a normal mood and affect. His speech is normal and behavior is normal. He is not actively hallucinating.         ED Course   Procedures  Labs Reviewed   COMPREHENSIVE METABOLIC PANEL - Abnormal; Notable for the following components:       Result Value    BUN 28 (*)     Creatinine 1.6 (*)     eGFR 49 (*)     All other components within normal limits   B-TYPE NATRIURETIC PEPTIDE - Abnormal; Notable for the following components:     (*)     All other components within normal limits   CBC W/ AUTO  DIFFERENTIAL   TROPONIN I   PROTIME-INR   TROPONIN I   D DIMER, QUANTITATIVE   POCT GLUCOSE     EKG Readings: (Independently Interpreted)   Initial Reading: No STEMI.   Atrial fibrillation with rapid ventricular response, 126 beats per minute       Imaging Results              X-Ray Chest AP Portable (Final result)  Result time 08/17/23 21:40:09      Final result by Blas Lee MD (08/17/23 21:40:09)                   Impression:      No acute abnormality.      Electronically signed by: Blas Lee  Date:    08/17/2023  Time:    21:40               Narrative:    EXAMINATION:  XR CHEST AP PORTABLE    CLINICAL HISTORY:  Chest Pain;    TECHNIQUE:  Single frontal view of the chest was performed.    COMPARISON:  11/30/2022    FINDINGS:  The lungs are clear, with normal appearance of pulmonary vasculature and no pleural effusion or pneumothorax.    The cardiac silhouette is normal in size. The hilar and mediastinal contours are unremarkable.    Bones are intact.                                       Medications   diltiaZEM injection 22 mg (22 mg Intravenous Given 8/17/23 2151)   sodium chloride 0.9% bolus 1,000 mL 1,000 mL (0 mLs Intravenous Stopped 8/18/23 0025)   apixaban tablet 5 mg (5 mg Oral Given 8/18/23 0018)   metoprolol tartrate (LOPRESSOR) tablet 25 mg (25 mg Oral Given 8/18/23 0018)     Medical Decision Making  Risk  Prescription drug management.                          Medical Decision Making:   History:   Old Medical Records: I decided to obtain old medical records.  Old Records Summarized: records from another hospital and records from clinic visits.       <> Summary of Records: From last cardiology clinic visit    ECHOCARDIOGRAM RESULTS  Results for orders placed in visit on 05/11/22     Echo     Interpretation Summary  · The left ventricle is mildly enlarged with mild eccentric hypertrophy and normal systolic function.  · The estimated ejection fraction is 65%.  · Normal left ventricular  diastolic function.  · Normal right ventricular size with normal right ventricular systolic function.  · Severe left atrial enlargement.  · There is mild mitral prolapse of the anterior mitral leaflet.  · Moderate-to-severe mitral regurgitation.  · Intermediate central venous pressure (8 mmHg).  · The estimated PA systolic pressure is 33 mmHg.     Assessment:  Hypertension, dyslipidemia, diabetes mellitus.  Moderate/ severe MR with mitral valve prolapse, last echo 1 year ago.  Atrial fibrillation, increased response, duration uncertain.           Plan:  Holter monitor, echo.  Lexiscan.  Add Lopressor 25 b.i.d..  5 b.i.d..  Follow up again in about 1 month.  Consider DC cardioversion.             Initial Assessment:   58-year-old man who presents emergency department for evaluation of chest tightness, feeling fatigued, occasional shortness of breath for the past 2 days more pronounced over the past 3 hours with heart rates in the 130s..  He also began taking Eliquis around that time.  He has a new diagnosis of atrial fibrillation, sees Dr. Watts.  Differential Diagnosis:   Atrial fibrillation with RVR, dehydration, PE, electrolyte abnormality  Independently Interpreted Test(s):   I have ordered and independently interpreted X-rays - see summary below.       <> Summary of X-Ray Reading(s): No pneumothorax, no acute abnormalities on chest x-ray.  Clinical Tests:   Lab Tests: Ordered and Reviewed       <> Summary of Lab: D-dimer 0.25.  .  Troponin less than 0.006 x 2.  Sodium 140, potassium 4.7.  Creatinine 1.6, BUN 28 hemoglobin 15.7  Radiological Study: Ordered and Reviewed  Medical Tests: Ordered and Reviewed  ED Management:  Patient received Cardizem in the emergency department for his rapid ventricular response with appropriate rate control however patient developed chest tightness with a Cardizem.  He continued to be observed in the ED with resolution of his chest tightness.  Repeat troponin continued to  be negative.  Patient was given his nighttime dose of Lopressor and Eliquis.  Mild MIKI with elevated BUN on labs.  He is given IV fluid for this.  He did have little bit of diarrhea earlier today may be little dehydrated.  Considered admission however he remained appropriately rate controlled.  He is scheduled for echocardiogram in the morning.  I discussed increasing his Lopressor to 25 mg 3 times daily with the patient in hopes of providing a little better rate control.  He will initiate this tomorrow and follow up closely with his cardiologist to check with him if this is okay or if he suggest any other further changes.  Return precautions discussed for any worsening chest pain or persistent tachycardia.      Clinical Impression:   Final diagnoses:  [R07.9] Chest pain  [I48.91] Atrial fibrillation with RVR (Primary)        ED Disposition Condition    Discharge Stable          ED Prescriptions       Medication Sig Dispense Start Date End Date Auth. Provider    metoprolol tartrate (LOPRESSOR) 25 MG tablet Take 1 tablet (25 mg total) by mouth 3 (three) times daily. 60 tablet 8/18/2023 -- Frank Bejarano MD          Follow-up Information       Follow up With Specialties Details Why Contact Info Additional Information    Albaro Watts MD Cardiology, Interventional Cardiology In 1 day  [address] [place] [address] [phone]       [facility] Emergency Medicine  As needed, If symptoms worsen [address] [place] [address] [address] [place] [place] [id number] 1st floor             Frank Bejarano MD  08/18/23 4256

## 2023-08-18 NOTE — FIRST PROVIDER EVALUATION
Emergency Department TeleTriage Encounter Note      CHIEF COMPLAINT    Chief Complaint   Patient presents with    Atrial Fibrillation     C/o tightness in chest and HR at 130's x 3 hours. Newly diagnosed with AFib       VITAL SIGNS   Initial Vitals [08/17/23 2053]   BP Pulse Resp Temp SpO2   117/71 84 20 97.1 °F (36.2 °C) 97 %      MAP       --            ALLERGIES    Review of patient's allergies indicates:  No Known Allergies    PROVIDER TRIAGE NOTE  Verbal consent for the teletriage evaluation was given by the patient at the start of the evaluation.  All efforts will be made to maintain patient's privacy during the evaluation.      This is a teletriage evaluation of a 58 y.o. male presenting to the ED with c/o chest tightness for several days that worsened today.  Also reports intermittent elevated HR in the 150s at home and recent diagnosis of A-fib. Limited physical exam via telehealth: The patient is awake, alert, answering questions appropriately and is not in respiratory distress.  As the Teletriage provider, I performed an initial assessment and ordered appropriate labs and imaging studies, if any, to facilitate the patient's care once placed in the ED. Once a room is available, care and a full evaluation will be completed by an alternate ED provider.  Any additional orders and the final disposition will be determined by that provider.  All imaging and labs will not be followed-up by the Teletriage Team, including myself.          ORDERS  Labs Reviewed   CBC W/ AUTO DIFFERENTIAL   COMPREHENSIVE METABOLIC PANEL   TROPONIN I   B-TYPE NATRIURETIC PEPTIDE   PROTIME-INR       ED Orders (720h ago, onward)      Start Ordered     Status Ordering Provider    08/17/23 2358 08/17/23 2058  Troponin I #2  Once Timed         Ordered TIMOTHY BERNABE    08/17/23 2059 08/17/23 2058  Protime-INR  STAT         Ordered TIMOTHY BERNABE    08/17/23 2058 08/17/23 2058  Vital signs  Every 15 min         Ordered TIMOTHY BERNABE     08/17/23 2058 08/17/23 2058  Cardiac Monitoring - Adult  Continuous        Comments: Notify Physician If:    Ordered TIMOTHY BERNABE    08/17/23 2058 08/17/23 2058  Pulse Oximetry Continuous  Continuous         Ordered TIMOTHY BERNABE    08/17/23 2058 08/17/23 2058  Diet NPO  Diet effective now         Ordered TIMOTHY BERNABE    08/17/23 2058 08/17/23 2058  Saline lock IV  Once         Ordered TIMOTHY BERNABE    08/17/23 2058 08/17/23 2058  EKG 12-lead  Once        Comments: Do not perform if previously done during this visit/ triage    Ordered TIMOTHY BERNABE    08/17/23 2058 08/17/23 2058  CBC auto differential  STAT         Ordered TIMOTHY BERNABE    08/17/23 2058 08/17/23 2058  Comprehensive metabolic panel  STAT         Ordered TIMOTHY BERNABE    08/17/23 2058 08/17/23 2058  Troponin I #1  STAT         Ordered TIMOTHY BERNABE    08/17/23 2058 08/17/23 2058  B-Type natriuretic peptide (BNP)  STAT         Ordered TIMOTHY BERNABE    08/17/23 2058 08/17/23 2058  X-Ray Chest AP Portable  1 time imaging         Ordered TIMOTHY BERNABE              Virtual Visit Note: The provider triage portion of this emergency department evaluation and documentation was performed via [employer], a HIPAA-compliant telemedicine application, in concert with a tele-presenter in the room. A face to face patient evaluation with one of my colleagues will occur once the patient is placed in an emergency department room.      DISCLAIMER: This note was prepared with [employer] voice recognition transcription software. Garbled syntax, mangled pronouns, and other bizarre constructions may be attributed to that software system.

## 2023-08-19 ENCOUNTER — HOSPITAL ENCOUNTER (OUTPATIENT)
Facility: HOSPITAL | Age: 58
Discharge: HOME | End: 2023-08-21
Attending: EMERGENCY MEDICINE | Admitting: STUDENT IN AN ORGANIZED HEALTH CARE EDUCATION/TRAINING PROGRAM
Payer: MEDICAID

## 2023-08-19 DIAGNOSIS — R07.9 CHEST PAIN: ICD-10-CM

## 2023-08-19 DIAGNOSIS — I48.91 ATRIAL FIBRILLATION WITH RAPID VENTRICULAR RESPONSE: Primary | ICD-10-CM

## 2023-08-19 DIAGNOSIS — I49.9 CARDIAC RHYTHM DISORDER OR DISTURBANCE OR CHANGE: ICD-10-CM

## 2023-08-19 LAB
ALBUMIN SERPL BCP-MCNC: 4.1 G/DL (ref 3.5–5.2)
ALP SERPL-CCNC: 60 U/L (ref 55–135)
ALT SERPL W/O P-5'-P-CCNC: 40 U/L (ref 10–44)
ANION GAP SERPL CALC-SCNC: 6 MMOL/L (ref 8–16)
AST SERPL-CCNC: 32 U/L (ref 10–40)
BASOPHILS # BLD AUTO: 0.05 K/UL (ref 0–0.2)
BASOPHILS NFR BLD: 0.5 % (ref 0–1.9)
BILIRUB SERPL-MCNC: 1 MG/DL (ref 0.1–1)
BNP SERPL-MCNC: 450 PG/ML (ref 0–99)
BUN SERPL-MCNC: 23 MG/DL (ref 6–20)
CALCIUM SERPL-MCNC: 8.7 MG/DL (ref 8.7–10.5)
CHLORIDE SERPL-SCNC: 104 MMOL/L (ref 95–110)
CO2 SERPL-SCNC: 24 MMOL/L (ref 23–29)
CREAT SERPL-MCNC: 1.4 MG/DL (ref 0.5–1.4)
DIFFERENTIAL METHOD: ABNORMAL
EOSINOPHIL # BLD AUTO: 0.2 K/UL (ref 0–0.5)
EOSINOPHIL NFR BLD: 1.5 % (ref 0–8)
ERYTHROCYTE [DISTWIDTH] IN BLOOD BY AUTOMATED COUNT: 12.5 % (ref 11.5–14.5)
EST. GFR  (NO RACE VARIABLE): 57.9 ML/MIN/1.73 M^2
GLUCOSE SERPL-MCNC: 140 MG/DL (ref 70–110)
HCT VFR BLD AUTO: 46.4 % (ref 40–54)
HGB BLD-MCNC: 15.8 G/DL (ref 14–18)
IMM GRANULOCYTES # BLD AUTO: 0.02 K/UL (ref 0–0.04)
IMM GRANULOCYTES NFR BLD AUTO: 0.2 % (ref 0–0.5)
LYMPHOCYTES # BLD AUTO: 3.4 K/UL (ref 1–4.8)
LYMPHOCYTES NFR BLD: 34.1 % (ref 18–48)
MAGNESIUM SERPL-MCNC: 2.1 MG/DL (ref 1.6–2.6)
MCH RBC QN AUTO: 31.5 PG (ref 27–31)
MCHC RBC AUTO-ENTMCNC: 34.1 G/DL (ref 32–36)
MCV RBC AUTO: 92 FL (ref 82–98)
MONOCYTES # BLD AUTO: 0.6 K/UL (ref 0.3–1)
MONOCYTES NFR BLD: 5.8 % (ref 4–15)
NEUTROPHILS # BLD AUTO: 5.7 K/UL (ref 1.8–7.7)
NEUTROPHILS NFR BLD: 57.9 % (ref 38–73)
NRBC BLD-RTO: 0 /100 WBC
PLATELET # BLD AUTO: 156 K/UL (ref 150–450)
PMV BLD AUTO: 10.9 FL (ref 9.2–12.9)
POTASSIUM SERPL-SCNC: 4.1 MMOL/L (ref 3.5–5.1)
PROT SERPL-MCNC: 6.7 G/DL (ref 6–8.4)
RBC # BLD AUTO: 5.02 M/UL (ref 4.6–6.2)
SARS-COV-2 RDRP RESP QL NAA+PROBE: NEGATIVE
SODIUM SERPL-SCNC: 134 MMOL/L (ref 136–145)
TROPONIN I SERPL HS-MCNC: 5.4 PG/ML (ref 0–14.9)
WBC # BLD AUTO: 9.82 K/UL (ref 3.9–12.7)

## 2023-08-19 PROCEDURE — 85025 COMPLETE CBC W/AUTO DIFF WBC: CPT | Performed by: EMERGENCY MEDICINE

## 2023-08-19 PROCEDURE — 93010 EKG 12-LEAD: ICD-10-PCS | Mod: ,,, | Performed by: SPECIALIST

## 2023-08-19 PROCEDURE — U0002 COVID-19 LAB TEST NON-CDC: HCPCS | Performed by: EMERGENCY MEDICINE

## 2023-08-19 PROCEDURE — 93010 ELECTROCARDIOGRAM REPORT: CPT | Mod: ,,, | Performed by: SPECIALIST

## 2023-08-19 PROCEDURE — 83735 ASSAY OF MAGNESIUM: CPT | Performed by: EMERGENCY MEDICINE

## 2023-08-19 PROCEDURE — 99284 EMERGENCY DEPT VISIT MOD MDM: CPT | Mod: 25

## 2023-08-19 PROCEDURE — 80053 COMPREHEN METABOLIC PANEL: CPT | Performed by: EMERGENCY MEDICINE

## 2023-08-19 PROCEDURE — 93005 ELECTROCARDIOGRAM TRACING: CPT | Performed by: SPECIALIST

## 2023-08-19 PROCEDURE — 83880 ASSAY OF NATRIURETIC PEPTIDE: CPT | Performed by: EMERGENCY MEDICINE

## 2023-08-19 PROCEDURE — 84484 ASSAY OF TROPONIN QUANT: CPT | Performed by: EMERGENCY MEDICINE

## 2023-08-19 PROCEDURE — 96375 TX/PRO/DX INJ NEW DRUG ADDON: CPT

## 2023-08-19 PROCEDURE — 96374 THER/PROPH/DIAG INJ IV PUSH: CPT

## 2023-08-19 PROCEDURE — 25000003 PHARM REV CODE 250: Performed by: EMERGENCY MEDICINE

## 2023-08-19 PROCEDURE — G0378 HOSPITAL OBSERVATION PER HR: HCPCS

## 2023-08-19 PROCEDURE — 63600175 PHARM REV CODE 636 W HCPCS: Performed by: EMERGENCY MEDICINE

## 2023-08-19 RX ORDER — INSULIN ASPART 100 [IU]/ML
0-5 INJECTION, SOLUTION INTRAVENOUS; SUBCUTANEOUS
Status: DISCONTINUED | OUTPATIENT
Start: 2023-08-20 | End: 2023-08-21 | Stop reason: HOSPADM

## 2023-08-19 RX ORDER — IBUPROFEN 200 MG
24 TABLET ORAL
Status: DISCONTINUED | OUTPATIENT
Start: 2023-08-20 | End: 2023-08-21 | Stop reason: HOSPADM

## 2023-08-19 RX ORDER — SODIUM,POTASSIUM PHOSPHATES 280-250MG
2 POWDER IN PACKET (EA) ORAL
Status: DISCONTINUED | OUTPATIENT
Start: 2023-08-20 | End: 2023-08-21 | Stop reason: HOSPADM

## 2023-08-19 RX ORDER — LANOLIN ALCOHOL/MO/W.PET/CERES
800 CREAM (GRAM) TOPICAL
Status: DISCONTINUED | OUTPATIENT
Start: 2023-08-20 | End: 2023-08-21 | Stop reason: HOSPADM

## 2023-08-19 RX ORDER — NALOXONE HCL 0.4 MG/ML
0.02 VIAL (ML) INJECTION
Status: DISCONTINUED | OUTPATIENT
Start: 2023-08-20 | End: 2023-08-21 | Stop reason: HOSPADM

## 2023-08-19 RX ORDER — IBUPROFEN 200 MG
16 TABLET ORAL
Status: DISCONTINUED | OUTPATIENT
Start: 2023-08-20 | End: 2023-08-21 | Stop reason: HOSPADM

## 2023-08-19 RX ORDER — MAGNESIUM SULFATE 1 G/100ML
1 INJECTION INTRAVENOUS ONCE
Status: COMPLETED | OUTPATIENT
Start: 2023-08-19 | End: 2023-08-19

## 2023-08-19 RX ORDER — POLYETHYLENE GLYCOL 3350 17 G/17G
17 POWDER, FOR SOLUTION ORAL DAILY
Status: DISCONTINUED | OUTPATIENT
Start: 2023-08-20 | End: 2023-08-21 | Stop reason: HOSPADM

## 2023-08-19 RX ORDER — ACETAMINOPHEN 325 MG/1
650 TABLET ORAL EVERY 4 HOURS PRN
Status: DISCONTINUED | OUTPATIENT
Start: 2023-08-20 | End: 2023-08-21 | Stop reason: HOSPADM

## 2023-08-19 RX ORDER — GLUCAGON 1 MG
1 KIT INJECTION
Status: DISCONTINUED | OUTPATIENT
Start: 2023-08-20 | End: 2023-08-21 | Stop reason: HOSPADM

## 2023-08-19 RX ORDER — TALC
6 POWDER (GRAM) TOPICAL NIGHTLY PRN
Status: DISCONTINUED | OUTPATIENT
Start: 2023-08-20 | End: 2023-08-21 | Stop reason: HOSPADM

## 2023-08-19 RX ORDER — SODIUM CHLORIDE 0.9 % (FLUSH) 0.9 %
10 SYRINGE (ML) INJECTION EVERY 12 HOURS PRN
Status: DISCONTINUED | OUTPATIENT
Start: 2023-08-20 | End: 2023-08-21 | Stop reason: HOSPADM

## 2023-08-19 RX ORDER — MORPHINE SULFATE 2 MG/ML
2 INJECTION, SOLUTION INTRAMUSCULAR; INTRAVENOUS EVERY 4 HOURS PRN
Status: DISCONTINUED | OUTPATIENT
Start: 2023-08-20 | End: 2023-08-21 | Stop reason: HOSPADM

## 2023-08-19 RX ORDER — ONDANSETRON 2 MG/ML
4 INJECTION INTRAMUSCULAR; INTRAVENOUS EVERY 8 HOURS PRN
Status: DISCONTINUED | OUTPATIENT
Start: 2023-08-20 | End: 2023-08-21 | Stop reason: HOSPADM

## 2023-08-19 RX ORDER — IPRATROPIUM BROMIDE AND ALBUTEROL SULFATE 2.5; .5 MG/3ML; MG/3ML
3 SOLUTION RESPIRATORY (INHALATION) EVERY 4 HOURS PRN
Status: DISCONTINUED | OUTPATIENT
Start: 2023-08-20 | End: 2023-08-21 | Stop reason: HOSPADM

## 2023-08-19 RX ORDER — DILTIAZEM HYDROCHLORIDE 5 MG/ML
5 INJECTION INTRAVENOUS
Status: COMPLETED | OUTPATIENT
Start: 2023-08-19 | End: 2023-08-19

## 2023-08-19 RX ADMIN — SODIUM CHLORIDE, SODIUM LACTATE, POTASSIUM CHLORIDE, AND CALCIUM CHLORIDE 1000 ML: .6; .31; .03; .02 INJECTION, SOLUTION INTRAVENOUS at 09:08

## 2023-08-19 RX ADMIN — DILTIAZEM HYDROCHLORIDE 5 MG: 5 INJECTION, SOLUTION INTRAVENOUS at 09:08

## 2023-08-19 RX ADMIN — MAGNESIUM SULFATE 1 G: 1 INJECTION INTRAVENOUS at 09:08

## 2023-08-20 PROBLEM — I48.91 ATRIAL FIBRILLATION WITH RAPID VENTRICULAR RESPONSE: Status: ACTIVE | Noted: 2023-08-20

## 2023-08-20 LAB
ALBUMIN SERPL BCP-MCNC: 3.8 G/DL (ref 3.5–5.2)
ALP SERPL-CCNC: 57 U/L (ref 55–135)
ALT SERPL W/O P-5'-P-CCNC: 35 U/L (ref 10–44)
ANION GAP SERPL CALC-SCNC: 6 MMOL/L (ref 8–16)
AST SERPL-CCNC: 24 U/L (ref 10–40)
BASOPHILS # BLD AUTO: 0.05 K/UL (ref 0–0.2)
BASOPHILS NFR BLD: 0.6 % (ref 0–1.9)
BILIRUB SERPL-MCNC: 1.2 MG/DL (ref 0.1–1)
BUN SERPL-MCNC: 21 MG/DL (ref 6–20)
CALCIUM SERPL-MCNC: 8.4 MG/DL (ref 8.7–10.5)
CHLORIDE SERPL-SCNC: 105 MMOL/L (ref 95–110)
CO2 SERPL-SCNC: 27 MMOL/L (ref 23–29)
CREAT SERPL-MCNC: 1.3 MG/DL (ref 0.5–1.4)
DIFFERENTIAL METHOD: ABNORMAL
EOSINOPHIL # BLD AUTO: 0.2 K/UL (ref 0–0.5)
EOSINOPHIL NFR BLD: 1.8 % (ref 0–8)
ERYTHROCYTE [DISTWIDTH] IN BLOOD BY AUTOMATED COUNT: 12.4 % (ref 11.5–14.5)
EST. GFR  (NO RACE VARIABLE): >60 ML/MIN/1.73 M^2
GLUCOSE SERPL-MCNC: 100 MG/DL (ref 70–110)
GLUCOSE SERPL-MCNC: 111 MG/DL (ref 70–110)
GLUCOSE SERPL-MCNC: 144 MG/DL (ref 70–110)
GLUCOSE SERPL-MCNC: 167 MG/DL (ref 70–110)
GLUCOSE SERPL-MCNC: 94 MG/DL (ref 70–110)
HCT VFR BLD AUTO: 41.3 % (ref 40–54)
HGB BLD-MCNC: 14 G/DL (ref 14–18)
IMM GRANULOCYTES # BLD AUTO: 0.02 K/UL (ref 0–0.04)
IMM GRANULOCYTES NFR BLD AUTO: 0.2 % (ref 0–0.5)
LYMPHOCYTES # BLD AUTO: 3.3 K/UL (ref 1–4.8)
LYMPHOCYTES NFR BLD: 38.4 % (ref 18–48)
MAGNESIUM SERPL-MCNC: 2.2 MG/DL (ref 1.6–2.6)
MCH RBC QN AUTO: 31.5 PG (ref 27–31)
MCHC RBC AUTO-ENTMCNC: 33.9 G/DL (ref 32–36)
MCV RBC AUTO: 93 FL (ref 82–98)
MONOCYTES # BLD AUTO: 0.5 K/UL (ref 0.3–1)
MONOCYTES NFR BLD: 6 % (ref 4–15)
NEUTROPHILS # BLD AUTO: 4.6 K/UL (ref 1.8–7.7)
NEUTROPHILS NFR BLD: 53 % (ref 38–73)
NRBC BLD-RTO: 0 /100 WBC
PHOSPHATE SERPL-MCNC: 3.7 MG/DL (ref 2.7–4.5)
PLATELET # BLD AUTO: 117 K/UL (ref 150–450)
PMV BLD AUTO: 11 FL (ref 9.2–12.9)
POTASSIUM SERPL-SCNC: 4.2 MMOL/L (ref 3.5–5.1)
PROT SERPL-MCNC: 6 G/DL (ref 6–8.4)
RBC # BLD AUTO: 4.45 M/UL (ref 4.6–6.2)
SODIUM SERPL-SCNC: 138 MMOL/L (ref 136–145)
TROPONIN I SERPL HS-MCNC: 11.5 PG/ML (ref 0–14.9)
TROPONIN I SERPL HS-MCNC: 6.6 PG/ML (ref 0–14.9)
TROPONIN I SERPL HS-MCNC: 7.4 PG/ML (ref 0–14.9)
WBC # BLD AUTO: 8.69 K/UL (ref 3.9–12.7)

## 2023-08-20 PROCEDURE — G0378 HOSPITAL OBSERVATION PER HR: HCPCS

## 2023-08-20 PROCEDURE — 84484 ASSAY OF TROPONIN QUANT: CPT | Mod: 91 | Performed by: NURSE PRACTITIONER

## 2023-08-20 PROCEDURE — 84100 ASSAY OF PHOSPHORUS: CPT | Performed by: NURSE PRACTITIONER

## 2023-08-20 PROCEDURE — 85025 COMPLETE CBC W/AUTO DIFF WBC: CPT | Performed by: NURSE PRACTITIONER

## 2023-08-20 PROCEDURE — 83735 ASSAY OF MAGNESIUM: CPT | Performed by: NURSE PRACTITIONER

## 2023-08-20 PROCEDURE — 25000003 PHARM REV CODE 250: Performed by: INTERNAL MEDICINE

## 2023-08-20 PROCEDURE — 94761 N-INVAS EAR/PLS OXIMETRY MLT: CPT

## 2023-08-20 PROCEDURE — 25000003 PHARM REV CODE 250: Performed by: STUDENT IN AN ORGANIZED HEALTH CARE EDUCATION/TRAINING PROGRAM

## 2023-08-20 PROCEDURE — 96375 TX/PRO/DX INJ NEW DRUG ADDON: CPT

## 2023-08-20 PROCEDURE — 99215 PR OFFICE/OUTPT VISIT, EST, LEVL V, 40-54 MIN: ICD-10-PCS | Mod: ,,, | Performed by: INTERNAL MEDICINE

## 2023-08-20 PROCEDURE — 80053 COMPREHEN METABOLIC PANEL: CPT | Performed by: NURSE PRACTITIONER

## 2023-08-20 PROCEDURE — 63600175 PHARM REV CODE 636 W HCPCS: Performed by: NURSE PRACTITIONER

## 2023-08-20 PROCEDURE — 25000003 PHARM REV CODE 250: Performed by: NURSE PRACTITIONER

## 2023-08-20 PROCEDURE — 99215 OFFICE O/P EST HI 40 MIN: CPT | Mod: ,,, | Performed by: INTERNAL MEDICINE

## 2023-08-20 PROCEDURE — 36415 COLL VENOUS BLD VENIPUNCTURE: CPT | Performed by: NURSE PRACTITIONER

## 2023-08-20 RX ORDER — METOPROLOL TARTRATE 1 MG/ML
5 INJECTION, SOLUTION INTRAVENOUS EVERY 4 HOURS PRN
Status: DISCONTINUED | OUTPATIENT
Start: 2023-08-20 | End: 2023-08-21 | Stop reason: HOSPADM

## 2023-08-20 RX ORDER — METOPROLOL TARTRATE 25 MG/1
25 TABLET, FILM COATED ORAL 3 TIMES DAILY
Status: DISCONTINUED | OUTPATIENT
Start: 2023-08-20 | End: 2023-08-21 | Stop reason: HOSPADM

## 2023-08-20 RX ORDER — DIGOXIN 0.25 MG/ML
250 INJECTION INTRAMUSCULAR; INTRAVENOUS ONCE
Status: COMPLETED | OUTPATIENT
Start: 2023-08-20 | End: 2023-08-20

## 2023-08-20 RX ORDER — ASPIRIN 81 MG/1
81 TABLET ORAL DAILY
Status: DISCONTINUED | OUTPATIENT
Start: 2023-08-20 | End: 2023-08-21 | Stop reason: HOSPADM

## 2023-08-20 RX ORDER — PRAMIPEXOLE DIHYDROCHLORIDE 0.12 MG/1
0.25 TABLET ORAL NIGHTLY
Status: CANCELLED | OUTPATIENT
Start: 2023-08-20

## 2023-08-20 RX ORDER — TRAZODONE HYDROCHLORIDE 50 MG/1
200 TABLET ORAL NIGHTLY
Status: DISCONTINUED | OUTPATIENT
Start: 2023-08-20 | End: 2023-08-21 | Stop reason: HOSPADM

## 2023-08-20 RX ORDER — BUPROPION HYDROCHLORIDE 150 MG/1
300 TABLET ORAL DAILY
Status: DISCONTINUED | OUTPATIENT
Start: 2023-08-20 | End: 2023-08-21 | Stop reason: HOSPADM

## 2023-08-20 RX ORDER — CIPROFLOXACIN HYDROCHLORIDE 3 MG/ML
1 SOLUTION/ DROPS OPHTHALMIC
COMMUNITY
Start: 2023-07-28 | End: 2023-08-24

## 2023-08-20 RX ORDER — PRAMIPEXOLE DIHYDROCHLORIDE 0.12 MG/1
0.12 TABLET ORAL NIGHTLY
Status: DISCONTINUED | OUTPATIENT
Start: 2023-08-20 | End: 2023-08-21 | Stop reason: HOSPADM

## 2023-08-20 RX ADMIN — APIXABAN 5 MG: 5 TABLET, FILM COATED ORAL at 10:08

## 2023-08-20 RX ADMIN — METOPROLOL TARTRATE 25 MG: 25 TABLET, FILM COATED ORAL at 10:08

## 2023-08-20 RX ADMIN — BUPROPION HYDROCHLORIDE 300 MG: 150 TABLET, FILM COATED, EXTENDED RELEASE ORAL at 10:08

## 2023-08-20 RX ADMIN — MULTIVITAMIN TABLET 1 TABLET: TABLET at 10:08

## 2023-08-20 RX ADMIN — TRAZODONE HYDROCHLORIDE 200 MG: 50 TABLET ORAL at 09:08

## 2023-08-20 RX ADMIN — APIXABAN 5 MG: 5 TABLET, FILM COATED ORAL at 09:08

## 2023-08-20 RX ADMIN — DIGOXIN 250 MCG: 0.25 INJECTION INTRAMUSCULAR; INTRAVENOUS at 02:08

## 2023-08-20 RX ADMIN — METOPROLOL TARTRATE 25 MG: 25 TABLET, FILM COATED ORAL at 09:08

## 2023-08-20 RX ADMIN — PRAMIPEXOLE DIHYDROCHLORIDE 0.12 MG: 0.12 TABLET ORAL at 11:08

## 2023-08-20 RX ADMIN — TRAZODONE HYDROCHLORIDE 200 MG: 50 TABLET ORAL at 01:08

## 2023-08-20 RX ADMIN — ASPIRIN 81 MG: 81 TABLET, COATED ORAL at 10:08

## 2023-08-20 RX ADMIN — POLYETHYLENE GLYCOL 3350 17 G: 17 POWDER, FOR SOLUTION ORAL at 10:08

## 2023-08-20 RX ADMIN — METOPROLOL TARTRATE 25 MG: 25 TABLET, FILM COATED ORAL at 02:08

## 2023-08-20 NOTE — ED PROVIDER NOTES
Encounter Date: 8/19/2023       History     Chief Complaint   Patient presents with    Chest Pain     Recent dx of Afib. Intermittent left sided chest pain radiating down left arm    Shortness of Breath     58-year-old man with recent diagnosis of atrial fibrillation, hypertension, hyperlipidemia, depression, anxiety, ADHD.  Patient seen emergency department proximally 2 days ago with AFib with RVR and discharged.  Patient states has been compliant with medication.  Have over last 5 days has been intermittent exertional chest pain and chest tightness.  He presents back to the emergency department tonight with complaint of midsternal chest pain and pressure also found with AFib with RVR rates in the 130s.  Patient is scheduled for outpatient nuclear medicine stress test.  Patient last echo revealed EF 60-65% with mild increased wall thickness.  Normal wall motion.  Left Atrium: Left atrium is severely dilated.  Right Ventricle: Normal right ventricular cavity size. Wall thickness is normal. Right ventricle wall motion  is normal. Systolic function is normal.  Mitral Valve: Mildly thickened anterior leaflet. There is moderate regurgitation with a posterolateral eccentriccally directed jet.  There is moderate prolapse of the anterior mitral valve leaflet. Pulmonary Artery: No pulmonary hypertension. The estimated pulmonary artery systolic pressure is 26 mmHg.  IVC/SVC: Intermediate venous pressure at 8 mmHg.        Review of patient's allergies indicates:   Allergen Reactions    Latex, natural rubber Rash     Past Medical History:   Diagnosis Date    ADHD (attention deficit hyperactivity disorder)     Anticoagulant long-term use     Anxiety     CHF (congestive heart failure)     Depression     Diabetes mellitus     Diverticulitis     Hyperlipidemia     Insomnia     Mitral regurgitation     Mitral valve disease 08/2023    PAF (paroxysmal atrial fibrillation)      Past Surgical History:   Procedure Laterality Date     ANGIOGRAM, CORONARY, WITH LEFT HEART CATHETERIZATION  8/30/2023    Procedure: Left Heart Cath;  Surgeon: Hilario Pretty MD;  Location: [facility] CATH;  Service: Cardiology;;    CORONARY ANGIOGRAPHY  8/30/2023    Procedure: Coronary angiogram study;  Surgeon: Hilario Pretty MD;  Location: [facility] CATH;  Service: Cardiology;;    KNEE ARTHROSCOPY W/ ACL RECONSTRUCTION AND HAMSTRING GRAFT  2003    right x 2; redo [place] in [place] 1/2022    LUMBAR LAMINECTOMY FOR DECOMPRESSION  12/2022    TRANSESOPHAGEAL ECHOCARDIOGRAM WITH POSSIBLE CARDIOVERSION (JASON W/ POSS CARDIOVERSION) Bilateral 08/21/2023    Procedure: Transesophageal echo (JASON) intra-procedure log documentation;  Surgeon: Jesu Flores MD;  Location: [facility] CATH/EP LAB;  Service: Cardiology;  Laterality: Bilateral;     Family History   Problem Relation Age of Onset    Stroke Mother     Heart attack Father     Heart disease Father     No Known Problems Sister     No Known Problems Brother     No Known Problems Daughter     No Known Problems Son     Alzheimer's disease Maternal Grandmother     Heart attack Paternal Grandfather      Social History     Tobacco Use    Smoking status: Never    Smokeless tobacco: Current     Types: Chew    Tobacco comments:     2 dips per day down from can per day   Substance Use Topics    Alcohol use: No    Drug use: No     Review of Systems   Constitutional:  Negative for fever.   HENT:  Negative for sore throat.    Respiratory:  Positive for chest tightness. Negative for shortness of breath.    Cardiovascular:  Positive for chest pain and palpitations.   Gastrointestinal:  Negative for abdominal pain, nausea and vomiting.   Genitourinary:  Negative for dysuria.   Musculoskeletal:  Negative for back pain.   Skin:  Negative for rash.   Neurological:  Negative for weakness.   Hematological:  Does not bruise/bleed easily.       Physical Exam     Initial Vitals [08/19/23 2123]   BP Pulse Resp Temp SpO2   110/75 (!) 137  (!) 24 97.6 °F (36.4 °C) 97 %      MAP       --         Physical Exam    Nursing note and vitals reviewed.  Constitutional: He appears well-developed and well-nourished.   HENT:   Head: Normocephalic and atraumatic.   Nose: Nose normal.   Mouth/Throat: Oropharynx is clear and moist.   Eyes: Conjunctivae and EOM are normal. Pupils are equal, round, and reactive to light. No scleral icterus.   Neck: Neck supple.   Normal range of motion.  Cardiovascular:  Normal heart sounds and intact distal pulses.     Exam reveals no gallop and no friction rub.       No murmur heard.  Irregular irregular rhythm   Pulmonary/Chest: No stridor. No respiratory distress.   Course bilateral breath sounds no adventitious sounds   Abdominal: Abdomen is soft. Bowel sounds are normal. He exhibits no mass. There is no abdominal tenderness. There is no rebound and no guarding.   Musculoskeletal:         General: No edema. Normal range of motion.      Cervical back: Normal range of motion and neck supple.     Lymphadenopathy:     He has no cervical adenopathy.   Neurological: He is alert and oriented to person, place, and time. He has normal strength and normal reflexes. No cranial nerve deficit or sensory deficit. GCS score is 15. GCS eye subscore is 4. GCS verbal subscore is 5. GCS motor subscore is 6.   Skin: Skin is warm and dry. Capillary refill takes less than 2 seconds. No rash noted.   Psychiatric: He has a normal mood and affect. His behavior is normal. Judgment and thought content normal.         ED Course   Procedures  Labs Reviewed   CBC W/ AUTO DIFFERENTIAL - Abnormal; Notable for the following components:       Result Value    MCH 31.5 (*)     All other components within normal limits   COMPREHENSIVE METABOLIC PANEL - Abnormal; Notable for the following components:    Sodium 134 (*)     Glucose 140 (*)     BUN 23 (*)     eGFR 57.9 (*)     Anion Gap 6 (*)     All other components within normal limits   B-TYPE NATRIURETIC PEPTIDE -  Abnormal; Notable for the following components:     (*)     All other components within normal limits   MAGNESIUM   TROPONIN I HIGH SENSITIVITY   SARS-COV-2 RNA AMPLIFICATION, QUAL        ECG Results              EKG 12-lead (Final result)  Result time 08/21/23 11:18:52      Final result by Interface, Lab In [facility] (08/21/23 11:18:52)                   Narrative:    Test Reason : R07.9,    Vent. Rate : 117 BPM     Atrial Rate : 000 BPM     P-R Int : 000 ms          QRS Dur : 088 ms      QT Int : 268 ms       P-R-T Axes : 000 045 035 degrees     QTc Int : 373 ms    Atrial fibrillation with rapid ventricular response  Abnormal ECG  When compared with ECG of 17-AUG-2023 21:02,  No significant change was found  Confirmed by Mario Johnston MD ([id number]) on 8/20/2023 12:03:53 PM    Referred By: PATRICK   SELF           Confirmed By:Mario Johnston MD                                  Imaging Results              X-Ray Chest AP Portable (Final result)  Result time 08/20/23 07:59:23      Final result by Jakub Perez MD (08/20/23 07:59:23)                   Narrative:    XR CHEST 1 VIEW    Interpretation time/date:  8/20/2023 7:58 AM CDT    History:Chest Pain    Comparison: None    Findings:  One view chest. The heart is not enlarged. Mediastinal and hilar structures are unremarkable. The lungs are clear bilaterally without active infiltrate consolidation or effusion. Bony structures and soft tissues visualized are unremarkable.      Impression: No visualized active chest disease.    Electronically signed by:  Jakub Perez MD  8/20/2023 7:59 AM CDT Workstation: [id number]                                     Medications   amiodarone 360 mg/200 mL (1.8 mg/mL) infusion (1 mg/min Intravenous New Bag 8/21/23 1134)   diltiaZEM injection 5 mg (5 mg Intravenous Given 8/19/23 2140)   lactated ringers bolus 1,000 mL (1,000 mLs Intravenous New Bag 8/19/23 2150)   magnesium sulfate in dextrose IVPB (premix) 1 g (1 g  Intravenous New Bag 8/19/23 7645)   digoxin injection 250 mcg (250 mcg Intravenous Given 8/20/23 1415)   amiodarone in dextrose 150 mg/100 mL (1.5 mg/mL) 150 mg bolus (150 mg Intravenous Given 8/21/23 0970)     Medical Decision Making  Amount and/or Complexity of Data Reviewed  Labs: ordered.  Radiology: ordered.    Risk  Prescription drug management.  Decision regarding hospitalization.              Attending Attestation:         Attending Critical Care:   Critical Care Times:   Direct Patient Care (initial evaluation, reassessments, and time considering the case)................................................................30 minutes.   Additional History from reviewing old medical records or taking additional history from the family, EMS, PCP, etc.......................5 minutes.   Ordering, Reviewing, and Interpreting Diagnostic Studies...............................................................................................................5 minutes.   Documentation..................................................................................................................................................................................5 minutes.   Consultation with other Physicians. .................................................................................................................................................5 minutes.   Consultation with the patient's family directly relating to the patient's condition, care, and DNR status (when patient unable)......5 minutes.   ==============================================================  Total Critical Care Time - exclusive of procedural time: 55 minutes.  ==============================================================  Critical care was necessary to treat or prevent imminent or life-threatening deterioration of the following conditions: cardiac arrhythmia.   Critical care was time spent personally by me on the following activities: obtaining  history from patient or relative, examination of patient, review of x-rays / CT sent with the patient, review of old charts, ordering lab, x-rays, and/or EKG, development of treatment plan with patient or relative, ordering and performing treatments and interventions, evaluation of patient's response to treatment, discussions with primary provider, discussion with consultants and re-evaluation of patient's conition.   Critical Care Condition: potentially life-threatening                      Medical Decision Making:   Initial Assessment:   58-year-old man with recent diagnosis of atrial fibrillation, hypertension, hyperlipidemia, depression, anxiety, ADHD.  Patient seen emergency department proximally 2 days ago with AFib with RVR and discharged.  Patient states has been compliant with medication.  Have over last 5 days has been intermittent exertional chest pain and chest tightness.  He presents back to the emergency department tonight with complaint of midsternal chest pain and pressure also found with AFib with RVR rates in the 130s.  Patient is scheduled for outpatient nuclear medicine stress test.  Patient last echo revealed EF 60-65% with mild increased wall thickness.  Normal wall motion.  Left Atrium: Left atrium is severely dilated.  Right Ventricle: Normal right ventricular cavity size. Wall thickness is normal. Right ventricle wall motion  is normal. Systolic function is normal.  Mitral Valve: Mildly thickened anterior leaflet. There is moderate regurgitation with a posterolateral eccentriccally directed jet.  There is moderate prolapse of the anterior mitral valve leaflet. Pulmonary Artery: No pulmonary hypertension. The estimated pulmonary artery systolic pressure is 26 mmHg.  IVC/SVC: Intermediate venous pressure at 8 mmHg.      Differential Diagnosis:   Atrial fibrillation rapid ventricular response, cardiac arrhythmia, electrolyte abnormality  Clinical Tests:   Lab Tests: Ordered and  Reviewed  Radiological Study: Ordered and Reviewed  Medical Tests: Ordered and Reviewed      Clinical Impression:   Final diagnoses:  [R07.9] Chest pain  [I48.91] Atrial fibrillation with rapid ventricular response (Primary)        ED Disposition Condition    Observation                 Sammy Rowland MD  08/19/23 2140       Sammy Rowland MD  08/19/23 9720       Sammy Rowland MD  09/06/23 1700

## 2023-08-20 NOTE — HPI
Abimael Armendariz is a 58 y.o. male with a history as  has a past medical history of ADHD (attention deficit hyperactivity disorder), Anxiety, Depression, Hyperlipidemia, Hypertension, Insomnia, and Mitral regurgitation. who presented to the ED with a Chest Pain (Recent dx of Afib. Intermittent left sided chest pain radiating down left arm) and Shortness of Breath    Patient presents to the emergency room, wife at bedside, with complaint of left-sided chest pain for last 2 days with associated dyspnea on exertion.      Patient reports recently diagnosed with atrial fibrillation 2 weeks ago while in [place] undergoing preop clearance. He reports he returned back home and was seen by Cardiology, 08/15/2023, where he was started on Lopressor and Eliquis with Holter monitor, echo and stress test ordered.  Echo completed 08/18/2023. Patient was still with chest pain and elevated heart rate and was seen at [facility], 08/17/2023- Lopressor increased to 25 mg t.i.d., discharged.     Denies fever, chills, diaphoresis, dizziness, HA, NVD, recent trauma or any other associated symptoms. No aggravating and alleviating factor.  Recently diagnosed with atrial fibrillation.  Pertinent family history father (d) MI.  Does not smoke cigarettes, drink or do illegal drugs. Does chew tobacco.     Lab and imaging obtained and reviewed.  CBC shows MCH 31.5 otherwise unremarkable.  PT/INR within normal range.  Chemistry profile shows sodium 134 Ag 6 BUN 23 GFR 57.9 glucose 140.  .  High sensitive troponin within normal range.  EKG shows AFib with RVR rate 117 bpm.  On admit, afebrile  RR 24 /75 sats 97% on room air.       ECHO 8/18/2023  Summary     Left Ventricle: The left ventricle is normal in size. Mildly increased wall thickness. Normal wall motion. There is normal systolic function with a visually estimated ejection fraction of 60 - 65%.    Left Atrium: Left atrium is severely dilated.    Right Ventricle: Normal right  ventricular cavity size. Wall thickness is normal. Right ventricle wall motion  is normal. Systolic function is normal.    Mitral Valve: Mildly thickened anterior leaflet. There is moderate regurgitation with a posterolateral eccentriccally directed jet.  There is moderate prolapse of the anterior mitral valve leaflet.    Pulmonary Artery: No pulmonary hypertension. The estimated pulmonary artery systolic pressure is 26 mmHg.    IVC/SVC: Intermediate venous pressure at 8 mmHg.    Per ED provider, patient with left-sided chest pain, obtained showed AFib RVR, IV Cardizem x1 given with rate improved and chest pain resolved.  Per ED provider, spoke with Cardiology who recommended admitting patient for possible cardioversion.

## 2023-08-20 NOTE — PLAN OF CARE
[facility]  Initial Discharge Assessment       Primary Care Provider: Sammy Trinidad MD    Admission Diagnosis: Atrial fibrillation with rapid ventricular response [I48.91]    Admission Date: 8/19/2023  Expected Discharge Date: TBD  Met with patient and his spouse at bedside to complete assessment. No advance directive, living will or POA. No HH, HD, Coumadin or DME. Patient's spouse will bring patient home when he is discharged. No needs.    Transition of Care Barriers: None    Payor: MEDICAID / Plan: [employer] [facility] / Product Type: Managed Medicaid /     Extended Emergency Contact Information  Primary Emergency Contact: Radha Armendariz  Address: [address] [place] [address] [place] [employer] [place]  Mobile Phone: [phone]  Relation: Spouse  Preferred language: English   needed? No    Discharge Plan A: Home with family  Discharge Plan B: Home with family      [id number] [place] - [address]  [address] [place] [address]  Phone: [phone] Fax: [phone]    [id number] - [place] - [address]  [address] [place] [address]  Phone: [phone] Fax: [phone]      Initial Assessment (most recent)       Adult Discharge Assessment - 08/20/23 1335          Discharge Assessment    Assessment Type Discharge Planning Assessment     Confirmed/corrected address, phone number and insurance Yes     Confirmed Demographics Correct on Facesheet     Source of Information patient     When was your last doctors appointment? --   one year ago    Does patient/caregiver understand observation status Yes     Communicated THEA with patient/caregiver Date not available/Unable to determine     Reason For Admission atrial fibrilation     People in Home spouse     Facility Arrived From: home     Do you expect to return to your current living situation? Yes     Do you have help at home or someone to help you  manage your care at home? Yes     Who are your caregiver(s) and their phone number(s)? Radha Armendariz/spouse [phone]     Prior to hospitilization cognitive status: Alert/Oriented;No Deficits     Current cognitive status: Alert/Oriented;No Deficits     Equipment Currently Used at Home none     Readmission within 30 days? No     Patient currently being followed by outpatient case management? No     Do you currently have service(s) that help you manage your care at home? No     Do you take prescription medications? Yes     Do you have prescription coverage? Yes     Coverage [employer] Medicaid     Do you have any problems affording any of your prescribed medications? No     Is the patient taking medications as prescribed? yes     Who is going to help you get home at discharge? Radha Armendariz/spouse [phone]     How do you get to doctors appointments? car, drives self     Are you on dialysis? No     Do you take coumadin? No     DME Needed Upon Discharge  none     Discharge Plan discussed with: Spouse/sig other;Patient     Name(s) and Number(s) Radha Armendariz/spouse [phone]     Transition of Care Barriers None     Discharge Plan A Home with family     Discharge Plan B Home with family        OTHER    Name(s) of People in Home Radha Armendariz/spouse [phone]

## 2023-08-20 NOTE — SUBJECTIVE & OBJECTIVE
Past Medical History:   Diagnosis Date    ADHD (attention deficit hyperactivity disorder)     Anxiety     Depression     Hyperlipidemia     Hypertension     patient states he uses fosinopril for mitral regurgitation, not hypertension    Insomnia     Mitral regurgitation        Past Surgical History:   Procedure Laterality Date    KNEE ARTHROSCOPY W/ ACL RECONSTRUCTION AND HAMSTRING GRAFT  2003    right       Review of patient's allergies indicates:  No Known Allergies    No current facility-administered medications on file prior to encounter.     Current Outpatient Medications on File Prior to Encounter   Medication Sig    apixaban (ELIQUIS) 5 mg Tab Take 1 tablet (5 mg total) by mouth 2 (two) times daily.    aspirin 81 mg Tab Take 81 mg by mouth once daily. Every day    buPROPion (WELLBUTRIN XL) 300 MG 24 hr tablet Take 1 tablet (300 mg total) by mouth once daily.    fosinopriL (MONOPRIL) 40 MG tablet Take 1 tablet (40 mg total) by mouth once daily.    metFORMIN (GLUCOPHAGE) 500 MG tablet Take 1 tablet (500 mg total) by mouth 2 (two) times daily with meals.    metoprolol tartrate (LOPRESSOR) 25 MG tablet Take 1 tablet (25 mg total) by mouth 3 (three) times daily.    pramipexole (MIRAPEX) 0.25 MG tablet TAKE 1 TABLET BY MOUTH EVERY DAY    traZODone (DESYREL) 100 MG tablet Take 2 tablets (200 mg total) by mouth every evening.    acetaminophen-codeine 300-60mg (TYLENOL #4) 300-60 mg Tab Take 1 tablet by mouth every 6 (six) hours as needed.    blood sugar diagnostic Strp To check BG qd times daily, to use with insurance preferred meter    blood-glucose meter kit To check BG qd times daily, to use with insurance preferred meter    fluticasone propionate (FLONASE) 50 mcg/actuation nasal spray 1 spray by Each Nostril route once daily.    ibuprofen (ADVIL,MOTRIN) 800 MG tablet Take 800 mg by mouth 2 (two) times daily.    lancets Misc To check BG qd times daily, to use with insurance preferred meter    multivitamin  (THERAGRAN) per tablet Take 1 tablet by mouth once daily.     Family History       Problem Relation (Age of Onset)    Alzheimer's disease Maternal Grandmother    Heart attack Father, Paternal Grandfather    Heart disease Father    No Known Problems Sister, Brother, Daughter, Son    Stroke Mother          Tobacco Use    Smoking status: Never    Smokeless tobacco: Current     Types: Chew   Substance and Sexual Activity    Alcohol use: No    Drug use: No    Sexual activity: Yes     Partners: Female     Review of Systems   Constitutional:  Negative for chills, diaphoresis and fever.   HENT:  Negative for congestion, postnasal drip, sinus pressure and sore throat.    Eyes:  Negative for visual disturbance.   Respiratory:  Positive for shortness of breath. Negative for cough, chest tightness and wheezing.    Cardiovascular:  Positive for chest pain (Left-sided). Negative for palpitations and leg swelling.   Gastrointestinal:  Negative for abdominal distention, abdominal pain, blood in stool, constipation, diarrhea, nausea and vomiting.   Endocrine: Negative.    Genitourinary:  Negative for dysuria.   Musculoskeletal: Negative.    Skin: Negative.    Allergic/Immunologic: Negative.    Neurological:  Negative for dizziness, weakness, numbness and headaches.   Hematological: Negative.    Psychiatric/Behavioral: Negative.       Objective:     Vital Signs (Most Recent):  Temp: 97.6 °F (36.4 °C) (08/19/23 2123)  Pulse: 87 (08/19/23 2145)  Resp: 20 (08/19/23 2145)  BP: 102/64 (08/19/23 2145)  SpO2: 100 % (08/19/23 2145) Vital Signs (24h Range):  Temp:  [97.6 °F (36.4 °C)] 97.6 °F (36.4 °C)  Pulse:  [] 87  Resp:  [20-24] 20  SpO2:  [97 %-100 %] 100 %  BP: (102-110)/(64-77) 102/64     Weight: 88 kg (194 lb)  Body mass index is 27.06 kg/m².     Physical Exam  Constitutional:       General: He is awake. He is not in acute distress.     Appearance: Normal appearance. He is well-developed. He is not toxic-appearing or  diaphoretic.      Interventions: Nasal cannula in place.   HENT:      Head: Normocephalic and atraumatic.   Eyes:      General: Lids are normal.      Conjunctiva/sclera: Conjunctivae normal.      Pupils: Pupils are equal, round, and reactive to light.   Neck:      Thyroid: No thyroid mass or thyromegaly.      Vascular: Normal carotid pulses. No JVD.      Trachea: Trachea normal. No tracheal deviation.   Cardiovascular:      Rate and Rhythm: Normal rate. Rhythm irregularly irregular.      Pulses: Normal pulses.      Heart sounds: S1 normal and S2 normal. Murmur heard.   Pulmonary:      Effort: Pulmonary effort is normal.      Breath sounds: Normal breath sounds. No stridor.   Abdominal:      General: Bowel sounds are normal.      Palpations: Abdomen is soft.      Tenderness: There is no abdominal tenderness.   Musculoskeletal:         General: Normal range of motion.      Cervical back: Full passive range of motion without pain, normal range of motion and neck supple.   Skin:     General: Skin is warm and dry.      Nails: There is no clubbing.   Neurological:      Mental Status: He is alert and oriented to person, place, and time.      Cranial Nerves: No cranial nerve deficit.      Sensory: No sensory deficit.   Psychiatric:         Speech: Speech normal.         Behavior: Behavior normal. Behavior is cooperative.         Thought Content: Thought content normal.         Judgment: Judgment normal.              CRANIAL NERVES     CN III, IV, VI   Pupils are equal, round, and reactive to light.       Significant Labs: All pertinent labs within the past 24 hours have been reviewed.  A1C:   Recent Labs   Lab 02/27/23  1354   HGBA1C 5.4       Bilirubin:   Recent Labs   Lab 08/17/23  2109 08/19/23  2134   BILITOT 0.7 1.0     BMP:   Recent Labs   Lab 08/19/23  2134   *   *   K 4.1      CO2 24   BUN 23*   CREATININE 1.4   CALCIUM 8.7   MG 2.1     CBC:   Recent Labs   Lab 08/19/23 2134   WBC 9.82   HGB 15.8    HCT 46.4        CMP:   Recent Labs   Lab 08/19/23 2134   *   K 4.1      CO2 24   *   BUN 23*   CREATININE 1.4   CALCIUM 8.7   PROT 6.7   ALBUMIN 4.1   BILITOT 1.0   ALKPHOS 60   AST 32   ALT 40   ANIONGAP 6*     Cardiac Markers:   Recent Labs   Lab 08/19/23 2134   *     Magnesium:   Recent Labs   Lab 08/19/23 2134   MG 2.1     Troponin:   Recent Labs   Lab 08/19/23 2134   TROPONINIHS 5.4       Significant Imaging: I have reviewed all pertinent imaging results/findings within the past 24 hours.  Echo    Result Date: 8/18/2023    Left Ventricle: The left ventricle is normal in size. Mildly increased wall thickness. Normal wall motion. There is normal systolic function with a visually estimated ejection fraction of 60 - 65%.   Left Atrium: Left atrium is severely dilated.   Right Ventricle: Normal right ventricular cavity size. Wall thickness is normal. Right ventricle wall motion  is normal. Systolic function is normal.   Mitral Valve: Mildly thickened anterior leaflet. There is moderate regurgitation with a posterolateral eccentriccally directed jet.  There is moderate prolapse of the anterior mitral valve leaflet.   Pulmonary Artery: No pulmonary hypertension. The estimated pulmonary artery systolic pressure is 26 mmHg.   IVC/SVC: Intermediate venous pressure at 8 mmHg.     X-Ray Chest AP Portable    Result Date: 8/17/2023  EXAMINATION: XR CHEST AP PORTABLE CLINICAL HISTORY: Chest Pain; TECHNIQUE: Single frontal view of the chest was performed. COMPARISON: 11/30/2022 FINDINGS: The lungs are clear, with normal appearance of pulmonary vasculature and no pleural effusion or pneumothorax. The cardiac silhouette is normal in size. The hilar and mediastinal contours are unremarkable. Bones are intact.     No acute abnormality. Electronically signed by: Blas Lee Date:    08/17/2023 Time:    21:40

## 2023-08-20 NOTE — NURSING
Nurses Note -- 4 Eyes      8/20/2023   3:41 AM      Skin assessed during: Admit      [x] No Altered Skin Integrity Present    []Prevention Measures Documented      [] Yes- Altered Skin Integrity Present or Discovered   [] LDA Added if Not in Epic (Describe Wound)   [] New Altered Skin Integrity was Present on Admit and Documented in LDA   [] Wound Image Taken    Wound Care Consulted? No    Attending Nurse:  Beryl Rapp RN/Staff Member:   [id number]

## 2023-08-20 NOTE — CARE UPDATE
59 yo male new afib, now with rvr and chest pain/sob.  Seen during rounds, HR 110s, bp stable.  Plan for JASON/CV tomorrow.

## 2023-08-20 NOTE — CONSULTS
[facility]  Department of Cardiology  Consult Note      PATIENT NAME: Abimael Armendariz  MRN: [id number]  TODAY'S DATE: 08/20/2023  ADMIT DATE: 8/19/2023                          CONSULT REQUESTED BY: Tez Squires,*    SUBJECTIVE     PRINCIPAL PROBLEM: Atrial fibrillation with rapid ventricular response      REASON FOR CONSULT:  AFIB with RVR      HPI:    58 year old male diagnosed with AFIB on Monday. Has been in AFIB since. He has a PMH significant for ADHD (attention deficit hyperactivity disorder), Anxiety, Depression, Hyperlipidemia, Hypertension, Insomnia, and Mitral regurgitation. who presented to the ED with a Chest Pain (Recent dx of Afib. Intermittent left sided chest pain radiating down left arm) and Shortness of Breath. He saw Dr. Watts and was ordered and ECHO and stress. ECHO shows severe LAE and MR. Stress scheduled the 24th. HR is 120s BP soft. Denies fever, chills, diaphoresis, dizziness, HA, NVD, recent trauma or any other associated symptoms. No problems swallowing no issues with food getting stuck. No bleeding tendencies. Currently on eliquis.          FROM H AND P     Chest Pain        Recent dx of Afib. Intermittent left sided chest pain radiating down left arm    Shortness of Breath         HPI: Abimael Armendariz is a 58 y.o. male with a history as  has a past medical history of ADHD (attention deficit hyperactivity disorder), Anxiety, Depression, Hyperlipidemia, Hypertension, Insomnia, and Mitral regurgitation. who presented to the ED with a Chest Pain (Recent dx of Afib. Intermittent left sided chest pain radiating down left arm) and Shortness of Breath     Patient presents to the emergency room, wife at bedside, with complaint of left-sided chest pain for last 2 days with associated dyspnea on exertion.       Patient reports recently diagnosed with atrial fibrillation 2 weeks ago while in [place] undergoing preop clearance. He reports he returned back home and was seen  by Cardiology, 08/15/2023, where he was started on Lopressor and Eliquis with Holter monitor, echo and stress test ordered.  Echo completed 08/18/2023. Patient was still with chest pain and elevated heart rate and was seen at [facility], 08/17/2023- Lopressor increased to 25 mg t.i.d., discharged.      Denies fever, chills, diaphoresis, dizziness, HA, NVD, recent trauma or any other associated symptoms. No aggravating and alleviating factor.  Recently diagnosed with atrial fibrillation.  Pertinent family history father (d) MI.  Does not smoke cigarettes, drink or do illegal drugs. Does chew tobacco.      Lab and imaging obtained and reviewed.  CBC shows MCH 31.5 otherwise unremarkable.  PT/INR within normal range.  Chemistry profile shows sodium 134 Ag 6 BUN 23 GFR 57.9 glucose 140.  .  High sensitive troponin within normal range.  EKG shows AFib with RVR rate 117 bpm.  On admit, afebrile  RR 24 /75 sats 97% on room air.        ECHO 8/18/2023  Summary     Left Ventricle: The left ventricle is normal in size. Mildly increased wall thickness. Normal wall motion. There is normal systolic function with a visually estimated ejection fraction of 60 - 65%.    Left Atrium: Left atrium is severely dilated.    Right Ventricle: Normal right ventricular cavity size. Wall thickness is normal. Right ventricle wall motion  is normal. Systolic function is normal.    Mitral Valve: Mildly thickened anterior leaflet. There is moderate regurgitation with a posterolateral eccentriccally directed jet.  There is moderate prolapse of the anterior mitral valve leaflet.    Pulmonary Artery: No pulmonary hypertension. The estimated pulmonary artery systolic pressure is 26 mmHg.    IVC/SVC: Intermediate venous pressure at 8 mmHg.     Per ED provider, patient with left-sided chest pain, obtained showed AFib RVR, IV Cardizem x1 given with rate improved and chest pain resolved.  Per ED provider, spoke with Cardiology who  recommended admitting patient for possible cardioversion.           Review of patient's allergies indicates:  No Known Allergies    Past Medical History:   Diagnosis Date    ADHD (attention deficit hyperactivity disorder)     Anxiety     Depression     Hyperlipidemia     Hypertension     patient states he uses fosinopril for mitral regurgitation, not hypertension    Insomnia     Mitral regurgitation      Past Surgical History:   Procedure Laterality Date    KNEE ARTHROSCOPY W/ ACL RECONSTRUCTION AND HAMSTRING GRAFT  2003    right     Social History     Tobacco Use    Smoking status: Never    Smokeless tobacco: Current     Types: Chew   Substance Use Topics    Alcohol use: No    Drug use: No        REVIEW OF SYSTEMS    As mentioned in HPI    OBJECTIVE     VITAL SIGNS (Most Recent)  Temp: 98 °F (36.7 °C) (08/20/23 0714)  Pulse: 96 (08/20/23 0714)  Resp: 20 (08/20/23 0714)  BP: 114/76 (08/20/23 0714)  SpO2: (!) 93 % (08/20/23 0714)    VENTILATION STATUS  Resp: 20 (08/20/23 0714)  SpO2: (!) 93 % (08/20/23 0714)           I & O (Last 24H):No intake or output data in the 24 hours ending 08/20/23 1002    WEIGHTS  Wt Readings from Last 3 Encounters:   08/20/23 0115 88 kg (194 lb)   08/20/23 0114 92.4 kg (203 lb 11.3 oz)   08/19/23 2123 88 kg (194 lb)   08/18/23 0927 88 kg (194 lb)   08/17/23 2053 88 kg (194 lb)       PHYSICAL EXAM    CONSTITUTIONAL: NAD  HEENT: Normocephalic. No pallor  NECK: no JVD  LUNGS: CTA b/l  HEART: irregular rate and rhythm, S1, S2 normal   ABDOMEN: soft, non-tender, bowel sounds normal  EXTREMITIES: No edema  SKIN: No rash  NEURO: AAO X 3  PSYCH: normal affect      HOME MEDICATIONS:  No current facility-administered medications on file prior to encounter.     Current Outpatient Medications on File Prior to Encounter   Medication Sig Dispense Refill    apixaban (ELIQUIS) 5 mg Tab Take 1 tablet (5 mg total) by mouth 2 (two) times daily. 60 tablet 3    aspirin 81 mg Tab Take 81 mg by mouth once daily.  Every day      buPROPion (WELLBUTRIN XL) 300 MG 24 hr tablet Take 1 tablet (300 mg total) by mouth once daily. 90 tablet 3    fosinopriL (MONOPRIL) 40 MG tablet Take 1 tablet (40 mg total) by mouth once daily. 90 tablet 3    metFORMIN (GLUCOPHAGE) 500 MG tablet Take 1 tablet (500 mg total) by mouth 2 (two) times daily with meals. 180 tablet 3    metoprolol tartrate (LOPRESSOR) 25 MG tablet Take 1 tablet (25 mg total) by mouth 3 (three) times daily. 60 tablet 3    multivitamin (THERAGRAN) per tablet Take 1 tablet by mouth once daily.      pramipexole (MIRAPEX) 0.25 MG tablet TAKE 1 TABLET BY MOUTH EVERY DAY 90 tablet 3    traZODone (DESYREL) 100 MG tablet Take 2 tablets (200 mg total) by mouth every evening. 180 tablet 3    blood sugar diagnostic Strp To check BG qd times daily, to use with insurance preferred meter 30 strip 11    blood-glucose meter kit To check BG qd times daily, to use with insurance preferred meter 1 each 0    ciprofloxacin HCl (CILOXAN) 0.3 % ophthalmic solution Place 1 drop into both eyes.      fluticasone propionate (FLONASE) 50 mcg/actuation nasal spray 1 spray by Each Nostril route once daily.      ibuprofen (ADVIL,MOTRIN) 800 MG tablet Take 800 mg by mouth 2 (two) times daily.      lancets Misc To check BG qd times daily, to use with insurance preferred meter 100 each 2    [DISCONTINUED] acetaminophen-codeine 300-60mg (TYLENOL #4) 300-60 mg Tab Take 1 tablet by mouth every 6 (six) hours as needed.         SCHEDULED MEDS:   apixaban  5 mg Oral BID    aspirin  81 mg Oral Daily    buPROPion  300 mg Oral Daily    metoprolol tartrate  25 mg Oral TID    multivitamin  1 tablet Oral Daily    polyethylene glycol  17 g Oral Daily    traZODone  200 mg Oral QHS       CONTINUOUS INFUSIONS:    PRN MEDS:acetaminophen, albuterol-ipratropium, dextrose 50%, dextrose 50%, glucagon (human recombinant), glucose, glucose, insulin aspart U-100, magnesium oxide, magnesium oxide, melatonin, metoprolol, morphine,  "naloxone, ondansetron, potassium bicarbonate, potassium bicarbonate, potassium bicarbonate, potassium, sodium phosphates, potassium, sodium phosphates, potassium, sodium phosphates, sodium chloride 0.9%    LABS AND DIAGNOSTICS     CBC LAST 3 DAYS  Recent Labs   Lab 08/17/23 2109 08/19/23 2134 08/20/23 0415   WBC 9.10 9.82 8.69   RBC 5.09 5.02 4.45*   HGB 15.7 15.8 14.0   HCT 46.2 46.4 41.3   MCV 91 92 93   MCH 30.8 31.5* 31.5*   MCHC 34.0 34.1 33.9   RDW 11.9 12.5 12.4    156 117*   MPV 10.6 10.9 11.0   GRAN 54.8  5.0 57.9  5.7 53.0  4.6   LYMPH 35.7  3.3 34.1  3.4 38.4  3.3   MONO 7.0  0.6 5.8  0.6 6.0  0.5   BASO 0.06 0.05 0.05   NRBC 0 0 0       COAGULATION LAST 3 DAYS  Recent Labs   Lab 08/17/23 2109   INR 1.0       CHEMISTRY LAST 3 DAYS  Recent Labs   Lab 08/17/23 2109 08/19/23 2134 08/20/23 0415    134* 138   K 4.7 4.1 4.2    104 105   CO2 23 24 27   ANIONGAP 13 6* 6*   BUN 28* 23* 21*   CREATININE 1.6* 1.4 1.3    140* 94   CALCIUM 9.4 8.7 8.4*   MG  --  2.1 2.2   ALBUMIN 4.3 4.1 3.8   PROT 7.0 6.7 6.0   ALKPHOS 67 60 57   ALT 27 40 35   AST 27 32 24   BILITOT 0.7 1.0 1.2*       CARDIAC PROFILE LAST 3 DAYS  Recent Labs   Lab 08/17/23 2109 08/18/23  0004 08/19/23 2134 08/20/23 0415   *  --  450*  --    TROPONINI <0.006 <0.006  --   --    TROPONINIHS  --   --  5.4 6.6       ENDOCRINE LAST 3 DAYS  No results for input(s): "TSH", "PROCAL" in the last 168 hours.    LAST ARTERIAL BLOOD GAS  ABG  No results for input(s): "PH", "PO2", "PCO2", "HCO3", "BE" in the last 168 hours.    LAST 7 DAYS MICROBIOLOGY   Microbiology Results (last 7 days)       ** No results found for the last 168 hours. **            MOST RECENT IMAGING  X-Ray Chest AP Portable  XR CHEST 1 VIEW    Interpretation time/date:  8/20/2023 7:58 AM CDT    History:Chest Pain    Comparison: None    Findings:  One view chest. The heart is not enlarged. Mediastinal and hilar structures are unremarkable. The " lungs are clear bilaterally without active infiltrate consolidation or effusion. Bony structures and soft tissues visualized are unremarkable.    Impression: No visualized active chest disease.    Electronically signed by:  Jakub Perez MD  8/20/2023 7:59 AM CDT Workstation: [id number]      ECHOCARDIOGRAM RESULTS (last 5)  Results for orders placed in visit on 08/18/23    Echo    Interpretation Summary    Left Ventricle: The left ventricle is normal in size. Mildly increased wall thickness. Normal wall motion. There is normal systolic function with a visually estimated ejection fraction of 60 - 65%.    Left Atrium: Left atrium is severely dilated.    Right Ventricle: Normal right ventricular cavity size. Wall thickness is normal. Right ventricle wall motion  is normal. Systolic function is normal.    Mitral Valve: Mildly thickened anterior leaflet. There is moderate regurgitation with a posterolateral eccentriccally directed jet.  There is moderate prolapse of the anterior mitral valve leaflet.    Pulmonary Artery: No pulmonary hypertension. The estimated pulmonary artery systolic pressure is 26 mmHg.    IVC/SVC: Intermediate venous pressure at 8 mmHg.      Results for orders placed in visit on 05/11/22    Echo    Interpretation Summary  · The left ventricle is mildly enlarged with mild eccentric hypertrophy and normal systolic function.  · The estimated ejection fraction is 65%.  · Normal left ventricular diastolic function.  · Normal right ventricular size with normal right ventricular systolic function.  · Severe left atrial enlargement.  · There is mild mitral prolapse of the anterior mitral leaflet.  · Moderate-to-severe mitral regurgitation.  · Intermediate central venous pressure (8 mmHg).  · The estimated PA systolic pressure is 33 mmHg.      Results for orders placed in visit on 08/06/19    Transthoracic echo (TTE) complete (Cupid Only)    Interpretation Summary  · Normal left ventricular systolic function.  The estimated ejection fraction is 60%  · Normal LV diastolic function.  · Eccentric left ventricular hypertrophy.  · Normal right ventricular systolic function.  · Severe left atrial enlargement.  · Moderate mitral regurgitation.  · The estimated PA systolic pressure is 25 mm Hg      Results for orders placed in visit on 12/27/18    Transthoracic echo (TTE) complete    Interpretation Summary  · Normal left ventricular systolic function. The estimated ejection fraction is 60%  · Eccentric left ventricular hypertrophy.  · Normal right ventricular systolic function.  · Moderate left atrial enlargement.  · Moderate mitral regurgitation.  · The mitral valve shows mild prolapse of the anterior mitral leaflet.  · The estimated PA systolic pressure is 36 mm Hg      CURRENT/PREVIOUS VISIT EKG  Results for orders placed or performed in visit on 11/30/22   IN OFFICE EKG 12-LEAD (to [facility])    Collection Time: 11/30/22 10:54 AM    Narrative    Test Reason : Z01.818,    Vent. Rate : 062 BPM     Atrial Rate : 062 BPM     P-R Int : 206 ms          QRS Dur : 096 ms      QT Int : 406 ms       P-R-T Axes : 024 008 011 degrees     QTc Int : 412 ms    Normal sinus rhythm  Normal ECG  When compared with ECG of 26-JUL-2007 13:09,  Inverted T waves have replaced nonspecific T wave abnormality in Inferior  leads  Confirmed by Ishmael Kaufman MD ([id number]) on 11/30/2022 12:51:35 PM    Referred By: Kyara Graham           Confirmed By:Ishmael Kaufman MD           ASSESSMENT/PLAN:     Active Hospital Problems    Diagnosis    *Atrial fibrillation with rapid ventricular response    Type 2 diabetes mellitus without complication, without long-term current use of insulin    BMI 29.0-29.9,adult    Restless leg syndrome    Anticoagulant long-term use    Depression       ASSESSMENT & PLAN:     AFIB with RVR- New diagnosis per patient  DM Type 2  MR   Severe LAE on ECHO  HTN  Dyslipidemia  On Eliquis since Monday  Chest Pain- Stress scheduled as an OP- cp free  currently-trop negative      RECOMMENDATIONS:      Continue Metoprolol 25 mg PO TID  Continue Eliquis 5 mg PO BID  Give digoxin 250 X 1 for rate control since bp is soft  Plan for JASON and CV tomorrow    Maliha Osorio NP  Department of Cardiology  Date of Service: 08/20/2023        I have personally interviewed and examined the patient, I have reviewed the Nurse Practitioner's history and physical, assessment, and plan. I agree with the findings and plan.      BARON Flores M.D.  Department of Cardiology  Date of Service: 08/20/2023

## 2023-08-20 NOTE — H&P
[facility] - Emergency Dept  Hospital Medicine  History & Physical    Patient Name: Abimael Armendariz  MRN: [id number]  Patient Class: OP- Observation  Admission Date: 8/19/2023  Attending Physician: Blas Henley MD   Primary Care Provider: Sammy Trinidad MD         Patient information was obtained from patient, spouse/SO and ER records.     Subjective:     Principal Problem:Atrial fibrillation with rapid ventricular response    Chief Complaint:   Chief Complaint   Patient presents with    Chest Pain     Recent dx of Afib. Intermittent left sided chest pain radiating down left arm    Shortness of Breath        HPI: Abimael Armendariz is a 58 y.o. male with a history as  has a past medical history of ADHD (attention deficit hyperactivity disorder), Anxiety, Depression, Hyperlipidemia, Hypertension, Insomnia, and Mitral regurgitation. who presented to the ED with a Chest Pain (Recent dx of Afib. Intermittent left sided chest pain radiating down left arm) and Shortness of Breath    Patient presents to the emergency room, wife at bedside, with complaint of left-sided chest pain for last 2 days with associated dyspnea on exertion.      Patient reports recently diagnosed with atrial fibrillation 2 weeks ago while in [place] undergoing preop clearance. He reports he returned back home and was seen by Cardiology, 08/15/2023, where he was started on Lopressor and Eliquis with Holter monitor, echo and stress test ordered.  Echo completed 08/18/2023. Patient was still with chest pain and elevated heart rate and was seen at [facility], 08/17/2023- Lopressor increased to 25 mg t.i.d., discharged.     Denies fever, chills, diaphoresis, dizziness, HA, NVD, recent trauma or any other associated symptoms. No aggravating and alleviating factor.  Recently diagnosed with atrial fibrillation.  Pertinent family history father (d) MI.  Does not smoke cigarettes, drink or do illegal drugs. Does chew tobacco.     Lab and imaging  obtained and reviewed.  CBC shows MCH 31.5 otherwise unremarkable.  PT/INR within normal range.  Chemistry profile shows sodium 134 Ag 6 BUN 23 GFR 57.9 glucose 140.  .  High sensitive troponin within normal range.  EKG shows AFib with RVR rate 117 bpm.  On admit, afebrile  RR 24 /75 sats 97% on room air.       ECHO 8/18/2023  Summary     Left Ventricle: The left ventricle is normal in size. Mildly increased wall thickness. Normal wall motion. There is normal systolic function with a visually estimated ejection fraction of 60 - 65%.    Left Atrium: Left atrium is severely dilated.    Right Ventricle: Normal right ventricular cavity size. Wall thickness is normal. Right ventricle wall motion  is normal. Systolic function is normal.    Mitral Valve: Mildly thickened anterior leaflet. There is moderate regurgitation with a posterolateral eccentriccally directed jet.  There is moderate prolapse of the anterior mitral valve leaflet.    Pulmonary Artery: No pulmonary hypertension. The estimated pulmonary artery systolic pressure is 26 mmHg.    IVC/SVC: Intermediate venous pressure at 8 mmHg.    Per ED provider, patient with left-sided chest pain, obtained showed AFib RVR, IV Cardizem x1 given with rate improved and chest pain resolved.  Per ED provider, spoke with Cardiology who recommended admitting patient for possible cardioversion.          Past Medical History:   Diagnosis Date    ADHD (attention deficit hyperactivity disorder)     Anxiety     Depression     Hyperlipidemia     Hypertension     patient states he uses fosinopril for mitral regurgitation, not hypertension    Insomnia     Mitral regurgitation        Past Surgical History:   Procedure Laterality Date    KNEE ARTHROSCOPY W/ ACL RECONSTRUCTION AND HAMSTRING GRAFT  2003    right       Review of patient's allergies indicates:  No Known Allergies    No current facility-administered medications on file prior to encounter.     Current  Outpatient Medications on File Prior to Encounter   Medication Sig    apixaban (ELIQUIS) 5 mg Tab Take 1 tablet (5 mg total) by mouth 2 (two) times daily.    aspirin 81 mg Tab Take 81 mg by mouth once daily. Every day    buPROPion (WELLBUTRIN XL) 300 MG 24 hr tablet Take 1 tablet (300 mg total) by mouth once daily.    fosinopriL (MONOPRIL) 40 MG tablet Take 1 tablet (40 mg total) by mouth once daily.    metFORMIN (GLUCOPHAGE) 500 MG tablet Take 1 tablet (500 mg total) by mouth 2 (two) times daily with meals.    metoprolol tartrate (LOPRESSOR) 25 MG tablet Take 1 tablet (25 mg total) by mouth 3 (three) times daily.    pramipexole (MIRAPEX) 0.25 MG tablet TAKE 1 TABLET BY MOUTH EVERY DAY    traZODone (DESYREL) 100 MG tablet Take 2 tablets (200 mg total) by mouth every evening.    acetaminophen-codeine 300-60mg (TYLENOL #4) 300-60 mg Tab Take 1 tablet by mouth every 6 (six) hours as needed.    blood sugar diagnostic Strp To check BG qd times daily, to use with insurance preferred meter    blood-glucose meter kit To check BG qd times daily, to use with insurance preferred meter    fluticasone propionate (FLONASE) 50 mcg/actuation nasal spray 1 spray by Each Nostril route once daily.    ibuprofen (ADVIL,MOTRIN) 800 MG tablet Take 800 mg by mouth 2 (two) times daily.    lancets Misc To check BG qd times daily, to use with insurance preferred meter    multivitamin (THERAGRAN) per tablet Take 1 tablet by mouth once daily.     Family History       Problem Relation (Age of Onset)    Alzheimer's disease Maternal Grandmother    Heart attack Father, Paternal Grandfather    Heart disease Father    No Known Problems Sister, Brother, Daughter, Son    Stroke Mother          Tobacco Use    Smoking status: Never    Smokeless tobacco: Current     Types: Chew   Substance and Sexual Activity    Alcohol use: No    Drug use: No    Sexual activity: Yes     Partners: Female     Review of Systems   Constitutional:  Negative for chills,  diaphoresis and fever.   HENT:  Negative for congestion, postnasal drip, sinus pressure and sore throat.    Eyes:  Negative for visual disturbance.   Respiratory:  Positive for shortness of breath. Negative for cough, chest tightness and wheezing.    Cardiovascular:  Positive for chest pain (Left-sided). Negative for palpitations and leg swelling.   Gastrointestinal:  Negative for abdominal distention, abdominal pain, blood in stool, constipation, diarrhea, nausea and vomiting.   Endocrine: Negative.    Genitourinary:  Negative for dysuria.   Musculoskeletal: Negative.    Skin: Negative.    Allergic/Immunologic: Negative.    Neurological:  Negative for dizziness, weakness, numbness and headaches.   Hematological: Negative.    Psychiatric/Behavioral: Negative.       Objective:     Vital Signs (Most Recent):  Temp: 97.6 °F (36.4 °C) (08/19/23 2123)  Pulse: 87 (08/19/23 2145)  Resp: 20 (08/19/23 2145)  BP: 102/64 (08/19/23 2145)  SpO2: 100 % (08/19/23 2145) Vital Signs (24h Range):  Temp:  [97.6 °F (36.4 °C)] 97.6 °F (36.4 °C)  Pulse:  [] 87  Resp:  [20-24] 20  SpO2:  [97 %-100 %] 100 %  BP: (102-110)/(64-77) 102/64     Weight: 88 kg (194 lb)  Body mass index is 27.06 kg/m².     Physical Exam  Constitutional:       General: He is awake. He is not in acute distress.     Appearance: Normal appearance. He is well-developed. He is not toxic-appearing or diaphoretic.      Interventions: Nasal cannula in place.   HENT:      Head: Normocephalic and atraumatic.   Eyes:      General: Lids are normal.      Conjunctiva/sclera: Conjunctivae normal.      Pupils: Pupils are equal, round, and reactive to light.   Neck:      Thyroid: No thyroid mass or thyromegaly.      Vascular: Normal carotid pulses. No JVD.      Trachea: Trachea normal. No tracheal deviation.   Cardiovascular:      Rate and Rhythm: Normal rate. Rhythm irregularly irregular.      Pulses: Normal pulses.      Heart sounds: S1 normal and S2 normal. Murmur heard.    Pulmonary:      Effort: Pulmonary effort is normal.      Breath sounds: Normal breath sounds. No stridor.   Abdominal:      General: Bowel sounds are normal.      Palpations: Abdomen is soft.      Tenderness: There is no abdominal tenderness.   Musculoskeletal:         General: Normal range of motion.      Cervical back: Full passive range of motion without pain, normal range of motion and neck supple.   Skin:     General: Skin is warm and dry.      Nails: There is no clubbing.   Neurological:      Mental Status: He is alert and oriented to person, place, and time.      Cranial Nerves: No cranial nerve deficit.      Sensory: No sensory deficit.   Psychiatric:         Speech: Speech normal.         Behavior: Behavior normal. Behavior is cooperative.         Thought Content: Thought content normal.         Judgment: Judgment normal.              CRANIAL NERVES     CN III, IV, VI   Pupils are equal, round, and reactive to light.       Significant Labs: All pertinent labs within the past 24 hours have been reviewed.  A1C:   Recent Labs   Lab 02/27/23  1354   HGBA1C 5.4       Bilirubin:   Recent Labs   Lab 08/17/23 2109 08/19/23  2134   BILITOT 0.7 1.0     BMP:   Recent Labs   Lab 08/19/23 2134   *   *   K 4.1      CO2 24   BUN 23*   CREATININE 1.4   CALCIUM 8.7   MG 2.1     CBC:   Recent Labs   Lab 08/19/23 2134   WBC 9.82   HGB 15.8   HCT 46.4        CMP:   Recent Labs   Lab 08/19/23 2134   *   K 4.1      CO2 24   *   BUN 23*   CREATININE 1.4   CALCIUM 8.7   PROT 6.7   ALBUMIN 4.1   BILITOT 1.0   ALKPHOS 60   AST 32   ALT 40   ANIONGAP 6*     Cardiac Markers:   Recent Labs   Lab 08/19/23  2134   *     Magnesium:   Recent Labs   Lab 08/19/23  2134   MG 2.1     Troponin:   Recent Labs   Lab 08/19/23  2134   TROPONINIHS 5.4       Significant Imaging: I have reviewed all pertinent imaging results/findings within the past 24 hours.  Echo    Result Date: 8/18/2023     Left Ventricle: The left ventricle is normal in size. Mildly increased wall thickness. Normal wall motion. There is normal systolic function with a visually estimated ejection fraction of 60 - 65%.   Left Atrium: Left atrium is severely dilated.   Right Ventricle: Normal right ventricular cavity size. Wall thickness is normal. Right ventricle wall motion  is normal. Systolic function is normal.   Mitral Valve: Mildly thickened anterior leaflet. There is moderate regurgitation with a posterolateral eccentriccally directed jet.  There is moderate prolapse of the anterior mitral valve leaflet.   Pulmonary Artery: No pulmonary hypertension. The estimated pulmonary artery systolic pressure is 26 mmHg.   IVC/SVC: Intermediate venous pressure at 8 mmHg.     X-Ray Chest AP Portable    Result Date: 8/17/2023  EXAMINATION: XR CHEST AP PORTABLE CLINICAL HISTORY: Chest Pain; TECHNIQUE: Single frontal view of the chest was performed. COMPARISON: 11/30/2022 FINDINGS: The lungs are clear, with normal appearance of pulmonary vasculature and no pleural effusion or pneumothorax. The cardiac silhouette is normal in size. The hilar and mediastinal contours are unremarkable. Bones are intact.     No acute abnormality. Electronically signed by: Blas Lee Date:    08/17/2023 Time:    21:40       Assessment/Plan:     Active Hospital Problems    Diagnosis  POA    *Atrial fibrillation with rapid ventricular response [I48.91]  Yes     Priority: 1 - High    Type 2 diabetes mellitus without complication, without long-term current use of insulin [E11.9]  Yes    BMI 29.0-29.9,adult [Z68.29]  Not Applicable    Restless leg syndrome [G25.81]  Yes    Anticoagulant long-term use [Z79.01]  Not Applicable    Depression [F32.A]  Yes      Resolved Hospital Problems   No resolved problems to display.     Plan:  Admit to observation - Telemetry unit for afib w/rrvr  Continuous cardiac monitor  Pulse oximetry O2 PRN - keep sats >93%, pulse ox q4 with  vital signs  IV metoprolol p.r.n. with parameters  Cardiology consult appreciated  EKG p.r.n. chest pain  NPO after midnight  Strict I/O and daily weight  DUO-nebs q4 hours PRN  POC glucose checks AC and HS  Correction scale and adjust accordingly  Hypoglycemia Protocol  Daily labs  Electrolytes sliding scale repletion  Continue chronic home medications - Metformin, pramipexole and fosinopril held for now  Further plan as per hospital course        VTE Risk Mitigation (From admission, onward)           Ordered     apixaban tablet 5 mg  2 times daily         08/20/23 0022     IP VTE LOW RISK PATIENT  Once         08/19/23 2326     Place sequential compression device  Until discontinued         08/19/23 2326                         On 08/20/2023, patient should be placed in hospital observation services under my care in collaboration with Dr. Henley.      SHEYLA Vasquez  Department of Hospital Medicine  [facility] - Emergency Dept

## 2023-08-21 ENCOUNTER — ANESTHESIA (OUTPATIENT)
Dept: CARDIOLOGY | Facility: HOSPITAL | Age: 58
End: 2023-08-21
Payer: MEDICAID

## 2023-08-21 ENCOUNTER — ANESTHESIA EVENT (OUTPATIENT)
Dept: CARDIOLOGY | Facility: HOSPITAL | Age: 58
End: 2023-08-21
Payer: MEDICAID

## 2023-08-21 ENCOUNTER — PATIENT MESSAGE (OUTPATIENT)
Dept: CARDIOLOGY | Facility: CLINIC | Age: 58
End: 2023-08-21
Payer: MEDICAID

## 2023-08-21 ENCOUNTER — CLINICAL SUPPORT (OUTPATIENT)
Dept: CARDIOLOGY | Facility: HOSPITAL | Age: 58
End: 2023-08-21
Attending: EMERGENCY MEDICINE
Payer: MEDICAID

## 2023-08-21 VITALS — WEIGHT: 194 LBS | HEIGHT: 71 IN | BODY MASS INDEX: 27.16 KG/M2

## 2023-08-21 VITALS
RESPIRATION RATE: 18 BRPM | HEART RATE: 131 BPM | SYSTOLIC BLOOD PRESSURE: 137 MMHG | HEIGHT: 71 IN | BODY MASS INDEX: 27.16 KG/M2 | OXYGEN SATURATION: 92 % | TEMPERATURE: 100 F | WEIGHT: 194 LBS | DIASTOLIC BLOOD PRESSURE: 89 MMHG

## 2023-08-21 LAB
ALBUMIN SERPL BCP-MCNC: 3.8 G/DL (ref 3.5–5.2)
ALP SERPL-CCNC: 59 U/L (ref 55–135)
ALT SERPL W/O P-5'-P-CCNC: 28 U/L (ref 10–44)
ANION GAP SERPL CALC-SCNC: 5 MMOL/L (ref 8–16)
AST SERPL-CCNC: 18 U/L (ref 10–40)
BASOPHILS # BLD AUTO: 0.04 K/UL (ref 0–0.2)
BASOPHILS NFR BLD: 0.4 % (ref 0–1.9)
BILIRUB SERPL-MCNC: 1.7 MG/DL (ref 0.1–1)
BSA FOR ECHO PROCEDURE: 2.1 M2
BUN SERPL-MCNC: 10 MG/DL (ref 6–20)
CALCIUM SERPL-MCNC: 8.7 MG/DL (ref 8.7–10.5)
CHLORIDE SERPL-SCNC: 107 MMOL/L (ref 95–110)
CO2 SERPL-SCNC: 26 MMOL/L (ref 23–29)
CREAT SERPL-MCNC: 1 MG/DL (ref 0.5–1.4)
DIFFERENTIAL METHOD: ABNORMAL
EOSINOPHIL # BLD AUTO: 0.1 K/UL (ref 0–0.5)
EOSINOPHIL NFR BLD: 0.9 % (ref 0–8)
ERYTHROCYTE [DISTWIDTH] IN BLOOD BY AUTOMATED COUNT: 12.5 % (ref 11.5–14.5)
EST. GFR  (NO RACE VARIABLE): >60 ML/MIN/1.73 M^2
GLUCOSE SERPL-MCNC: 105 MG/DL (ref 70–110)
GLUCOSE SERPL-MCNC: 107 MG/DL (ref 70–110)
GLUCOSE SERPL-MCNC: 153 MG/DL (ref 70–110)
HCT VFR BLD AUTO: 41.5 % (ref 40–54)
HGB BLD-MCNC: 14 G/DL (ref 14–18)
IMM GRANULOCYTES # BLD AUTO: 0.01 K/UL (ref 0–0.04)
IMM GRANULOCYTES NFR BLD AUTO: 0.1 % (ref 0–0.5)
LACTATE SERPL-SCNC: 1.1 MMOL/L (ref 0.5–1.9)
LYMPHOCYTES # BLD AUTO: 2 K/UL (ref 1–4.8)
LYMPHOCYTES NFR BLD: 19 % (ref 18–48)
MAGNESIUM SERPL-MCNC: 2.6 MG/DL (ref 1.6–2.6)
MCH RBC QN AUTO: 31.3 PG (ref 27–31)
MCHC RBC AUTO-ENTMCNC: 33.7 G/DL (ref 32–36)
MCV RBC AUTO: 93 FL (ref 82–98)
MONOCYTES # BLD AUTO: 0.8 K/UL (ref 0.3–1)
MONOCYTES NFR BLD: 7.4 % (ref 4–15)
MR PISA EROA: 0.58 CM2
NEUTROPHILS # BLD AUTO: 7.7 K/UL (ref 1.8–7.7)
NEUTROPHILS NFR BLD: 72.2 % (ref 38–73)
NRBC BLD-RTO: 0 /100 WBC
PHOSPHATE SERPL-MCNC: 2.4 MG/DL (ref 2.7–4.5)
PISA MRMAX VEL: 3 M/S
PISA RADIUS: 0.9 CM
PISA VN NYQUIST MS: 0.34 M/S
PISA VN NYQUIST: 0.34 M/S
PLATELET # BLD AUTO: 121 K/UL (ref 150–450)
PMV BLD AUTO: 11 FL (ref 9.2–12.9)
POTASSIUM SERPL-SCNC: 4.4 MMOL/L (ref 3.5–5.1)
PROT SERPL-MCNC: 6.5 G/DL (ref 6–8.4)
RA PRESSURE ESTIMATED: 3 MMHG
RBC # BLD AUTO: 4.48 M/UL (ref 4.6–6.2)
SODIUM SERPL-SCNC: 138 MMOL/L (ref 136–145)
WBC # BLD AUTO: 10.7 K/UL (ref 3.9–12.7)

## 2023-08-21 PROCEDURE — 84100 ASSAY OF PHOSPHORUS: CPT | Performed by: NURSE PRACTITIONER

## 2023-08-21 PROCEDURE — 37000009 HC ANESTHESIA EA ADD 15 MINS: Performed by: INTERNAL MEDICINE

## 2023-08-21 PROCEDURE — 93321 TRANSESOPHAGEAL ECHO (TEE) W/ POSSIBLE CARDIOVERSION: ICD-10-PCS | Mod: 26,,, | Performed by: SPECIALIST

## 2023-08-21 PROCEDURE — 99900035 HC TECH TIME PER 15 MIN (STAT)

## 2023-08-21 PROCEDURE — 80053 COMPREHEN METABOLIC PANEL: CPT | Performed by: NURSE PRACTITIONER

## 2023-08-21 PROCEDURE — 93010 EKG 12-LEAD: ICD-10-PCS | Mod: ,,, | Performed by: SPECIALIST

## 2023-08-21 PROCEDURE — 63600175 PHARM REV CODE 636 W HCPCS: Performed by: SPECIALIST

## 2023-08-21 PROCEDURE — 93005 ELECTROCARDIOGRAM TRACING: CPT | Performed by: SPECIALIST

## 2023-08-21 PROCEDURE — 93325 TRANSESOPHAGEAL ECHO (TEE) W/ POSSIBLE CARDIOVERSION: ICD-10-PCS | Mod: 26,,, | Performed by: SPECIALIST

## 2023-08-21 PROCEDURE — 93312 TRANSESOPHAGEAL ECHO (TEE) W/ POSSIBLE CARDIOVERSION: ICD-10-PCS | Mod: 26,,, | Performed by: SPECIALIST

## 2023-08-21 PROCEDURE — D9220A PRA ANESTHESIA: ICD-10-PCS | Mod: CRNA,,, | Performed by: NURSE ANESTHETIST, CERTIFIED REGISTERED

## 2023-08-21 PROCEDURE — 94761 N-INVAS EAR/PLS OXIMETRY MLT: CPT

## 2023-08-21 PROCEDURE — 93325 DOPPLER ECHO COLOR FLOW MAPG: CPT

## 2023-08-21 PROCEDURE — 83605 ASSAY OF LACTIC ACID: CPT | Performed by: STUDENT IN AN ORGANIZED HEALTH CARE EDUCATION/TRAINING PROGRAM

## 2023-08-21 PROCEDURE — 92960 CARDIOVERSION ELECTRIC EXT: CPT | Mod: ,,, | Performed by: SPECIALIST

## 2023-08-21 PROCEDURE — 63600175 PHARM REV CODE 636 W HCPCS

## 2023-08-21 PROCEDURE — 96365 THER/PROPH/DIAG IV INF INIT: CPT | Mod: 59 | Performed by: INTERNAL MEDICINE

## 2023-08-21 PROCEDURE — D9220A PRA ANESTHESIA: Mod: CRNA,,, | Performed by: NURSE ANESTHETIST, CERTIFIED REGISTERED

## 2023-08-21 PROCEDURE — 82962 GLUCOSE BLOOD TEST: CPT

## 2023-08-21 PROCEDURE — D9220A PRA ANESTHESIA: Mod: ANES,,, | Performed by: ANESTHESIOLOGY

## 2023-08-21 PROCEDURE — 93010 ELECTROCARDIOGRAM REPORT: CPT | Mod: ,,, | Performed by: SPECIALIST

## 2023-08-21 PROCEDURE — 93325 DOPPLER ECHO COLOR FLOW MAPG: CPT | Mod: 26,,, | Performed by: SPECIALIST

## 2023-08-21 PROCEDURE — 37000008 HC ANESTHESIA 1ST 15 MINUTES: Performed by: INTERNAL MEDICINE

## 2023-08-21 PROCEDURE — 93312 ECHO TRANSESOPHAGEAL: CPT | Mod: 26,,, | Performed by: SPECIALIST

## 2023-08-21 PROCEDURE — G0378 HOSPITAL OBSERVATION PER HR: HCPCS

## 2023-08-21 PROCEDURE — 83735 ASSAY OF MAGNESIUM: CPT | Performed by: NURSE PRACTITIONER

## 2023-08-21 PROCEDURE — 92960 CARDIOVERSION ELECTRIC EXT: CPT | Performed by: INTERNAL MEDICINE

## 2023-08-21 PROCEDURE — D9220A PRA ANESTHESIA: ICD-10-PCS | Mod: ANES,,, | Performed by: ANESTHESIOLOGY

## 2023-08-21 PROCEDURE — 96376 TX/PRO/DX INJ SAME DRUG ADON: CPT | Mod: 59

## 2023-08-21 PROCEDURE — 93321 DOPPLER ECHO F-UP/LMTD STD: CPT | Mod: 26,,, | Performed by: SPECIALIST

## 2023-08-21 PROCEDURE — 25000003 PHARM REV CODE 250: Performed by: NURSE PRACTITIONER

## 2023-08-21 PROCEDURE — 63600175 PHARM REV CODE 636 W HCPCS: Performed by: NURSE ANESTHETIST, CERTIFIED REGISTERED

## 2023-08-21 PROCEDURE — 96375 TX/PRO/DX INJ NEW DRUG ADDON: CPT | Mod: 59

## 2023-08-21 PROCEDURE — 36415 COLL VENOUS BLD VENIPUNCTURE: CPT | Performed by: STUDENT IN AN ORGANIZED HEALTH CARE EDUCATION/TRAINING PROGRAM

## 2023-08-21 PROCEDURE — 92960 PR CARDIOVERSION, ELECTIVE;EXTERN: ICD-10-PCS | Mod: ,,, | Performed by: SPECIALIST

## 2023-08-21 PROCEDURE — 25000003 PHARM REV CODE 250: Performed by: NURSE ANESTHETIST, CERTIFIED REGISTERED

## 2023-08-21 PROCEDURE — 99900031 HC PATIENT EDUCATION (STAT)

## 2023-08-21 PROCEDURE — 85025 COMPLETE CBC W/AUTO DIFF WBC: CPT | Performed by: NURSE PRACTITIONER

## 2023-08-21 RX ORDER — AMIODARONE HYDROCHLORIDE 200 MG/1
400 TABLET ORAL 2 TIMES DAILY
Status: DISCONTINUED | OUTPATIENT
Start: 2023-08-21 | End: 2023-08-21 | Stop reason: HOSPADM

## 2023-08-21 RX ORDER — PROPOFOL 10 MG/ML
VIAL (ML) INTRAVENOUS
Status: DISCONTINUED | OUTPATIENT
Start: 2023-08-21 | End: 2023-08-21

## 2023-08-21 RX ORDER — AMIODARONE HYDROCHLORIDE 400 MG/1
TABLET ORAL
Qty: 371 TABLET | Refills: 0 | Status: ON HOLD | OUTPATIENT
Start: 2023-08-21 | End: 2023-08-26 | Stop reason: HOSPADM

## 2023-08-21 RX ADMIN — METOPROLOL TARTRATE 25 MG: 25 TABLET, FILM COATED ORAL at 09:08

## 2023-08-21 RX ADMIN — SODIUM CHLORIDE: 0.9 INJECTION, SOLUTION INTRAVENOUS at 11:08

## 2023-08-21 RX ADMIN — PROPOFOL 30 MG: 10 INJECTION, EMULSION INTRAVENOUS at 11:08

## 2023-08-21 RX ADMIN — PROPOFOL 100 MG: 10 INJECTION, EMULSION INTRAVENOUS at 11:08

## 2023-08-21 RX ADMIN — POLYETHYLENE GLYCOL 3350 17 G: 17 POWDER, FOR SOLUTION ORAL at 09:08

## 2023-08-21 RX ADMIN — BUPROPION HYDROCHLORIDE 300 MG: 150 TABLET, FILM COATED, EXTENDED RELEASE ORAL at 09:08

## 2023-08-21 RX ADMIN — MULTIVITAMIN TABLET 1 TABLET: TABLET at 09:08

## 2023-08-21 RX ADMIN — AMIODARONE HYDROCHLORIDE 150 MG: 1.5 INJECTION, SOLUTION INTRAVENOUS at 09:08

## 2023-08-21 RX ADMIN — AMIODARONE HYDROCHLORIDE 1 MG/MIN: 1.8 INJECTION, SOLUTION INTRAVENOUS at 11:08

## 2023-08-21 RX ADMIN — APIXABAN 5 MG: 5 TABLET, FILM COATED ORAL at 09:08

## 2023-08-21 RX ADMIN — ASPIRIN 81 MG: 81 TABLET, COATED ORAL at 09:08

## 2023-08-21 NOTE — PROCEDURES
"Abimael Armendariz is a 58 y.o. male patient.    Temp: 98.6 °F (37 °C) (08/21/23 0712)  Pulse: 77 (08/21/23 1013)  Resp: 15 (08/21/23 0820)  BP: 113/74 (re-take) (08/21/23 1034)  SpO2: (!) 94 % (08/21/23 0820)  Weight: 88 kg (194 lb) (08/20/23 0115)  Height: 5' 11" (180.3 cm) (08/20/23 0115)       Procedures     JASON consent obtained   -No absolute contraindications of esophageal stricture, tumor, perforation, laceration,or diverticulum and/or active GI bleed  -The risks, benefits & alternatives of the procedure were explained to the patient.   -The risks of transesophageal echo include but are not limited to:  Dental trauma, esophageal trauma/perforation, bleeding, laryngospasm/brochospasm, aspiration, sore throat/hoarseness, & dislodgement of the endotracheal tube/nasogastric tube (where applicable).    -The risks of moderate sedation include hypotension, respiratory depression, arrhythmias, bronchospasm, & death.    -Informed consent was obtained. The patient is agreeable to proceed with the procedure and all questions and concerns addressed.    M'allampati score 1 propofol used for sedation  JASON probe then passed  via  transesophageal route and  imaging performed in the  standard 3 views ie  Upper esophageal, ,mid esophageal, and transgastric views . Saline bubbles were injected IV for PFO imaging of   Intraatrial septum.   At end of procedure, JASON probe removed. The patient tolerated procedure well .     OF PRELIMINARY TEEp ENLARGED LEFT ATRIUM=-LEFT ATRIAL APPENDAGE IS CLEAR  RATHER MARKED PROLAPSE OF THE ANTERIOR LEAF OF THE MITRAL VALVE WITH A QUESTION OF A SMALL HOLE AND MODERATELY SEVERE MITRAL REGURGITATION  8/21/2023    "

## 2023-08-21 NOTE — NURSING
Pt taken to heart center for JASON/ Cardioversion at this time via wheelchair with no s/s of pain or distress. Pt is AAOx 4

## 2023-08-21 NOTE — NURSING TRANSFER
Nursing Transfer Note      8/21/2023   2:35 PM    Nurse giving handoff:Cristian LEWIS RN  Nurse receiving handoff:Nakia SANDERS    Reason patient is being transferred: s/p JASON/CV    Transfer To: 2517 from 1404    Transfer via wheelchair    Transfer with cardiac monitoring    Transported by Nakia SANDERS

## 2023-08-21 NOTE — TRANSFER OF CARE
"Anesthesia Transfer of Care Note    Patient: Abimael Armendariz    Procedure(s) Performed: Procedure(s) (LRB):  Transesophageal echo (JASON) intra-procedure log documentation (Bilateral)    Patient location: Telemetry/Step Down Unit    Anesthesia Type: general    Transport from OR: Transported from OR on room air with adequate spontaneous ventilation    Post pain: adequate analgesia    Post assessment: no apparent anesthetic complications and tolerated procedure well    Post vital signs: stable    Level of consciousness: awake, alert and oriented    Nausea/Vomiting: no nausea/vomiting    Complications: none    Transfer of care protocol was followed      Last vitals:   Visit Vitals  /74   Pulse 77   Temp 37 °C (98.6 °F) (Oral)   Resp 15   Ht 5' 11" (1.803 m)   Wt 88 kg (194 lb)   SpO2 (!) 94%   BMI 27.06 kg/m²     "

## 2023-08-21 NOTE — ANESTHESIA POSTPROCEDURE EVALUATION
Anesthesia Post Evaluation    Patient: Abimael Armendariz    Procedure(s) Performed: Procedure(s) (LRB):  Transesophageal echo (JASON) intra-procedure log documentation (Bilateral)    Final Anesthesia Type: general      Patient location: [facility].  Patient participation: Yes- Able to Participate  Level of consciousness: awake and alert  Post-procedure vital signs: reviewed and stable  Pain management: adequate  Airway patency: patent    PONV status at discharge: No PONV  Anesthetic complications: no      Cardiovascular status: stable  Respiratory status: unassisted  Hydration status: euvolemic  Follow-up not needed.          Vitals Value Taken Time   BP 93/57 08/21/23 1248   Temp 37 °C (98.6 °F) 08/21/23 0712   Pulse 54 08/21/23 1252   Resp 21 08/21/23 1252   SpO2 92 % 08/21/23 1252   Vitals shown include unvalidated device data.      No case tracking events are documented in the log.      Pain/Xander Score: No data recorded

## 2023-08-21 NOTE — CARE UPDATE
08/21/23 0820   Patient Assessment/Suction   Level of Consciousness (AVPU) alert   Respiratory Effort Unlabored   All Lung Fields Breath Sounds clear   PRE-TX-O2   Device (Oxygen Therapy) room air   SpO2 (!) 94 %   Pulse Oximetry Type Intermittent   $ Pulse Oximetry - Multiple Charge Pulse Oximetry - Multiple   Pulse 79   Resp 15   Aerosol Therapy   $ Aerosol Therapy Charges PRN treatment not required   Education   $ Education Bronchodilator;15 min   Respiratory Evaluation   $ Care Plan Tech Time 15 min

## 2023-08-21 NOTE — ANESTHESIA PREPROCEDURE EVALUATION
08/21/2023  Abimael Armendariz is a 58 y.o., male.      Pre-op Assessment    I have reviewed the Patient Summary Reports.     I have reviewed the Nursing Notes. I have reviewed the NPO Status.   I have reviewed the Medications.     Review of Systems  Anesthesia Hx:  Denies Family Hx of Anesthesia complications.   Denies Personal Hx of Anesthesia complications.   Social:  Non-Smoker, No Alcohol Use    Hematology/Oncology:  Hematology Normal   Oncology Normal   Hematology Comments: Eliquis this morning    EENT/Dental:   Some teeth chipped and broken   Cardiovascular:   Valvular problems/Murmurs ( moderate), MR Dysrhythmias atrial fibrillation Angina Orthopnea (And cough since atrial fibrillation started) hyperlipidemia    Pulmonary:  Pulmonary Normal    Renal/:  Renal/ Normal     Hepatic/GI:   GERD ( occasional but none today)    Musculoskeletal:  Musculoskeletal Normal    Neurological:  Neurology Normal    Endocrine:   Diabetes, type 2    Psych:   Psychiatric History (ADHD) anxiety depression          Physical Exam  General: Well nourished, Cooperative, Alert and Oriented    Airway:  Mallampati: III   Mouth Opening: Normal  TM Distance: > 6 cm  Tongue: Normal  Neck ROM: Normal ROM    Dental:  Intact    Chest/Lungs:  Clear to auscultation, Normal Respiratory Rate, Rales, Basilar    Heart:  Rate: Normal  Rhythm: Irregularly Irregular  Sounds: Normal        Anesthesia Plan  Type of Anesthesia, risks & benefits discussed:    Anesthesia Type: Gen Natural Airway  Intra-op Monitoring Plan: Standard ASA Monitors  Induction:  IV  Informed Consent: Informed consent signed with the Patient and all parties understand the risks and agree with anesthesia plan.  All questions answered.   ASA Score: 3  Anesthesia Plan Notes: Procedural oxygen mask  Limit IV fluid    Ready For Surgery From Anesthesia Perspective.      .

## 2023-08-21 NOTE — PROCEDURES
"Abimael Armendariz is a 58 y.o. male patient.    Temp: 98.6 °F (37 °C) (08/21/23 0712)  Pulse: 77 (08/21/23 1013)  Resp: 15 (08/21/23 0820)  BP: 113/74 (re-take) (08/21/23 1034)  SpO2: (!) 94 % (08/21/23 0820)  Weight: 88 kg (194 lb) (08/20/23 0115)  Height: 5' 11" (180.3 cm) (08/20/23 0115)       Electrical Cardioversion    Date/Time: 8/19/2023 9:23 PM  Location procedure was performed: [facility] CARDIOLOGY    Performed by: Mario Johnston MD  Authorized by: Mario Johnston MD  Assisting provider: Mario Johnston MD  Pre-operative diagnosis: AFIB  Post-operative diagnosis: SAME  Consent Done: Yes  Consent: Verbal consent obtained. Written consent obtained.  Consent given by: patient  Patient understanding: patient states understanding of the procedure being performed  Patient consent: the patient's understanding of the procedure matches consent given  Procedure consent: procedure consent matches procedure scheduled  Relevant documents: relevant documents present and verified    Patient sedated: yes  Sedatives: propofol  Vitals: Vital signs were monitored during sedation.  Cardioversion basis: elective  Indications: failure of anti-arrhythmic medications  Pre-procedure rhythm: atrial fibrillation  Patient position: patient was placed in a supine position  Chest area: chest area exposed  Electrodes: pads  Electrodes placed: anterior-posterior  Number of attempts: 1  Attempt 1 mode: synchronous  Attempt 1 shock (in Joules): 200  Attempt 1 outcome: conversion to normal sinus rhythm  Post-procedure rhythm: normal sinus rhythm  Complications: no complications  Patient tolerance: Patient tolerated the procedure well with no immediate complications  Technical procedures used: CARDIOVERSION  Significant surgical tasks conducted by the assistant(s): na  Complications: No  Estimated blood loss (mL): 0  Specimens: No  Implants: No          8/21/2023    "

## 2023-08-21 NOTE — NURSING
Pt returned to room 2517 from heart center at this time via wheelchair with no s/s of pain or distress. Pt is AAOx 4

## 2023-08-22 ENCOUNTER — PATIENT OUTREACH (OUTPATIENT)
Dept: ADMINISTRATIVE | Facility: HOSPITAL | Age: 58
End: 2023-08-22
Payer: MEDICAID

## 2023-08-22 ENCOUNTER — PATIENT MESSAGE (OUTPATIENT)
Dept: ADMINISTRATIVE | Facility: HOSPITAL | Age: 58
End: 2023-08-22
Payer: MEDICAID

## 2023-08-22 ENCOUNTER — TELEPHONE (OUTPATIENT)
Dept: CARDIOLOGY | Facility: CLINIC | Age: 58
End: 2023-08-22
Payer: MEDICAID

## 2023-08-22 NOTE — TELEPHONE ENCOUNTER
----- Message from Perez Jennings sent at 8/22/2023 12:21 PM CDT -----  Regarding: appointment  Contact: patient  Type:  Sooner Apoointment Request    Caller is requesting a sooner appointment.  Caller declined first available appointment listed below.  Caller will not accept being placed on the waitlist and is requesting a message be sent to doctor.  Name of Caller:patient  When is the first available appointment?unable to schedule  Symptoms:hospital f/u/ A-fib for a week Chest pain SOB referral  Would the patient rather a call back or a response via [facility]? call  Best Call Back Number:[phone] or [phone]  Additional Information: please call to advise/schedule

## 2023-08-22 NOTE — TELEPHONE ENCOUNTER
----- Message from Nanette Swanson sent at 8/22/2023 10:11 AM CDT -----  Contact: wife  Type: Needs Medical Advice  Who Called:  wife  Best Call Back Number: [phone] (home)   Additional Information: patient was in hospital- needs to know if he needs to still have all of these tests done.  Also needs a f/u asap

## 2023-08-22 NOTE — TELEPHONE ENCOUNTER
----- Message from Nanette Swanson sent at 8/22/2023 12:06 PM CDT -----  Contact: patient  Type:  Patient Returning Call    Who Called:  patient  Who Left Message for Patient:  jannet  Does the patient know what this is regarding?:    Best Call Back Number:  [phone] (home)   Additional Information:

## 2023-08-22 NOTE — PLAN OF CARE
Patient discharged after case management hours. Chart and orders reviewed, patient cleared for discharge from .     Discharged to home       08/22/23 0804   Final Note   Assessment Type Final Discharge Note   Anticipated Discharge Disposition Home

## 2023-08-22 NOTE — TELEPHONE ENCOUNTER
Spoke to pt and advised he does not to do Holter monitor but still do stress test and keep appt with Dr Watts in September

## 2023-08-23 ENCOUNTER — PATIENT MESSAGE (OUTPATIENT)
Dept: CARDIOLOGY | Facility: CLINIC | Age: 58
End: 2023-08-23
Payer: MEDICAID

## 2023-08-23 NOTE — TELEPHONE ENCOUNTER
Spoke with spouse, states pt follows Cardiology in [place] and is scheduled for testing and a follow up there in the near future. Prefers to continue with [place] location at this time.

## 2023-08-23 NOTE — TELEPHONE ENCOUNTER
----- Message from Armando Green sent at 8/23/2023 10:10 AM CDT -----  Type: Need Medical Advice   Who Called: wife of patient  Best callback number:   Additional Information: wife called to speak with Dr Johnston or his nurse with questions about her 's procedure. She also wanted to schedule a one week follow up appointment, I sent that to cardio scheduling  Please call to further assist, Thanks.

## 2023-08-23 NOTE — TELEPHONE ENCOUNTER
----- Message from Armando Green sent at 8/23/2023 10:04 AM CDT -----  Type: Need Medical Advice   Who Called: Wife of patient  Best callback number:   Additional Information: wife of patient called to schedule an appointment to see Dr Johnston, patient was discharged on Monday 8/21 and will need to schedule a one week follow up appointment   Please call to further assist with scheduling, Thanks.

## 2023-08-24 ENCOUNTER — CLINICAL SUPPORT (OUTPATIENT)
Dept: CARDIOLOGY | Facility: HOSPITAL | Age: 58
End: 2023-08-24
Attending: INTERNAL MEDICINE
Payer: MEDICAID

## 2023-08-24 ENCOUNTER — HOSPITAL ENCOUNTER (OUTPATIENT)
Dept: RADIOLOGY | Facility: HOSPITAL | Age: 58
Discharge: HOME | End: 2023-08-24
Attending: INTERNAL MEDICINE
Payer: MEDICAID

## 2023-08-24 ENCOUNTER — HOSPITAL ENCOUNTER (OUTPATIENT)
Facility: HOSPITAL | Age: 58
Discharge: HOME | End: 2023-08-26
Attending: EMERGENCY MEDICINE | Admitting: HOSPITALIST
Payer: MEDICAID

## 2023-08-24 VITALS — WEIGHT: 194 LBS | BODY MASS INDEX: 27.16 KG/M2 | HEIGHT: 71 IN

## 2023-08-24 DIAGNOSIS — Z79.01 ANTICOAGULANT LONG-TERM USE: ICD-10-CM

## 2023-08-24 DIAGNOSIS — I10 PRIMARY HYPERTENSION: ICD-10-CM

## 2023-08-24 DIAGNOSIS — Z00.00 ANNUAL PHYSICAL EXAM: ICD-10-CM

## 2023-08-24 DIAGNOSIS — I50.23 ACUTE ON CHRONIC SYSTOLIC CONGESTIVE HEART FAILURE: ICD-10-CM

## 2023-08-24 DIAGNOSIS — I50.9 ACUTE HEART FAILURE, UNSPECIFIED HEART FAILURE TYPE: Primary | ICD-10-CM

## 2023-08-24 DIAGNOSIS — E11.9 NEWLY DIAGNOSED DIABETES: ICD-10-CM

## 2023-08-24 DIAGNOSIS — I34.0 NONRHEUMATIC MITRAL VALVE REGURGITATION: ICD-10-CM

## 2023-08-24 DIAGNOSIS — E78.5 DYSLIPIDEMIA: ICD-10-CM

## 2023-08-24 DIAGNOSIS — I48.19 PERSISTENT ATRIAL FIBRILLATION: ICD-10-CM

## 2023-08-24 DIAGNOSIS — R07.9 CHEST PAIN: ICD-10-CM

## 2023-08-24 LAB
ALBUMIN SERPL BCP-MCNC: 3.8 G/DL (ref 3.5–5.2)
ALP SERPL-CCNC: 70 U/L (ref 55–135)
ALT SERPL W/O P-5'-P-CCNC: 28 U/L (ref 10–44)
ANION GAP SERPL CALC-SCNC: 12 MMOL/L (ref 8–16)
AST SERPL-CCNC: 21 U/L (ref 10–40)
BACTERIA #/AREA URNS HPF: ABNORMAL /HPF
BASOPHILS # BLD AUTO: 0.04 K/UL (ref 0–0.2)
BASOPHILS NFR BLD: 0.5 % (ref 0–1.9)
BILIRUB SERPL-MCNC: 1.6 MG/DL (ref 0.1–1)
BILIRUB UR QL STRIP: NEGATIVE
BNP SERPL-MCNC: 261 PG/ML (ref 0–99)
BUN SERPL-MCNC: 18 MG/DL (ref 6–20)
CALCIUM SERPL-MCNC: 9.1 MG/DL (ref 8.7–10.5)
CHLORIDE SERPL-SCNC: 103 MMOL/L (ref 95–110)
CLARITY UR: CLEAR
CO2 SERPL-SCNC: 24 MMOL/L (ref 23–29)
COLOR UR: YELLOW
CREAT SERPL-MCNC: 1.2 MG/DL (ref 0.5–1.4)
CV PHARM DOSE: 0.4 MG
CV STRESS BASE HR: 49 BPM
DIASTOLIC BLOOD PRESSURE: 93 MMHG
DIFFERENTIAL METHOD: ABNORMAL
EOSINOPHIL # BLD AUTO: 0.1 K/UL (ref 0–0.5)
EOSINOPHIL NFR BLD: 1.5 % (ref 0–8)
ERYTHROCYTE [DISTWIDTH] IN BLOOD BY AUTOMATED COUNT: 12.2 % (ref 11.5–14.5)
EST. GFR  (NO RACE VARIABLE): >60 ML/MIN/1.73 M^2
GLUCOSE SERPL-MCNC: 93 MG/DL (ref 70–110)
GLUCOSE UR QL STRIP: NEGATIVE
HCT VFR BLD AUTO: 40.3 % (ref 40–54)
HGB BLD-MCNC: 13.4 G/DL (ref 14–18)
HGB UR QL STRIP: NEGATIVE
HYALINE CASTS #/AREA URNS LPF: 3 /LPF
IMM GRANULOCYTES # BLD AUTO: 0.03 K/UL (ref 0–0.04)
IMM GRANULOCYTES NFR BLD AUTO: 0.4 % (ref 0–0.5)
KETONES UR QL STRIP: NEGATIVE
LEUKOCYTE ESTERASE UR QL STRIP: NEGATIVE
LYMPHOCYTES # BLD AUTO: 1.9 K/UL (ref 1–4.8)
LYMPHOCYTES NFR BLD: 22.5 % (ref 18–48)
MCH RBC QN AUTO: 31.1 PG (ref 27–31)
MCHC RBC AUTO-ENTMCNC: 33.3 G/DL (ref 32–36)
MCV RBC AUTO: 94 FL (ref 82–98)
MICROSCOPIC COMMENT: ABNORMAL
MONOCYTES # BLD AUTO: 0.8 K/UL (ref 0.3–1)
MONOCYTES NFR BLD: 9.9 % (ref 4–15)
NEUTROPHILS # BLD AUTO: 5.4 K/UL (ref 1.8–7.7)
NEUTROPHILS NFR BLD: 65.2 % (ref 38–73)
NITRITE UR QL STRIP: NEGATIVE
NRBC BLD-RTO: 0 /100 WBC
NUC STRESS EJECTION FRACTION: 47 %
OHS CV CPX 1 MINUTE RECOVERY HEART RATE: 62 BPM
OHS CV CPX 85 PERCENT MAX PREDICTED HEART RATE MALE: 137
OHS CV CPX MAX PREDICTED HEART RATE: 161
OHS CV CPX PATIENT IS FEMALE: 0
OHS CV CPX PATIENT IS MALE: 1
OHS CV CPX PEAK DIASTOLIC BLOOD PRESSURE: 93 MMHG
OHS CV CPX PEAK HEAR RATE: 68 BPM
OHS CV CPX PEAK RATE PRESSURE PRODUCT: NORMAL
OHS CV CPX PEAK SYSTOLIC BLOOD PRESSURE: 151 MMHG
OHS CV CPX PERCENT MAX PREDICTED HEART RATE ACHIEVED: 42
OHS CV CPX RATE PRESSURE PRODUCT PRESENTING: 7399
OHS CV PHARM TIME: 1452 MIN
PH UR STRIP: 6 [PH] (ref 5–8)
PLATELET # BLD AUTO: 125 K/UL (ref 150–450)
PMV BLD AUTO: 11.3 FL (ref 9.2–12.9)
POTASSIUM SERPL-SCNC: 4.6 MMOL/L (ref 3.5–5.1)
PROT SERPL-MCNC: 6.5 G/DL (ref 6–8.4)
PROT UR QL STRIP: ABNORMAL
RBC # BLD AUTO: 4.31 M/UL (ref 4.6–6.2)
RBC #/AREA URNS HPF: 1 /HPF (ref 0–4)
SODIUM SERPL-SCNC: 139 MMOL/L (ref 136–145)
SP GR UR STRIP: 1.02 (ref 1–1.03)
SQUAMOUS #/AREA URNS HPF: 0 /HPF
SYSTOLIC BLOOD PRESSURE: 151 MMHG
TROPONIN I SERPL DL<=0.01 NG/ML-MCNC: 0.02 NG/ML (ref 0–0.03)
TROPONIN I SERPL DL<=0.01 NG/ML-MCNC: 0.03 NG/ML (ref 0–0.03)
URN SPEC COLLECT METH UR: ABNORMAL
UROBILINOGEN UR STRIP-ACNC: ABNORMAL EU/DL
WBC # BLD AUTO: 8.21 K/UL (ref 3.9–12.7)
WBC #/AREA URNS HPF: 2 /HPF (ref 0–5)

## 2023-08-24 PROCEDURE — 63600175 PHARM REV CODE 636 W HCPCS: Performed by: EMERGENCY MEDICINE

## 2023-08-24 PROCEDURE — 99285 EMERGENCY DEPT VISIT HI MDM: CPT | Mod: 25

## 2023-08-24 PROCEDURE — 78452 HT MUSCLE IMAGE SPECT MULT: CPT | Mod: 26,,, | Performed by: INTERNAL MEDICINE

## 2023-08-24 PROCEDURE — 80053 COMPREHEN METABOLIC PANEL: CPT | Performed by: NURSE PRACTITIONER

## 2023-08-24 PROCEDURE — G0378 HOSPITAL OBSERVATION PER HR: HCPCS

## 2023-08-24 PROCEDURE — A9502 TC99M TETROFOSMIN: HCPCS | Mod: PO

## 2023-08-24 PROCEDURE — 93016 NUCLEAR STRESS - CARDIOLOGY INTERPRETED (CUPID ONLY): ICD-10-PCS | Mod: ,,, | Performed by: INTERNAL MEDICINE

## 2023-08-24 PROCEDURE — 93010 EKG 12-LEAD: ICD-10-PCS | Mod: ,,, | Performed by: INTERNAL MEDICINE

## 2023-08-24 PROCEDURE — 83880 ASSAY OF NATRIURETIC PEPTIDE: CPT | Performed by: NURSE PRACTITIONER

## 2023-08-24 PROCEDURE — 36415 COLL VENOUS BLD VENIPUNCTURE: CPT | Performed by: NURSE PRACTITIONER

## 2023-08-24 PROCEDURE — 93018 PR CARDIAC STRESS TST,INTERP/REPT ONLY: ICD-10-PCS | Mod: ,,, | Performed by: INTERNAL MEDICINE

## 2023-08-24 PROCEDURE — 78452 NUCLEAR STRESS - CARDIOLOGY INTERPRETED (CUPID ONLY): ICD-10-PCS | Mod: 26,,, | Performed by: INTERNAL MEDICINE

## 2023-08-24 PROCEDURE — 93017 CV STRESS TEST TRACING ONLY: CPT | Mod: PO

## 2023-08-24 PROCEDURE — 93018 CV STRESS TEST I&R ONLY: CPT | Mod: ,,, | Performed by: INTERNAL MEDICINE

## 2023-08-24 PROCEDURE — 78452 HT MUSCLE IMAGE SPECT MULT: CPT | Mod: PO

## 2023-08-24 PROCEDURE — 93010 ELECTROCARDIOGRAM REPORT: CPT | Mod: ,,, | Performed by: INTERNAL MEDICINE

## 2023-08-24 PROCEDURE — 93005 ELECTROCARDIOGRAM TRACING: CPT

## 2023-08-24 PROCEDURE — 93016 CV STRESS TEST SUPVJ ONLY: CPT | Mod: ,,, | Performed by: INTERNAL MEDICINE

## 2023-08-24 PROCEDURE — 63600175 PHARM REV CODE 636 W HCPCS: Mod: PO | Performed by: INTERNAL MEDICINE

## 2023-08-24 PROCEDURE — 81000 URINALYSIS NONAUTO W/SCOPE: CPT | Performed by: NURSE PRACTITIONER

## 2023-08-24 PROCEDURE — 96374 THER/PROPH/DIAG INJ IV PUSH: CPT

## 2023-08-24 PROCEDURE — 94761 N-INVAS EAR/PLS OXIMETRY MLT: CPT

## 2023-08-24 PROCEDURE — 84484 ASSAY OF TROPONIN QUANT: CPT | Mod: 91 | Performed by: NURSE PRACTITIONER

## 2023-08-24 PROCEDURE — 85025 COMPLETE CBC W/AUTO DIFF WBC: CPT | Performed by: NURSE PRACTITIONER

## 2023-08-24 PROCEDURE — 83036 HEMOGLOBIN GLYCOSYLATED A1C: CPT | Performed by: NURSE PRACTITIONER

## 2023-08-24 RX ORDER — SODIUM CHLORIDE 0.9 % (FLUSH) 0.9 %
10 SYRINGE (ML) INJECTION
Status: DISCONTINUED | OUTPATIENT
Start: 2023-08-24 | End: 2023-08-26 | Stop reason: HOSPADM

## 2023-08-24 RX ORDER — ONDANSETRON 2 MG/ML
4 INJECTION INTRAMUSCULAR; INTRAVENOUS EVERY 8 HOURS PRN
Status: DISCONTINUED | OUTPATIENT
Start: 2023-08-24 | End: 2023-08-26 | Stop reason: HOSPADM

## 2023-08-24 RX ORDER — BUPROPION HYDROCHLORIDE 150 MG/1
300 TABLET ORAL DAILY
Status: DISCONTINUED | OUTPATIENT
Start: 2023-08-25 | End: 2023-08-25

## 2023-08-24 RX ORDER — FUROSEMIDE 10 MG/ML
40 INJECTION INTRAMUSCULAR; INTRAVENOUS
Status: COMPLETED | OUTPATIENT
Start: 2023-08-24 | End: 2023-08-24

## 2023-08-24 RX ORDER — METOPROLOL TARTRATE 25 MG/1
25 TABLET, FILM COATED ORAL 3 TIMES DAILY
Status: DISCONTINUED | OUTPATIENT
Start: 2023-08-25 | End: 2023-08-25

## 2023-08-24 RX ORDER — TRAZODONE HYDROCHLORIDE 50 MG/1
200 TABLET ORAL NIGHTLY
Status: DISCONTINUED | OUTPATIENT
Start: 2023-08-25 | End: 2023-08-26 | Stop reason: HOSPADM

## 2023-08-24 RX ORDER — PRAMIPEXOLE DIHYDROCHLORIDE 0.12 MG/1
0.25 TABLET ORAL NIGHTLY
Status: DISCONTINUED | OUTPATIENT
Start: 2023-08-25 | End: 2023-08-26 | Stop reason: HOSPADM

## 2023-08-24 RX ORDER — FUROSEMIDE 10 MG/ML
40 INJECTION INTRAMUSCULAR; INTRAVENOUS
Status: DISCONTINUED | OUTPATIENT
Start: 2023-08-25 | End: 2023-08-26 | Stop reason: HOSPADM

## 2023-08-24 RX ORDER — INSULIN ASPART 100 [IU]/ML
0-5 INJECTION, SOLUTION INTRAVENOUS; SUBCUTANEOUS
Status: DISCONTINUED | OUTPATIENT
Start: 2023-08-24 | End: 2023-08-26 | Stop reason: HOSPADM

## 2023-08-24 RX ORDER — GLUCAGON 1 MG
1 KIT INJECTION
Status: DISCONTINUED | OUTPATIENT
Start: 2023-08-24 | End: 2023-08-26 | Stop reason: HOSPADM

## 2023-08-24 RX ORDER — IBUPROFEN 200 MG
16 TABLET ORAL
Status: DISCONTINUED | OUTPATIENT
Start: 2023-08-24 | End: 2023-08-26 | Stop reason: HOSPADM

## 2023-08-24 RX ORDER — REGADENOSON 0.08 MG/ML
0.4 INJECTION, SOLUTION INTRAVENOUS
Status: DISCONTINUED | OUTPATIENT
Start: 2023-08-24 | End: 2023-08-24 | Stop reason: HOSPADM

## 2023-08-24 RX ORDER — BENZONATATE 100 MG/1
100 CAPSULE ORAL 3 TIMES DAILY PRN
Status: DISCONTINUED | OUTPATIENT
Start: 2023-08-24 | End: 2023-08-26 | Stop reason: HOSPADM

## 2023-08-24 RX ORDER — IBUPROFEN 200 MG
24 TABLET ORAL
Status: DISCONTINUED | OUTPATIENT
Start: 2023-08-24 | End: 2023-08-26 | Stop reason: HOSPADM

## 2023-08-24 RX ADMIN — FUROSEMIDE 40 MG: 10 INJECTION, SOLUTION INTRAMUSCULAR; INTRAVENOUS at 08:08

## 2023-08-24 RX ADMIN — REGADENOSON 0.4 MG: 0.08 INJECTION, SOLUTION INTRAVENOUS at 02:08

## 2023-08-25 LAB
ANION GAP SERPL CALC-SCNC: 10 MMOL/L (ref 8–16)
BUN SERPL-MCNC: 19 MG/DL (ref 6–20)
CALCIUM SERPL-MCNC: 8.4 MG/DL (ref 8.7–10.5)
CHLORIDE SERPL-SCNC: 102 MMOL/L (ref 95–110)
CO2 SERPL-SCNC: 28 MMOL/L (ref 23–29)
CREAT SERPL-MCNC: 1.3 MG/DL (ref 0.5–1.4)
EST. GFR  (NO RACE VARIABLE): >60 ML/MIN/1.73 M^2
ESTIMATED AVG GLUCOSE: 105 MG/DL (ref 68–131)
ESTIMATED AVG GLUCOSE: 120 MG/DL (ref 68–131)
GLUCOSE SERPL-MCNC: 114 MG/DL (ref 70–110)
HBA1C MFR BLD: 5.3 % (ref 4.5–6.2)
HBA1C MFR BLD: 5.8 % (ref 4.5–6.2)
MAGNESIUM SERPL-MCNC: 1.9 MG/DL (ref 1.6–2.6)
POCT GLUCOSE: 117 MG/DL (ref 70–110)
POCT GLUCOSE: 145 MG/DL (ref 70–110)
POCT GLUCOSE: 90 MG/DL (ref 70–110)
POCT GLUCOSE: 95 MG/DL (ref 70–110)
POCT GLUCOSE: 98 MG/DL (ref 70–110)
POTASSIUM SERPL-SCNC: 4.1 MMOL/L (ref 3.5–5.1)
SODIUM SERPL-SCNC: 140 MMOL/L (ref 136–145)
TROPONIN I SERPL DL<=0.01 NG/ML-MCNC: 0.02 NG/ML (ref 0–0.03)

## 2023-08-25 PROCEDURE — 83735 ASSAY OF MAGNESIUM: CPT | Performed by: NURSE PRACTITIONER

## 2023-08-25 PROCEDURE — 99213 OFFICE O/P EST LOW 20 MIN: CPT | Mod: ,,, | Performed by: GENERAL PRACTICE

## 2023-08-25 PROCEDURE — 96376 TX/PRO/DX INJ SAME DRUG ADON: CPT

## 2023-08-25 PROCEDURE — 94760 N-INVAS EAR/PLS OXIMETRY 1: CPT

## 2023-08-25 PROCEDURE — 25000003 PHARM REV CODE 250: Performed by: NURSE PRACTITIONER

## 2023-08-25 PROCEDURE — 94761 N-INVAS EAR/PLS OXIMETRY MLT: CPT

## 2023-08-25 PROCEDURE — 84484 ASSAY OF TROPONIN QUANT: CPT | Performed by: NURSE PRACTITIONER

## 2023-08-25 PROCEDURE — 25000003 PHARM REV CODE 250: Performed by: GENERAL PRACTICE

## 2023-08-25 PROCEDURE — 99213 PR OFFICE/OUTPT VISIT, EST, LEVL III, 20-29 MIN: ICD-10-PCS | Mod: ,,, | Performed by: GENERAL PRACTICE

## 2023-08-25 PROCEDURE — G0378 HOSPITAL OBSERVATION PER HR: HCPCS

## 2023-08-25 PROCEDURE — 36415 COLL VENOUS BLD VENIPUNCTURE: CPT | Performed by: NURSE PRACTITIONER

## 2023-08-25 PROCEDURE — 63600175 PHARM REV CODE 636 W HCPCS: Performed by: NURSE PRACTITIONER

## 2023-08-25 PROCEDURE — 80048 BASIC METABOLIC PNL TOTAL CA: CPT | Performed by: NURSE PRACTITIONER

## 2023-08-25 PROCEDURE — 83036 HEMOGLOBIN GLYCOSYLATED A1C: CPT | Performed by: NURSE PRACTITIONER

## 2023-08-25 RX ORDER — POLYETHYLENE GLYCOL 3350 17 G/17G
17 POWDER, FOR SOLUTION ORAL NIGHTLY PRN
COMMUNITY
End: 2023-12-07 | Stop reason: ALTCHOICE

## 2023-08-25 RX ORDER — NAPROXEN SODIUM 220 MG/1
81 TABLET, FILM COATED ORAL NIGHTLY
Status: ON HOLD | COMMUNITY
End: 2023-11-14 | Stop reason: HOSPADM

## 2023-08-25 RX ORDER — POLYETHYLENE GLYCOL 3350 17 G/17G
17 POWDER, FOR SOLUTION ORAL DAILY
Status: DISCONTINUED | OUTPATIENT
Start: 2023-08-25 | End: 2023-08-26 | Stop reason: HOSPADM

## 2023-08-25 RX ORDER — SPIRONOLACTONE 25 MG/1
25 TABLET ORAL DAILY
Status: DISCONTINUED | OUTPATIENT
Start: 2023-08-25 | End: 2023-08-26 | Stop reason: HOSPADM

## 2023-08-25 RX ORDER — NAPROXEN SODIUM 220 MG/1
81 TABLET, FILM COATED ORAL DAILY
Status: DISCONTINUED | OUTPATIENT
Start: 2023-08-25 | End: 2023-08-26 | Stop reason: HOSPADM

## 2023-08-25 RX ORDER — BUPROPION HYDROCHLORIDE 150 MG/1
300 TABLET ORAL NIGHTLY
Status: DISCONTINUED | OUTPATIENT
Start: 2023-08-25 | End: 2023-08-26 | Stop reason: HOSPADM

## 2023-08-25 RX ORDER — METOPROLOL TARTRATE 25 MG/1
25 TABLET, FILM COATED ORAL 3 TIMES DAILY
Status: DISCONTINUED | OUTPATIENT
Start: 2023-08-25 | End: 2023-08-26 | Stop reason: HOSPADM

## 2023-08-25 RX ADMIN — BUPROPION HYDROCHLORIDE 300 MG: 150 TABLET, FILM COATED, EXTENDED RELEASE ORAL at 10:08

## 2023-08-25 RX ADMIN — FUROSEMIDE 40 MG: 10 INJECTION, SOLUTION INTRAMUSCULAR; INTRAVENOUS at 10:08

## 2023-08-25 RX ADMIN — APIXABAN 5 MG: 2.5 TABLET, FILM COATED ORAL at 10:08

## 2023-08-25 RX ADMIN — BUPROPION HYDROCHLORIDE 300 MG: 150 TABLET, FILM COATED, EXTENDED RELEASE ORAL at 12:08

## 2023-08-25 RX ADMIN — ASPIRIN 81 MG CHEWABLE TABLET 81 MG: 81 TABLET CHEWABLE at 09:08

## 2023-08-25 RX ADMIN — APIXABAN 5 MG: 2.5 TABLET, FILM COATED ORAL at 12:08

## 2023-08-25 RX ADMIN — METOPROLOL TARTRATE 25 MG: 25 TABLET, FILM COATED ORAL at 12:08

## 2023-08-25 RX ADMIN — PRAMIPEXOLE DIHYDROCHLORIDE 0.25 MG: 0.12 TABLET ORAL at 10:08

## 2023-08-25 RX ADMIN — TRAZODONE HYDROCHLORIDE 200 MG: 50 TABLET ORAL at 10:08

## 2023-08-25 RX ADMIN — SPIRONOLACTONE 25 MG: 25 TABLET ORAL at 05:08

## 2023-08-25 RX ADMIN — PRAMIPEXOLE DIHYDROCHLORIDE 0.25 MG: 0.12 TABLET ORAL at 12:08

## 2023-08-25 RX ADMIN — APIXABAN 5 MG: 2.5 TABLET, FILM COATED ORAL at 09:08

## 2023-08-25 RX ADMIN — METOPROLOL TARTRATE 25 MG: 25 TABLET, FILM COATED ORAL at 10:08

## 2023-08-25 RX ADMIN — TRAZODONE HYDROCHLORIDE 200 MG: 50 TABLET ORAL at 12:08

## 2023-08-25 RX ADMIN — FUROSEMIDE 40 MG: 10 INJECTION, SOLUTION INTRAMUSCULAR; INTRAVENOUS at 09:08

## 2023-08-25 NOTE — ASSESSMENT & PLAN NOTE
Patient with Paroxysmal (<7 days) atrial fibrillation which is controlled currently with Beta Blocker. Patient is currently in sinus rhythm.[id number] Score: 1. . Anticoagulation indicated. Anticoagulation done with Eliquis.

## 2023-08-25 NOTE — ASSESSMENT & PLAN NOTE
Patient's FSGs are controlled on current medication regimen.  Last A1c reviewed-   Lab Results   Component Value Date    HGBA1C 5.8 08/25/2023     Most recent fingerstick glucose reviewed-   Recent Labs   Lab 08/25/23  0641 08/25/23  0742 08/25/23  1136   POCTGLUCOSE 95 98 90     Current correctional scale  Low  Maintain anti-hyperglycemic dose as follows-   Antihyperglycemics (From admission, onward)    Start     Stop Route Frequency Ordered    08/24/23 2257  insulin aspart U-100 pen 0-5 Units         -- SubQ Before meals & nightly PRN 08/24/23 2157        Hold Oral hypoglycemics while patient is in the hospital.

## 2023-08-25 NOTE — ASSESSMENT & PLAN NOTE
Patient is chronically on statin.will continue for now. Monitor clinically. Last LDL was   Lab Results   Component Value Date    LDLCALC 76.2 11/15/2022

## 2023-08-25 NOTE — ED NOTES
Pt reports that he has been having ongoing cardiac testing this week r/t new dx of afib. Pt reports he has been cardioverted in the last few days, JASON and stress test completed today. Pt reports lower chest pain christopher and SOB. Pt reports he has been having the same symptoms but they worsened today after the JASON and stress test. Pt also c/o LLE tenderness.

## 2023-08-25 NOTE — SUBJECTIVE & OBJECTIVE
Past Medical History:   Diagnosis Date    ADHD (attention deficit hyperactivity disorder)     Anxiety     Depression     Hyperlipidemia     Hypertension     patient states he uses fosinopril for mitral regurgitation, not hypertension    Insomnia     Mitral regurgitation        Past Surgical History:   Procedure Laterality Date    KNEE ARTHROSCOPY W/ ACL RECONSTRUCTION AND HAMSTRING GRAFT  2003    right    TRANSESOPHAGEAL ECHOCARDIOGRAM WITH POSSIBLE CARDIOVERSION (JASON W/ POSS CARDIOVERSION) Bilateral 8/21/2023    Procedure: Transesophageal echo (JASON) intra-procedure log documentation;  Surgeon: Jesu Flores MD;  Location: [facility] CATH/EP LAB;  Service: Cardiology;  Laterality: Bilateral;       Review of patient's allergies indicates:  No Known Allergies    Current Facility-Administered Medications on File Prior to Encounter   Medication    [COMPLETED] regadenoson injection 0.4 mg     Current Outpatient Medications on File Prior to Encounter   Medication Sig    amiodarone (PACERONE) 400 MG tablet Take 1 tablet (400 mg total) by mouth 2 (two) times daily for 6 days, THEN 0.5 tablets (200 mg total) 2 (two) times daily.    apixaban (ELIQUIS) 5 mg Tab Take 1 tablet (5 mg total) by mouth 2 (two) times daily.    aspirin 81 mg Tab Take 81 mg by mouth once daily. Every day    blood sugar diagnostic Strp To check BG qd times daily, to use with insurance preferred meter    blood-glucose meter kit To check BG qd times daily, to use with insurance preferred meter    buPROPion (WELLBUTRIN XL) 300 MG 24 hr tablet Take 1 tablet (300 mg total) by mouth once daily.    ciprofloxacin HCl (CILOXAN) 0.3 % ophthalmic solution Place 1 drop into both eyes.    fluticasone propionate (FLONASE) 50 mcg/actuation nasal spray 1 spray by Each Nostril route once daily.    fosinopriL (MONOPRIL) 40 MG tablet Take 1 tablet (40 mg total) by mouth once daily.    ibuprofen (ADVIL,MOTRIN) 800 MG tablet Take 800 mg by mouth 2 (two) times daily.     lancets Misc To check BG qd times daily, to use with insurance preferred meter    metFORMIN (GLUCOPHAGE) 500 MG tablet Take 1 tablet (500 mg total) by mouth 2 (two) times daily with meals.    metoprolol tartrate (LOPRESSOR) 25 MG tablet Take 1 tablet (25 mg total) by mouth 3 (three) times daily.    multivitamin (THERAGRAN) per tablet Take 1 tablet by mouth once daily.    pramipexole (MIRAPEX) 0.25 MG tablet TAKE 1 TABLET BY MOUTH EVERY DAY    traZODone (DESYREL) 100 MG tablet Take 2 tablets (200 mg total) by mouth every evening.     Family History       Problem Relation (Age of Onset)    Alzheimer's disease Maternal Grandmother    Heart attack Father, Paternal Grandfather    Heart disease Father    No Known Problems Sister, Brother, Daughter, Son    Stroke Mother          Tobacco Use    Smoking status: Never    Smokeless tobacco: Current     Types: Chew   Substance and Sexual Activity    Alcohol use: No    Drug use: No    Sexual activity: Yes     Partners: Female     Review of Systems   Constitutional:  Negative for activity change, appetite change, chills and fever.   HENT:  Negative for congestion, sore throat and trouble swallowing.    Eyes:  Negative for photophobia and visual disturbance.   Respiratory:  Positive for cough and shortness of breath. Negative for chest tightness.    Cardiovascular:  Positive for leg swelling. Negative for chest pain and palpitations.   Gastrointestinal:  Positive for abdominal distention. Negative for abdominal pain, diarrhea and nausea.   Genitourinary:  Negative for dysuria, flank pain and hematuria.   Musculoskeletal:  Negative for back pain.   Neurological:  Negative for dizziness, weakness and headaches.   Psychiatric/Behavioral:  Negative for confusion.      Objective:     Vital Signs (Most Recent):  Temp: 97.8 °F (36.6 °C) (08/24/23 1929)  Pulse: (!) 58 (08/24/23 2132)  Resp: 18 (08/24/23 2132)  BP: 132/81 (08/24/23 2132)  SpO2: 96 % (08/24/23 2132) Vital Signs (24h  Range):  Temp:  [97.8 °F (36.6 °C)] 97.8 °F (36.6 °C)  Pulse:  [55-58] 58  Resp:  [16-18] 18  SpO2:  [94 %-98 %] 96 %  BP: (116-132)/(60-81) 132/81     Weight: 90.7 kg (200 lb)  Body mass index is 27.89 kg/m².     Physical Exam  Vitals reviewed.   Constitutional:       Appearance: Normal appearance. He is normal weight.   HENT:      Head: Normocephalic.      Mouth/Throat:      Mouth: Mucous membranes are moist.      Pharynx: Oropharynx is clear.   Eyes:      Pupils: Pupils are equal, round, and reactive to light.   Cardiovascular:      Rate and Rhythm: Normal rate and regular rhythm.      Pulses: Normal pulses.      Heart sounds: Murmur heard.      Systolic murmur is present.   Pulmonary:      Effort: Pulmonary effort is normal.      Breath sounds: Rales present.   Abdominal:      General: Bowel sounds are normal. There is distension.      Palpations: Abdomen is soft.   Musculoskeletal:         General: Normal range of motion.      Cervical back: Normal range of motion.      Right lower leg: Edema (trace) present.      Left lower leg: Edema (trace) present.   Skin:     General: Skin is warm and dry.   Neurological:      Mental Status: He is alert and oriented to person, place, and time. Mental status is at baseline.   Psychiatric:         Mood and Affect: Mood normal.              CRANIAL NERVES     CN III, IV, VI   Pupils are equal, round, and reactive to light.       Significant Labs: All pertinent labs within the past 24 hours have been reviewed.  CBC:   Recent Labs   Lab 08/24/23 1957   WBC 8.21   HGB 13.4*   HCT 40.3   *     CMP:   Recent Labs   Lab 08/24/23 1957      K 4.6      CO2 24   GLU 93   BUN 18   CREATININE 1.2   CALCIUM 9.1   PROT 6.5   ALBUMIN 3.8   BILITOT 1.6*   ALKPHOS 70   AST 21   ALT 28   ANIONGAP 12       Significant Imaging: I have reviewed all pertinent imaging results/findings within the past 24 hours.  Imaging Results              X-Ray Chest AP Portable (Final result)   Result time 08/24/23 20:25:42      Final result by Tao Trinidad DO (08/24/23 20:25:42)                   Impression:      Interstitial pulmonary edema and small bilateral pleural effusions.      Electronically signed by: Tao Trinidad  Date:    08/24/2023  Time:    20:25               Narrative:    EXAMINATION:  XR CHEST AP PORTABLE    CLINICAL HISTORY:  Chest Pain;    TECHNIQUE:  Single frontal view of the chest was performed.    COMPARISON:  08/19/2023.    FINDINGS:  The lungs are well expanded.  There are bilateral interstitial opacities and pulmonary vascular congestion compatible with pulmonary edema.  There are small effusions.  The cardiac silhouette is enlarged.  Osseous structures are intact.

## 2023-08-25 NOTE — PROGRESS NOTES
[facility]  [facility] Note    Patient Name: Abimael Armendariz  MRN: [id number]  Patient Class: OP- Observation   Admission Date: 8/24/2023  Length of Stay: 0 days  Attending Physician: Mahi Rene MD  Primary Care Provider: Sammy Trinidad MD        Subjective:     Principal Problem:Acute exacerbation of CHF (congestive heart failure)        HPI:  Abimael Armendariz 58-year-old male with a past medical history hypertension, hyperlipidemia, mitral regurgitation, anxiety, ADHD, AFib with RVR recently cardioverted and needed nose CHF who presented to the emergency room complaining of shortness of breath, cough and abdominal swelling that started 7 days ago and has persisted.  Patient underwent nuclear stress today and states he has not felt well since with increased shortness of.  Stress test revealed a small to moderate-sized fixed perfusion abnormality consistent with a scar in the basal to apical inferior walls.  Ejection fraction of 47% post stress.  Left ventricle appears to be dilated.  ECG portion negative for ischemia and patient had no chest pain during stress test.  Patient has had echo and JASON this month. Patient is followed by Cardiology,  and has also been seen recently by Dr. Johnston and Dr. Eubanks.  Patient currently denies chest pain.  EKG phoned in ED showed sinus bradycardia with first-degree AV block.  , troponin 0.027 and chest x-ray demonstrated interstitial pulmonary edema small bilateral pleural effusions.  Patient received 40 mg IV Lasix while in the ED and spoke to Cardiology who agreed patient should be admitted.  Patient was referred to Hospital Medicine and seen in the ED. Patient reports dyspnea with exertion and orthopnea.  Patient denies chest pain, nausea, vomiting, fever and chills.  Patient will be admitted to Hospital Medicine for further evaluation and management                 Overview/Hospital Course:  No notes on  file    Interval History:  Patient seen and examined.  Continues to have shortness of breath and nonproductive cough.  On IV Lasix.  Cardiology to see today.    Review of Systems   Constitutional:  Negative for activity change, appetite change, chills and fever.   HENT:  Negative for congestion, sore throat and trouble swallowing.    Respiratory:  Positive for cough and shortness of breath. Negative for chest tightness.    Cardiovascular:  Positive for leg swelling. Negative for chest pain and palpitations.   Gastrointestinal:  Positive for abdominal distention. Negative for abdominal pain, diarrhea and nausea.   Genitourinary:  Negative for dysuria, flank pain and hematuria.   Musculoskeletal:  Negative for back pain.   Neurological:  Negative for dizziness, weakness and headaches.   Psychiatric/Behavioral:  Negative for confusion.      Objective:     Vital Signs (Most Recent):  Temp: 97.9 °F (36.6 °C) (08/25/23 1118)  Pulse: (!) 56 (08/25/23 1118)  Resp: 18 (08/25/23 1118)  BP: 123/60 (08/25/23 1118)  SpO2: (!) 94 % (08/25/23 1118) Vital Signs (24h Range):  Temp:  [97.8 °F (36.6 °C)-98.8 °F (37.1 °C)] 97.9 °F (36.6 °C)  Pulse:  [55-65] 56  Resp:  [16-18] 18  SpO2:  [93 %-98 %] 94 %  BP: (106-134)/(58-81) 123/60     Weight: 90.4 kg (199 lb 4.7 oz)  Body mass index is 27.8 kg/m².    Intake/Output Summary (Last 24 hours) at 8/25/2023 1147  Last data filed at 8/25/2023 0600  Gross per 24 hour   Intake 240 ml   Output 1850 ml   Net -1610 ml         Physical Exam  Vitals reviewed.   Constitutional:       Appearance: Normal appearance. He is normal weight.   HENT:      Head: Normocephalic.      Mouth/Throat:      Mouth: Mucous membranes are moist.      Pharynx: Oropharynx is clear.   Eyes:      Pupils: Pupils are equal, round, and reactive to light.   Cardiovascular:      Rate and Rhythm: Normal rate and regular rhythm.      Pulses: Normal pulses.      Heart sounds: Murmur heard.      Systolic murmur is present.    Pulmonary:      Effort: Pulmonary effort is normal.      Breath sounds: Rales present.   Abdominal:      General: Bowel sounds are normal. There is distension.      Palpations: Abdomen is soft.   Musculoskeletal:         General: Normal range of motion.      Cervical back: Normal range of motion.      Right lower leg: Edema (trace) present.      Left lower leg: Edema (trace) present.   Skin:     General: Skin is warm and dry.   Neurological:      Mental Status: He is alert and oriented to person, place, and time. Mental status is at baseline.   Psychiatric:         Mood and Affect: Mood normal.             Significant Labs: All pertinent labs within the past 24 hours have been reviewed.    Significant Imaging: I have reviewed all pertinent imaging results/findings within the past 24 hours.      Assessment/Plan:      * Acute exacerbation of CHF (congestive heart failure)  Patient is identified as having  heart failure that is Acute. CHF is currently uncontrolled due to >3 pillow orthopnea, Rales/crackles on pulmonary exam and Pulmonary edema/pleural effusion on CXR. Latest ECHO performed and demonstrates- Results for orders placed in visit on 08/18/23    Echo    Interpretation Summary    Left Ventricle: The left ventricle is normal in size. Mildly increased wall thickness. Normal wall motion. There is normal systolic function with a visually estimated ejection fraction of 60 - 65%.    Left Atrium: Left atrium is severely dilated.    Right Ventricle: Normal right ventricular cavity size. Wall thickness is normal. Right ventricle wall motion  is normal. Systolic function is normal.    Mitral Valve: Mildly thickened anterior leaflet. There is moderate regurgitation with a posterolateral eccentriccally directed jet.  There is moderate prolapse of the anterior mitral valve leaflet.    Pulmonary Artery: No pulmonary hypertension. The estimated pulmonary artery systolic pressure is 26 mmHg.    IVC/SVC: Intermediate  venous pressure at 8 mmHg.  . Continue Beta Blocker and monitor clinical status closely. Monitor on telemetry. Patient is on CHF pathway.  Monitor strict Is&Os and daily weights.  Place on fluid restriction of 1.5 L. Cardiology has been consulted. Continue to stress to patient importance of self efficacy and  on diet for CHF. Last BNP reviewed- and noted below   Recent Labs   Lab 08/24/23 1957   *   .    A-fib  Patient with Paroxysmal (<7 days) atrial fibrillation which is controlled currently with Beta Blocker. Patient is currently in sinus rhythm.[id number] Score: 1. . Anticoagulation indicated. Anticoagulation done with Eliquis.    Type 2 diabetes mellitus without complication, without long-term current use of insulin  Patient's FSGs are controlled on current medication regimen.  Last A1c reviewed-   Lab Results   Component Value Date    HGBA1C 5.8 08/25/2023     Most recent fingerstick glucose reviewed-   Recent Labs   Lab 08/25/23  0641 08/25/23  0742 08/25/23  1136   POCTGLUCOSE 95 98 90     Current correctional scale  Low  Maintain anti-hyperglycemic dose as follows-   Antihyperglycemics (From admission, onward)    Start     Stop Route Frequency Ordered    08/24/23 2257  insulin aspart U-100 pen 0-5 Units         -- SubQ Before meals & nightly PRN 08/24/23 2157        Hold Oral hypoglycemics while patient is in the hospital.    Anxiety        Hypertension  Chronic, controlled.  Latest blood pressure and vitals reviewed-     Temp:  [97.8 °F (36.6 °C)-98.8 °F (37.1 °C)]   Pulse:  [55-65]   Resp:  [16-18]   BP: (106-134)/(58-81)   SpO2:  [93 %-98 %] .   Home meds for hypertension were reviewed and noted below-  Hypertension Medications             fosinopriL (MONOPRIL) 40 MG tablet Take 1 tablet (40 mg total) by mouth once daily.    metoprolol tartrate (LOPRESSOR) 25 MG tablet Take 1 tablet (25 mg total) by mouth 3 (three) times daily.          While in the hospital, will manage blood pressure as  follows; Continue home antihypertensive regimen    Will utilize p.r.n. blood pressure medication only if patient's blood pressure greater than 180/110 and he develops symptoms such as worsening chest pain or shortness of breath.        Dyslipidemia   Patient is chronically on statin.will continue for now. Monitor clinically. Last LDL was   Lab Results   Component Value Date    LDLCALC 76.2 11/15/2022              VTE Risk Mitigation (From admission, onward)         Ordered     apixaban tablet 5 mg  2 times daily         08/24/23 2353     IP VTE HIGH RISK PATIENT  Once         08/24/23 2353     Place sequential compression device  Until discontinued         08/24/23 2353                Discharge Planning   THEA:      Code Status: Full Code   Is the patient medically ready for discharge?:     Reason for patient still in hospital (select all that apply): Patient trending condition and Treatment                     Mahi Rene MD  Department of Hospital Medicine   [place]/Surg

## 2023-08-25 NOTE — CONSULTS
Food & Nutrition                                                           Education     Diet Education: Cardiac Diet, Low sodium, Fluid restriction  Time Spent: 15 minutes  Learners: Patient and significant other        Nutrition Education provided with handouts: yes        Comments: Patient admitted with CHF exacerbation. PMH AFIB ( new recently pe rpt), HTN, HLD, MI, anxiety, Diverticulitis per pt, ADHD, DM2 ( but now pt lost 32 lb and A1C has been controlled-currently 5.4). Pt started following a heart healthy, low sodium, low sugar diet in the last few years and intentionally lost weight in the last few years, max 19 lb per chart review-fluctuations noted. They use an air fryer at home, cook with low sodium seasoning blends and try to eat lean meats often like chicken and fish. Pt avoids nuts/seeds/corn r/t history of diverticulitis.    I reviewed a heart healthy low sodium diet with pt and encouraged a normal fiber diet as he does not currently have diverticulitis. Discussed the importance of fiber for heart health and discussed increasing consumption of beans, fruits, vegetables, and whole grains. Pt has never followed a fluid restrictions and has recently been drinking 4-5 (16 oz) bottles of water daily because he has been outside in the heat. I educated pt on his fluid restriction and hydration when outside in the excessive heat. Pt agreeable to cut back by 1-2 water bottles daily. Teachback method utilized successfully.     Assessment: NFPE WDL 8/25/23. Good appetite PTA. Pt ate 75% of his breakfast- did not care for the hot breakfast cereal.  All questions and concerns answered. Dietitian's contact information provided.         Follow-Up: yes     Please Re-consult as needed           Thanks!

## 2023-08-25 NOTE — PLAN OF CARE
PCP apt scheduled using smart scheduling suggestion. Pt had no preference for date/time of apt. PCP and cards apt added to AVS       08/25/23 2055   Post-Acute Status   Hospital Resources/Appts/Education Provided Appointments scheduled and added to AVS

## 2023-08-25 NOTE — ASSESSMENT & PLAN NOTE
Patient with Paroxysmal (<7 days) atrial fibrillation which is controlled currently with Beta Blocker. Patient is currently in sinus rhythm.[id number] Score: 1. . Anticoagulation indicated. Anticoagulation done with Eliquis.

## 2023-08-25 NOTE — CONSULTS
[facility]  Department of Cardiology  Consult Note      PATIENT NAME: Abimael Armendariz    MRN: [id number]  TODAY'S DATE: 08/25/2023  ADMIT DATE: 8/24/2023                          CONSULT REQUESTED BY: Mahi Rene MD    SUBJECTIVE     PRINCIPAL PROBLEM: Acute exacerbation of CHF (congestive heart failure)      REASON FOR CONSULT:  CHF      HPI:  CLINT THOMSON   Abimael Armendariz 58-year-old male with a past medical history hypertension, hyperlipidemia, mitral regurgitation, anxiety, ADHD, AFib with RVR recently cardioverted and needed nose CHF who presented to the emergency room complaining of shortness of breath, cough and abdominal swelling that started 7 days ago and has persisted.  Patient underwent nuclear stress today and states he has not felt well since with increased shortness of.  Stress test revealed a small to moderate-sized fixed perfusion abnormality consistent with a scar in the basal to apical inferior walls.  Ejection fraction of 47% post stress.  Left ventricle appears to be dilated.  ECG portion negative for ischemia and patient had no chest pain during stress test.  Patient has had echo and JASON this month. Patient is followed by Cardiology,  and has also been seen recently by Dr. Johnston and Dr. Eubanks.  Patient currently denies chest pain.  EKG phoned in ED showed sinus bradycardia with first-degree AV block.  , troponin 0.027 and chest x-ray demonstrated interstitial pulmonary edema small bilateral pleural effusions.  Patient received 40 mg IV Lasix while in the ED and spoke to Cardiology who agreed patient should be admitted.  Patient was referred to Hospital Medicine and seen in the ED. Patient reports dyspnea with exertion and orthopnea.  Patient denies chest pain, nausea, vomiting, fever and chills.  Patient will be admitted to Hospital Medicine for further evaluation and management            8/20/23       58 year old male diagnosed with AFIB on  Monday. Has been in AFIB since. He has a PMH significant for ADHD (attention deficit hyperactivity disorder), Anxiety, Depression, Hyperlipidemia, Hypertension, Insomnia, and Mitral regurgitation. who presented to the ED with a Chest Pain (Recent dx of Afib. Intermittent left sided chest pain radiating down left arm) and Shortness of Breath. He saw Dr. Watts and was ordered and ECHO and stress. ECHO shows severe LAE and MR. Stress scheduled the 24th. HR is 120s BP soft. Denies fever, chills, diaphoresis, dizziness, HA, NVD, recent trauma or any other associated symptoms. No problems swallowing no issues with food getting stuck. No bleeding tendencies. Currently on eliquis.              FROM H AND P           Chest Pain         Recent dx of Afib. Intermittent left sided chest pain radiating down left arm    Shortness of Breath         HPI: Abimael Armendariz is a 58 y.o. male with a history as  has a past medical history of ADHD (attention deficit hyperactivity disorder), Anxiety, Depression, Hyperlipidemia, Hypertension, Insomnia, and Mitral regurgitation. who presented to the ED with a Chest Pain (Recent dx of Afib. Intermittent left sided chest pain radiating down left arm) and Shortness of Breath     Patient presents to the emergency room, wife at bedside, with complaint of left-sided chest pain for last 2 days with associated dyspnea on exertion.       Patient reports recently diagnosed with atrial fibrillation 2 weeks ago while in [place] undergoing preop clearance. He reports he returned back home and was seen by Cardiology, 08/15/2023, where he was started on Lopressor and Eliquis with Holter monitor, echo and stress test ordered.  Echo completed 08/18/2023. Patient was still with chest pain and elevated heart rate and was seen at [facility], 08/17/2023- Lopressor increased to 25 mg t.i.d., discharged.      Denies fever, chills, diaphoresis, dizziness, HA, NVD, recent trauma or any other associated symptoms. No  aggravating and alleviating factor.  Recently diagnosed with atrial fibrillation.  Pertinent family history father (d) MI.  Does not smoke cigarettes, drink or do illegal drugs. Does chew tobacco.      Lab and imaging obtained and reviewed.  CBC shows MCH 31.5 otherwise unremarkable.  PT/INR within normal range.  Chemistry profile shows sodium 134 Ag 6 BUN 23 GFR 57.9 glucose 140.  .  High sensitive troponin within normal range.  EKG shows AFib with RVR rate 117 bpm.  On admit, afebrile  RR 24 /75 sats 97% on room air.        ECHO 8/18/2023  Summary     Left Ventricle: The left ventricle is normal in size. Mildly increased wall thickness. Normal wall motion. There is normal systolic function with a visually estimated ejection fraction of 60 - 65%.    Left Atrium: Left atrium is severely dilated.    Right Ventricle: Normal right ventricular cavity size. Wall thickness is normal. Right ventricle wall motion  is normal. Systolic function is normal.    Mitral Valve: Mildly thickened anterior leaflet. There is moderate regurgitation with a posterolateral eccentriccally directed jet.  There is moderate prolapse of the anterior mitral valve leaflet.    Pulmonary Artery: No pulmonary hypertension. The estimated pulmonary artery systolic pressure is 26 mmHg.    IVC/SVC: Intermediate venous pressure at 8 mmHg.     Per ED provider, patient with left-sided chest pain, obtained showed AFib RVR, IV Cardizem x1 given with rate improved and chest pain resolved.  Per ED provider, spoke with Cardiology who recommended admitting patient for possible cardioversion.        8/24/23      Abnormal myocardial perfusion scan.    There is a small to moderate sized, fixed perfusion abnormality consistent with scar in the basal to apical inferior wall(s).    There are no other significant perfusion abnormalities.    The gated perfusion images showed an ejection fraction of 47% post stress.    LV cavity size is mildly enlarged  at rest and mildly enlarged at stress.    The ECG portion of the study is negative for ischemia.    The patient reported no chest pain during the stress test.    Equivocal study, fixed inferior defect, can not rule out attenuation artifact versus scar tissue.  Clinical correlation warranted.  Left ventricle does appear to be dilated.  Nuclear EF 47%, correlate with echo.     TECHNIQUE:  Single frontal view of the chest was performed.     COMPARISON:  08/19/2023.     FINDINGS:  The lungs are well expanded.  There are bilateral interstitial opacities and pulmonary vascular congestion compatible with pulmonary edema.  There are small effusions.  The cardiac silhouette is enlarged.  Osseous structures are intact.     Impression:     Interstitial pulmonary edema and small bilateral pleural effusions  Review of patient's allergies indicates:  No Known Allergies    Past Medical History:   Diagnosis Date    ADHD (attention deficit hyperactivity disorder)     Anxiety     Depression     Hyperlipidemia     Hypertension     patient states he uses fosinopril for mitral regurgitation, not hypertension    Insomnia     Mitral regurgitation      Past Surgical History:   Procedure Laterality Date    KNEE ARTHROSCOPY W/ ACL RECONSTRUCTION AND HAMSTRING GRAFT  2003    right    TRANSESOPHAGEAL ECHOCARDIOGRAM WITH POSSIBLE CARDIOVERSION (JASON W/ POSS CARDIOVERSION) Bilateral 8/21/2023    Procedure: Transesophageal echo (JASON) intra-procedure log documentation;  Surgeon: Jesu Flores MD;  Location: [facility] CATH/EP LAB;  Service: Cardiology;  Laterality: Bilateral;     Social History     Tobacco Use    Smoking status: Never    Smokeless tobacco: Current     Types: Chew   Substance Use Topics    Alcohol use: No    Drug use: No        REVIEW OF SYSTEMS  CONSTITUTIONAL: Negative for chills, fatigue and fever.   EYES: No double vision, No blurred vision  NEURO: No headaches, No dizziness  RESPIRATORY: Negative for cough, shortness of  breath and wheezing.    CARDIOVASCULAR: Negative for chest pain. Negative for palpitations and leg swelling.   GI: Negative for abdominal pain, No melena, diarrhea, nausea and vomiting.   : Negative for dysuria and frequency, Negative for hematuria  SKIN: Negative for bruising, Negative for edema or discoloration noted.   ENDOCRINE: Negative for polyphagia, Negative for heat intolerance, Negative for cold intolerance  PSYCHIATRIC: Negative for depression, Negative for anxiety, Negative for memory loss  MUSCULOSKELETAL: Negative for neck pain, Negative for muscle weakness, Negative for back pain     OBJECTIVE     VITAL SIGNS (Most Recent)  Temp: 97.9 °F (36.6 °C) (08/25/23 1118)  Pulse: (!) 56 (08/25/23 1118)  Resp: 18 (08/25/23 1118)  BP: 123/60 (08/25/23 1118)  SpO2: (!) 94 % (08/25/23 1118)    VENTILATION STATUS  Resp: 18 (08/25/23 1118)  SpO2: (!) 94 % (08/25/23 1118)           I & O (Last 24H):  Intake/Output Summary (Last 24 hours) at 8/25/2023 1152  Last data filed at 8/25/2023 0600  Gross per 24 hour   Intake 240 ml   Output 1850 ml   Net -1610 ml       WEIGHTS  Wt Readings from Last 1 Encounters:   08/24/23 2351 90.4 kg (199 lb 4.7 oz)   08/24/23 1929 90.7 kg (200 lb)       PHYSICAL EXAM  GENERAL: well built, well nourished, well-developed in no apparent distress alert and oriented.   HEENT: Normocephalic. Pupils normal and conjunctivae normal.  Mucous membranes normal, no cyanosis or icterus, trachea central,no pallor or icterus is noted..   NECK: No JVD. No bruit..   THYROID: Thyroid not enlarged. No nodules present..   CARDIAC: Regular rate and rhythm. S1 is normal.S2 is normal.No gallops, clicks or murmurs noted at this time.  CHEST ANATOMY: normal.   LUNGS:  BILATERAL WHEEZES BASILAR RALES.   ABDOMEN: Soft no masses or organomegaly.  MILDLY DISTENDED URINARY: No valente catheter   EXTREMITIES: No cyanosis, clubbing or edema noted at this time., no calf tenderness bilaterally.   PERIPHERAL VASCULAR  SYSTEM: Good palpable distal pulses.   CENTRAL NERVOUS SYSTEM: No focal motor or sensory deficits noted.   SKIN: Skin without lesions, moist, well perfused.   MUSCLE STRENGTH & TONE: No noteable weakness, atrophy or abnormal movement.     HOME MEDICATIONS:  No current facility-administered medications on file prior to encounter.     Current Outpatient Medications on File Prior to Encounter   Medication Sig Dispense Refill    amiodarone (PACERONE) 400 MG tablet Take 1 tablet (400 mg total) by mouth 2 (two) times daily for 6 days, THEN 0.5 tablets (200 mg total) 2 (two) times daily. 371 tablet 0    apixaban (ELIQUIS) 5 mg Tab Take 1 tablet (5 mg total) by mouth 2 (two) times daily. 60 tablet 3    buPROPion (WELLBUTRIN XL) 300 MG 24 hr tablet Take 1 tablet (300 mg total) by mouth once daily. 90 tablet 3    fosinopriL (MONOPRIL) 40 MG tablet Take 1 tablet (40 mg total) by mouth once daily. 90 tablet 3    metFORMIN (GLUCOPHAGE) 500 MG tablet Take 1 tablet (500 mg total) by mouth 2 (two) times daily with meals. 180 tablet 3    metoprolol tartrate (LOPRESSOR) 25 MG tablet Take 1 tablet (25 mg total) by mouth 3 (three) times daily. 60 tablet 3    multivitamin (THERAGRAN) per tablet Take 1 tablet by mouth once daily.      pramipexole (MIRAPEX) 0.25 MG tablet TAKE 1 TABLET BY MOUTH EVERY DAY 90 tablet 3    traZODone (DESYREL) 100 MG tablet Take 2 tablets (200 mg total) by mouth every evening. 180 tablet 3    aspirin 81 MG Chew Take 81 mg by mouth once daily.      blood sugar diagnostic Strp To check BG qd times daily, to use with insurance preferred meter 30 strip 11    blood-glucose meter kit To check BG qd times daily, to use with insurance preferred meter 1 each 0    fluticasone propionate (FLONASE) 50 mcg/actuation nasal spray 1 spray by Each Nostril route once daily.      ibuprofen (ADVIL,MOTRIN) 800 MG tablet Take 800 mg by mouth 2 (two) times daily.      lancets Misc To check BG qd times daily, to use with insurance  "preferred meter 100 each 2    polyethylene glycol (GLYCOLAX) 17 gram PwPk Take 17 g by mouth once daily. 2 packs         SCHEDULED MEDS:   apixaban  5 mg Oral BID    aspirin  81 mg Oral Daily    buPROPion  300 mg Oral Nightly    furosemide (LASIX) injection  40 mg Intravenous Q12H    metoprolol tartrate  25 mg Oral TID    polyethylene glycol  17 g Oral Daily    pramipexole  0.25 mg Oral QHS    traZODone  200 mg Oral QHS       CONTINUOUS INFUSIONS:    PRN MEDS:benzonatate, dextrose 10%, dextrose 10%, glucagon (human recombinant), glucose, glucose, insulin aspart U-100, ondansetron, sodium chloride 0.9%    LABS AND DIAGNOSTICS     CBC LAST 3 DAYS  Recent Labs   Lab 08/20/23 0415 08/21/23 0439 08/24/23 1957   WBC 8.69 10.70 8.21   RBC 4.45* 4.48* 4.31*   HGB 14.0 14.0 13.4*   HCT 41.3 41.5 40.3   MCV 93 93 94   MCH 31.5* 31.3* 31.1*   MCHC 33.9 33.7 33.3   RDW 12.4 12.5 12.2   * 121* 125*   MPV 11.0 11.0 11.3   GRAN 53.0  4.6 72.2  7.7 65.2  5.4   LYMPH 38.4  3.3 19.0  2.0 22.5  1.9   MONO 6.0  0.5 7.4  0.8 9.9  0.8   BASO 0.05 0.04 0.04   NRBC 0 0 0       COAGULATION LAST 3 DAYS  No results for input(s): "LABPT", "INR", "APTT" in the last 168 hours.    CHEMISTRY LAST 3 DAYS  Recent Labs   Lab 08/20/23 0415 08/21/23 0439 08/24/23 1957 08/25/23  0026 08/25/23 0436    138 139  --  140   K 4.2 4.4 4.6  --  4.1    107 103  --  102   CO2 27 26 24  --  28   ANIONGAP 6* 5* 12  --  10   BUN 21* 10 18  --  19   CREATININE 1.3 1.0 1.2  --  1.3   GLU 94 107 93  --  114*   CALCIUM 8.4* 8.7 9.1  --  8.4*   MG 2.2 2.6  --  1.9  --    ALBUMIN 3.8 3.8 3.8  --   --    PROT 6.0 6.5 6.5  --   --    ALKPHOS 57 59 70  --   --    ALT 35 28 28  --   --    AST 24 18 21  --   --    BILITOT 1.2* 1.7* 1.6*  --   --        CARDIAC PROFILE LAST 3 DAYS  Recent Labs   Lab 08/19/23  2134 08/20/23  0415 08/20/23  1001 08/20/23  1547 08/24/23  1957 08/24/23  2212 08/25/23  0436   *  --   --   --  261*  --   --  " "  TROPONINI  --   --   --   --  0.027* 0.021 0.022   TROPONINIHS 5.4 6.6 7.4 11.5  --   --   --        ENDOCRINE LAST 3 DAYS  No results for input(s): "TSH", "PROCAL" in the last 168 hours.    LAST ARTERIAL BLOOD GAS  ABG  No results for input(s): "PH", "PO2", "PCO2", "HCO3", "BE" in the last 168 hours.    LAST 7 DAYS MICROBIOLOGY   Microbiology Results (last 7 days)       ** No results found for the last 168 hours. **            MOST RECENT IMAGING  X-Ray Chest AP Portable  Narrative: EXAMINATION:  XR CHEST AP PORTABLE    CLINICAL HISTORY:  Chest Pain;    TECHNIQUE:  Single frontal view of the chest was performed.    COMPARISON:  08/19/2023.    FINDINGS:  The lungs are well expanded.  There are bilateral interstitial opacities and pulmonary vascular congestion compatible with pulmonary edema.  There are small effusions.  The cardiac silhouette is enlarged.  Osseous structures are intact.  Impression: Interstitial pulmonary edema and small bilateral pleural effusions.    Electronically signed by: Tao Trinidad  Date:    08/24/2023  Time:    20:25  Nuclear Stress - Cardiology Interpreted    Abnormal myocardial perfusion scan.    There is a small to moderate sized, fixed perfusion abnormality   consistent with scar in the basal to apical inferior wall(s).    There are no other significant perfusion abnormalities.    The gated perfusion images showed an ejection fraction of 47% post   stress.    LV cavity size is mildly enlarged at rest and mildly enlarged at   stress.    The ECG portion of the study is negative for ischemia.    The patient reported no chest pain during the stress test.    Equivocal study, fixed inferior defect, can not rule out attenuation   artifact versus scar tissue.  Clinical correlation warranted.  Left   ventricle does appear to be dilated.  Nuclear EF 47%, correlate with echo.      ECHOCARDIOGRAM RESULTS (last 5)  Results for orders placed during the hospital encounter of " 08/19/23    Transesophageal echo (JASON) with possible cardioversion    Interpretation Summary    Left Ventricle: The left ventricle is normal in size. Ventricular mass is normal. Normal wall thickness. Normal wall motion. There is normal systolic function with a visually estimated ejection fraction of 55 - 70%.    Left Atrium: Left atrium is mildly dilated. The left atrial appendage appears normal. The left atrial appendage has a windsock morphology. There is no thrombus in the left atrial appendage.    Right Ventricle: Normal right ventricular cavity size. Right ventricle wall motion  is normal. Systolic function is normal.    Mitral Valve significant prolapse of the anterior leaf of the mitral valve associated with moderately severe mitral regurgitation is seen    IVC/SVC: Normal venous pressure at 3 mmHg.    Atrial fibrillation is      Results for orders placed in visit on 08/18/23    Echo    Interpretation Summary    Left Ventricle: The left ventricle is normal in size. Mildly increased wall thickness. Normal wall motion. There is normal systolic function with a visually estimated ejection fraction of 60 - 65%.    Left Atrium: Left atrium is severely dilated.    Right Ventricle: Normal right ventricular cavity size. Wall thickness is normal. Right ventricle wall motion  is normal. Systolic function is normal.    Mitral Valve: Mildly thickened anterior leaflet. There is moderate regurgitation with a posterolateral eccentriccally directed jet.  There is moderate prolapse of the anterior mitral valve leaflet.    Pulmonary Artery: No pulmonary hypertension. The estimated pulmonary artery systolic pressure is 26 mmHg.    IVC/SVC: Intermediate venous pressure at 8 mmHg.      Results for orders placed in visit on 05/11/22    Echo    Interpretation Summary  · The left ventricle is mildly enlarged with mild eccentric hypertrophy and normal systolic function.  · The estimated ejection fraction is 65%.  · Normal left  ventricular diastolic function.  · Normal right ventricular size with normal right ventricular systolic function.  · Severe left atrial enlargement.  · There is mild mitral prolapse of the anterior mitral leaflet.  · Moderate-to-severe mitral regurgitation.  · Intermediate central venous pressure (8 mmHg).  · The estimated PA systolic pressure is 33 mmHg.      Results for orders placed in visit on 08/06/19    Transthoracic echo (TTE) complete (Cupid Only)    Interpretation Summary  · Normal left ventricular systolic function. The estimated ejection fraction is 60%  · Normal LV diastolic function.  · Eccentric left ventricular hypertrophy.  · Normal right ventricular systolic function.  · Severe left atrial enlargement.  · Moderate mitral regurgitation.  · The estimated PA systolic pressure is 25 mm Hg      Results for orders placed in visit on 12/27/18    Transthoracic echo (TTE) complete    Interpretation Summary  · Normal left ventricular systolic function. The estimated ejection fraction is 60%  · Eccentric left ventricular hypertrophy.  · Normal right ventricular systolic function.  · Moderate left atrial enlargement.  · Moderate mitral regurgitation.  · The mitral valve shows mild prolapse of the anterior mitral leaflet.  · The estimated PA systolic pressure is 36 mm Hg      CURRENT/PREVIOUS VISIT EKG  Results for orders placed or performed during the hospital encounter of 08/19/23   EKG 12-lead    Collection Time: 08/21/23 10:41 AM    Narrative    Test Reason : I49.9,    Vent. Rate : 059 BPM     Atrial Rate : 059 BPM     P-R Int : 216 ms          QRS Dur : 092 ms      QT Int : 406 ms       P-R-T Axes : 056 045 051 degrees     QTc Int : 401 ms    Sinus bradycardia with 1st degree A-V block  Otherwise normal ECG  When compared with ECG of 19-AUG-2023 21:28,  Sinus rhythm has replaced Atrial fibrillation  Vent. rate has decreased BY  58 BPM    Referred By: [email or web address]   SELF           Confirmed By:             ASSESSMENT/PLAN:     Active Hospital Problems    Diagnosis    *Acute exacerbation of CHF (congestive heart failure)    A-fib    Type 2 diabetes mellitus without complication, without long-term current use of insulin    Hypertension     Patient states he does not have hypertension but uses fosinopril for mitral regurgitation      Anxiety    Dyslipidemia       ASSESSMENT & PLAN:   ACUTE ONSET CHF MILDLY REDUCED EJECTION FRACTION  HISTORY OF ATRIAL FIBRILLATION STATUS POST CARDIOVERSION CURRENTLY SINUS BRADYCARDIA    RECOMMENDATIONS:  GOAL-DIRECTED MEDICAL THERAPY  AGREE WITH IV LASIX AND DISCHARGED ON LASIX 40 MG A DAY.  HE IS ALREADY ON ACE-INHIBITOR.  HIS BLOOD PRESSURE MAY BE SOFT TO START ENTRESTO DURING THIS HOSPITALIZATION  POSSIBLE ADDITION OF ALDACTONE JARDIANCE AS AN OUTPATIENT    SHORT-TERM FOLLOW-UP WITH DR. RAE      Will give an extra dose of IV Lasix as his lungs are still congested today has wheezing and rhonchi rales can not take a deep breath  Tyler Thrasher MD  Date of Service: 08/25/2023  11:52 AM

## 2023-08-25 NOTE — PLAN OF CARE
POC reviewed patient,verbalized understanding. Tele monitored  SB-SR   strict I& O. No falls,or  injury. Instructed to call for assistance ,verbalized understanding. Bed in low position and locked  VSS afebrile Metoprolol held this shift due to low heart rate

## 2023-08-25 NOTE — ASSESSMENT & PLAN NOTE
Chronic, controlled.  Latest blood pressure and vitals reviewed-     Temp:  [97.8 °F (36.6 °C)-98.8 °F (37.1 °C)]   Pulse:  [55-65]   Resp:  [16-18]   BP: (106-134)/(58-81)   SpO2:  [93 %-98 %] .   Home meds for hypertension were reviewed and noted below-  Hypertension Medications             fosinopriL (MONOPRIL) 40 MG tablet Take 1 tablet (40 mg total) by mouth once daily.    metoprolol tartrate (LOPRESSOR) 25 MG tablet Take 1 tablet (25 mg total) by mouth 3 (three) times daily.          While in the hospital, will manage blood pressure as follows; Continue home antihypertensive regimen    Will utilize p.r.n. blood pressure medication only if patient's blood pressure greater than 180/110 and he develops symptoms such as worsening chest pain or shortness of breath.

## 2023-08-25 NOTE — HPI
Abimael Armendariz 58-year-old male with a past medical history hypertension, hyperlipidemia, mitral regurgitation, anxiety, ADHD, AFib with RVR recently cardioverted and needed nose CHF who presented to the emergency room complaining of shortness of breath, cough and abdominal swelling that started 7 days ago and has persisted.  Patient underwent nuclear stress today and states he has not felt well since with increased shortness of.  Stress test revealed a small to moderate-sized fixed perfusion abnormality consistent with a scar in the basal to apical inferior walls.  Ejection fraction of 47% post stress.  Left ventricle appears to be dilated.  ECG portion negative for ischemia and patient had no chest pain during stress test.  Patient has had echo and JASON this month. Patient is followed by Cardiology,  and has also been seen recently by Dr. Johnston and Dr. Eubanks.  Patient currently denies chest pain.  EKG phoned in ED showed sinus bradycardia with first-degree AV block.  , troponin 0.027 and chest x-ray demonstrated interstitial pulmonary edema small bilateral pleural effusions.  Patient received 40 mg IV Lasix while in the ED and spoke to Cardiology who agreed patient should be admitted.  Patient was referred to Hospital Medicine and seen in the ED. Patient reports dyspnea with exertion and orthopnea.  Patient denies chest pain, nausea, vomiting, fever and chills.  Patient will be admitted to Hospital Medicine for further evaluation and management

## 2023-08-25 NOTE — ASSESSMENT & PLAN NOTE
Patient is identified as having  heart failure that is Acute. CHF is currently uncontrolled due to >3 pillow orthopnea, Rales/crackles on pulmonary exam and Pulmonary edema/pleural effusion on CXR. Latest ECHO performed and demonstrates- Results for orders placed in visit on 08/18/23    Echo    Interpretation Summary    Left Ventricle: The left ventricle is normal in size. Mildly increased wall thickness. Normal wall motion. There is normal systolic function with a visually estimated ejection fraction of 60 - 65%.    Left Atrium: Left atrium is severely dilated.    Right Ventricle: Normal right ventricular cavity size. Wall thickness is normal. Right ventricle wall motion  is normal. Systolic function is normal.    Mitral Valve: Mildly thickened anterior leaflet. There is moderate regurgitation with a posterolateral eccentriccally directed jet.  There is moderate prolapse of the anterior mitral valve leaflet.    Pulmonary Artery: No pulmonary hypertension. The estimated pulmonary artery systolic pressure is 26 mmHg.    IVC/SVC: Intermediate venous pressure at 8 mmHg.  . Continue Beta Blocker and monitor clinical status closely. Monitor on telemetry. Patient is on CHF pathway.  Monitor strict Is&Os and daily weights.  Place on fluid restriction of 1.5 L. Cardiology has been consulted. Continue to stress to patient importance of self efficacy and  on diet for CHF. Last BNP reviewed- and noted below   Recent Labs   Lab 08/24/23 1957   *   .

## 2023-08-25 NOTE — PLAN OF CARE
[facility] - Med/Surg  Initial Discharge Assessment       Primary Care Provider: Sammy Trinidad MD    Admission Diagnosis: Acute heart failure, unspecified heart failure type [I50.9]    Admission Date: 8/24/2023  Expected Discharge Date:     Transition of Care Barriers: None    Payor: MEDICAID / Plan: AETNA [facility] / Product Type: Managed Medicaid /     Extended Emergency Contact Information  Primary Emergency Contact: Radha Armendariz  Address: [address] [place] [address] [place] [address] [place]  Mobile Phone: [phone]  Relation: Spouse  Preferred language: English   needed? No    Discharge Plan A: Home with family  Discharge Plan B: Home      [id number] [place] - [address]  [address] [place] [address]  Phone: [phone] Fax: [phone]    [id number] [place] - [address]  [address]  [place] [address]  Phone: [phone] Fax: [phone]        DC assessment completed at bedside with pt an spouse. Verified information on facesheet as correct. Lives at listed address with spouse. Reports POA as spouse. PCP is Dr. Trinidad- states last apt was about a year ago. Pharmacy is [facility] [address]. Denies hh/hd. [facility]. DME- glucometer with supplies. Independent at baseline. Drives himself to apts. Spouse to provide transportation home. Verified insurance on file. Reports taking home medications as prescribed and can afford them. Reports several admits at [facility] with related symptoms. DC plan is home.           Initial Assessment (most recent)       Adult Discharge Assessment - 08/25/23 1351          Discharge Assessment    Assessment Type Discharge Planning Assessment     Confirmed/corrected address, phone number and insurance Yes     Confirmed Demographics Correct on Facesheet     Source of Information patient;family     Does patient/caregiver understand  observation status Yes     Communicated THEA with patient/caregiver Yes     Reason For Admission CHF     People in Home spouse     Facility Arrived From: ER     Do you expect to return to your current living situation? Yes     Do you have help at home or someone to help you manage your care at home? Yes     Prior to hospitilization cognitive status: Alert/Oriented     Current cognitive status: Alert/Oriented     Equipment Currently Used at Home glucometer     Readmission within 30 days? No     Patient currently being followed by outpatient case management? No     Do you currently have service(s) that help you manage your care at home? No     Do you take prescription medications? Yes     Do you have prescription coverage? Yes     Do you have any problems affording any of your prescribed medications? No     Is the patient taking medications as prescribed? yes     Who is going to help you get home at discharge? spouse     How do you get to doctors appointments? car, drives self     Are you on dialysis? No     Do you take coumadin? No     DME Needed Upon Discharge  none     Discharge Plan discussed with: Patient;Spouse/sig other     Transition of Care Barriers None     Discharge Plan A Home with family     Discharge Plan B Home

## 2023-08-25 NOTE — ED PROVIDER NOTES
Encounter Date: 8/24/2023       History     Chief Complaint   Patient presents with    Shortness of Breath     Began a couple of weeks ago. Pt states it got worse after stress test today. Pt recently cardioverted.     Chest Pain     Recently cardioverted.     Cough     Recently cardioverted.      Patient is a 58-year-old male with a past medical history of hypertension hyperlipidemia mitral regurgitation ADHD anxiety depression recent diagnosis of atrial fibrillation with rapid ventricular response recently cardioverted on amiodarone metoprolol Eliquis aspirin fosinopril who presents the emergency room for evaluation of orthopnea PND mild bilateral lower extremity edema persistent nonproductive cough.  Patient is not on a diuretic.  Nuclear medicine outpatient stress test was performed today which showed:     Abnormal myocardial perfusion scan.  ·  There is a small to moderate sized, fixed perfusion abnormality consistent with scar in the basal to apical inferior wall(s).  ·  There are no other significant perfusion abnormalities.  ·  The gated perfusion images showed an ejection fraction of 47% post stress.  ·  LV cavity size is mildly enlarged at rest and mildly enlarged at stress.  ·  The ECG portion of the study is negative for ischemia.  ·  The patient reported no chest pain during the stress test.  ·  Equivocal study, fixed inferior defect, can not rule out attenuation artifact versus scar tissue.  Clinical correlation warranted.  Left ventricle does appear to be dilated.  Nuclear EF 47%, correlate with echo      He is no complaints of chest pain at this time.      Review of patient's allergies indicates:  No Known Allergies  Past Medical History:   Diagnosis Date    ADHD (attention deficit hyperactivity disorder)     Anxiety     Depression     Hyperlipidemia     Hypertension     patient states he uses fosinopril for mitral regurgitation, not hypertension    Insomnia     Mitral regurgitation      Past Surgical  History:   Procedure Laterality Date    KNEE ARTHROSCOPY W/ ACL RECONSTRUCTION AND HAMSTRING GRAFT  2003    right    TRANSESOPHAGEAL ECHOCARDIOGRAM WITH POSSIBLE CARDIOVERSION (JASON W/ POSS CARDIOVERSION) Bilateral 8/21/2023    Procedure: Transesophageal echo (JASON) intra-procedure log documentation;  Surgeon: Jesu Flores MD;  Location: [facility] CATH/EP LAB;  Service: Cardiology;  Laterality: Bilateral;     Family History   Problem Relation Age of Onset    Stroke Mother     Heart attack Father     Heart disease Father     Heart attack Paternal Grandfather     No Known Problems Sister     No Known Problems Brother     No Known Problems Daughter     No Known Problems Son     Alzheimer's disease Maternal Grandmother      Social History     Tobacco Use    Smoking status: Never    Smokeless tobacco: Current     Types: Chew   Substance Use Topics    Alcohol use: No    Drug use: No     Review of Systems   Constitutional:  Negative for fatigue and fever.   HENT:  Negative for ear pain, rhinorrhea and sore throat.    Eyes:  Negative for pain and visual disturbance.   Respiratory:  Positive for cough and shortness of breath.    Cardiovascular:  Positive for leg swelling. Negative for chest pain.   Gastrointestinal:  Negative for abdominal pain, diarrhea, nausea and vomiting.   Genitourinary:  Negative for difficulty urinating.   Musculoskeletal:  Negative for arthralgias.   Skin:  Negative for rash.   Neurological:  Negative for syncope, weakness, numbness and headaches.   All other systems reviewed and are negative.      Physical Exam     Initial Vitals [08/24/23 1929]   BP Pulse Resp Temp SpO2   116/60 (!) 55 18 97.8 °F (36.6 °C) 96 %      MAP       --         Physical Exam    Nursing note and vitals reviewed.  Constitutional: He appears well-developed and well-nourished. No distress.   HENT:   Head: Normocephalic and atraumatic.   Mouth/Throat: Oropharynx is clear and moist.   Eyes: EOM are normal. Pupils are equal,  round, and reactive to light.   Neck: Neck supple.   Cardiovascular:  Normal rate, regular rhythm and intact distal pulses.           Murmur (holosystolic left lower sternal border) heard.  Pulmonary/Chest: He has no wheezes. He has no rhonchi. He has rales.   Abdominal: Abdomen is soft. Bowel sounds are normal. There is no abdominal tenderness.   Musculoskeletal:         General: Edema (mild at the ankles bilaterally) present. Normal range of motion.      Cervical back: Neck supple.     Neurological: He is alert and oriented to person, place, and time. He has normal strength.   Skin: Skin is warm and dry. No rash noted.   Psychiatric: He has a normal mood and affect.         ED Course   Procedures  Labs Reviewed   CBC W/ AUTO DIFFERENTIAL - Abnormal; Notable for the following components:       Result Value    RBC 4.31 (*)     Hemoglobin 13.4 (*)     MCH 31.1 (*)     Platelets 125 (*)     All other components within normal limits   COMPREHENSIVE METABOLIC PANEL - Abnormal; Notable for the following components:    Total Bilirubin 1.6 (*)     All other components within normal limits   TROPONIN I - Abnormal; Notable for the following components:    Troponin I 0.027 (*)     All other components within normal limits   B-TYPE NATRIURETIC PEPTIDE - Abnormal; Notable for the following components:     (*)     All other components within normal limits   URINALYSIS, REFLEX TO URINE CULTURE - Abnormal; Notable for the following components:    Protein, UA 1+ (*)     Urobilinogen, UA 2.0-3.0 (*)     All other components within normal limits    Narrative:     Specimen Source->Urine   URINALYSIS MICROSCOPIC - Abnormal; Notable for the following components:    Hyaline Casts, UA 3 (*)     All other components within normal limits    Narrative:     Specimen Source->Urine   TROPONIN I   HEMOGLOBIN A1C     EKG Readings: (Independently Interpreted)   Other EKG Interpretations: EKG independently interpreted by me as rate 57 sinus  bradycardia first-degree AV block normal axis and intervals nonspecific T-wave inversion inferior lead no ST segment elevation or depression.       Imaging Results              X-Ray Chest AP Portable (Final result)  Result time 08/24/23 20:25:42      Final result by Tao Trinidad DO (08/24/23 20:25:42)                   Impression:      Interstitial pulmonary edema and small bilateral pleural effusions.      Electronically signed by: Tao Trinidad  Date:    08/24/2023  Time:    20:25               Narrative:    EXAMINATION:  XR CHEST AP PORTABLE    CLINICAL HISTORY:  Chest Pain;    TECHNIQUE:  Single frontal view of the chest was performed.    COMPARISON:  08/19/2023.    FINDINGS:  The lungs are well expanded.  There are bilateral interstitial opacities and pulmonary vascular congestion compatible with pulmonary edema.  There are small effusions.  The cardiac silhouette is enlarged.  Osseous structures are intact.                                       Medications   apixaban tablet 5 mg (5 mg Oral Given 8/25/23 0048)   traZODone tablet 200 mg (200 mg Oral Given 8/25/23 0048)   sodium chloride 0.9% flush 10 mL (has no administration in time range)   furosemide injection 40 mg (has no administration in time range)   ondansetron injection 4 mg (has no administration in time range)   pramipexole tablet 0.25 mg (0.25 mg Oral Given 8/25/23 0047)   glucose chewable tablet 16 g (has no administration in time range)   glucose chewable tablet 24 g (has no administration in time range)   glucagon (human recombinant) injection 1 mg (has no administration in time range)   insulin aspart U-100 pen 0-5 Units (has no administration in time range)   dextrose 10% bolus 125 mL 125 mL (has no administration in time range)   dextrose 10% bolus 250 mL 250 mL (has no administration in time range)   benzonatate capsule 100 mg (has no administration in time range)   buPROPion TB24 tablet 300 mg (300 mg Oral Given 8/25/23 0047)    metoprolol tartrate (LOPRESSOR) tablet 25 mg (25 mg Oral Given 8/25/23 0048)   aspirin chewable tablet 81 mg (has no administration in time range)   polyethylene glycol packet 17 g (has no administration in time range)   furosemide injection 40 mg (40 mg Intravenous Given 8/24/23 2059)     Medical Decision Making  Patient has what appears to be heart failure on x-ray with pulmonary edema and mild pleural effusions at the bases.  I spoke to the cardiologist, Dr. Thrasher who agrees with placing the patient in the hospital overnight for diuresis given that he is had multiple medications changed over the past week or 2 since he has been diagnosed with atrial fibrillation with rapid ventricular response subsequently cardioverted and had a outpatient nuclear medicine stress test today.  The patient has a decreased EF on that stress test.  He is not on any diuretics at his home.  He will likely need medical management and changes in his regimen with addition of diuretics.  I do not believe the patient actually has acute coronary syndrome.  The EKG portion of stress test earlier today was negative for ischemia or infarction.  However he does have some a moderate-sized fixed perfusion abnormality on his echocardiogram.    Amount and/or Complexity of Data Reviewed  Independent Historian: spouse  External Data Reviewed:      Details: Echocardiogram  Labs: ordered. Decision-making details documented in ED Course.  Radiology: ordered and independent interpretation performed.  ECG/medicine tests: ordered and independent interpretation performed.  Discussion of management or test interpretation with external provider(s): I spoke to the cardiologist and the hospitalist regarding the patient's admission    Risk  Prescription drug management.               ED Course as of 08/25/23 0607   Thu Aug 24, 2023   2016 EKG independently interpreted by me as rate 57 sinus bradycardia with first-degree AV block normal axis no ST segment  elevation or depression nonspecific T-wave inversion lead 3 [JS]      ED Course User Index  [JS] Juan Villanueva MD                    Clinical Impression:   Final diagnoses:  [R07.9] Chest pain  [I50.9] Acute heart failure, unspecified heart failure type (Primary)        ED Disposition Condition    Observation Stable                Juan Villanueva MD  08/25/23 0612

## 2023-08-25 NOTE — PLAN OF CARE
Problem: Adult Inpatient Plan of Care  Goal: Plan of Care Review  Outcome: Ongoing, Progressing  Goal: Patient-Specific Goal (Individualized)  Outcome: Ongoing, Progressing  Goal: Absence of Hospital-Acquired Illness or Injury  Outcome: Ongoing, Progressing  Goal: Optimal Comfort and Wellbeing  Outcome: Ongoing, Progressing  Goal: Readiness for Transition of Care  Outcome: Ongoing, Progressing     Problem: Fluid Imbalance (Heart Failure)  Goal: Fluid Balance  Outcome: Ongoing, Progressing     Problem: Oral Intake Inadequate (Heart Failure)  Goal: Optimal Nutrition Intake  Outcome: Ongoing, Progressing   Plan of care reviewed with patient,verbalized understanding. SB/SR on telemetry. Instructed on fluid restriction, strict I& O. Remains free from falls, injury. Instructed to call for assistance as needed ,verbalized understanding. Bed in low & locked position. Call light in reach, bed alarm on .

## 2023-08-25 NOTE — H&P
[facility] Medicine  History & Physical    Patient Name: Abimael Armendariz  MRN: [id number]  Patient Class: OP- Observation  Admission Date: 8/24/2023  Attending Physician: Mahi Rene MD   Primary Care Provider: Sammy Trinidad MD         Patient information was obtained from patient, spouse/SO, past medical records and ER records.     Subjective:     Principal Problem:Acute exacerbation of CHF (congestive heart failure)    Chief Complaint:   Chief Complaint   Patient presents with    Shortness of Breath     Began a couple of weeks ago. Pt states it got worse after stress test today. Pt recently cardioverted.     Chest Pain     Recently cardioverted.     Cough     Recently cardioverted.         HPI: Abimael Armendariz 58-year-old male with a past medical history hypertension, hyperlipidemia, mitral regurgitation, anxiety, ADHD, AFib with RVR recently cardioverted and needed nose CHF who presented to the emergency room complaining of shortness of breath, cough and abdominal swelling that started 7 days ago and has persisted.  Patient underwent nuclear stress today and states he has not felt well since with increased shortness of.  Stress test revealed a small to moderate-sized fixed perfusion abnormality consistent with a scar in the basal to apical inferior walls.  Ejection fraction of 47% post stress.  Left ventricle appears to be dilated.  ECG portion negative for ischemia and patient had no chest pain during stress test.  Patient has had echo and JASON this month. Patient is followed by Cardiology,  and has also been seen recently by Dr. Johnston and Dr. Eubanks.  Patient currently denies chest pain.  EKG phoned in ED showed sinus bradycardia with first-degree AV block.  , troponin 0.027 and chest x-ray demonstrated interstitial pulmonary edema small bilateral pleural effusions.  Patient received 40 mg IV Lasix while in the ED and spoke to Cardiology who agreed patient  Phone: [address]      Fax: [phone]                            Outpatient Physical Therapy                                                                            Daily Note    Date: 3/16/2021  Patient Name: Aura Pennington        MRN: [id number]   ACCT#:  [de-identified]  : 1948  (67 y.o.)    Referring Practitioner: Dr. Quyen Membreno    Referral Date : 21    Diagnosis: S/P right patellar tendon repair, S/P right TKA  Treatment Diagnosis: R knee pain    Onset Date: 21  PT Insurance Information: [facility], [employer]  Total # of Visits Approved: 18 Per Physician Order  Total # of Visits to Date: 4  Plan of Care/Certification Expiration Date: 21    Pre-Treatment Pain:  1/10     Assessment  Assessment: Patient reports R knee pain is a 1/10 this morning. Completed gentle PROM as outlined. R knee PROM flex = 82 deg. Readjusted ACE wrap and knee brace following session as the were both sliding down his leg. Therapist also did a wound check. Patient has small opening at base of incision with some yellow drainage patient also has redness around this area with another small black scab in the middle of incision. Patient reports taking antibiotics as prescribed. Patient notes he is also applying peroxide and Neosporin as directed with regular bandage changes. Therapist called and left message for Dr. Quyen Membreno nurse about patient's wound.   Chart Reviewed: Yes    Plan  Plan: Continue with current plan    Exercises/Modalities/Manual:  See DocFlow Sheet    Education:     Goals  (Total # of Visits to Date: 4)   Short Term Goals - Time Frame for Short term goals: 10  Short term goal 1: Patient to have increase PROM R knee flexion 95 degrees  Short term goal 2: Patient to be independent with HEP    Long Term Goals - Time Frame for Long term goals : 25  Long term goal 1: Patient to have increase strength R knee extension 4/5 to walk without device  Long term goal 2: Improve functional mobility with LEFS score > 50/80  Long term goal 3: Patient to have increase R knee flexion to 100 degrees to ambualte on stairs alternating feet WFL    Post Treatment Pain:  1/10    Time In: 0950    Time Out : 1020   Timed Code Treatment Minutes: 30 Minutes  Total Treatment Time: 27 Minutes    Tori Ambriz     Date: 3/16/2021 should be admitted.  Patient was referred to Hospital Medicine and seen in the ED. Patient reports dyspnea with exertion and orthopnea.  Patient denies chest pain, nausea, vomiting, fever and chills.  Patient will be admitted to Hospital Medicine for further evaluation and management                 Past Medical History:   Diagnosis Date    ADHD (attention deficit hyperactivity disorder)     Anxiety     Depression     Hyperlipidemia     Hypertension     patient states he uses fosinopril for mitral regurgitation, not hypertension    Insomnia     Mitral regurgitation        Past Surgical History:   Procedure Laterality Date    KNEE ARTHROSCOPY W/ ACL RECONSTRUCTION AND HAMSTRING GRAFT  2003    right    TRANSESOPHAGEAL ECHOCARDIOGRAM WITH POSSIBLE CARDIOVERSION (JAOSN W/ POSS CARDIOVERSION) Bilateral 8/21/2023    Procedure: Transesophageal echo (JASON) intra-procedure log documentation;  Surgeon: Jesu Flores MD;  Location: [facility] CATH/EP LAB;  Service: Cardiology;  Laterality: Bilateral;       Review of patient's allergies indicates:  No Known Allergies    Current Facility-Administered Medications on File Prior to Encounter   Medication    [COMPLETED] regadenoson injection 0.4 mg     Current Outpatient Medications on File Prior to Encounter   Medication Sig    amiodarone (PACERONE) 400 MG tablet Take 1 tablet (400 mg total) by mouth 2 (two) times daily for 6 days, THEN 0.5 tablets (200 mg total) 2 (two) times daily.    apixaban (ELIQUIS) 5 mg Tab Take 1 tablet (5 mg total) by mouth 2 (two) times daily.    aspirin 81 mg Tab Take 81 mg by mouth once daily. Every day    blood sugar diagnostic Strp To check BG qd times daily, to use with insurance preferred meter    blood-glucose meter kit To check BG qd times daily, to use with insurance preferred meter    buPROPion (WELLBUTRIN XL) 300 MG 24 hr tablet Take 1 tablet (300 mg total) by mouth once daily.    ciprofloxacin HCl (CILOXAN) 0.3 % ophthalmic  solution Place 1 drop into both eyes.    fluticasone propionate (FLONASE) 50 mcg/actuation nasal spray 1 spray by Each Nostril route once daily.    fosinopriL (MONOPRIL) 40 MG tablet Take 1 tablet (40 mg total) by mouth once daily.    ibuprofen (ADVIL,MOTRIN) 800 MG tablet Take 800 mg by mouth 2 (two) times daily.    lancets Misc To check BG qd times daily, to use with insurance preferred meter    metFORMIN (GLUCOPHAGE) 500 MG tablet Take 1 tablet (500 mg total) by mouth 2 (two) times daily with meals.    metoprolol tartrate (LOPRESSOR) 25 MG tablet Take 1 tablet (25 mg total) by mouth 3 (three) times daily.    multivitamin (THERAGRAN) per tablet Take 1 tablet by mouth once daily.    pramipexole (MIRAPEX) 0.25 MG tablet TAKE 1 TABLET BY MOUTH EVERY DAY    traZODone (DESYREL) 100 MG tablet Take 2 tablets (200 mg total) by mouth every evening.     Family History       Problem Relation (Age of Onset)    Alzheimer's disease Maternal Grandmother    Heart attack Father, Paternal Grandfather    Heart disease Father    No Known Problems Sister, Brother, Daughter, Son    Stroke Mother          Tobacco Use    Smoking status: Never    Smokeless tobacco: Current     Types: Chew   Substance and Sexual Activity    Alcohol use: No    Drug use: No    Sexual activity: Yes     Partners: Female     Review of Systems   Constitutional:  Negative for activity change, appetite change, chills and fever.   HENT:  Negative for congestion, sore throat and trouble swallowing.    Eyes:  Negative for photophobia and visual disturbance.   Respiratory:  Positive for cough and shortness of breath. Negative for chest tightness.    Cardiovascular:  Positive for leg swelling. Negative for chest pain and palpitations.   Gastrointestinal:  Positive for abdominal distention. Negative for abdominal pain, diarrhea and nausea.   Genitourinary:  Negative for dysuria, flank pain and hematuria.   Musculoskeletal:  Negative for back pain.    Neurological:  Negative for dizziness, weakness and headaches.   Psychiatric/Behavioral:  Negative for confusion.      Objective:     Vital Signs (Most Recent):  Temp: 97.8 °F (36.6 °C) (08/24/23 1929)  Pulse: (!) 58 (08/24/23 2132)  Resp: 18 (08/24/23 2132)  BP: 132/81 (08/24/23 2132)  SpO2: 96 % (08/24/23 2132) Vital Signs (24h Range):  Temp:  [97.8 °F (36.6 °C)] 97.8 °F (36.6 °C)  Pulse:  [55-58] 58  Resp:  [16-18] 18  SpO2:  [94 %-98 %] 96 %  BP: (116-132)/(60-81) 132/81     Weight: 90.7 kg (200 lb)  Body mass index is 27.89 kg/m².     Physical Exam  Vitals reviewed.   Constitutional:       Appearance: Normal appearance. He is normal weight.   HENT:      Head: Normocephalic.      Mouth/Throat:      Mouth: Mucous membranes are moist.      Pharynx: Oropharynx is clear.   Eyes:      Pupils: Pupils are equal, round, and reactive to light.   Cardiovascular:      Rate and Rhythm: Normal rate and regular rhythm.      Pulses: Normal pulses.      Heart sounds: Murmur heard.      Systolic murmur is present.   Pulmonary:      Effort: Pulmonary effort is normal.      Breath sounds: Rales present.   Abdominal:      General: Bowel sounds are normal. There is distension.      Palpations: Abdomen is soft.   Musculoskeletal:         General: Normal range of motion.      Cervical back: Normal range of motion.      Right lower leg: Edema (trace) present.      Left lower leg: Edema (trace) present.   Skin:     General: Skin is warm and dry.   Neurological:      Mental Status: He is alert and oriented to person, place, and time. Mental status is at baseline.   Psychiatric:         Mood and Affect: Mood normal.              CRANIAL NERVES     CN III, IV, VI   Pupils are equal, round, and reactive to light.       Significant Labs: All pertinent labs within the past 24 hours have been reviewed.  CBC:   Recent Labs   Lab 08/24/23 1957   WBC 8.21   HGB 13.4*   HCT 40.3   *     CMP:   Recent Labs   Lab 08/24/23 1957       K 4.6      CO2 24   GLU 93   BUN 18   CREATININE 1.2   CALCIUM 9.1   PROT 6.5   ALBUMIN 3.8   BILITOT 1.6*   ALKPHOS 70   AST 21   ALT 28   ANIONGAP 12       Significant Imaging: I have reviewed all pertinent imaging results/findings within the past 24 hours.  Imaging Results              X-Ray Chest AP Portable (Final result)  Result time 08/24/23 20:25:42      Final result by Tao Trinidad DO (08/24/23 20:25:42)                   Impression:      Interstitial pulmonary edema and small bilateral pleural effusions.      Electronically signed by: Tao Trinidad  Date:    08/24/2023  Time:    20:25               Narrative:    EXAMINATION:  XR CHEST AP PORTABLE    CLINICAL HISTORY:  Chest Pain;    TECHNIQUE:  Single frontal view of the chest was performed.    COMPARISON:  08/19/2023.    FINDINGS:  The lungs are well expanded.  There are bilateral interstitial opacities and pulmonary vascular congestion compatible with pulmonary edema.  There are small effusions.  The cardiac silhouette is enlarged.  Osseous structures are intact.                                        Assessment/Plan:     * Acute exacerbation of CHF (congestive heart failure)  Patient is identified as having  heart failure that is Acute. CHF is currently uncontrolled due to >3 pillow orthopnea, Rales/crackles on pulmonary exam and Pulmonary edema/pleural effusion on CXR. Latest ECHO performed and demonstrates- Results for orders placed in visit on 08/18/23    Echo    Interpretation Summary    Left Ventricle: The left ventricle is normal in size. Mildly increased wall thickness. Normal wall motion. There is normal systolic function with a visually estimated ejection fraction of 60 - 65%.    Left Atrium: Left atrium is severely dilated.    Right Ventricle: Normal right ventricular cavity size. Wall thickness is normal. Right ventricle wall motion  is normal. Systolic function is normal.    Mitral Valve: Mildly thickened anterior leaflet.  "There is moderate regurgitation with a posterolateral eccentriccally directed jet.  There is moderate prolapse of the anterior mitral valve leaflet.    Pulmonary Artery: No pulmonary hypertension. The estimated pulmonary artery systolic pressure is 26 mmHg.    IVC/SVC: Intermediate venous pressure at 8 mmHg.  . Continue Beta Blocker and monitor clinical status closely. Monitor on telemetry. Patient is on CHF pathway.  Monitor strict Is&Os and daily weights.  Place on fluid restriction of 1.5 L. Cardiology has been consulted. Continue to stress to patient importance of self efficacy and  on diet for CHF. Last BNP reviewed- and noted below   Recent Labs   Lab 08/24/23 1957   *   .    A-fib  Patient with Paroxysmal (<7 days) atrial fibrillation which is controlled currently with Beta Blocker. Patient is currently in sinus rhythm.[id number] Score: 1. . Anticoagulation indicated. Anticoagulation done with Eliquis.    Type 2 diabetes mellitus without complication, without long-term current use of insulin  Patient's FSGs are controlled on current medication regimen.  Last A1c reviewed-   Lab Results   Component Value Date    HGBA1C 5.4 02/27/2023     Most recent fingerstick glucose reviewed- No results for input(s): "POCTGLUCOSE" in the last 24 hours.  Current correctional scale  Low  Maintain anti-hyperglycemic dose as follows-   Antihyperglycemics (From admission, onward)    None        Hold Oral hypoglycemics while patient is in the hospital.    Hypertension  Chronic, controlled.  Latest blood pressure and vitals reviewed-     Temp:  [97.8 °F (36.6 °C)]   Pulse:  [55-58]   Resp:  [16-18]   BP: (116-132)/(60-81)   SpO2:  [94 %-98 %] .   Home meds for hypertension were reviewed and noted below-  Hypertension Medications             fosinopriL (MONOPRIL) 40 MG tablet Take 1 tablet (40 mg total) by mouth once daily.    metoprolol tartrate (LOPRESSOR) 25 MG tablet Take 1 tablet (25 mg total) by mouth 3 " (three) times daily.          While in the hospital, will manage blood pressure as follows; Continue home antihypertensive regimen    Will utilize p.r.n. blood pressure medication only if patient's blood pressure greater than 180/110 and he develops symptoms such as worsening chest pain or shortness of breath.        Dyslipidemia   Patient is chronically on statin.will continue for now. Monitor clinically. Last LDL was   Lab Results   Component Value Date    LDLCALC 76.2 11/15/2022              VTE Risk Mitigation (From admission, onward)    None             Maliha Pruitt NP  Department of Hospital Medicine  [facility]

## 2023-08-25 NOTE — ASSESSMENT & PLAN NOTE
"Patient's FSGs are controlled on current medication regimen.  Last A1c reviewed-   Lab Results   Component Value Date    HGBA1C 5.4 02/27/2023     Most recent fingerstick glucose reviewed- No results for input(s): "POCTGLUCOSE" in the last 24 hours.  Current correctional scale  Low  Maintain anti-hyperglycemic dose as follows-   Antihyperglycemics (From admission, onward)    None        Hold Oral hypoglycemics while patient is in the hospital.  "

## 2023-08-25 NOTE — HOSPITAL COURSE
58-year-old male with past medical history of hypertension, hyperlipidemia, mitral regurgitation, anxiety, AFib with RVR recently cardioverted, CHF admitted with CHF exacerbation after undergoing nuclear stress test on day of admission.  Underwent diuresis with IV Lasix.  Cardiology consulted.

## 2023-08-25 NOTE — ASSESSMENT & PLAN NOTE
Chronic, controlled.  Latest blood pressure and vitals reviewed-     Temp:  [97.8 °F (36.6 °C)]   Pulse:  [55-58]   Resp:  [16-18]   BP: (116-132)/(60-81)   SpO2:  [94 %-98 %] .   Home meds for hypertension were reviewed and noted below-  Hypertension Medications             fosinopriL (MONOPRIL) 40 MG tablet Take 1 tablet (40 mg total) by mouth once daily.    metoprolol tartrate (LOPRESSOR) 25 MG tablet Take 1 tablet (25 mg total) by mouth 3 (three) times daily.          While in the hospital, will manage blood pressure as follows; Continue home antihypertensive regimen    Will utilize p.r.n. blood pressure medication only if patient's blood pressure greater than 180/110 and he develops symptoms such as worsening chest pain or shortness of breath.

## 2023-08-25 NOTE — NURSING
Blood glucose 98 Attempeted to scan arm band bar code not recognized Glucose control test performed hi and lo level passed attempted to put in csn number not recognized attempted to scan arm band again bar code not recognized Glucose done  blood glucose 98 secure chat sent to Charlotte Nichole Charge nurse notified Unit director notified

## 2023-08-25 NOTE — NURSING
"Awake alert and oriented x4. Good historian. Follow simple commands. Sinus Louis on monitor. 18g to rt posterior arm with site free of s/s of infiltration. Continues to report c/o "pain like a rope across the epigastric region of torso. Reports c/o "6 at rest" and "9 when coughing". VSS.  "

## 2023-08-25 NOTE — FIRST PROVIDER EVALUATION
Emergency Department TeleTriage Encounter Note      CHIEF COMPLAINT    Chief Complaint   Patient presents with    Shortness of Breath     Began a couple of weeks ago. Pt states it got worse after stress test today. Pt recently cardioverted.     Chest Pain     Recently cardioverted.     Cough     Recently cardioverted.        VITAL SIGNS   Initial Vitals [08/24/23 1929]   BP Pulse Resp Temp SpO2   116/60 (!) 55 18 97.8 °F (36.6 °C) 96 %      MAP       --            ALLERGIES    Review of patient's allergies indicates:  No Known Allergies    PROVIDER TRIAGE NOTE  Verbal consent for the teletriage evaluation was given by the patient at the start of the evaluation.  All efforts will be made to maintain patient's privacy during the evaluation.      This is a teletriage evaluation of a 58 y.o. male presenting to the ED with c/o SOB, cough, and chest pain that started 7 days ago.  Recently cardioverted for A-fib.  On Amiodarone, Metoprolol, and Eliquis.  Also reports swelling to abdomen.  Limited physical exam via telehealth: The patient is awake, alert, answering questions appropriately and is not in respiratory distress.  As the Teletriage provider, I performed an initial assessment and ordered appropriate labs and imaging studies, if any, to facilitate the patient's care once placed in the ED. Once a room is available, care and a full evaluation will be completed by an alternate ED provider.  Any additional orders and the final disposition will be determined by that provider.  All imaging and labs will not be followed-up by the Teletriage Team, including myself.          ORDERS  Labs Reviewed - No data to display    ED Orders (720h ago, onward)      Start Ordered     Status Ordering Provider    08/24/23 2234 08/24/23 1935  Troponin I #2  Once Timed         Ordered TIMOTHY BERNABE    08/24/23 1936 08/24/23 1935  Urinalysis, Reflex to Urine Culture Urine, Clean Catch  STAT         Ordered TIMOTHY BERNABE    08/24/23 1934  08/24/23 1935  Vital signs  Every 15 min         Ordered TIMOTHY BERNABE    08/24/23 1934 08/24/23 1935  Cardiac Monitoring - Adult  Continuous        Comments: Notify Physician If:    Ordered TIMOTHY BERNABE    08/24/23 1934 08/24/23 1935  Pulse Oximetry Continuous  Continuous         Ordered TIMOTHY BERNABE    08/24/23 1934 08/24/23 1935  Diet NPO  Diet effective now         Ordered TIMOTHY BERNABE    08/24/23 1934 08/24/23 1935  Saline lock IV  Once         Ordered TIMOTHY BERNABE    08/24/23 1934 08/24/23 1935  EKG 12-lead  Once        Comments: Do not perform if previously done during this visit/ triage    Ordered TIMOTHY BERNABE    08/24/23 1934 08/24/23 1935  CBC auto differential  STAT         Ordered TIMOTHY BERNABE    08/24/23 1934 08/24/23 1935  Comprehensive metabolic panel  STAT         Ordered TIMOTHY BERNABE    08/24/23 1934 08/24/23 1935  Troponin I #1  STAT         Ordered TIMOTHY BERNABE    08/24/23 1934 08/24/23 1935  B-Type natriuretic peptide (BNP)  STAT         Ordered TIMOTHY BERNABE    08/24/23 1934 08/24/23 1935  X-Ray Chest AP Portable  1 time imaging         Ordered TIMOTHY BERNABE              Virtual Visit Note: The provider triage portion of this emergency department evaluation and documentation was performed via [employer], a HIPAA-compliant telemedicine application, in concert with a tele-presenter in the room. A face to face patient evaluation with one of my colleagues will occur once the patient is placed in an emergency department room.      DISCLAIMER: This note was prepared with [employer] voice recognition transcription software. Garbled syntax, mangled pronouns, and other bizarre constructions may be attributed to that software system.

## 2023-08-25 NOTE — SUBJECTIVE & OBJECTIVE
Interval History:  Patient seen and examined.  Continues to have shortness of breath and nonproductive cough.  On IV Lasix.  Cardiology to see today.    Review of Systems   Constitutional:  Negative for activity change, appetite change, chills and fever.   HENT:  Negative for congestion, sore throat and trouble swallowing.    Respiratory:  Positive for cough and shortness of breath. Negative for chest tightness.    Cardiovascular:  Positive for leg swelling. Negative for chest pain and palpitations.   Gastrointestinal:  Positive for abdominal distention. Negative for abdominal pain, diarrhea and nausea.   Genitourinary:  Negative for dysuria, flank pain and hematuria.   Musculoskeletal:  Negative for back pain.   Neurological:  Negative for dizziness, weakness and headaches.   Psychiatric/Behavioral:  Negative for confusion.      Objective:     Vital Signs (Most Recent):  Temp: 97.9 °F (36.6 °C) (08/25/23 1118)  Pulse: (!) 56 (08/25/23 1118)  Resp: 18 (08/25/23 1118)  BP: 123/60 (08/25/23 1118)  SpO2: (!) 94 % (08/25/23 1118) Vital Signs (24h Range):  Temp:  [97.8 °F (36.6 °C)-98.8 °F (37.1 °C)] 97.9 °F (36.6 °C)  Pulse:  [55-65] 56  Resp:  [16-18] 18  SpO2:  [93 %-98 %] 94 %  BP: (106-134)/(58-81) 123/60     Weight: 90.4 kg (199 lb 4.7 oz)  Body mass index is 27.8 kg/m².    Intake/Output Summary (Last 24 hours) at 8/25/2023 1147  Last data filed at 8/25/2023 0600  Gross per 24 hour   Intake 240 ml   Output 1850 ml   Net -1610 ml         Physical Exam  Vitals reviewed.   Constitutional:       Appearance: Normal appearance. He is normal weight.   HENT:      Head: Normocephalic.      Mouth/Throat:      Mouth: Mucous membranes are moist.      Pharynx: Oropharynx is clear.   Eyes:      Pupils: Pupils are equal, round, and reactive to light.   Cardiovascular:      Rate and Rhythm: Normal rate and regular rhythm.      Pulses: Normal pulses.      Heart sounds: Murmur heard.      Systolic murmur is present.   Pulmonary:       Effort: Pulmonary effort is normal.      Breath sounds: Rales present.   Abdominal:      General: Bowel sounds are normal. There is distension.      Palpations: Abdomen is soft.   Musculoskeletal:         General: Normal range of motion.      Cervical back: Normal range of motion.      Right lower leg: Edema (trace) present.      Left lower leg: Edema (trace) present.   Skin:     General: Skin is warm and dry.   Neurological:      Mental Status: He is alert and oriented to person, place, and time. Mental status is at baseline.   Psychiatric:         Mood and Affect: Mood normal.             Significant Labs: All pertinent labs within the past 24 hours have been reviewed.    Significant Imaging: I have reviewed all pertinent imaging results/findings within the past 24 hours.

## 2023-08-26 VITALS
HEIGHT: 71 IN | WEIGHT: 191.81 LBS | OXYGEN SATURATION: 96 % | SYSTOLIC BLOOD PRESSURE: 116 MMHG | TEMPERATURE: 98 F | BODY MASS INDEX: 26.85 KG/M2 | HEART RATE: 58 BPM | DIASTOLIC BLOOD PRESSURE: 64 MMHG | RESPIRATION RATE: 16 BRPM

## 2023-08-26 LAB
ANION GAP SERPL CALC-SCNC: 10 MMOL/L (ref 8–16)
BUN SERPL-MCNC: 21 MG/DL (ref 6–20)
CALCIUM SERPL-MCNC: 9 MG/DL (ref 8.7–10.5)
CHLORIDE SERPL-SCNC: 101 MMOL/L (ref 95–110)
CO2 SERPL-SCNC: 30 MMOL/L (ref 23–29)
CREAT SERPL-MCNC: 1.3 MG/DL (ref 0.5–1.4)
EST. GFR  (NO RACE VARIABLE): >60 ML/MIN/1.73 M^2
GLUCOSE SERPL-MCNC: 101 MG/DL (ref 70–110)
POCT GLUCOSE: 107 MG/DL (ref 70–110)
POCT GLUCOSE: 94 MG/DL (ref 70–110)
POTASSIUM SERPL-SCNC: 4.7 MMOL/L (ref 3.5–5.1)
SODIUM SERPL-SCNC: 141 MMOL/L (ref 136–145)

## 2023-08-26 PROCEDURE — 25000003 PHARM REV CODE 250: Performed by: NURSE PRACTITIONER

## 2023-08-26 PROCEDURE — 94761 N-INVAS EAR/PLS OXIMETRY MLT: CPT

## 2023-08-26 PROCEDURE — 25000003 PHARM REV CODE 250: Performed by: GENERAL PRACTICE

## 2023-08-26 PROCEDURE — 63600175 PHARM REV CODE 636 W HCPCS: Performed by: NURSE PRACTITIONER

## 2023-08-26 PROCEDURE — 80048 BASIC METABOLIC PNL TOTAL CA: CPT | Performed by: NURSE PRACTITIONER

## 2023-08-26 PROCEDURE — 96376 TX/PRO/DX INJ SAME DRUG ADON: CPT

## 2023-08-26 PROCEDURE — 36415 COLL VENOUS BLD VENIPUNCTURE: CPT | Performed by: NURSE PRACTITIONER

## 2023-08-26 PROCEDURE — 99900031 HC PATIENT EDUCATION (STAT)

## 2023-08-26 PROCEDURE — G0378 HOSPITAL OBSERVATION PER HR: HCPCS

## 2023-08-26 RX ORDER — METOPROLOL TARTRATE 25 MG/1
25 TABLET, FILM COATED ORAL 2 TIMES DAILY
Qty: 60 TABLET | Refills: 0
Start: 2023-08-26 | End: 2023-09-25 | Stop reason: SDUPTHER

## 2023-08-26 RX ORDER — SPIRONOLACTONE 25 MG/1
12.5 TABLET ORAL DAILY
Qty: 15 TABLET | Refills: 0 | Status: SHIPPED | OUTPATIENT
Start: 2023-08-27 | End: 2023-09-25 | Stop reason: SDUPTHER

## 2023-08-26 RX ORDER — SPIRONOLACTONE 25 MG/1
25 TABLET ORAL DAILY
Qty: 30 TABLET | Refills: 0 | Status: SHIPPED | OUTPATIENT
Start: 2023-08-27 | End: 2023-08-26 | Stop reason: SDUPTHER

## 2023-08-26 RX ORDER — FUROSEMIDE 40 MG/1
40 TABLET ORAL 2 TIMES DAILY
Qty: 30 TABLET | Refills: 0 | Status: SHIPPED | OUTPATIENT
Start: 2023-08-26 | End: 2023-08-26 | Stop reason: SDUPTHER

## 2023-08-26 RX ORDER — AMIODARONE HYDROCHLORIDE 200 MG/1
200 TABLET ORAL DAILY
Qty: 30 TABLET | Refills: 0 | Status: SHIPPED | OUTPATIENT
Start: 2023-08-26 | End: 2023-09-25 | Stop reason: SDUPTHER

## 2023-08-26 RX ORDER — FUROSEMIDE 40 MG/1
40 TABLET ORAL DAILY
Qty: 30 TABLET | Refills: 0 | Status: SHIPPED | OUTPATIENT
Start: 2023-08-26 | End: 2023-09-25 | Stop reason: SDUPTHER

## 2023-08-26 RX ADMIN — SPIRONOLACTONE 25 MG: 25 TABLET ORAL at 09:08

## 2023-08-26 RX ADMIN — METOPROLOL TARTRATE 25 MG: 25 TABLET, FILM COATED ORAL at 09:08

## 2023-08-26 RX ADMIN — POLYETHYLENE GLYCOL 3350 17 G: 17 POWDER, FOR SOLUTION ORAL at 09:08

## 2023-08-26 RX ADMIN — APIXABAN 5 MG: 2.5 TABLET, FILM COATED ORAL at 09:08

## 2023-08-26 RX ADMIN — METOPROLOL TARTRATE 25 MG: 25 TABLET, FILM COATED ORAL at 03:08

## 2023-08-26 RX ADMIN — FUROSEMIDE 40 MG: 10 INJECTION, SOLUTION INTRAMUSCULAR; INTRAVENOUS at 09:08

## 2023-08-26 RX ADMIN — ASPIRIN 81 MG CHEWABLE TABLET 81 MG: 81 TABLET CHEWABLE at 09:08

## 2023-08-26 NOTE — NURSING
Pt cleared for DC per Cm and medicine changes made per Dr Monreal. Pts wife at bedside. Tele [id number] removed and returned to monitor room. IV in right fa removed with cath intact. Gauze applied and secured with coban. No bleeding from site. Discharge instructions given and reviewed with pt. Verbalized understanding. Pt discharged via wheelchair to his wifes car and going home.

## 2023-08-26 NOTE — ASSESSMENT & PLAN NOTE
Patient's FSGs are controlled on current medication regimen.  Last A1c reviewed-   Lab Results   Component Value Date    HGBA1C 5.8 08/25/2023     Most recent fingerstick glucose reviewed-   Recent Labs   Lab 08/25/23  1136 08/25/23  1619 08/25/23  2202 08/26/23  0748   POCTGLUCOSE 90 117* 145* 94     Current correctional scale  Low  Maintain anti-hyperglycemic dose as follows-   Antihyperglycemics (From admission, onward)    Start     Stop Route Frequency Ordered    08/24/23 2257  insulin aspart U-100 pen 0-5 Units         -- SubQ Before meals & nightly PRN 08/24/23 2157        Hold Oral hypoglycemics while patient is in the hospital.

## 2023-08-26 NOTE — ASSESSMENT & PLAN NOTE
Patient with Paroxysmal (<7 days) atrial fibrillation which is controlled currently with Beta Blocker. Patient is currently in sinus rhythm.[id number] Score: 1. . Anticoagulation indicated. Anticoagulation done with Eliquis.

## 2023-08-26 NOTE — DISCHARGE SUMMARY
[facility] Hospital Medicine  Discharge Summary      Patient Name: Abimael Armendariz  MRN: [id number]  ADY: [id number]  Patient Class: OP- Observation  Admission Date: 8/24/2023  Hospital Length of Stay: 0 days  Discharge Date and Time:  08/26/2023 1:25 PM  Attending Physician: Genevieve Monreal MD   Discharging Provider: Genevieve Monreal MD  Primary Care Provider: Sammy Trinidad MD    Primary Care Team: Networked reference to record PCT     HPI:   Abimael Armendariz 58-year-old male with a past medical history hypertension, hyperlipidemia, mitral regurgitation, anxiety, ADHD, AFib with RVR recently cardioverted and needed nose CHF who presented to the emergency room complaining of shortness of breath, cough and abdominal swelling that started 7 days ago and has persisted.  Patient underwent nuclear stress today and states he has not felt well since with increased shortness of.  Stress test revealed a small to moderate-sized fixed perfusion abnormality consistent with a scar in the basal to apical inferior walls.  Ejection fraction of 47% post stress.  Left ventricle appears to be dilated.  ECG portion negative for ischemia and patient had no chest pain during stress test.  Patient has had echo and JASON this month. Patient is followed by Cardiology,  and has also been seen recently by Dr. Johnston and Dr. Eubanks.  Patient currently denies chest pain.  EKG phoned in ED showed sinus bradycardia with first-degree AV block.  , troponin 0.027 and chest x-ray demonstrated interstitial pulmonary edema small bilateral pleural effusions.  Patient received 40 mg IV Lasix while in the ED and spoke to Cardiology who agreed patient should be admitted.  Patient was referred to Hospital Medicine and seen in the ED. Patient reports dyspnea with exertion and orthopnea.  Patient denies chest pain, nausea, vomiting, fever and chills.  Patient will be admitted to Hospital Medicine for further evaluation and  management                 * No surgery found *      Hospital Course:   58-year-old male with past medical history of hypertension, hyperlipidemia, mitral regurgitation, anxiety, AFib with RVR recently cardioverted, CHF admitted with CHF exacerbation after undergoing nuclear stress test on day of admission. Patient had serial cardiac enzymes. Patient was given IV lasix and intake and output closely monitored. Patient was evaluated by cardiologist and underwent ECHO. Patient's renal panel and electrolytes closely monitored. Patient had daily weights. Patient was educated on monitoring daily weight, following low salt diet and in case if gain more than 3 pounds in 24 hours, to call their doctor for further advice. Patient's symptoms resolved.  Patient instructed to maintain daily blood pressure log and will have BMP checked next week.  Patient has appointment to follow-up with his primary cardiologist next week.  Patient also counseled regarding dangers of chewing tobacco and patient encouraged to stop chewing tobacco.  Patient and his wife voiced understanding.  Patient feels ready to go home.  Fall precautions again addressed with use of anticoagulation therapy.       Goals of Care Treatment Preferences:  Code Status: Full Code      Consults:   Consults (From admission, onward)          Status Ordering Provider     Inpatient consult to Social Work/Case Management  Once        Provider:  (Not yet assigned)    Completed CLINT THOMSON     Inpatient consult to Registered Dietitian/Nutritionist  Once        Provider:  (Not yet assigned)    Completed CLINT THOMSON     Inpatient consult to Cardiology  Once        Provider:  Tyler Thrasher MD    Completed CLINT THOMSON            Cardiac/Vascular  * Acute exacerbation of CHF (congestive heart failure)  Acute diastolic CHF exacerbation. Patient is identified as having  heart failure that is Acute. CHF is currently uncontrolled due to >3 pillow orthopnea,  Rales/crackles on pulmonary exam and Pulmonary edema/pleural effusion on CXR. Latest ECHO performed and demonstrates- Results for orders placed in visit on 08/18/23    Echo    Interpretation Summary    Left Ventricle: The left ventricle is normal in size. Mildly increased wall thickness. Normal wall motion. There is normal systolic function with a visually estimated ejection fraction of 60 - 65%.    Left Atrium: Left atrium is severely dilated.    Right Ventricle: Normal right ventricular cavity size. Wall thickness is normal. Right ventricle wall motion  is normal. Systolic function is normal.    Mitral Valve: Mildly thickened anterior leaflet. There is moderate regurgitation with a posterolateral eccentriccally directed jet.  There is moderate prolapse of the anterior mitral valve leaflet.    Pulmonary Artery: No pulmonary hypertension. The estimated pulmonary artery systolic pressure is 26 mmHg.    IVC/SVC: Intermediate venous pressure at 8 mmHg.  . Continue Beta Blocker and monitor clinical status closely. Monitor on telemetry. Patient is on CHF pathway.  Monitor strict Is&Os and daily weights.  Place on fluid restriction of 1.5 L. Cardiology has been consulted. Continue to stress to patient importance of self efficacy and  on diet for CHF. Last BNP reviewed- and noted below   Recent Labs   Lab 08/24/23 1957   *   .    A-fib  Patient with Paroxysmal (<7 days) atrial fibrillation which is controlled currently with Beta Blocker. Patient is currently in sinus rhythm.[id number] Score: 1. . Anticoagulation indicated. Anticoagulation done with Eliquis.    Hypertension  Chronic, controlled.  Latest blood pressure and vitals reviewed-     Temp:  [97 °F (36.1 °C)-99 °F (37.2 °C)]   Pulse:  [56-86]   Resp:  [16-20]   BP: (116-135)/(59-76)   SpO2:  [93 %-96 %] .   Home meds for hypertension were reviewed and noted below-  Hypertension Medications               fosinopriL (MONOPRIL) 40 MG tablet Take 1 tablet  (40 mg total) by mouth once daily.    metoprolol tartrate (LOPRESSOR) 25 MG tablet Take 1 tablet (25 mg total) by mouth 3 (three) times daily.            While in the hospital, will manage blood pressure as follows; Continue home antihypertensive regimen    Will utilize p.r.n. blood pressure medication only if patient's blood pressure greater than 180/110 and he develops symptoms such as worsening chest pain or shortness of breath.        Dyslipidemia   Patient is chronically on statin.will continue for now. Monitor clinically. Last LDL was   Lab Results   Component Value Date    LDLCALC 76.2 11/15/2022            Endocrine  Type 2 diabetes mellitus without complication, without long-term current use of insulin  Patient's FSGs are controlled on current medication regimen.  Last A1c reviewed-   Lab Results   Component Value Date    HGBA1C 5.8 08/25/2023     Most recent fingerstick glucose reviewed-   Recent Labs   Lab 08/25/23  1136 08/25/23  1619 08/25/23  2202 08/26/23  0748   POCTGLUCOSE 90 117* 145* 94     Current correctional scale  Low  Maintain anti-hyperglycemic dose as follows-   Antihyperglycemics (From admission, onward)      Start     Stop Route Frequency Ordered    08/24/23 2257  insulin aspart U-100 pen 0-5 Units         -- SubQ Before meals & nightly PRN 08/24/23 2157          Hold Oral hypoglycemics while patient is in the hospital.    Other  Anxiety  Use anxiolytics as needed.        Microbiology Results (last 7 days)       ** No results found for the last 168 hours. **          CXR:  Interstitial pulmonary edema and small bilateral pleural effusions.     JASON (8/20/23):    Left Ventricle: The left ventricle is normal in size. Ventricular mass is normal. Normal wall thickness. Normal wall motion. There is normal systolic function with a visually estimated ejection fraction of 55 - 70%.    Left Atrium: Left atrium is mildly dilated. The left atrial appendage appears normal. The left atrial appendage  has a windsock morphology. There is no thrombus in the left atrial appendage.    Right Ventricle: Normal right ventricular cavity size. Right ventricle wall motion  is normal. Systolic function is normal.    Mitral Valve significant prolapse of the anterior leaf of the mitral valve associated with moderately severe mitral regurgitation is seen    IVC/SVC: Normal venous pressure at 3 mmHg.    Atrial fibrillation is    Final Active Diagnoses:    Diagnosis Date Noted POA    PRINCIPAL PROBLEM:  Acute exacerbation of CHF (congestive heart failure) [I50.9] 08/24/2023 Yes    A-fib [I48.91] 08/20/2023 Yes    Type 2 diabetes mellitus without complication, without long-term current use of insulin [E11.9] 11/30/2022 Yes    Hypertension [I10]  Yes    Anxiety [F41.9]  Yes    Dyslipidemia [E78.5] 04/25/2012 Yes      Problems Resolved During this Admission:       Discharged Condition: good    Disposition: Home or Self Care    Follow Up:   Follow-up Information       Sammy Trinidad MD Follow up on 9/7/2023.    Specialty: Family Medicine  Why: at 11 AM for hospital follow up.   will see Dr. Lopez for this apt  Contact information:  [address]  [facility] [address]  [phone]               Albaro Watts MD Follow up on 9/18/2023.    Specialties: Cardiology, Interventional Cardiology  Why: at 11:45 AM as previously scheduled  Contact information:  [address] [place] [address]  [phone]                           Patient Instructions:      Basic Metabolic Panel   Standing Status: Future Standing Exp. Date: 10/24/24     Diet diabetic     Diet Cardiac   Order Comments: Patient to monitor daily weight, follow low salt diet and in case if gain more than 3 pounds in 24 hours, please call your doctor for further advice.     Call MD for:  temperature >100.4     Call MD for:  persistent nausea and vomiting or diarrhea     Call MD for:  severe uncontrolled pain     Call MD for:  redness, tenderness, or signs of  infection (pain, swelling, redness, odor or green/yellow discharge around incision site)     Call MD for:  difficulty breathing or increased cough     Call MD for:  severe persistent headache     Call MD for:  worsening rash     Call MD for:  persistent dizziness, light-headedness, or visual disturbances     Call MD for:  increased confusion or weakness       Significant Diagnostic Studies: Labs:   CMP   Recent Labs   Lab 08/24/23 1957 08/25/23  0436 08/26/23  0510    140 141   K 4.6 4.1 4.7    102 101   CO2 24 28 30*   GLU 93 114* 101   BUN 18 19 21*   CREATININE 1.2 1.3 1.3   CALCIUM 9.1 8.4* 9.0   PROT 6.5  --   --    ALBUMIN 3.8  --   --    BILITOT 1.6*  --   --    ALKPHOS 70  --   --    AST 21  --   --    ALT 28  --   --    ANIONGAP 12 10 10   , CBC   Recent Labs   Lab 08/24/23 1957   WBC 8.21   HGB 13.4*   HCT 40.3   *    and INR   Lab Results   Component Value Date    INR 1.0 08/17/2023       Pending Diagnostic Studies:       None           Medications:  Reconciled Home Medications:      Medication List        START taking these medications      furosemide 40 MG tablet  Commonly known as: LASIX  Take 1 tablet (40 mg total) by mouth once daily.     spironolactone 25 MG tablet  Commonly known as: ALDACTONE  Take 0.5 tablets (12.5 mg total) by mouth once daily.  Start taking on: August 27, 2023            CHANGE how you take these medications      amiodarone 200 MG Tab  Commonly known as: PACERONE  Take 1 tablet (200 mg total) by mouth once daily.  What changed:   medication strength  See the new instructions.     metoprolol tartrate 25 MG tablet  Commonly known as: LOPRESSOR  Take 1 tablet (25 mg total) by mouth 2 (two) times daily.  What changed: when to take this            CONTINUE taking these medications      apixaban 5 mg Tab  Commonly known as: ELIQUIS  Take 1 tablet (5 mg total) by mouth 2 (two) times daily.     aspirin 81 MG Chew  Take 81 mg by mouth once daily.     blood sugar  diagnostic Strp  To check BG qd times daily, to use with insurance preferred meter     blood-glucose meter kit  To check BG qd times daily, to use with insurance preferred meter     buPROPion 300 MG 24 hr tablet  Commonly known as: WELLBUTRIN XL  Take 1 tablet (300 mg total) by mouth once daily.     fluticasone propionate 50 mcg/actuation nasal spray  Commonly known as: FLONASE  1 spray by Each Nostril route once daily.     lancets Misc  To check BG qd times daily, to use with insurance preferred meter     metFORMIN 500 MG tablet  Commonly known as: GLUCOPHAGE  Take 1 tablet (500 mg total) by mouth 2 (two) times daily with meals.     multivitamin per tablet  Commonly known as: THERAGRAN  Take 1 tablet by mouth once daily.     polyethylene glycol 17 gram Pwpk  Commonly known as: GLYCOLAX  Take 17 g by mouth once daily. 2 packs     pramipexole 0.25 MG tablet  Commonly known as: MIRAPEX  TAKE 1 TABLET BY MOUTH EVERY DAY     traZODone 100 MG tablet  Commonly known as: DESYREL  Take 2 tablets (200 mg total) by mouth every evening.            STOP taking these medications      fosinopriL 40 MG tablet  Commonly known as: MONOPRIL     ibuprofen 800 MG tablet  Commonly known as: ADVIL,MOTRIN              Indwelling Lines/Drains at time of discharge:   Lines/Drains/Airways       None                   Time spent on the discharge of patient: 35 minutes         Genevieve Monreal MD  Department of Hospital Medicine  [facility]/Surg

## 2023-08-26 NOTE — ASSESSMENT & PLAN NOTE
Chronic, controlled.  Latest blood pressure and vitals reviewed-     Temp:  [97 °F (36.1 °C)-99 °F (37.2 °C)]   Pulse:  [56-86]   Resp:  [16-20]   BP: (116-135)/(59-76)   SpO2:  [93 %-96 %] .   Home meds for hypertension were reviewed and noted below-  Hypertension Medications             fosinopriL (MONOPRIL) 40 MG tablet Take 1 tablet (40 mg total) by mouth once daily.    metoprolol tartrate (LOPRESSOR) 25 MG tablet Take 1 tablet (25 mg total) by mouth 3 (three) times daily.          While in the hospital, will manage blood pressure as follows; Continue home antihypertensive regimen    Will utilize p.r.n. blood pressure medication only if patient's blood pressure greater than 180/110 and he develops symptoms such as worsening chest pain or shortness of breath.

## 2023-08-26 NOTE — PLAN OF CARE
Problem: Adult Inpatient Plan of Care  Goal: Plan of Care Review  Outcome: Ongoing, Progressing  Goal: Patient-Specific Goal (Individualized)  Outcome: Ongoing, Progressing  Goal: Absence of Hospital-Acquired Illness or Injury  Outcome: Ongoing, Progressing  Goal: Optimal Comfort and Wellbeing  Outcome: Ongoing, Progressing  Goal: Readiness for Transition of Care  Outcome: Ongoing, Progressing     Problem: Diabetes Comorbidity  Goal: Blood Glucose Level Within Targeted Range  Outcome: Ongoing, Progressing     Problem: Adjustment to Illness (Heart Failure)  Goal: Optimal Coping  Outcome: Ongoing, Progressing     Problem: Cardiac Output Decreased (Heart Failure)  Goal: Optimal Cardiac Output  Outcome: Ongoing, Progressing   Plan of care reviewed with patient,verbalized understanding.  SB  on telemetry. Strict I&O maintained. Fluid restriction followed. Remains free from falls, injury. Instructed to call for assistance as needed ,verbalized understanding. Bed in low & locked position. Call light in reach, bed alarm on .

## 2023-08-26 NOTE — CARE UPDATE
08/26/23 0933   Patient Assessment/Suction   Level of Consciousness (AVPU) alert   Respiratory Effort Normal;Unlabored   Expansion/Accessory Muscles/Retractions no use of accessory muscles;no retractions;expansion symmetric   Rhythm/Pattern, Respiratory unlabored   PRE-TX-O2   Device (Oxygen Therapy) room air   SpO2 (!) 93 %   Pulse Oximetry Type Intermittent   $ Pulse Oximetry - Multiple Charge Pulse Oximetry - Multiple   Pulse 62   Resp 18   Positioning HOB elevated 30 degrees   Education   $ Education 15 min

## 2023-08-26 NOTE — ASSESSMENT & PLAN NOTE
Acute diastolic CHF exacerbation. Patient is identified as having  heart failure that is Acute. CHF is currently uncontrolled due to >3 pillow orthopnea, Rales/crackles on pulmonary exam and Pulmonary edema/pleural effusion on CXR. Latest ECHO performed and demonstrates- Results for orders placed in visit on 08/18/23    Echo    Interpretation Summary    Left Ventricle: The left ventricle is normal in size. Mildly increased wall thickness. Normal wall motion. There is normal systolic function with a visually estimated ejection fraction of 60 - 65%.    Left Atrium: Left atrium is severely dilated.    Right Ventricle: Normal right ventricular cavity size. Wall thickness is normal. Right ventricle wall motion  is normal. Systolic function is normal.    Mitral Valve: Mildly thickened anterior leaflet. There is moderate regurgitation with a posterolateral eccentriccally directed jet.  There is moderate prolapse of the anterior mitral valve leaflet.    Pulmonary Artery: No pulmonary hypertension. The estimated pulmonary artery systolic pressure is 26 mmHg.    IVC/SVC: Intermediate venous pressure at 8 mmHg.  . Continue Beta Blocker and monitor clinical status closely. Monitor on telemetry. Patient is on CHF pathway.  Monitor strict Is&Os and daily weights.  Place on fluid restriction of 1.5 L. Cardiology has been consulted. Continue to stress to patient importance of self efficacy and  on diet for CHF. Last BNP reviewed- and noted below   Recent Labs   Lab 08/24/23 1957   *   .

## 2023-08-26 NOTE — PLAN OF CARE
Problem: Adult Inpatient Plan of Care  Goal: Plan of Care Review  Outcome: Met  Goal: Patient-Specific Goal (Individualized)  Outcome: Met  Goal: Absence of Hospital-Acquired Illness or Injury  Outcome: Met  Goal: Optimal Comfort and Wellbeing  Outcome: Met  Goal: Readiness for Transition of Care  Outcome: Met     Problem: Diabetes Comorbidity  Goal: Blood Glucose Level Within Targeted Range  Outcome: Met     Problem: Adjustment to Illness (Heart Failure)  Goal: Optimal Coping  Outcome: Met     Problem: Cardiac Output Decreased (Heart Failure)  Goal: Optimal Cardiac Output  Outcome: Met     Problem: Dysrhythmia (Heart Failure)  Goal: Stable Heart Rate and Rhythm  Outcome: Met     Problem: Fluid Imbalance (Heart Failure)  Goal: Fluid Balance  Outcome: Met     Problem: Functional Ability Impaired (Heart Failure)  Goal: Optimal Functional Ability  Outcome: Met     Problem: Oral Intake Inadequate (Heart Failure)  Goal: Optimal Nutrition Intake  Outcome: Met     Problem: Respiratory Compromise (Heart Failure)  Goal: Effective Oxygenation and Ventilation  Outcome: Met     Problem: Sleep Disordered Breathing (Heart Failure)  Goal: Effective Breathing Pattern During Sleep  Outcome: Met

## 2023-08-26 NOTE — SUBJECTIVE & OBJECTIVE
Interval History:  Patient seen and examined.  Continues to have shortness of breath and nonproductive cough.  On IV Lasix.  Cardiology to see today.    Review of Systems   Constitutional:  Negative for activity change, appetite change, chills and fever.   HENT:  Negative for congestion, sore throat and trouble swallowing.    Respiratory:  Positive for cough and shortness of breath. Negative for chest tightness.    Cardiovascular:  Positive for leg swelling. Negative for chest pain and palpitations.   Gastrointestinal:  Positive for abdominal distention. Negative for abdominal pain, diarrhea and nausea.   Genitourinary:  Negative for dysuria, flank pain and hematuria.   Musculoskeletal:  Negative for back pain.   Neurological:  Negative for dizziness, weakness and headaches.   Psychiatric/Behavioral:  Negative for confusion.      Objective:     Vital Signs (Most Recent):  Temp: 97.5 °F (36.4 °C) (08/26/23 0739)  Pulse: (!) 56 (08/26/23 0739)  Resp: 18 (08/26/23 0739)  BP: 117/67 (08/26/23 0739)  SpO2: 95 % (08/26/23 0739) Vital Signs (24h Range):  Temp:  [97 °F (36.1 °C)-99 °F (37.2 °C)] 97.5 °F (36.4 °C)  Pulse:  [56-86] 56  Resp:  [16-20] 18  SpO2:  [93 %-96 %] 95 %  BP: (116-135)/(59-76) 117/67     Weight: 87 kg (191 lb 12.8 oz)  Body mass index is 26.75 kg/m².    Intake/Output Summary (Last 24 hours) at 8/26/2023 0910  Last data filed at 8/26/2023 0600  Gross per 24 hour   Intake 1090 ml   Output 2325 ml   Net -1235 ml           Physical Exam  Vitals reviewed.   Constitutional:       Appearance: Normal appearance. He is normal weight.   HENT:      Head: Normocephalic.      Mouth/Throat:      Mouth: Mucous membranes are moist.      Pharynx: Oropharynx is clear.   Eyes:      Pupils: Pupils are equal, round, and reactive to light.   Cardiovascular:      Rate and Rhythm: Normal rate and regular rhythm.      Pulses: Normal pulses.      Heart sounds: Murmur heard.      Systolic murmur is present.   Pulmonary:       Effort: Pulmonary effort is normal.      Breath sounds: Rales present.   Abdominal:      General: Bowel sounds are normal. There is distension.      Palpations: Abdomen is soft.   Musculoskeletal:         General: Normal range of motion.      Cervical back: Normal range of motion.      Right lower leg: Edema (trace) present.      Left lower leg: Edema (trace) present.   Skin:     General: Skin is warm and dry.   Neurological:      Mental Status: He is alert and oriented to person, place, and time. Mental status is at baseline.   Psychiatric:         Mood and Affect: Mood normal.             Significant Labs:   Lab Results   Component Value Date    WBC 8.21 08/24/2023    HGB 13.4 (L) 08/24/2023    HCT 40.3 08/24/2023    MCV 94 08/24/2023     (L) 08/24/2023       CMP  Sodium   Date Value Ref Range Status   08/26/2023 141 136 - 145 mmol/L Final     Potassium   Date Value Ref Range Status   08/26/2023 4.7 3.5 - 5.1 mmol/L Final     Chloride   Date Value Ref Range Status   08/26/2023 101 95 - 110 mmol/L Final     CO2   Date Value Ref Range Status   08/26/2023 30 (H) 23 - 29 mmol/L Final     Glucose   Date Value Ref Range Status   08/26/2023 101 70 - 110 mg/dL Final     BUN   Date Value Ref Range Status   08/26/2023 21 (H) 6 - 20 mg/dL Final     Creatinine   Date Value Ref Range Status   08/26/2023 1.3 0.5 - 1.4 mg/dL Final     Calcium   Date Value Ref Range Status   08/26/2023 9.0 8.7 - 10.5 mg/dL Final     Total Protein   Date Value Ref Range Status   08/24/2023 6.5 6.0 - 8.4 g/dL Final     Albumin   Date Value Ref Range Status   08/24/2023 3.8 3.5 - 5.2 g/dL Final     Total Bilirubin   Date Value Ref Range Status   08/24/2023 1.6 (H) 0.1 - 1.0 mg/dL Final     Comment:     For infants and newborns, interpretation of results should be based  on gestational age, weight and in agreement with clinical  observations.    Premature Infant recommended reference ranges:  Up to 24 hours.............<8.0 mg/dL  Up to 48  hours............<12.0 mg/dL  3-5 days..................<15.0 mg/dL  6-29 days.................<15.0 mg/dL       Alkaline Phosphatase   Date Value Ref Range Status   08/24/2023 70 55 - 135 U/L Final     AST   Date Value Ref Range Status   08/24/2023 21 10 - 40 U/L Final     ALT   Date Value Ref Range Status   08/24/2023 28 10 - 44 U/L Final     Anion Gap   Date Value Ref Range Status   08/26/2023 10 8 - 16 mmol/L Final     eGFR   Date Value Ref Range Status   08/26/2023 >60 >60 mL/min/1.73 m^2 Final     Microbiology Results (last 7 days)       ** No results found for the last 168 hours. **          Significant Imaging:   CXR:  Interstitial pulmonary edema and small bilateral pleural effusions.    JASON (8/20/23):    Left Ventricle: The left ventricle is normal in size. Ventricular mass is normal. Normal wall thickness. Normal wall motion. There is normal systolic function with a visually estimated ejection fraction of 55 - 70%.    Left Atrium: Left atrium is mildly dilated. The left atrial appendage appears normal. The left atrial appendage has a windsock morphology. There is no thrombus in the left atrial appendage.    Right Ventricle: Normal right ventricular cavity size. Right ventricle wall motion  is normal. Systolic function is normal.    Mitral Valve significant prolapse of the anterior leaf of the mitral valve associated with moderately severe mitral regurgitation is seen    IVC/SVC: Normal venous pressure at 3 mmHg.    Atrial fibrillation is

## 2023-08-26 NOTE — PLAN OF CARE
Spoke with Dr. Thrasher and reviewed d/c medications for patient. He would like pt on Metoprolol tartrate 25 mg PO BID, Amiodarone 200 mg PO daily, Lasix 40 mg PO daily, and Aldactone 12.5 mg PO daily. Patient to follow up with cardiologist as scheduled on Monday. Notified Dr. Monreal of above.

## 2023-08-26 NOTE — PLAN OF CARE
Pt is clear for DC from        08/26/23 1103   Final Note   Assessment Type Final Discharge Note   Anticipated Discharge Disposition Home   Hospital Resources/Appts/Education Provided Appointments scheduled and added to AVS

## 2023-08-28 ENCOUNTER — OFFICE VISIT (OUTPATIENT)
Dept: CARDIOLOGY | Facility: CLINIC | Age: 58
End: 2023-08-28
Payer: MEDICAID

## 2023-08-28 VITALS
BODY MASS INDEX: 26.79 KG/M2 | SYSTOLIC BLOOD PRESSURE: 104 MMHG | DIASTOLIC BLOOD PRESSURE: 53 MMHG | HEART RATE: 52 BPM | HEIGHT: 71 IN | WEIGHT: 191.38 LBS

## 2023-08-28 DIAGNOSIS — I05.9 MITRAL VALVE DISEASE: Primary | ICD-10-CM

## 2023-08-28 DIAGNOSIS — I10 PRIMARY HYPERTENSION: ICD-10-CM

## 2023-08-28 DIAGNOSIS — I34.0 NONRHEUMATIC MITRAL VALVE REGURGITATION: ICD-10-CM

## 2023-08-28 DIAGNOSIS — E78.5 DYSLIPIDEMIA: ICD-10-CM

## 2023-08-28 DIAGNOSIS — I48.0 PAROXYSMAL ATRIAL FIBRILLATION: ICD-10-CM

## 2023-08-28 PROCEDURE — 3044F PR MOST RECENT HEMOGLOBIN A1C LEVEL <7.0%: ICD-10-PCS | Mod: CPTII,,, | Performed by: INTERNAL MEDICINE

## 2023-08-28 PROCEDURE — 3078F DIAST BP <80 MM HG: CPT | Mod: CPTII,,, | Performed by: INTERNAL MEDICINE

## 2023-08-28 PROCEDURE — 1160F RVW MEDS BY RX/DR IN RCRD: CPT | Mod: CPTII,,, | Performed by: INTERNAL MEDICINE

## 2023-08-28 PROCEDURE — 3008F PR BODY MASS INDEX (BMI) DOCUMENTED: ICD-10-PCS | Mod: CPTII,,, | Performed by: INTERNAL MEDICINE

## 2023-08-28 PROCEDURE — 99999 PR PBB SHADOW E&M-EST. PATIENT-LVL V: CPT | Mod: PBBFAC,,, | Performed by: INTERNAL MEDICINE

## 2023-08-28 PROCEDURE — 3044F HG A1C LEVEL LT 7.0%: CPT | Mod: CPTII,,, | Performed by: INTERNAL MEDICINE

## 2023-08-28 PROCEDURE — 4010F ACE/ARB THERAPY RXD/TAKEN: CPT | Mod: CPTII,,, | Performed by: INTERNAL MEDICINE

## 2023-08-28 PROCEDURE — 99215 OFFICE O/P EST HI 40 MIN: CPT | Mod: S$PBB,,, | Performed by: INTERNAL MEDICINE

## 2023-08-28 PROCEDURE — 99215 OFFICE O/P EST HI 40 MIN: CPT | Mod: PBBFAC,PO | Performed by: INTERNAL MEDICINE

## 2023-08-28 PROCEDURE — 1160F PR REVIEW ALL MEDS BY PRESCRIBER/CLIN PHARMACIST DOCUMENTED: ICD-10-PCS | Mod: CPTII,,, | Performed by: INTERNAL MEDICINE

## 2023-08-28 PROCEDURE — 3074F SYST BP LT 130 MM HG: CPT | Mod: CPTII,,, | Performed by: INTERNAL MEDICINE

## 2023-08-28 PROCEDURE — 3074F PR MOST RECENT SYSTOLIC BLOOD PRESSURE < 130 MM HG: ICD-10-PCS | Mod: CPTII,,, | Performed by: INTERNAL MEDICINE

## 2023-08-28 PROCEDURE — 1159F MED LIST DOCD IN RCRD: CPT | Mod: CPTII,,, | Performed by: INTERNAL MEDICINE

## 2023-08-28 PROCEDURE — 99999 PR PBB SHADOW E&M-EST. PATIENT-LVL V: ICD-10-PCS | Mod: PBBFAC,,, | Performed by: INTERNAL MEDICINE

## 2023-08-28 PROCEDURE — 1159F PR MEDICATION LIST DOCUMENTED IN MEDICAL RECORD: ICD-10-PCS | Mod: CPTII,,, | Performed by: INTERNAL MEDICINE

## 2023-08-28 PROCEDURE — 4010F PR ACE/ARB THEARPY RXD/TAKEN: ICD-10-PCS | Mod: CPTII,,, | Performed by: INTERNAL MEDICINE

## 2023-08-28 PROCEDURE — 99215 PR OFFICE/OUTPT VISIT, EST, LEVL V, 40-54 MIN: ICD-10-PCS | Mod: S$PBB,,, | Performed by: INTERNAL MEDICINE

## 2023-08-28 PROCEDURE — 3008F BODY MASS INDEX DOCD: CPT | Mod: CPTII,,, | Performed by: INTERNAL MEDICINE

## 2023-08-28 PROCEDURE — 3078F PR MOST RECENT DIASTOLIC BLOOD PRESSURE < 80 MM HG: ICD-10-PCS | Mod: CPTII,,, | Performed by: INTERNAL MEDICINE

## 2023-08-28 RX ORDER — NAPROXEN SODIUM 220 MG/1
81 TABLET, FILM COATED ORAL ONCE
Status: CANCELLED | OUTPATIENT
Start: 2023-08-28 | End: 2023-08-28

## 2023-08-28 RX ORDER — SODIUM CHLORIDE 9 MG/ML
INJECTION, SOLUTION INTRAVENOUS ONCE
Status: CANCELLED | OUTPATIENT
Start: 2023-08-28 | End: 2023-08-28

## 2023-08-28 NOTE — PROGRESS NOTES
Subjective:    Patient ID:  Abimael Armendariz is a 58 y.o. male who presents for follow-up of atrial fibrillation, mitral regurgitation    HPI    He ended up having JASON with electrical cardioversion last week, this was successful.    He had to be given some IV Lasix also last week because of pulmonary congestion.  Feeling okay now.      Review of Systems   Constitutional: Negative for decreased appetite, malaise/fatigue, weight gain and weight loss.   Cardiovascular:  Negative for chest pain, dyspnea on exertion, leg swelling, palpitations and syncope.   Respiratory:  Negative for cough and shortness of breath.    Gastrointestinal: Negative.    Neurological:  Negative for weakness.   All other systems reviewed and are negative.       Objective:      Physical Exam  Vitals and nursing note reviewed.   Constitutional:       Appearance: Normal appearance. He is well-developed.   HENT:      Head: Normocephalic.   Eyes:      Pupils: Pupils are equal, round, and reactive to light.   Neck:      Thyroid: No thyromegaly.      Vascular: No carotid bruit or JVD.   Cardiovascular:      Rate and Rhythm: Normal rate and regular rhythm.      Chest Wall: PMI is not displaced.      Pulses: Normal pulses and intact distal pulses.      Heart sounds: Murmur heard.      High-pitched blowing holosystolic murmur is present with a grade of 3/6 at the apex.      No gallop.   Pulmonary:      Effort: Pulmonary effort is normal.      Breath sounds: Normal breath sounds.   Abdominal:      Palpations: Abdomen is soft. There is no mass.      Tenderness: There is no abdominal tenderness.   Musculoskeletal:         General: Normal range of motion.      Cervical back: Normal range of motion and neck supple.   Skin:     General: Skin is warm.   Neurological:      Mental Status: He is alert and oriented to person, place, and time.      Sensory: No sensory deficit.      Deep Tendon Reflexes: Reflexes are normal and symmetric.           Most Recent EKG  Results  Results for orders placed or performed during the hospital encounter of 08/19/23   EKG 12-lead    Collection Time: 08/21/23 10:41 AM    Narrative    Test Reason : I49.9,    Vent. Rate : 059 BPM     Atrial Rate : 059 BPM     P-R Int : 216 ms          QRS Dur : 092 ms      QT Int : 406 ms       P-R-T Axes : 056 045 051 degrees     QTc Int : 401 ms    Sinus bradycardia with 1st degree A-V block  Otherwise normal ECG  When compared with ECG of 19-AUG-2023 21:28,  Sinus rhythm has replaced Atrial fibrillation  Vent. rate has decreased BY  58 BPM  Confirmed by Mario Johnston MD ([id number]) on 8/25/2023 6:39:22 PM    Referred By: [email or web address]   SELF           Confirmed By:Mario Johnston MD       Most Recent Echocardiogram Results  Results for orders placed during the hospital encounter of 08/19/23    Transesophageal echo (JASON) with possible cardioversion    Interpretation Summary    Left Ventricle: The left ventricle is normal in size. Ventricular mass is normal. Normal wall thickness. Normal wall motion. There is normal systolic function with a visually estimated ejection fraction of 55 - 70%.    Left Atrium: Left atrium is mildly dilated. The left atrial appendage appears normal. The left atrial appendage has a windsock morphology. There is no thrombus in the left atrial appendage.    Right Ventricle: Normal right ventricular cavity size. Right ventricle wall motion  is normal. Systolic function is normal.    Mitral Valve significant prolapse of the anterior leaf of the mitral valve associated with moderately severe mitral regurgitation is seen    IVC/SVC: Normal venous pressure at 3 mmHg.    Atrial fibrillation is      Most Recent Nuclear Stress Test Results  Results for orders placed during the hospital encounter of 08/24/23    Nuclear Stress - Cardiology Interpreted    Interpretation Summary    Abnormal myocardial perfusion scan.    There is a small to moderate sized, fixed perfusion abnormality consistent with  scar in the basal to apical inferior wall(s).    There are no other significant perfusion abnormalities.    The gated perfusion images showed an ejection fraction of 47% post stress.    LV cavity size is mildly enlarged at rest and mildly enlarged at stress.    The ECG portion of the study is negative for ischemia.    The patient reported no chest pain during the stress test.    Equivocal study, fixed inferior defect, can not rule out attenuation artifact versus scar tissue.  Clinical correlation warranted.  Left ventricle does appear to be dilated.  Nuclear EF 47%, correlate with echo.      Most Recent Cardiac PET Stress Test Results  No results found for this or any previous visit.      Most Recent Cardiovascular Angiogram results  Results for orders placed during the hospital encounter of 08/19/23    Intra-Procedure Documentation      Other Most Recent Cardiology Results  Results for orders placed during the hospital encounter of 08/19/23    CARDIAC MONITORING STRIPS      Labs reviewed    Assessment:       1. Mitral valve disease    2. Paroxysmal atrial fibrillation    3. Nonrheumatic mitral valve regurgitation    4. Primary hypertension    5. Dyslipidemia         Plan:     Continue:  Antiarrhythmic, ASA, Beta blocker, Diuretic, DOAC, and MRA  Regular exercise program  Weight loss  Low cholesterol diet  Cardiac catheterization this week in preparation for mitral valve replacement  Will refer to Dr. Monroe for MVR

## 2023-08-28 NOTE — PATIENT INSTRUCTIONS
Angiogram 8/30     Arrive for procedure at: [facility]    You will receive a phone call from [facility] Pre-Op Department with further instructions and exact arrival time prior to your scheduled procedure.    Notify the nurse if you are ALLERGIC TO IODINE.    FASTING: You MAY NOT have anything to eat or drink AFTER MIDNIGHT the day before your procedure. If your procedure is scheduled in the afternoon, you may have a LIGHT BREAKFAST 6-8 hours prior to your procedure.  For example: Two slices of toast; black coffee or black tea.    MEDICATIONS: You may take your regular morning medications with water. If there are any medications that you should not take, you will be instructed to hold them for that morning.    CARDIOLOGY PRE-PROCEDURE MEDICATION ORDERS:  ** Please hold any medications that are checked below:    HOLD   # OF DAYS TO HOLD  Coumadin   Consult with Coumadin Clinic   Xarelto    _DAY BEFORE & DAY OF_  Pradaxa  _ DAY BEFORE & DAY OF _  Eliquis   _ DAY BEFORE & DAY OF _  Metformin    Day before procedure & morning of procedure  Short acting insulin   Morning of procedure    CONTINUE the Following Medications   Plavix      Effient     Aspirin    WHAT TO EXPECT:    How long will the procedure take?  The procedure will take an average of 1 - 2 hours to perform.  After the procedure, you will need to lay flat for around 4 - 6 hours to minimize bleeding from the puncture site. If the wrist is accessed you will need to keep your arm still as instructed by the nurse.    When can I go home?  You may be able to be discharged home that same afternoon if there were no complications.  If you have one of the following: balloon; stent; pacemaker or defibrillator procedures, you may spend one night for observation.  Your doctor will determine your discharge based upon your progress.  The results of your procedure will be discussed with you before you are discharged.  Any further testing or procedures will be  scheduled for you either before you leave or you will be instructed to call for a future appointment.      TRANSPORTATION:  PLEASE ARRANGE TO HAVE SOMEONE DRIVE YOU HOME FOLLOWING YOUR PROCEDURE, YOU WILL NOT BE ALLOWED TO DRIVE.

## 2023-08-29 DIAGNOSIS — E11.9 TYPE 2 DIABETES MELLITUS WITHOUT COMPLICATION, WITHOUT LONG-TERM CURRENT USE OF INSULIN: ICD-10-CM

## 2023-08-29 NOTE — TELEPHONE ENCOUNTER
No care due was identified.  Health [facility] Embedded Care Due Messages. Reference number: [id number].   8/29/2023 4:27:17 PM CDT

## 2023-08-30 RX ORDER — METFORMIN HYDROCHLORIDE 500 MG/1
500 TABLET ORAL 2 TIMES DAILY WITH MEALS
Qty: 180 TABLET | Refills: 1 | Status: SHIPPED | OUTPATIENT
Start: 2023-08-30 | End: 2023-12-27

## 2023-08-31 ENCOUNTER — OFFICE VISIT (OUTPATIENT)
Dept: PRIMARY CARE CLINIC | Facility: CLINIC | Age: 58
End: 2023-08-31
Payer: MEDICAID

## 2023-08-31 VITALS
HEART RATE: 52 BPM | WEIGHT: 191.81 LBS | DIASTOLIC BLOOD PRESSURE: 60 MMHG | OXYGEN SATURATION: 95 % | TEMPERATURE: 98 F | HEIGHT: 71 IN | SYSTOLIC BLOOD PRESSURE: 114 MMHG | BODY MASS INDEX: 26.85 KG/M2

## 2023-08-31 DIAGNOSIS — I50.9 ACUTE ON CHRONIC CONGESTIVE HEART FAILURE, UNSPECIFIED HEART FAILURE TYPE: Primary | ICD-10-CM

## 2023-08-31 PROCEDURE — 99999 PR PBB SHADOW E&M-EST. PATIENT-LVL V: CPT | Mod: PBBFAC,,, | Performed by: NURSE PRACTITIONER

## 2023-08-31 PROCEDURE — 99204 OFFICE O/P NEW MOD 45 MIN: CPT | Mod: S$PBB,,, | Performed by: NURSE PRACTITIONER

## 2023-08-31 PROCEDURE — 99215 OFFICE O/P EST HI 40 MIN: CPT | Mod: PBBFAC,PN | Performed by: NURSE PRACTITIONER

## 2023-08-31 PROCEDURE — 3044F PR MOST RECENT HEMOGLOBIN A1C LEVEL <7.0%: ICD-10-PCS | Mod: CPTII,,, | Performed by: NURSE PRACTITIONER

## 2023-08-31 PROCEDURE — 4010F PR ACE/ARB THEARPY RXD/TAKEN: ICD-10-PCS | Mod: CPTII,,, | Performed by: NURSE PRACTITIONER

## 2023-08-31 PROCEDURE — 1159F PR MEDICATION LIST DOCUMENTED IN MEDICAL RECORD: ICD-10-PCS | Mod: CPTII,,, | Performed by: NURSE PRACTITIONER

## 2023-08-31 PROCEDURE — 1159F MED LIST DOCD IN RCRD: CPT | Mod: CPTII,,, | Performed by: NURSE PRACTITIONER

## 2023-08-31 PROCEDURE — 1160F RVW MEDS BY RX/DR IN RCRD: CPT | Mod: CPTII,,, | Performed by: NURSE PRACTITIONER

## 2023-08-31 PROCEDURE — 3008F PR BODY MASS INDEX (BMI) DOCUMENTED: ICD-10-PCS | Mod: CPTII,,, | Performed by: NURSE PRACTITIONER

## 2023-08-31 PROCEDURE — 4010F ACE/ARB THERAPY RXD/TAKEN: CPT | Mod: CPTII,,, | Performed by: NURSE PRACTITIONER

## 2023-08-31 PROCEDURE — 3044F HG A1C LEVEL LT 7.0%: CPT | Mod: CPTII,,, | Performed by: NURSE PRACTITIONER

## 2023-08-31 PROCEDURE — 99204 PR OFFICE/OUTPT VISIT, NEW, LEVL IV, 45-59 MIN: ICD-10-PCS | Mod: S$PBB,,, | Performed by: NURSE PRACTITIONER

## 2023-08-31 PROCEDURE — 3074F SYST BP LT 130 MM HG: CPT | Mod: CPTII,,, | Performed by: NURSE PRACTITIONER

## 2023-08-31 PROCEDURE — 3078F PR MOST RECENT DIASTOLIC BLOOD PRESSURE < 80 MM HG: ICD-10-PCS | Mod: CPTII,,, | Performed by: NURSE PRACTITIONER

## 2023-08-31 PROCEDURE — 3008F BODY MASS INDEX DOCD: CPT | Mod: CPTII,,, | Performed by: NURSE PRACTITIONER

## 2023-08-31 PROCEDURE — 3078F DIAST BP <80 MM HG: CPT | Mod: CPTII,,, | Performed by: NURSE PRACTITIONER

## 2023-08-31 PROCEDURE — 99999 PR PBB SHADOW E&M-EST. PATIENT-LVL V: ICD-10-PCS | Mod: PBBFAC,,, | Performed by: NURSE PRACTITIONER

## 2023-08-31 PROCEDURE — 3074F PR MOST RECENT SYSTOLIC BLOOD PRESSURE < 130 MM HG: ICD-10-PCS | Mod: CPTII,,, | Performed by: NURSE PRACTITIONER

## 2023-08-31 PROCEDURE — 1160F PR REVIEW ALL MEDS BY PRESCRIBER/CLIN PHARMACIST DOCUMENTED: ICD-10-PCS | Mod: CPTII,,, | Performed by: NURSE PRACTITIONER

## 2023-08-31 NOTE — PROGRESS NOTES
Transitional Care Note  Subjective:       Patient ID: Abimael Armendariz is a 58 y.o. male.  Chief Complaint: Transitional Care    Family and/or Caretaker present at visit?  Yes.  Diagnostic tests reviewed/disposition: I have reviewed all completed as well as pending diagnostic tests at the time of discharge.  Disease/illness education: CHF  Home health/community services discussion/referrals: Patient does not have home health established from hospital visit.  They do not need home health.  If needed, we will set up home health for the patient.   Establishment or re-establishment of referral orders for community resources: No other necessary community resources.   Discussion with other health care providers: No discussion with other health care providers necessary.   Abimael Armendariz is a 58 year old male with a past medical history of hypertension, hyperlipidemia, mitral regurgitation, anxiety, ADHD, AFib with RVR who was recently cardioverted, who presents s/p CHF exacerbation and heart cath. The patient is followed by Cardiology,  and has also been seen recently by Dr. Johnston and Dr. Watts.  He reports that he was in afib a few days ago and was cardioverted and sent home. He returned for a stress test but had to be admitted to the hospital later that day for shortness of breath. He stated that while he was admitted for the cardioversion, he was experiencing some chest pain around the ribs, a cough, shortness of breath where he couldn't take full breaths and abdominal swelling. After the stress test, his abdomen was taut and he had gained over 10lbs. He had a successful cath yesterday with no blockages found and improved EF from 47% to >55%. He is feeling much better and denies CP, shortness of breath or swelling. He has been having a stable weight, ranging from 188-189lbs daily.  He does not have any questions about his discharge or admission diagnoses. He was able to get his medications filled and has no  issue with that. He was able to get his appointments schedueled with his heart specialists and is on the wait list for a sooner appt with CT surgery. He does nto require home health or other outpatient needs. He has a good support system       Review of Systems   Constitutional:  Negative for chills and fever.   HENT:  Negative for congestion and sore throat.    Eyes:  Negative for visual disturbance.   Respiratory:  Negative for cough and shortness of breath.    Cardiovascular:  Negative for chest pain and palpitations.   Gastrointestinal:  Negative for abdominal pain, constipation, diarrhea, nausea and vomiting.   Endocrine: Negative for cold intolerance and heat intolerance.   Genitourinary:  Negative for dysuria and hematuria.   Musculoskeletal:  Negative for arthralgias and myalgias.   Skin:  Positive for wound. Negative for rash.   Neurological:  Negative for tremors and seizures.   Hematological:  Negative for adenopathy. Does not bruise/bleed easily.   All other systems reviewed and are negative.    Objective:      Physical Exam  Vitals and nursing note reviewed.   Constitutional:       General: He is awake. He is not in acute distress.     Appearance: Normal appearance. He is well-developed. He is not ill-appearing.   HENT:      Head: Normocephalic and atraumatic.      Nose: Nose normal. No septal deviation.      Mouth/Throat:      Lips: Pink.      Mouth: Mucous membranes are moist.   Eyes:      Conjunctiva/sclera: Conjunctivae normal.      Pupils: Pupils are equal, round, and reactive to light.   Neck:      Thyroid: No thyroid mass.      Vascular: No JVD.      Trachea: No tracheal tenderness or tracheal deviation.   Cardiovascular:      Rate and Rhythm: Normal rate and regular rhythm.      Heart sounds: Normal heart sounds, S1 normal and S2 normal. No murmur heard.     No friction rub. No gallop.   Pulmonary:      Effort: Pulmonary effort is normal.      Breath sounds: Examination of the right-lower field  reveals rales. Examination of the left-lower field reveals rales. Rales (very mild to bases) present. No decreased breath sounds, wheezing or rhonchi.   Abdominal:      General: Bowel sounds are normal. There is no distension.      Palpations: Abdomen is soft. There is no hepatomegaly, splenomegaly or mass.      Tenderness: There is no abdominal tenderness.   Musculoskeletal:      Right lower le+ Edema present.      Left lower le+ Edema present.   Skin:     General: Skin is warm.      Findings: No rash.          Neurological:      General: No focal deficit present.      Mental Status: He is alert and oriented to person, place, and time.      Cranial Nerves: No cranial nerve deficit.      Sensory: No sensory deficit.   Psychiatric:         Mood and Affect: Mood normal.         Behavior: Behavior normal. Behavior is cooperative.       Assessment:       1. Acute on chronic congestive heart failure, unspecified heart failure type        Plan:       Acute on chronic congestive heart failure, unspecified heart failure type       Continue to weight self   Given instructions on what to look for regarding CHF exacerbation  Keep follow up appointments

## 2023-09-25 ENCOUNTER — PATIENT MESSAGE (OUTPATIENT)
Dept: CARDIOLOGY | Facility: CLINIC | Age: 58
End: 2023-09-25
Payer: MEDICAID

## 2023-09-25 DIAGNOSIS — Z00.00 ANNUAL PHYSICAL EXAM: ICD-10-CM

## 2023-09-25 DIAGNOSIS — E78.5 DYSLIPIDEMIA: ICD-10-CM

## 2023-09-25 DIAGNOSIS — I50.9 ACUTE ON CHRONIC CONGESTIVE HEART FAILURE, UNSPECIFIED HEART FAILURE TYPE: Primary | ICD-10-CM

## 2023-09-25 DIAGNOSIS — I10 PRIMARY HYPERTENSION: ICD-10-CM

## 2023-09-25 DIAGNOSIS — Z79.01 ANTICOAGULANT LONG-TERM USE: ICD-10-CM

## 2023-09-25 DIAGNOSIS — I48.19 PERSISTENT ATRIAL FIBRILLATION: ICD-10-CM

## 2023-09-25 DIAGNOSIS — E11.9 NEWLY DIAGNOSED DIABETES: ICD-10-CM

## 2023-09-25 DIAGNOSIS — I34.0 NONRHEUMATIC MITRAL VALVE REGURGITATION: ICD-10-CM

## 2023-09-25 RX ORDER — AMIODARONE HYDROCHLORIDE 200 MG/1
200 TABLET ORAL DAILY
Qty: 90 TABLET | Refills: 3 | Status: ON HOLD | OUTPATIENT
Start: 2023-09-25 | End: 2023-11-14 | Stop reason: HOSPADM

## 2023-09-25 RX ORDER — FUROSEMIDE 40 MG/1
40 TABLET ORAL DAILY
Qty: 90 TABLET | Refills: 3 | Status: ON HOLD | OUTPATIENT
Start: 2023-09-25 | End: 2023-11-14 | Stop reason: HOSPADM

## 2023-09-25 RX ORDER — SPIRONOLACTONE 25 MG/1
12.5 TABLET ORAL DAILY
Qty: 45 TABLET | Refills: 3 | Status: ON HOLD | OUTPATIENT
Start: 2023-09-25 | End: 2023-11-14 | Stop reason: HOSPADM

## 2023-09-25 RX ORDER — METOPROLOL TARTRATE 25 MG/1
25 TABLET, FILM COATED ORAL 2 TIMES DAILY
Qty: 180 TABLET | Refills: 3 | Status: ON HOLD
Start: 2023-09-25 | End: 2023-11-14 | Stop reason: HOSPADM

## 2023-10-03 ENCOUNTER — OFFICE VISIT (OUTPATIENT)
Dept: CARDIOTHORACIC SURGERY | Facility: CLINIC | Age: 58
End: 2023-10-03
Payer: MEDICAID

## 2023-10-03 VITALS
BODY MASS INDEX: 26.74 KG/M2 | WEIGHT: 191 LBS | OXYGEN SATURATION: 96 % | SYSTOLIC BLOOD PRESSURE: 115 MMHG | HEART RATE: 50 BPM | DIASTOLIC BLOOD PRESSURE: 67 MMHG | HEIGHT: 71 IN

## 2023-10-03 DIAGNOSIS — I34.0 MITRAL VALVE INSUFFICIENCY, UNSPECIFIED ETIOLOGY: Primary | ICD-10-CM

## 2023-10-03 DIAGNOSIS — I50.9 CONGESTIVE HEART FAILURE, UNSPECIFIED HF CHRONICITY, UNSPECIFIED HEART FAILURE TYPE: ICD-10-CM

## 2023-10-03 DIAGNOSIS — I48.0 PAROXYSMAL ATRIAL FIBRILLATION: ICD-10-CM

## 2023-10-03 DIAGNOSIS — I34.1 MVP (MITRAL VALVE PROLAPSE): ICD-10-CM

## 2023-10-03 PROCEDURE — 3078F DIAST BP <80 MM HG: CPT | Mod: CPTII,,, | Performed by: THORACIC SURGERY (CARDIOTHORACIC VASCULAR SURGERY)

## 2023-10-03 PROCEDURE — 99214 OFFICE O/P EST MOD 30 MIN: CPT | Mod: PBBFAC | Performed by: THORACIC SURGERY (CARDIOTHORACIC VASCULAR SURGERY)

## 2023-10-03 PROCEDURE — 1159F MED LIST DOCD IN RCRD: CPT | Mod: CPTII,,, | Performed by: THORACIC SURGERY (CARDIOTHORACIC VASCULAR SURGERY)

## 2023-10-03 PROCEDURE — 3008F PR BODY MASS INDEX (BMI) DOCUMENTED: ICD-10-PCS | Mod: CPTII,,, | Performed by: THORACIC SURGERY (CARDIOTHORACIC VASCULAR SURGERY)

## 2023-10-03 PROCEDURE — 1159F PR MEDICATION LIST DOCUMENTED IN MEDICAL RECORD: ICD-10-PCS | Mod: CPTII,,, | Performed by: THORACIC SURGERY (CARDIOTHORACIC VASCULAR SURGERY)

## 2023-10-03 PROCEDURE — 3008F BODY MASS INDEX DOCD: CPT | Mod: CPTII,,, | Performed by: THORACIC SURGERY (CARDIOTHORACIC VASCULAR SURGERY)

## 2023-10-03 PROCEDURE — 3078F PR MOST RECENT DIASTOLIC BLOOD PRESSURE < 80 MM HG: ICD-10-PCS | Mod: CPTII,,, | Performed by: THORACIC SURGERY (CARDIOTHORACIC VASCULAR SURGERY)

## 2023-10-03 PROCEDURE — 4010F ACE/ARB THERAPY RXD/TAKEN: CPT | Mod: CPTII,,, | Performed by: THORACIC SURGERY (CARDIOTHORACIC VASCULAR SURGERY)

## 2023-10-03 PROCEDURE — 99205 PR OFFICE/OUTPT VISIT, NEW, LEVL V, 60-74 MIN: ICD-10-PCS | Mod: S$PBB,,, | Performed by: THORACIC SURGERY (CARDIOTHORACIC VASCULAR SURGERY)

## 2023-10-03 PROCEDURE — 3044F HG A1C LEVEL LT 7.0%: CPT | Mod: CPTII,,, | Performed by: THORACIC SURGERY (CARDIOTHORACIC VASCULAR SURGERY)

## 2023-10-03 PROCEDURE — 3074F PR MOST RECENT SYSTOLIC BLOOD PRESSURE < 130 MM HG: ICD-10-PCS | Mod: CPTII,,, | Performed by: THORACIC SURGERY (CARDIOTHORACIC VASCULAR SURGERY)

## 2023-10-03 PROCEDURE — 99205 OFFICE O/P NEW HI 60 MIN: CPT | Mod: S$PBB,,, | Performed by: THORACIC SURGERY (CARDIOTHORACIC VASCULAR SURGERY)

## 2023-10-03 PROCEDURE — 3074F SYST BP LT 130 MM HG: CPT | Mod: CPTII,,, | Performed by: THORACIC SURGERY (CARDIOTHORACIC VASCULAR SURGERY)

## 2023-10-03 PROCEDURE — 99999 PR PBB SHADOW E&M-EST. PATIENT-LVL IV: ICD-10-PCS | Mod: PBBFAC,,, | Performed by: THORACIC SURGERY (CARDIOTHORACIC VASCULAR SURGERY)

## 2023-10-03 PROCEDURE — 4010F PR ACE/ARB THEARPY RXD/TAKEN: ICD-10-PCS | Mod: CPTII,,, | Performed by: THORACIC SURGERY (CARDIOTHORACIC VASCULAR SURGERY)

## 2023-10-03 PROCEDURE — 99999 PR PBB SHADOW E&M-EST. PATIENT-LVL IV: CPT | Mod: PBBFAC,,, | Performed by: THORACIC SURGERY (CARDIOTHORACIC VASCULAR SURGERY)

## 2023-10-03 PROCEDURE — 3044F PR MOST RECENT HEMOGLOBIN A1C LEVEL <7.0%: ICD-10-PCS | Mod: CPTII,,, | Performed by: THORACIC SURGERY (CARDIOTHORACIC VASCULAR SURGERY)

## 2023-10-03 NOTE — PROGRESS NOTES
Subjective:      Patient ID: Abimael Armendariz is a 58 y.o. male.    Chief Complaint: No chief complaint on file.      HPI:  Abimael Armendariz is a 58 y.o. male who presents to an initial surgical evaluation for mitral insufficiency. Medical conditions include paroxysmal AF, Hyperlipidemia, Hypertension, Insomnia, ADHD (attention deficit hyperactivity disorder), Anxiety, depression. Recently diagnosed with atrial fibrillation while in [place] undergoing preop clearance. He reports he returned back home and was seen by Cardiology, 08/15/2023, where he was started on Lopressor and Eliquis with Holter monitor, echo and stress test ordered.  Echo completed 08/18/2023. Admitted 3 times within the diagnosis time  frame. First ED visit was for palpitations for which they adjusted his BB. He then developed chest pain and elevated heart rate and was seen at [facility], 08/17/2023- had negative cardiac ischemic work up. EKG during admission showed AFIB RVR for which he was given IV Cardizem x1 with rate improved and chest pain resolved. Subsequently underwent cardioversion. He was then readmitted again for CHF exacerbation. Today, positive for orthopnea and PND. Denies ARANDA, palpitations, LE swelling. Independently perform ADLs. No Assistive devices for walking. Denies prior history of strokes, stents, sternotomies.     Pertinent family history father (d) MI.  Does not smoke cigarettes, drink or do illegal drugs. Does chew tobacco.     Family and social history reviewed    Review of patient's allergies indicates:   Allergen Reactions    Latex, natural rubber Rash     Past Medical History:   Diagnosis Date    ADHD (attention deficit hyperactivity disorder)     Anticoagulant long-term use     Anxiety     CHF (congestive heart failure)     Depression     Diabetes mellitus     Diverticulitis     Hyperlipidemia     Insomnia     Mitral regurgitation     Mitral valve disease 08/2023    PAF (paroxysmal atrial fibrillation)      Past  Surgical History:   Procedure Laterality Date    ANGIOGRAM, CORONARY, WITH LEFT HEART CATHETERIZATION  8/30/2023    Procedure: Left Heart Cath;  Surgeon: Hilario Pretty MD;  [email or web address];  Service: Cardiology;;    CORONARY ANGIOGRAPHY  8/30/2023    Procedure: Coronary angiogram study;  Surgeon: Hilario Pretty MD;  Location: [email or web address] CATH;  Service: Cardiology;;    KNEE ARTHROSCOPY W/ ACL RECONSTRUCTION AND HAMSTRING GRAFT  2003    right x 2; redo [place] in [place] 1/2022    LUMBAR LAMINECTOMY FOR DECOMPRESSION  12/2022    TRANSESOPHAGEAL ECHOCARDIOGRAM WITH POSSIBLE CARDIOVERSION (JASON W/ POSS CARDIOVERSION) Bilateral 08/21/2023    Procedure: Transesophageal echo (JASON) intra-procedure log documentation;  Surgeon: Jesu Flores MD;  Location: [facility] CATH/EP LAB;  Service: Cardiology;  Laterality: Bilateral;     Family History       Problem Relation (Age of Onset)    Alzheimer's disease Maternal Grandmother    Heart attack Father, Paternal Grandfather    Heart disease Father    No Known Problems Sister, Brother, Daughter, Son    Stroke Mother          Social History     Socioeconomic History    Marital status:    Occupational History     Employer: [employer]   Tobacco Use    Smoking status: Never    Smokeless tobacco: Current     Types: Chew    Tobacco comments:     2 dips per day down from can per day   Substance and Sexual Activity    Alcohol use: No    Drug use: No    Sexual activity: Yes     Partners: Female     Social Determinants of Health     Financial Resource Strain: Low Risk  (9/27/2023)    Overall Financial Resource Strain (CARDIA)     Difficulty of Paying Living Expenses: Not very hard   Recent Concern: Financial Resource Strain - Medium Risk (8/14/2023)    Overall Financial Resource Strain (CARDIA)     Difficulty of Paying Living Expenses: Somewhat hard   Food Insecurity: Food Insecurity Present (9/27/2023)    Hunger Vital Sign     Worried About Running Out of Food  in the Last Year: Sometimes true     Ran Out of Food in the Last Year: Never true   Transportation Needs: No Transportation Needs (9/27/2023)    PRAPARE - Transportation     Lack of Transportation (Medical): No     Lack of Transportation (Non-Medical): No   Physical Activity: Insufficiently Active (9/27/2023)    Exercise Vital Sign     Days of Exercise per Week: 2 days     Minutes of Exercise per Session: 30 min   Stress: Stress Concern Present (9/27/2023)    [place] [employer] - Occupational Stress Questionnaire     Feeling of Stress : Very much   Social Connections: Unknown (9/27/2023)    Social Connection and Isolation Panel [NHANES]     Frequency of Communication with Friends and Family: Three times a week     Frequency of Social Gatherings with Friends and Family: Once a week     Active Member of Clubs or Organizations: Yes     Attends Club or Organization Meetings: More than 4 times per year     Marital Status:    Housing Stability: Low Risk  (9/27/2023)    Housing Stability Vital Sign     Unable to Pay for Housing in the Last Year: No     Number of Places Lived in the Last Year: 1     Unstable Housing in the Last Year: No       Current medications Reviewed  Current Outpatient Medications on File Prior to Visit   Medication Sig Dispense Refill    amiodarone (PACERONE) 200 MG Tab Take 1 tablet (200 mg total) by mouth once daily. 90 tablet 3    apixaban (ELIQUIS) 5 mg Tab Take 1 tablet (5 mg total) by mouth 2 (two) times daily. 60 tablet 3    aspirin 81 MG Chew Take 81 mg by mouth every evening.      blood sugar diagnostic Strp To check BG qd times daily, to use with insurance preferred meter 30 strip 11    blood-glucose meter kit To check BG qd times daily, to use with insurance preferred meter 1 each 0    buPROPion (WELLBUTRIN XL) 300 MG 24 hr tablet Take 1 tablet (300 mg total) by mouth once daily. (Patient taking differently: Take 300 mg by mouth every evening.) 90 tablet 3     fluticasone propionate (FLONASE) 50 mcg/actuation nasal spray 1 spray by Each Nostril route daily as needed.      furosemide (LASIX) 40 MG tablet Take 1 tablet (40 mg total) by mouth once daily. 90 tablet 3    lancets Misc To check BG qd times daily, to use with insurance preferred meter 100 each 2    metFORMIN (GLUCOPHAGE) 500 MG tablet Take 1 tablet (500 mg total) by mouth 2 (two) times daily with meals. 180 tablet 1    metoprolol tartrate (LOPRESSOR) 25 MG tablet Take 1 tablet (25 mg total) by mouth 2 (two) times daily. 180 tablet 3    multivitamin (THERAGRAN) per tablet Take 1 tablet by mouth every evening.      polyethylene glycol (GLYCOLAX) 17 gram PwPk Take 17 g by mouth nightly as needed. 2 packs      pramipexole (MIRAPEX) 0.25 MG tablet TAKE 1 TABLET BY MOUTH EVERY DAY (Patient taking differently: Take 0.25 mg by mouth every evening. TAKE 1 TABLET BY MOUTH EVERY DAY) 90 tablet 3    spironolactone (ALDACTONE) 25 MG tablet Take 0.5 tablets (12.5 mg total) by mouth once daily. 45 tablet 3    traZODone (DESYREL) 100 MG tablet Take 2 tablets (200 mg total) by mouth every evening. 180 tablet 3     No current facility-administered medications on file prior to visit.       Review of Systems   Constitutional:  Negative for activity change, appetite change, fatigue and fever.   HENT:  Negative for nosebleeds.    Respiratory:  Negative for cough and shortness of breath.    Cardiovascular:  Negative for chest pain, palpitations and leg swelling.   Gastrointestinal:  Negative for abdominal distention, abdominal pain and nausea.   Genitourinary:  Negative for frequency.   Musculoskeletal:  Negative for arthralgias and myalgias.   Skin:  Negative for rash.   Neurological:  Negative for dizziness and numbness.   Hematological:  Does not bruise/bleed easily.     Objective:   Physical Exam  Constitutional:       Appearance: Normal appearance.   HENT:      Head: Normocephalic and atraumatic.   Eyes:      Extraocular  Movements: Extraocular movements intact.   Cardiovascular:      Rate and Rhythm: Normal rate and regular rhythm.      Heart sounds: Murmur heard.   Pulmonary:      Effort: Pulmonary effort is normal.      Breath sounds: Normal breath sounds.   Abdominal:      General: Abdomen is flat.      Palpations: Abdomen is soft.   Musculoskeletal:         General: Normal range of motion.      Cervical back: Normal range of motion.   Skin:     General: Skin is warm and dry.      Capillary Refill: Capillary refill takes less than 2 seconds.   Neurological:      General: No focal deficit present.      Mental Status: He is alert.       Diagnostic Results: reviewed   [facility] OSH  8/30/23    There was non-obstructive coronary artery disease..    There was severe (3/4+) mitral regurgitation.    The ejection fraction was greater than 55% by visual estimate.    The left ventricular systolic function was normal.    The left ventricular end diastolic pressure was elevated.     Nuclear stress test OSH 8/24/23    Abnormal myocardial perfusion scan.    There is a small to moderate sized, fixed perfusion abnormality consistent with scar in the basal to apical inferior wall(s).    There are no other significant perfusion abnormalities.    The gated perfusion images showed an ejection fraction of 47% post stress.    LV cavity size is mildly enlarged at rest and mildly enlarged at stress.    The ECG portion of the study is negative for ischemia.    The patient reported no chest pain during the stress test.    Equivocal study, fixed inferior defect, can not rule out attenuation artifact versus scar tissue.  Clinical correlation warranted.  Left ventricle does appear to be dilated.  Nuclear EF 47%, correlate with echo.     [facility] OSH    Left Ventricle: The left ventricle is normal in size. Ventricular mass is normal. Normal wall thickness. Normal wall motion. There is normal systolic function with a visually estimated ejection fraction of 55 - 70%.     Left Atrium: Left atrium is mildly dilated. The left atrial appendage appears normal. The left atrial appendage has a windsock morphology. There is no thrombus in the left atrial appendage.    Right Ventricle: Normal right ventricular cavity size. Right ventricle wall motion  is normal. Systolic function is normal.    Mitral Valve significant prolapse of the anterior leaf of the mitral valve associated with moderately severe mitral regurgitation is seen    IVC/SVC: Normal venous pressure at 3 mmHg.    Atrial fibrillation is     Assessment:   Mitral insufficiency   MVP   Paroxysmal AF   Plan:     CTS Attending Note:    I have personally taken the history and examined this patient and agree with the NAFISA's note as stated above.  Very pleasant 58-year-old gentleman found to have atrial fibrillation while undergoing preop clearance in [place].  He returned to the area and had a full cardiology evaluation.  In the course of that evaluation he was seen and admitted 3 times.  On 1 occasion he was admitted with a diagnosis of heart failure.  He had an angiogram which failed to demonstrate any hemodynamically significant lesions.  I recommended mitral valve repair and Maze procedure, and he agreed with this. We discussed the risks and benefits of the proposed procedure, including but not limited to death, stroke, respiratory complications, renal complications, arrythmia, need for permanent pacemaker, and infection. We discussed the STS predicted risk.  His questions have been answered, and he wishes to proceed. After a discussion of the advantages and disadvantages of tissue and mechanical prostheses, he indicated that he desires a mechanical valve should repair not be feasible.

## 2023-10-16 ENCOUNTER — OFFICE VISIT (OUTPATIENT)
Dept: CARDIOLOGY | Facility: CLINIC | Age: 58
End: 2023-10-16
Payer: MEDICAID

## 2023-10-16 VITALS
HEART RATE: 57 BPM | DIASTOLIC BLOOD PRESSURE: 76 MMHG | HEIGHT: 71 IN | WEIGHT: 192 LBS | BODY MASS INDEX: 26.88 KG/M2 | SYSTOLIC BLOOD PRESSURE: 138 MMHG

## 2023-10-16 DIAGNOSIS — I34.0 MITRAL VALVE INSUFFICIENCY, UNSPECIFIED ETIOLOGY: ICD-10-CM

## 2023-10-16 DIAGNOSIS — I48.0 PAROXYSMAL ATRIAL FIBRILLATION: Primary | ICD-10-CM

## 2023-10-16 DIAGNOSIS — I10 PRIMARY HYPERTENSION: ICD-10-CM

## 2023-10-16 DIAGNOSIS — E78.5 DYSLIPIDEMIA: ICD-10-CM

## 2023-10-16 PROCEDURE — 3008F BODY MASS INDEX DOCD: CPT | Mod: CPTII,,, | Performed by: INTERNAL MEDICINE

## 2023-10-16 PROCEDURE — 99214 PR OFFICE/OUTPT VISIT, EST, LEVL IV, 30-39 MIN: ICD-10-PCS | Mod: S$PBB,,, | Performed by: INTERNAL MEDICINE

## 2023-10-16 PROCEDURE — 3044F PR MOST RECENT HEMOGLOBIN A1C LEVEL <7.0%: ICD-10-PCS | Mod: CPTII,,, | Performed by: INTERNAL MEDICINE

## 2023-10-16 PROCEDURE — 1159F PR MEDICATION LIST DOCUMENTED IN MEDICAL RECORD: ICD-10-PCS | Mod: CPTII,,, | Performed by: INTERNAL MEDICINE

## 2023-10-16 PROCEDURE — 3044F HG A1C LEVEL LT 7.0%: CPT | Mod: CPTII,,, | Performed by: INTERNAL MEDICINE

## 2023-10-16 PROCEDURE — 3078F PR MOST RECENT DIASTOLIC BLOOD PRESSURE < 80 MM HG: ICD-10-PCS | Mod: CPTII,,, | Performed by: INTERNAL MEDICINE

## 2023-10-16 PROCEDURE — 99214 OFFICE O/P EST MOD 30 MIN: CPT | Mod: S$PBB,,, | Performed by: INTERNAL MEDICINE

## 2023-10-16 PROCEDURE — 3008F PR BODY MASS INDEX (BMI) DOCUMENTED: ICD-10-PCS | Mod: CPTII,,, | Performed by: INTERNAL MEDICINE

## 2023-10-16 PROCEDURE — 1160F RVW MEDS BY RX/DR IN RCRD: CPT | Mod: CPTII,,, | Performed by: INTERNAL MEDICINE

## 2023-10-16 PROCEDURE — 3075F PR MOST RECENT SYSTOLIC BLOOD PRESS GE 130-139MM HG: ICD-10-PCS | Mod: CPTII,,, | Performed by: INTERNAL MEDICINE

## 2023-10-16 PROCEDURE — 1160F PR REVIEW ALL MEDS BY PRESCRIBER/CLIN PHARMACIST DOCUMENTED: ICD-10-PCS | Mod: CPTII,,, | Performed by: INTERNAL MEDICINE

## 2023-10-16 PROCEDURE — 3075F SYST BP GE 130 - 139MM HG: CPT | Mod: CPTII,,, | Performed by: INTERNAL MEDICINE

## 2023-10-16 PROCEDURE — 1159F MED LIST DOCD IN RCRD: CPT | Mod: CPTII,,, | Performed by: INTERNAL MEDICINE

## 2023-10-16 PROCEDURE — 99214 OFFICE O/P EST MOD 30 MIN: CPT | Mod: PBBFAC,PO | Performed by: INTERNAL MEDICINE

## 2023-10-16 PROCEDURE — 3078F DIAST BP <80 MM HG: CPT | Mod: CPTII,,, | Performed by: INTERNAL MEDICINE

## 2023-10-16 PROCEDURE — 4010F PR ACE/ARB THEARPY RXD/TAKEN: ICD-10-PCS | Mod: CPTII,,, | Performed by: INTERNAL MEDICINE

## 2023-10-16 PROCEDURE — 99999 PR PBB SHADOW E&M-EST. PATIENT-LVL IV: CPT | Mod: PBBFAC,,, | Performed by: INTERNAL MEDICINE

## 2023-10-16 PROCEDURE — 4010F ACE/ARB THERAPY RXD/TAKEN: CPT | Mod: CPTII,,, | Performed by: INTERNAL MEDICINE

## 2023-10-16 PROCEDURE — 99999 PR PBB SHADOW E&M-EST. PATIENT-LVL IV: ICD-10-PCS | Mod: PBBFAC,,, | Performed by: INTERNAL MEDICINE

## 2023-10-16 NOTE — PROGRESS NOTES
Subjective:    Patient ID:  Abimael Armendariz is a 58 y.o. male who presents for follow-up of mitral regurgitation    HPI  He comes for follow up with no major problems, no chest pain, no shortness of breath.  Scheduled to have mitral valve repair-replacement with left atrial appendage ligation early next month  Blood pressure normal at home.  No palpitations  FC II    Review of Systems   Constitutional: Negative for decreased appetite, malaise/fatigue, weight gain and weight loss.   Cardiovascular:  Negative for chest pain, dyspnea on exertion, leg swelling, palpitations and syncope.   Respiratory:  Negative for cough and shortness of breath.    Gastrointestinal: Negative.    Neurological:  Negative for weakness.   All other systems reviewed and are negative.       Objective:      Physical Exam  Vitals and nursing note reviewed.   Constitutional:       Appearance: Normal appearance. He is well-developed.   HENT:      Head: Normocephalic.   Eyes:      Pupils: Pupils are equal, round, and reactive to light.   Neck:      Thyroid: No thyromegaly.      Vascular: No carotid bruit or JVD.   Cardiovascular:      Rate and Rhythm: Normal rate and regular rhythm.      Chest Wall: PMI is not displaced.      Pulses: Normal pulses and intact distal pulses.      Heart sounds: Normal heart sounds. No murmur heard.     No gallop.   Pulmonary:      Effort: Pulmonary effort is normal.      Breath sounds: Normal breath sounds.   Abdominal:      Palpations: Abdomen is soft. There is no mass.      Tenderness: There is no abdominal tenderness.   Musculoskeletal:         General: Normal range of motion.      Cervical back: Normal range of motion and neck supple.   Skin:     General: Skin is warm.   Neurological:      Mental Status: He is alert and oriented to person, place, and time.      Sensory: No sensory deficit.      Deep Tendon Reflexes: Reflexes are normal and symmetric.             Most Recent EKG Results  Results for orders  placed or performed during the hospital encounter of 08/30/23   EKG 12-lead    Collection Time: 08/30/23 11:13 AM    Narrative    Test Reason : Z01.810,    Vent. Rate : 051 BPM     Atrial Rate : 051 BPM     P-R Int : 232 ms          QRS Dur : 096 ms      QT Int : 470 ms       P-R-T Axes : 040 012 034 degrees     QTc Int : 433 ms    Sinus bradycardia with 1st degree A-V block  Otherwise normal ECG  When compared with ECG of 24-AUG-2023 19:51,  No significant change was found  Confirmed by HUEY COLÓN MD ([id number]) on 8/31/2023 9:16:52 AM    Referred By: EMILY AQUINO MD           Confirmed By:HUEY COLÓN MD       Most Recent Echocardiogram Results  Results for orders placed during the hospital encounter of 08/19/23    Transesophageal echo (JASON) with possible cardioversion    Interpretation Summary    Left Ventricle: The left ventricle is normal in size. Ventricular mass is normal. Normal wall thickness. Normal wall motion. There is normal systolic function with a visually estimated ejection fraction of 55 - 70%.    Left Atrium: Left atrium is mildly dilated. The left atrial appendage appears normal. The left atrial appendage has a windsock morphology. There is no thrombus in the left atrial appendage.    Right Ventricle: Normal right ventricular cavity size. Right ventricle wall motion  is normal. Systolic function is normal.    Mitral Valve significant prolapse of the anterior leaf of the mitral valve associated with moderately severe mitral regurgitation is seen    IVC/SVC: Normal venous pressure at 3 mmHg.    Atrial fibrillation is      Most Recent Nuclear Stress Test Results  Results for orders placed during the hospital encounter of 08/24/23    Nuclear Stress - Cardiology Interpreted    Interpretation Summary    Abnormal myocardial perfusion scan.    There is a small to moderate sized, fixed perfusion abnormality consistent with scar in the basal to apical inferior wall(s).    There are no other significant  perfusion abnormalities.    The gated perfusion images showed an ejection fraction of 47% post stress.    LV cavity size is mildly enlarged at rest and mildly enlarged at stress.    The ECG portion of the study is negative for ischemia.    The patient reported no chest pain during the stress test.    Equivocal study, fixed inferior defect, can not rule out attenuation artifact versus scar tissue.  Clinical correlation warranted.  Left ventricle does appear to be dilated.  Nuclear EF 47%, correlate with echo.      Most Recent Cardiac PET Stress Test Results  No results found for this or any previous visit.      Most Recent Cardiovascular Angiogram results  Results for orders placed during the hospital encounter of 08/30/23    Cardiac catheterization    Conclusion    There was non-obstructive coronary artery disease..    There was severe (3/4+) mitral regurgitation.    The ejection fraction was greater than 55% by visual estimate.    The left ventricular systolic function was normal.    The left ventricular end diastolic pressure was elevated.    The procedure log was documented by Documenter: Stacie Fuller RN and verified by Hilario Pretty MD.    Date: 8/30/2023  Time: 1:28 PM      Other Most Recent Cardiology Results  Results for orders placed during the hospital encounter of 08/24/23    CARDIAC MONITORING STRIPS      Labs reviewed    Assessment:       1. Paroxysmal atrial fibrillation    2. Mitral valve insufficiency, unspecified etiology    3. Dyslipidemia    4. Primary hypertension         Plan:     Continue:  Antiarrhythmic, ASA, Beta blocker, DOAC, and MRA  Regular exercise program  Weight loss  Low cholesterol diet  Two month follow-up

## 2023-10-20 ENCOUNTER — TELEPHONE (OUTPATIENT)
Dept: CARDIOTHORACIC SURGERY | Facility: CLINIC | Age: 58
End: 2023-10-20
Payer: MEDICAID

## 2023-10-20 DIAGNOSIS — I48.0 PAROXYSMAL ATRIAL FIBRILLATION: ICD-10-CM

## 2023-10-20 DIAGNOSIS — Z01.818 PRE-OP TESTING: ICD-10-CM

## 2023-10-20 DIAGNOSIS — I34.0 MITRAL VALVE INSUFFICIENCY, UNSPECIFIED ETIOLOGY: Primary | ICD-10-CM

## 2023-10-20 NOTE — TELEPHONE ENCOUNTER
Called pt to review pre-op appointments which have been scheduled for November 2 and November 7. Also reviewed pt's medications. Pt will need to take last dose of Eliquis on Wednesday, November 1. Pt verbalized understanding.

## 2023-11-01 ENCOUNTER — TELEPHONE (OUTPATIENT)
Dept: CARDIOTHORACIC SURGERY | Facility: CLINIC | Age: 58
End: 2023-11-01
Payer: MEDICAID

## 2023-11-01 NOTE — TELEPHONE ENCOUNTER
Called pt to remind him to take last dose of Eliquis today in preparation for surgery. Pt verbalized understanding.

## 2023-11-02 ENCOUNTER — HOSPITAL ENCOUNTER (OUTPATIENT)
Dept: CARDIOLOGY | Facility: HOSPITAL | Age: 58
Discharge: HOME | End: 2023-11-02
Attending: THORACIC SURGERY (CARDIOTHORACIC VASCULAR SURGERY)
Payer: MEDICAID

## 2023-11-02 VITALS — WEIGHT: 192 LBS | BODY MASS INDEX: 26.88 KG/M2 | HEIGHT: 71 IN

## 2023-11-02 DIAGNOSIS — I34.0 MITRAL VALVE INSUFFICIENCY, UNSPECIFIED ETIOLOGY: ICD-10-CM

## 2023-11-02 DIAGNOSIS — Z01.818 PRE-OP TESTING: ICD-10-CM

## 2023-11-02 DIAGNOSIS — I48.0 PAROXYSMAL ATRIAL FIBRILLATION: ICD-10-CM

## 2023-11-02 PROCEDURE — 93306 TTE W/DOPPLER COMPLETE: CPT | Mod: 26,,, | Performed by: INTERNAL MEDICINE

## 2023-11-02 PROCEDURE — 93306 TTE W/DOPPLER COMPLETE: CPT

## 2023-11-02 PROCEDURE — 93306 ECHO (CUPID ONLY): ICD-10-PCS | Mod: 26,,, | Performed by: INTERNAL MEDICINE

## 2023-11-03 LAB
AORTIC ROOT ANNULUS: 4.5 CM
AORTIC VALVE CUSP SEPERATION: 2.4 CM
AV INDEX (PROSTH): 0.96
AV MEAN GRADIENT: 3 MMHG
AV PEAK GRADIENT: 5 MMHG
AV VALVE AREA BY VELOCITY RATIO: 3.04 CM²
AV VALVE AREA: 3.31 CM²
AV VELOCITY RATIO: 0.88
BSA FOR ECHO PROCEDURE: 2.09 M2
CV ECHO LV RWT: 0.37 CM
DOP CALC AO PEAK VEL: 1.14 M/S
DOP CALC AO VTI: 21.1 CM
DOP CALC LVOT AREA: 3.5 CM2
DOP CALC LVOT DIAMETER: 2.1 CM
DOP CALC LVOT PEAK VEL: 1 M/S
DOP CALC LVOT STROKE VOLUME: 69.93 CM3
DOP CALCLVOT PEAK VEL VTI: 20.2 CM
E WAVE DECELERATION TIME: 144 MSEC
E/A RATIO: 2.8
E/E' RATIO: 10.79 M/S
ECHO LV POSTERIOR WALL: 1.13 CM (ref 0.6–1.1)
FRACTIONAL SHORTENING: 29 % (ref 28–44)
INTERVENTRICULAR SEPTUM: 1.16 CM (ref 0.6–1.1)
IVRT: 88 MSEC
LEFT INTERNAL DIMENSION IN SYSTOLE: 4.31 CM (ref 2.1–4)
LEFT VENTRICLE DIASTOLIC VOLUME INDEX: 89.37 ML/M2
LEFT VENTRICLE DIASTOLIC VOLUME: 185 ML
LEFT VENTRICLE MASS INDEX: 145 G/M2
LEFT VENTRICLE SYSTOLIC VOLUME INDEX: 40.3 ML/M2
LEFT VENTRICLE SYSTOLIC VOLUME: 83.5 ML
LEFT VENTRICULAR INTERNAL DIMENSION IN DIASTOLE: 6.07 CM (ref 3.5–6)
LEFT VENTRICULAR MASS: 300.64 G
LV LATERAL E/E' RATIO: 7.95 M/S
LV SEPTAL E/E' RATIO: 16.78 M/S
LVOT MG: 2 MMHG
LVOT MV: 0.61 CM/S
MV PEAK A VEL: 0.54 M/S
MV PEAK E VEL: 1.51 M/S
MV STENOSIS PRESSURE HALF TIME: 115 MS
MV VALVE AREA P 1/2 METHOD: 1.91 CM2
PISA TR MAX VEL: 2.37 M/S
RA PRESSURE ESTIMATED: 3 MMHG
RIGHT VENTRICULAR END-DIASTOLIC DIMENSION: 2.19 CM
RV TB RVSP: 5 MMHG
TDI LATERAL: 0.19 M/S
TDI SEPTAL: 0.09 M/S
TDI: 0.14 M/S
TR MAX PG: 22 MMHG
TV REST PULMONARY ARTERY PRESSURE: 25 MMHG
Z-SCORE OF LEFT VENTRICULAR DIMENSION IN END DIASTOLE: -0.36
Z-SCORE OF LEFT VENTRICULAR DIMENSION IN END SYSTOLE: 0.89

## 2023-11-07 ENCOUNTER — HOSPITAL ENCOUNTER (OUTPATIENT)
Dept: PULMONOLOGY | Facility: CLINIC | Age: 58
Discharge: HOME | End: 2023-11-07
Payer: MEDICAID

## 2023-11-07 ENCOUNTER — OFFICE VISIT (OUTPATIENT)
Dept: CARDIOTHORACIC SURGERY | Facility: CLINIC | Age: 58
End: 2023-11-07
Payer: MEDICAID

## 2023-11-07 ENCOUNTER — HOSPITAL ENCOUNTER (OUTPATIENT)
Dept: VASCULAR SURGERY | Facility: CLINIC | Age: 58
Discharge: HOME | End: 2023-11-07
Attending: THORACIC SURGERY (CARDIOTHORACIC VASCULAR SURGERY)
Payer: MEDICAID

## 2023-11-07 ENCOUNTER — HOSPITAL ENCOUNTER (OUTPATIENT)
Dept: CARDIOLOGY | Facility: CLINIC | Age: 58
Discharge: HOME | End: 2023-11-07
Payer: MEDICAID

## 2023-11-07 ENCOUNTER — HOSPITAL ENCOUNTER (OUTPATIENT)
Dept: RADIOLOGY | Facility: HOSPITAL | Age: 58
Discharge: HOME | End: 2023-11-07
Attending: THORACIC SURGERY (CARDIOTHORACIC VASCULAR SURGERY)
Payer: MEDICAID

## 2023-11-07 VITALS
BODY MASS INDEX: 26.92 KG/M2 | SYSTOLIC BLOOD PRESSURE: 126 MMHG | HEART RATE: 56 BPM | WEIGHT: 193 LBS | DIASTOLIC BLOOD PRESSURE: 74 MMHG

## 2023-11-07 DIAGNOSIS — Z01.818 PRE-OP TESTING: ICD-10-CM

## 2023-11-07 DIAGNOSIS — I48.0 PAROXYSMAL ATRIAL FIBRILLATION: ICD-10-CM

## 2023-11-07 DIAGNOSIS — I34.0 MITRAL VALVE INSUFFICIENCY, UNSPECIFIED ETIOLOGY: ICD-10-CM

## 2023-11-07 DIAGNOSIS — I48.19 PERSISTENT ATRIAL FIBRILLATION: ICD-10-CM

## 2023-11-07 DIAGNOSIS — I34.0 MITRAL VALVE INSUFFICIENCY, UNSPECIFIED ETIOLOGY: Primary | ICD-10-CM

## 2023-11-07 LAB
DLCO SINGLE BREATH LLN: 22.42
DLCO SINGLE BREATH PRE REF: 105.6 %
DLCO SINGLE BREATH REF: 29.35
DLCOC SBVA LLN: 2.94
DLCOC SBVA REF: 4.12
DLCOC SINGLE BREATH LLN: 22.42
DLCOC SINGLE BREATH REF: 29.35
DLCOCSBVAULN: 5.3
DLCOCSINGLEBREATHULN: 36.28
DLCOSINGLEBREATHULN: 36.28
DLCOVA LLN: 2.94
DLCOVA PRE REF: 128 %
DLCOVA PRE: 5.27 ML/(MIN*MMHG*L) (ref 2.94–5.3)
DLCOVA REF: 4.12
DLCOVAULN: 5.3
FEF 25 75 LLN: 1.48
FEF 25 75 PRE REF: 116.3 %
FEF 25 75 REF: 3.01
FEV05 LLN: 1.69
FEV05 REF: 2.83
FEV1 FVC LLN: 66
FEV1 FVC PRE REF: 107.2 %
FEV1 FVC REF: 78
FEV1 LLN: 2.72
FEV1 PRE REF: 102 %
FEV1 REF: 3.6
FVC LLN: 3.55
FVC PRE REF: 95 %
FVC REF: 4.65
IVC PRE: 4.32 L (ref 3.55–5.77)
IVC SINGLE BREATH LLN: 3.55
IVC SINGLE BREATH PRE REF: 92.8 %
IVC SINGLE BREATH REF: 4.65
IVCSINGLEBREATHULN: 5.77
MVV LLN: 117
MVV PRE REF: 116.6 %
MVV REF: 138
PEF LLN: 6.98
PEF PRE REF: 103.9 %
PEF REF: 9.3
PHYSICIAN COMMENT: ABNORMAL
PRE DLCO: 31 ML/(MIN*MMHG) (ref 22.42–36.28)
PRE FEF 25 75: 3.5 L/S (ref 1.48–5.07)
PRE FET 100: 7.22 SEC
PRE FEV05 REF: 98.9 %
PRE FEV1 FVC: 83.1 % (ref 65.56–87.93)
PRE FEV1: 3.67 L (ref 2.72–4.43)
PRE FEV5: 2.8 L (ref 1.69–3.96)
PRE FVC: 4.42 L (ref 3.55–5.77)
PRE MVV: 160.59 L/MIN (ref 117.11–158.44)
PRE PEF: 9.66 L/S (ref 6.98–11.61)
VA PRE: 5.88 L (ref 6.98–6.98)
VA SINGLE BREATH LLN: 6.98
VA SINGLE BREATH PRE REF: 84.3 %
VA SINGLE BREATH REF: 6.98
VASINGLEBREATHULN: 6.98

## 2023-11-07 PROCEDURE — 93010 EKG 12-LEAD: ICD-10-PCS | Mod: S$PBB,,, | Performed by: INTERNAL MEDICINE

## 2023-11-07 PROCEDURE — 99214 OFFICE O/P EST MOD 30 MIN: CPT | Mod: PBBFAC,25 | Performed by: THORACIC SURGERY (CARDIOTHORACIC VASCULAR SURGERY)

## 2023-11-07 PROCEDURE — 94729 DIFFUSING CAPACITY: CPT | Mod: 26,S$PBB,, | Performed by: INTERNAL MEDICINE

## 2023-11-07 PROCEDURE — 99499 UNLISTED E&M SERVICE: CPT | Mod: S$PBB,,, | Performed by: THORACIC SURGERY (CARDIOTHORACIC VASCULAR SURGERY)

## 2023-11-07 PROCEDURE — 93005 ELECTROCARDIOGRAM TRACING: CPT | Mod: PBBFAC | Performed by: INTERNAL MEDICINE

## 2023-11-07 PROCEDURE — 94729 PR C02/MEMBANE DIFFUSE CAPACITY: ICD-10-PCS | Mod: 26,S$PBB,, | Performed by: INTERNAL MEDICINE

## 2023-11-07 PROCEDURE — 94010 BREATHING CAPACITY TEST: CPT | Mod: 26,S$PBB,, | Performed by: INTERNAL MEDICINE

## 2023-11-07 PROCEDURE — 93880 PR DUPLEX SCAN EXTRACRANIAL,BILAT: ICD-10-PCS | Mod: 26,S$PBB,, | Performed by: SURGERY

## 2023-11-07 PROCEDURE — 71046 XR CHEST PA AND LATERAL PRE-OP: ICD-10-PCS | Mod: 26,,, | Performed by: RADIOLOGY

## 2023-11-07 PROCEDURE — 93010 ELECTROCARDIOGRAM REPORT: CPT | Mod: S$PBB,,, | Performed by: INTERNAL MEDICINE

## 2023-11-07 PROCEDURE — 94010 BREATHING CAPACITY TEST: ICD-10-PCS | Mod: 26,S$PBB,, | Performed by: INTERNAL MEDICINE

## 2023-11-07 PROCEDURE — 93880 EXTRACRANIAL BILAT STUDY: CPT | Mod: PBBFAC | Performed by: SURGERY

## 2023-11-07 PROCEDURE — 94729 DIFFUSING CAPACITY: CPT | Mod: PBBFAC | Performed by: INTERNAL MEDICINE

## 2023-11-07 PROCEDURE — 71046 X-RAY EXAM CHEST 2 VIEWS: CPT | Mod: 26,,, | Performed by: RADIOLOGY

## 2023-11-07 PROCEDURE — 94010 BREATHING CAPACITY TEST: CPT | Mod: PBBFAC | Performed by: INTERNAL MEDICINE

## 2023-11-07 PROCEDURE — 71046 X-RAY EXAM CHEST 2 VIEWS: CPT | Mod: TC

## 2023-11-07 PROCEDURE — 99999 PR PBB SHADOW E&M-EST. PATIENT-LVL IV: CPT | Mod: PBBFAC,,, | Performed by: THORACIC SURGERY (CARDIOTHORACIC VASCULAR SURGERY)

## 2023-11-07 PROCEDURE — 93880 EXTRACRANIAL BILAT STUDY: CPT | Mod: 26,S$PBB,, | Performed by: SURGERY

## 2023-11-07 PROCEDURE — 99999 PR PBB SHADOW E&M-EST. PATIENT-LVL IV: ICD-10-PCS | Mod: PBBFAC,,, | Performed by: THORACIC SURGERY (CARDIOTHORACIC VASCULAR SURGERY)

## 2023-11-07 PROCEDURE — 99499 NO LOS: ICD-10-PCS | Mod: S$PBB,,, | Performed by: THORACIC SURGERY (CARDIOTHORACIC VASCULAR SURGERY)

## 2023-11-07 RX ORDER — IPRATROPIUM BROMIDE AND ALBUTEROL SULFATE 2.5; .5 MG/3ML; MG/3ML
3 SOLUTION RESPIRATORY (INHALATION) EVERY 4 HOURS PRN
Status: CANCELLED | OUTPATIENT
Start: 2023-11-07 | End: 2023-11-08

## 2023-11-07 RX ORDER — BISACODYL 10 MG
10 SUPPOSITORY, RECTAL RECTAL DAILY PRN
Status: CANCELLED | OUTPATIENT
Start: 2023-11-07

## 2023-11-07 RX ORDER — ASPIRIN 325 MG
325 TABLET, DELAYED RELEASE (ENTERIC COATED) ORAL DAILY
Status: CANCELLED | OUTPATIENT
Start: 2023-11-07

## 2023-11-07 RX ORDER — DOCUSATE SODIUM 100 MG/1
100 CAPSULE, LIQUID FILLED ORAL 2 TIMES DAILY
Status: CANCELLED | OUTPATIENT
Start: 2023-11-07

## 2023-11-07 RX ORDER — POTASSIUM CHLORIDE 20 MEQ/1
20 TABLET, EXTENDED RELEASE ORAL EVERY 12 HOURS
Status: CANCELLED | OUTPATIENT
Start: 2023-11-07

## 2023-11-07 RX ORDER — METOCLOPRAMIDE HYDROCHLORIDE 5 MG/ML
5 INJECTION INTRAMUSCULAR; INTRAVENOUS EVERY 6 HOURS PRN
Status: CANCELLED | OUTPATIENT
Start: 2023-11-07

## 2023-11-07 RX ORDER — POTASSIUM CHLORIDE 29.8 MG/ML
40 INJECTION INTRAVENOUS
Status: CANCELLED | OUTPATIENT
Start: 2023-11-07

## 2023-11-07 RX ORDER — FENTANYL CITRATE 50 UG/ML
50 INJECTION, SOLUTION INTRAMUSCULAR; INTRAVENOUS
Status: CANCELLED | OUTPATIENT
Start: 2023-11-11

## 2023-11-07 RX ORDER — CEFAZOLIN SODIUM 2 G/50ML
2 SOLUTION INTRAVENOUS
Status: CANCELLED | OUTPATIENT
Start: 2023-11-07

## 2023-11-07 RX ORDER — OXYCODONE HYDROCHLORIDE 5 MG/1
5 TABLET ORAL EVERY 4 HOURS PRN
Status: CANCELLED | OUTPATIENT
Start: 2023-11-07

## 2023-11-07 RX ORDER — MUPIROCIN 20 MG/G
1 OINTMENT TOPICAL 2 TIMES DAILY
Status: CANCELLED | OUTPATIENT
Start: 2023-11-07 | End: 2023-11-12

## 2023-11-07 RX ORDER — ACETAMINOPHEN 325 MG/1
650 TABLET ORAL EVERY 4 HOURS PRN
Status: CANCELLED | OUTPATIENT
Start: 2023-11-07

## 2023-11-07 RX ORDER — POTASSIUM CHLORIDE 14.9 MG/ML
60 INJECTION INTRAVENOUS
Status: CANCELLED | OUTPATIENT
Start: 2023-11-07

## 2023-11-07 RX ORDER — ALBUMIN HUMAN 50 G/1000ML
25 SOLUTION INTRAVENOUS ONCE AS NEEDED
Status: CANCELLED | OUTPATIENT
Start: 2023-11-07 | End: 2035-04-05

## 2023-11-07 RX ORDER — LIDOCAINE HYDROCHLORIDE 10 MG/ML
1 INJECTION, SOLUTION EPIDURAL; INFILTRATION; INTRACAUDAL; PERINEURAL
Status: CANCELLED | OUTPATIENT
Start: 2023-11-07

## 2023-11-07 RX ORDER — PROPOFOL 10 MG/ML
0-50 INJECTION, EMULSION INTRAVENOUS CONTINUOUS
Status: CANCELLED | OUTPATIENT
Start: 2023-11-07

## 2023-11-07 RX ORDER — ONDANSETRON 2 MG/ML
4 INJECTION INTRAMUSCULAR; INTRAVENOUS EVERY 12 HOURS PRN
Status: CANCELLED | OUTPATIENT
Start: 2023-11-07

## 2023-11-07 RX ORDER — METOPROLOL TARTRATE 25 MG/1
25 TABLET, FILM COATED ORAL
Status: CANCELLED | OUTPATIENT
Start: 2023-11-07

## 2023-11-07 RX ORDER — SODIUM CHLORIDE 0.9 % (FLUSH) 0.9 %
10 SYRINGE (ML) INJECTION
Status: CANCELLED | OUTPATIENT
Start: 2023-11-07

## 2023-11-07 RX ORDER — DEXTROSE MONOHYDRATE, SODIUM CHLORIDE, AND POTASSIUM CHLORIDE 50; 1.49; 4.5 G/1000ML; G/1000ML; G/1000ML
INJECTION, SOLUTION INTRAVENOUS CONTINUOUS
Status: CANCELLED | OUTPATIENT
Start: 2023-11-07

## 2023-11-07 RX ORDER — MUPIROCIN 20 MG/G
1 OINTMENT TOPICAL
Status: CANCELLED | OUTPATIENT
Start: 2023-11-07

## 2023-11-07 RX ORDER — FAMOTIDINE 10 MG/ML
20 INJECTION INTRAVENOUS 2 TIMES DAILY
Status: CANCELLED | OUTPATIENT
Start: 2023-11-07

## 2023-11-07 RX ORDER — POTASSIUM CHLORIDE 14.9 MG/ML
20 INJECTION INTRAVENOUS
Status: CANCELLED | OUTPATIENT
Start: 2023-11-07

## 2023-11-07 RX ORDER — NAPROXEN SODIUM 220 MG/1
81 TABLET, FILM COATED ORAL ONCE
Status: CANCELLED | OUTPATIENT
Start: 2023-11-07 | End: 2023-11-07

## 2023-11-07 RX ORDER — MAGNESIUM SULFATE HEPTAHYDRATE 40 MG/ML
2 INJECTION, SOLUTION INTRAVENOUS
Status: CANCELLED | OUTPATIENT
Start: 2023-11-07

## 2023-11-07 RX ORDER — SODIUM CHLORIDE 9 MG/ML
INJECTION, SOLUTION INTRAVENOUS CONTINUOUS
Status: CANCELLED | OUTPATIENT
Start: 2023-11-07

## 2023-11-07 RX ORDER — ASPIRIN 300 MG/1
300 SUPPOSITORY RECTAL ONCE AS NEEDED
Status: CANCELLED | OUTPATIENT
Start: 2023-11-07 | End: 2035-04-05

## 2023-11-07 RX ORDER — FENTANYL CITRATE 50 UG/ML
25 INJECTION, SOLUTION INTRAMUSCULAR; INTRAVENOUS
Status: CANCELLED | OUTPATIENT
Start: 2023-11-07

## 2023-11-07 RX ORDER — MAGNESIUM SULFATE/D5W 2 G/50 ML
4 INTRAVENOUS SOLUTION, PIGGYBACK (ML) INTRAVENOUS
Status: CANCELLED | OUTPATIENT
Start: 2023-11-07

## 2023-11-07 RX ORDER — NAPROXEN SODIUM 220 MG/1
81 TABLET, FILM COATED ORAL DAILY
Status: CANCELLED | OUTPATIENT
Start: 2023-11-07

## 2023-11-07 RX ORDER — OXYCODONE HYDROCHLORIDE 10 MG/1
10 TABLET ORAL EVERY 4 HOURS PRN
Status: CANCELLED | OUTPATIENT
Start: 2023-11-07

## 2023-11-07 RX ORDER — CEFAZOLIN SODIUM 2 G/50ML
2 SOLUTION INTRAVENOUS
Status: CANCELLED | OUTPATIENT
Start: 2023-11-07 | End: 2023-11-09

## 2023-11-07 RX ORDER — IPRATROPIUM BROMIDE AND ALBUTEROL SULFATE 2.5; .5 MG/3ML; MG/3ML
3 SOLUTION RESPIRATORY (INHALATION) EVERY 4 HOURS
Status: CANCELLED | OUTPATIENT
Start: 2023-11-07 | End: 2023-11-08

## 2023-11-07 RX ORDER — ATORVASTATIN CALCIUM 40 MG/1
40 TABLET, FILM COATED ORAL NIGHTLY
Status: CANCELLED | OUTPATIENT
Start: 2023-11-07

## 2023-11-07 RX ORDER — FENTANYL CITRATE 50 UG/ML
25 INJECTION, SOLUTION INTRAMUSCULAR; INTRAVENOUS
Status: CANCELLED | OUTPATIENT
Start: 2023-11-07 | End: 2023-11-10

## 2023-11-07 RX ORDER — POLYETHYLENE GLYCOL 3350 17 G/17G
17 POWDER, FOR SOLUTION ORAL DAILY
Status: CANCELLED | OUTPATIENT
Start: 2023-11-07

## 2023-11-07 RX ORDER — FAMOTIDINE 20 MG/1
20 TABLET, FILM COATED ORAL 2 TIMES DAILY
Status: CANCELLED | OUTPATIENT
Start: 2023-11-07

## 2023-11-07 NOTE — PATIENT INSTRUCTIONS
"Pt given verbal and written instructions for surgery.      PREPARING FOR SURGERY    Your surgery has been scheduled for:    Day: Thursday   Date:  November 9    Arrival Time: 0500    Start Time: 0700    You should report to the second floor surgery center, located on the [facility] side of the second floor of the [facility]. The phone number is [phone].         PLEASE NOTE    If you are allergic to any medications, please inform your doctor or the nurse responsible for your care.  Tell the doctor if you take aspirin, products containing aspirin, herbal medications or blood thinners, such as Coumadin, Pradaxa, or Plavix.  Notify your doctor if you are diabetic and provide information about the medications you take.  Arrange for someone to drive you home following surgery. You will not be allowed to leave the surgical facility alone or drive yourself home following sedation and anesthesia.  If you have not already done so, please bring a list of your medications with you the day of your surgery.          THE NIGHT BEFORE SURGERY    Eat a light supper on the night before your surgery, no greasy or fatty foods.    DO NOT EAT OR DRINK ANYTHING AFTER MIDNIGHT, INCLUDING GUM, HARD CANDY, MINTS, OR CHEWING TOBACCO    Take a complete shower. Wash your body from the neck down with Hibiclens (chlorhexidine gluconate) soap. Hibiclens soap may be purchased over the counter at the pharmacy. Keep the soap away from your eyes, ears, and mouth. After washing with Hibiclens, rinse thoroughly. You may also use any soap labeled "antibacterial". Shampoo your hair with your regular shampoo.         THE DAY OF SURGERY    Take another shower with Hibiclens or any antibacterial soap, to reduce the change of infection.    Medications to take the morning of surgery: metoprolol with a small sip of water.     Diabetic medication instructions: take last dose of metformin morning of Wednesday, November 8    You may brush your " teeth and rinse your mouth, but do not swallow any water.    Do not apply perfume, powder, body lotions or deodorant on the day of surgery.    Do not wear any makeup, including mascara and false eyelashes.    Nail polish should be removed.    Wear comfortable clothes, such as button front shirt and loose-fitting pants.    Leave all jewelry, including body piercings and valuables at home.    Hairpins and clasps must be removed before you enter the operating room.    You may wear glasses, dentures and hearing aids before and after surgery. They may need to be removed before going into the operating room. Contact lenses worn before surgery must be removed before entering the operating room. Please bring a case for your hearing aids, glasses and/or contacts.    Bring any devices you will need after surgery such as crutches or canes.    If you have sleep apnea, please bring your CPAP machine.    If you have an implantable device, such as a pacemaker or AICD, please bring the device information card, if you have one.       If you have any questions or concerns, please don't hesitate to call.

## 2023-11-07 NOTE — PROGRESS NOTES
Subjective:      Patient ID: Abimael Armendariz is a 58 y.o. male.    Chief Complaint: No chief complaint on file.      HPI:  Abimael Armendariz is a 58 y.o. male who presents for pre-op. Medical conditions include paroxysmal AF, Hyperlipidemia, Hypertension, Insomnia, ADHD (attention deficit hyperactivity disorder), Anxiety, depression. Recently diagnosed with atrial fibrillation while in [place] undergoing preop clearance. He reports he returned back home and was seen by Cardiology, 08/15/2023, where he was started on Lopressor and Eliquis with Holter monitor, echo and stress test ordered.  Echo completed 08/18/2023. Admitted 3 times within the diagnosis time  frame. First ED visit was for palpitations for which they adjusted his BB. He then developed chest pain and elevated heart rate and was seen at [facility], 08/17/2023- had negative cardiac ischemic work up. EKG during admission showed AFIB RVR for which he was given IV Cardizem x1 with rate improved and chest pain resolved. Subsequently underwent cardioversion. He was then readmitted again for CHF exacerbation. Today, positive for orthopnea and PND. Denies ARANDA, palpitations, LE swelling. Independently perform ADLs. No Assistive devices for walking. Denies prior history of strokes, stents, sternotomies.      Pertinent family history father (d) MI.  Does not smoke cigarettes, drink or do illegal drugs. Does chew tobacco.     Family and social history reviewed    Review of patient's allergies indicates:   Allergen Reactions    Latex, natural rubber Rash     Past Medical History:   Diagnosis Date    ADHD (attention deficit hyperactivity disorder)     Anticoagulant long-term use     Anxiety     CHF (congestive heart failure)     Depression     Diabetes mellitus     Diverticulitis     Hyperlipidemia     Insomnia     Mitral regurgitation     Mitral valve disease 08/2023    PAF (paroxysmal atrial fibrillation)      Past Surgical History:   Procedure Laterality Date     ANGIOGRAM, CORONARY, WITH LEFT HEART CATHETERIZATION  8/30/2023    Procedure: Left Heart Cath;  Surgeon: Hilario Pretty MD;  Location: [facility] CATH;  Service: Cardiology;;    CORONARY ANGIOGRAPHY  8/30/2023    Procedure: Coronary angiogram study;  Surgeon: Hilario Pretty MD;  Location: [facility] CATH;  Service: Cardiology;;    KNEE ARTHROSCOPY W/ ACL RECONSTRUCTION AND HAMSTRING GRAFT  2003    right x 2; redo Verliner in [place] 1/2022    LUMBAR LAMINECTOMY FOR DECOMPRESSION  12/2022    TRANSESOPHAGEAL ECHOCARDIOGRAM WITH POSSIBLE CARDIOVERSION (JASON W/ POSS CARDIOVERSION) Bilateral 08/21/2023    Procedure: Transesophageal echo (JASON) intra-procedure log documentation;  Surgeon: Jesu Flores MD;  Location: [facility] CATH/EP LAB;  Service: Cardiology;  Laterality: Bilateral;     Family History       Problem Relation (Age of Onset)    Alzheimer's disease Maternal Grandmother    Heart attack Father, Paternal Grandfather    Heart disease Father    No Known Problems Sister, Brother, Daughter, Son    Stroke Mother          Social History     Socioeconomic History    Marital status:    Occupational History     Employer: [employer]   Tobacco Use    Smoking status: Never    Smokeless tobacco: Current     Types: Chew    Tobacco comments:     2 dips per day down from can per day   Substance and Sexual Activity    Alcohol use: No    Drug use: No    Sexual activity: Yes     Partners: Female     Social Determinants of Health     Financial Resource Strain: Medium Risk (11/7/2023)    Overall Financial Resource Strain (CARDIA)     Difficulty of Paying Living Expenses: Somewhat hard   Food Insecurity: No Food Insecurity (11/7/2023)    Hunger Vital Sign     Worried About Running Out of Food in the Last Year: Never true     Ran Out of Food in the Last Year: Never true   Recent Concern: Food Insecurity - Food Insecurity Present (9/27/2023)    Hunger Vital Sign     Worried About Running Out of Food in the Last  Year: Sometimes true     Ran Out of Food in the Last Year: Never true   Transportation Needs: No Transportation Needs (11/7/2023)    PRAPARE - Transportation     Lack of Transportation (Medical): No     Lack of Transportation (Non-Medical): No   Physical Activity: Insufficiently Active (11/7/2023)    Exercise Vital Sign     Days of Exercise per Week: 2 days     Minutes of Exercise per Session: 30 min   Stress: Stress Concern Present (11/7/2023)    [employer] - Occupational Stress Questionnaire     Feeling of Stress : Very much   Social Connections: Unknown (11/7/2023)    Social Connection and Isolation Panel [NHANES]     Frequency of Communication with Friends and Family: More than three times a week     Frequency of Social Gatherings with Friends and Family: Once a week     Active Member of Clubs or Organizations: Yes     Attends Club or Organization Meetings: 1 to 4 times per year     Marital Status:    Housing Stability: Low Risk  (11/7/2023)    Housing Stability Vital Sign     Unable to Pay for Housing in the Last Year: No     Number of Places Lived in the Last Year: 1     Unstable Housing in the Last Year: No       Current medications Reviewed    Review of Systems   Constitutional:  Negative for activity change, appetite change, fatigue and fever.   HENT:  Negative for nosebleeds.    Respiratory:  Negative for cough and shortness of breath.    Cardiovascular:  Negative for chest pain, palpitations and leg swelling.   Gastrointestinal:  Negative for abdominal distention, abdominal pain and nausea.   Genitourinary:  Negative for frequency.   Musculoskeletal:  Negative for arthralgias and myalgias.   Skin:  Negative for rash.   Neurological:  Negative for dizziness and numbness.   Hematological:  Does not bruise/bleed easily.      Objective:   Physical Exam  Constitutional:       Appearance: Normal appearance.   HENT:      Head: Normocephalic and atraumatic.   Eyes:       Extraocular Movements: Extraocular movements intact.   Cardiovascular:      Rate and Rhythm: Normal rate and regular rhythm.      Heart sounds: Murmur heard.   Pulmonary:      Effort: Pulmonary effort is normal.      Breath sounds: Normal breath sounds.   Abdominal:      General: Abdomen is flat.      Palpations: Abdomen is soft.   Musculoskeletal:         General: Normal range of motion.      Cervical back: Normal range of motion.   Skin:     General: Skin is warm and dry.      Capillary Refill: Capillary refill takes less than 2 seconds.   Neurological:      General: No focal deficit present.      Mental Status: He is alert.     Diagnostic Results: reviewed   TTE 11/2/23    Left Ventricle: The left ventricle is mildly dilated. Normal wall thickness. Normal wall motion. There is normal systolic function with a visually estimated ejection fraction of 60 - 65%. There is normal diastolic function.    Right Ventricle: Normal right ventricular cavity size. Wall thickness is normal. Right ventricle wall motion  is normal. Systolic function is normal.    Left Atrium: Left atrium is moderately dilated.    Mitral Valve: There is moderate regurgitation with a centrally directed jet.    IVC/SVC: Normal venous pressure at 3 mmHg.    [facility]   8/30/23    There was non-obstructive coronary artery disease..    There was severe (3/4+) mitral regurgitation.    The ejection fraction was greater than 55% by visual estimate.    The left ventricular systolic function was normal.    The left ventricular end diastolic pressure was elevated.     Nuclear stress test OSH 8/24/23    Abnormal myocardial perfusion scan.    There is a small to moderate sized, fixed perfusion abnormality consistent with scar in the basal to apical inferior wall(s).    There are no other significant perfusion abnormalities.    The gated perfusion images showed an ejection fraction of 47% post stress.    LV cavity size is mildly enlarged at rest and mildly enlarged at  stress.    The ECG portion of the study is negative for ischemia.    The patient reported no chest pain during the stress test.    Equivocal study, fixed inferior defect, can not rule out attenuation artifact versus scar tissue.  Clinical correlation warranted.  Left ventricle does appear to be dilated.  Nuclear EF 47%, correlate with echo.     [facility]    Left Ventricle: The left ventricle is normal in size. Ventricular mass is normal. Normal wall thickness. Normal wall motion. There is normal systolic function with a visually estimated ejection fraction of 55 - 70%.    Left Atrium: Left atrium is mildly dilated. The left atrial appendage appears normal. The left atrial appendage has a windsock morphology. There is no thrombus in the left atrial appendage.    Right Ventricle: Normal right ventricular cavity size. Right ventricle wall motion  is normal. Systolic function is normal.    Mitral Valve significant prolapse of the anterior leaf of the mitral valve associated with moderately severe mitral regurgitation is seen    IVC/SVC: Normal venous pressure at 3 mmHg.    Atrial fibrillation     Assessment:   MR  Afib   Plan:   Pre-Op mitral repair and MAZE. Desires mechanical valve if repair not feasible.       CTS Attending Note:    I have personally taken the history and examined this patient and agree with the NAFISA's note as stated above.  Very pleasant 58-year-old gentleman with mitral regurgitation and atrial fibrillation.  We plan mitral valve repair and Maze procedure.  If repair is not feasible, he desires a mechanical prosthesis.  His questions have been answered, and he wishes to proceed.

## 2023-11-07 NOTE — H&P (VIEW-ONLY)
Subjective:      Patient ID: Abimael Armendariz is a 58 y.o. male.    Chief Complaint: No chief complaint on file.      HPI:  Abimael Armendariz is a 58 y.o. male who presents for pre-op. Medical conditions include paroxysmal AF, Hyperlipidemia, Hypertension, Insomnia, ADHD (attention deficit hyperactivity disorder), Anxiety, depression. Recently diagnosed with atrial fibrillation while in [place] undergoing preop clearance. He reports he returned back home and was seen by Cardiology, 08/15/2023, where he was started on Lopressor and Eliquis with Holter monitor, echo and stress test ordered.  Echo completed 08/18/2023. Admitted 3 times within the diagnosis time  frame. First ED visit was for palpitations for which they adjusted his BB. He then developed chest pain and elevated heart rate and was seen at [facility], 08/17/2023- had negative cardiac ischemic work up. EKG during admission showed AFIB RVR for which he was given IV Cardizem x1 with rate improved and chest pain resolved. Subsequently underwent cardioversion. He was then readmitted again for CHF exacerbation. Today, positive for orthopnea and PND. Denies ARANDA, palpitations, LE swelling. Independently perform ADLs. No Assistive devices for walking. Denies prior history of strokes, stents, sternotomies.      Pertinent family history father (d) MI.  Does not smoke cigarettes, drink or do illegal drugs. Does chew tobacco.     Family and social history reviewed    Review of patient's allergies indicates:   Allergen Reactions    Latex, natural rubber Rash     Past Medical History:   Diagnosis Date    ADHD (attention deficit hyperactivity disorder)     Anticoagulant long-term use     Anxiety     CHF (congestive heart failure)     Depression     Diabetes mellitus     Diverticulitis     Hyperlipidemia     Insomnia     Mitral regurgitation     Mitral valve disease 08/2023    PAF (paroxysmal atrial fibrillation)      Past Surgical History:   Procedure Laterality Date     ANGIOGRAM, CORONARY, WITH LEFT HEART CATHETERIZATION  8/30/2023    Procedure: Left Heart Cath;  Surgeon: Hilario Pretty MD;  Location: [facility] CATH;  Service: Cardiology;;    CORONARY ANGIOGRAPHY  8/30/2023    Procedure: Coronary angiogram study;  Surgeon: Hilario Pretty MD;  Location: [facility] CATH;  Service: Cardiology;;    KNEE ARTHROSCOPY W/ ACL RECONSTRUCTION AND HAMSTRING GRAFT  2003    right x 2; redo Verliner in [place] 1/2022    LUMBAR LAMINECTOMY FOR DECOMPRESSION  12/2022    TRANSESOPHAGEAL ECHOCARDIOGRAM WITH POSSIBLE CARDIOVERSION (JASON W/ POSS CARDIOVERSION) Bilateral 08/21/2023    Procedure: Transesophageal echo (JASON) intra-procedure log documentation;  Surgeon: Jesu Flores MD;  Location: [facility] CATH/EP LAB;  Service: Cardiology;  Laterality: Bilateral;     Family History       Problem Relation (Age of Onset)    Alzheimer's disease Maternal Grandmother    Heart attack Father, Paternal Grandfather    Heart disease Father    No Known Problems Sister, Brother, Daughter, Son    Stroke Mother          Social History     Socioeconomic History    Marital status:    Occupational History     Employer: [employer]   Tobacco Use    Smoking status: Never    Smokeless tobacco: Current     Types: Chew    Tobacco comments:     2 dips per day down from can per day   Substance and Sexual Activity    Alcohol use: No    Drug use: No    Sexual activity: Yes     Partners: Female     Social Determinants of Health     Financial Resource Strain: Medium Risk (11/7/2023)    Overall Financial Resource Strain (CARDIA)     Difficulty of Paying Living Expenses: Somewhat hard   Food Insecurity: No Food Insecurity (11/7/2023)    Hunger Vital Sign     Worried About Running Out of Food in the Last Year: Never true     Ran Out of Food in the Last Year: Never true   Recent Concern: Food Insecurity - Food Insecurity Present (9/27/2023)    Hunger Vital Sign     Worried About Running Out of Food in the Last  Year: Sometimes true     Ran Out of Food in the Last Year: Never true   Transportation Needs: No Transportation Needs (11/7/2023)    PRAPARE - Transportation     Lack of Transportation (Medical): No     Lack of Transportation (Non-Medical): No   Physical Activity: Insufficiently Active (11/7/2023)    Exercise Vital Sign     Days of Exercise per Week: 2 days     Minutes of Exercise per Session: 30 min   Stress: Stress Concern Present (11/7/2023)    [employer] - Occupational Stress Questionnaire     Feeling of Stress : Very much   Social Connections: Unknown (11/7/2023)    Social Connection and Isolation Panel [NHANES]     Frequency of Communication with Friends and Family: More than three times a week     Frequency of Social Gatherings with Friends and Family: Once a week     Active Member of Clubs or Organizations: Yes     Attends Club or Organization Meetings: 1 to 4 times per year     Marital Status:    Housing Stability: Low Risk  (11/7/2023)    Housing Stability Vital Sign     Unable to Pay for Housing in the Last Year: No     Number of Places Lived in the Last Year: 1     Unstable Housing in the Last Year: No       Current medications Reviewed    Review of Systems   Constitutional:  Negative for activity change, appetite change, fatigue and fever.   HENT:  Negative for nosebleeds.    Respiratory:  Negative for cough and shortness of breath.    Cardiovascular:  Negative for chest pain, palpitations and leg swelling.   Gastrointestinal:  Negative for abdominal distention, abdominal pain and nausea.   Genitourinary:  Negative for frequency.   Musculoskeletal:  Negative for arthralgias and myalgias.   Skin:  Negative for rash.   Neurological:  Negative for dizziness and numbness.   Hematological:  Does not bruise/bleed easily.      Objective:   Physical Exam  Constitutional:       Appearance: Normal appearance.   HENT:      Head: Normocephalic and atraumatic.   Eyes:       Extraocular Movements: Extraocular movements intact.   Cardiovascular:      Rate and Rhythm: Normal rate and regular rhythm.      Heart sounds: Murmur heard.   Pulmonary:      Effort: Pulmonary effort is normal.      Breath sounds: Normal breath sounds.   Abdominal:      General: Abdomen is flat.      Palpations: Abdomen is soft.   Musculoskeletal:         General: Normal range of motion.      Cervical back: Normal range of motion.   Skin:     General: Skin is warm and dry.      Capillary Refill: Capillary refill takes less than 2 seconds.   Neurological:      General: No focal deficit present.      Mental Status: He is alert.     Diagnostic Results: reviewed   TTE 11/2/23    Left Ventricle: The left ventricle is mildly dilated. Normal wall thickness. Normal wall motion. There is normal systolic function with a visually estimated ejection fraction of 60 - 65%. There is normal diastolic function.    Right Ventricle: Normal right ventricular cavity size. Wall thickness is normal. Right ventricle wall motion  is normal. Systolic function is normal.    Left Atrium: Left atrium is moderately dilated.    Mitral Valve: There is moderate regurgitation with a centrally directed jet.    IVC/SVC: Normal venous pressure at 3 mmHg.    [facility]   8/30/23    There was non-obstructive coronary artery disease..    There was severe (3/4+) mitral regurgitation.    The ejection fraction was greater than 55% by visual estimate.    The left ventricular systolic function was normal.    The left ventricular end diastolic pressure was elevated.     Nuclear stress test OSH 8/24/23    Abnormal myocardial perfusion scan.    There is a small to moderate sized, fixed perfusion abnormality consistent with scar in the basal to apical inferior wall(s).    There are no other significant perfusion abnormalities.    The gated perfusion images showed an ejection fraction of 47% post stress.    LV cavity size is mildly enlarged at rest and mildly enlarged at  stress.    The ECG portion of the study is negative for ischemia.    The patient reported no chest pain during the stress test.    Equivocal study, fixed inferior defect, can not rule out attenuation artifact versus scar tissue.  Clinical correlation warranted.  Left ventricle does appear to be dilated.  Nuclear EF 47%, correlate with echo.     [facility]    Left Ventricle: The left ventricle is normal in size. Ventricular mass is normal. Normal wall thickness. Normal wall motion. There is normal systolic function with a visually estimated ejection fraction of 55 - 70%.    Left Atrium: Left atrium is mildly dilated. The left atrial appendage appears normal. The left atrial appendage has a windsock morphology. There is no thrombus in the left atrial appendage.    Right Ventricle: Normal right ventricular cavity size. Right ventricle wall motion  is normal. Systolic function is normal.    Mitral Valve significant prolapse of the anterior leaf of the mitral valve associated with moderately severe mitral regurgitation is seen    IVC/SVC: Normal venous pressure at 3 mmHg.    Atrial fibrillation     Assessment:   MR  Afib   Plan:   Pre-Op mitral repair and MAZE. Desires mechanical valve if repair not feasible.       CTS Attending Note:    I have personally taken the history and examined this patient and agree with the NAFISA's note as stated above.  Very pleasant 58-year-old gentleman with mitral regurgitation and atrial fibrillation.  We plan mitral valve repair and Maze procedure.  If repair is not feasible, he desires a mechanical prosthesis.  His questions have been answered, and he wishes to proceed.

## 2023-11-08 ENCOUNTER — ANESTHESIA EVENT (OUTPATIENT)
Dept: SURGERY | Facility: HOSPITAL | Age: 58
DRG: 220 | End: 2023-11-08
Payer: MEDICAID

## 2023-11-08 ENCOUNTER — TELEPHONE (OUTPATIENT)
Dept: CARDIOTHORACIC SURGERY | Facility: CLINIC | Age: 58
End: 2023-11-08
Payer: MEDICAID

## 2023-11-08 NOTE — ANESTHESIA PREPROCEDURE EVALUATION
[facility]  Anesthesia Pre-Operative Evaluation         Patient Name: Abimael Armendariz  YOB: 1965  MRN: [id number]    SUBJECTIVE:     Pre-operative evaluation for Procedure(s) (LRB):  Valvuloplasty, Mitral (N/A)  SCHNEIDER MAZE PROCEDURE (N/A)  REPLACEMENT, MITRAL VALVE (N/A)     11/08/2023    Abimael Armendariz is a 58 y.o. male w/ a significant PMHx of paroxysmal AF, HTN, ADHD, anxiety/depression. Patient was recently in Afib RVR requiring cardizem and subsequent cardioversion. Readmitted with CHF exacerbation. On echo found to have mitral valve prolapse with moderate mitral regurgitation. Symptoms are positive for orthopnea and PND.    Patient now presents for the above procedure(s).    TTE 11/2/23    Left Ventricle: The left ventricle is mildly dilated. Normal wall thickness. Normal wall motion. There is normal systolic function with a visually estimated ejection fraction of 60 - 65%. There is normal diastolic function.    Right Ventricle: Normal right ventricular cavity size. Wall thickness is normal. Right ventricle wall motion  is normal. Systolic function is normal.    Left Atrium: Left atrium is moderately dilated.    Mitral Valve: There is moderate regurgitation with a centrally directed jet.    IVC/SVC: Normal venous pressure at 3 mmHg.     [facility]   8/30/23    There was non-obstructive coronary artery disease..    There was severe (3/4+) mitral regurgitation.    The ejection fraction was greater than 55% by visual estimate.    The left ventricular systolic function was normal.    The left ventricular end diastolic pressure was elevated.     Nuclear stress test OSH 8/24/23    Abnormal myocardial perfusion scan.    There is a small to moderate sized, fixed perfusion abnormality consistent with scar in the basal to apical inferior wall(s).    There are no other significant perfusion abnormalities.    The gated perfusion images showed an ejection fraction of 47% post stress.    LV cavity  size is mildly enlarged at rest and mildly enlarged at stress.    The ECG portion of the study is negative for ischemia.    The patient reported no chest pain during the stress test.    Equivocal study, fixed inferior defect, can not rule out attenuation artifact versus scar tissue.  Clinical correlation warranted.  Left ventricle does appear to be dilated.  Nuclear EF 47%, correlate with echo.     [facility] O[facility]    Left Ventricle: The left ventricle is normal in size. Ventricular mass is normal. Normal wall thickness. Normal wall motion. There is normal systolic function with a visually estimated ejection fraction of 55 - 70%.    Left Atrium: Left atrium is mildly dilated. The left atrial appendage appears normal. The left atrial appendage has a windsock morphology. There is no thrombus in the left atrial appendage.    Right Ventricle: Normal right ventricular cavity size. Right ventricle wall motion  is normal. Systolic function is normal.    Mitral Valve significant prolapse of the anterior leaf of the mitral valve associated with moderately severe mitral regurgitation is seen    IVC/SVC: Normal venous pressure at 3 mmHg.    Atrial fibrillation       LDA: None documented.    Prev airway: None documented.    Drips: None documented.    Patient Active Problem List   Diagnosis    Dyslipidemia    Insomnia    Hypertension    Anxiety    Mitral regurgitation    Restless leg syndrome    Allergic sinusitis    Anticoagulant long-term use    BMI 29.0-29.9,adult    Type 2 diabetes mellitus without complication, without long-term current use of insulin    Persistent atrial fibrillation    A-fib    Acute exacerbation of CHF (congestive heart failure)    Pre-op testing       Review of patient's allergies indicates:   Allergen Reactions    Latex, natural rubber Rash       Current Outpatient Medications:  No current facility-administered medications for this encounter.    Current Outpatient Medications:     amiodarone (PACERONE) 200 MG  Tab, Take 1 tablet (200 mg total) by mouth once daily., Disp: 90 tablet, Rfl: 3    apixaban (ELIQUIS) 5 mg Tab, Take 1 tablet (5 mg total) by mouth 2 (two) times daily., Disp: 60 tablet, Rfl: 3    aspirin 81 MG Chew, Take 81 mg by mouth every evening., Disp: , Rfl:     blood sugar diagnostic Strp, To check BG qd times daily, to use with insurance preferred meter, Disp: 30 strip, Rfl: 11    blood-glucose meter kit, To check BG qd times daily, to use with insurance preferred meter, Disp: 1 each, Rfl: 0    buPROPion (WELLBUTRIN XL) 300 MG 24 hr tablet, Take 1 tablet (300 mg total) by mouth once daily. (Patient taking differently: Take 300 mg by mouth every evening.), Disp: 90 tablet, Rfl: 3    fluticasone propionate (FLONASE) 50 mcg/actuation nasal spray, 1 spray by Each Nostril route daily as needed., Disp: , Rfl:     furosemide (LASIX) 40 MG tablet, Take 1 tablet (40 mg total) by mouth once daily., Disp: 90 tablet, Rfl: 3    lancets Misc, To check BG qd times daily, to use with insurance preferred meter, Disp: 100 each, Rfl: 2    metFORMIN (GLUCOPHAGE) 500 MG tablet, Take 1 tablet (500 mg total) by mouth 2 (two) times daily with meals., Disp: 180 tablet, Rfl: 1    metoprolol tartrate (LOPRESSOR) 25 MG tablet, Take 1 tablet (25 mg total) by mouth 2 (two) times daily., Disp: 180 tablet, Rfl: 3    multivitamin (THERAGRAN) per tablet, Take 1 tablet by mouth every evening., Disp: , Rfl:     polyethylene glycol (GLYCOLAX) 17 gram PwPk, Take 17 g by mouth nightly as needed. 2 packs, Disp: , Rfl:     pramipexole (MIRAPEX) 0.25 MG tablet, TAKE 1 TABLET BY MOUTH EVERY DAY (Patient taking differently: Take 0.25 mg by mouth every evening. TAKE 1 TABLET BY MOUTH EVERY DAY), Disp: 90 tablet, Rfl: 3    spironolactone (ALDACTONE) 25 MG tablet, Take 0.5 tablets (12.5 mg total) by mouth once daily., Disp: 45 tablet, Rfl: 3    traZODone (DESYREL) 100 MG tablet, Take 2 tablets (200 mg total) by mouth every evening., Disp: 180 tablet,  Rfl: 3    Past Surgical History:   Procedure Laterality Date    ANGIOGRAM, CORONARY, WITH LEFT HEART CATHETERIZATION  8/30/2023    Procedure: Left Heart Cath;  Surgeon: Hilario Pretty MD;  Location: [facility] CATH;  Service: Cardiology;;    CORONARY ANGIOGRAPHY  8/30/2023    Procedure: Coronary angiogram study;  Surgeon: Hilario Pretty MD;  Location: [facility] CATH;  Service: Cardiology;;    KNEE ARTHROSCOPY W/ ACL RECONSTRUCTION AND HAMSTRING GRAFT  2003    right x 2; redo [facility] in [place] 1/2022    LUMBAR LAMINECTOMY FOR DECOMPRESSION  12/2022    TRANSESOPHAGEAL ECHOCARDIOGRAM WITH POSSIBLE CARDIOVERSION (JASON W/ POSS CARDIOVERSION) Bilateral 08/21/2023    Procedure: Transesophageal echo (JASON) intra-procedure log documentation;  Surgeon: Jesu Flores MD;  Location: [facility] CATH/EP LAB;  Service: Cardiology;  Laterality: Bilateral;       Social History     Socioeconomic History    Marital status:    Occupational History     Employer: [employer]   Tobacco Use    Smoking status: Never    Smokeless tobacco: Current     Types: Chew    Tobacco comments:     2 dips per day down from can per day   Substance and Sexual Activity    Alcohol use: No    Drug use: No    Sexual activity: Yes     Partners: Female     Social Determinants of Health     Financial Resource Strain: Medium Risk (11/7/2023)    Overall Financial Resource Strain (CARDIA)     Difficulty of Paying Living Expenses: Somewhat hard   Food Insecurity: No Food Insecurity (11/7/2023)    Hunger Vital Sign     Worried About Running Out of Food in the Last Year: Never true     Ran Out of Food in the Last Year: Never true   Recent Concern: Food Insecurity - Food Insecurity Present (9/27/2023)    Hunger Vital Sign     Worried About Running Out of Food in the Last Year: Sometimes true     Ran Out of Food in the Last Year: Never true   Transportation Needs: No Transportation Needs (11/7/2023)    PRAPARE - Transportation     Lack of  Transportation (Medical): No     Lack of Transportation (Non-Medical): No   Physical Activity: Insufficiently Active (11/7/2023)    Exercise Vital Sign     Days of Exercise per Week: 2 days     Minutes of Exercise per Session: 30 min   Stress: Stress Concern Present (11/7/2023)    [employer] - Occupational Stress Questionnaire     Feeling of Stress : Very much   Social Connections: Unknown (11/7/2023)    Social Connection and Isolation Panel [NHANES]     Frequency of Communication with Friends and Family: More than three times a week     Frequency of Social Gatherings with Friends and Family: Once a week     Active Member of Clubs or Organizations: Yes     Attends Club or Organization Meetings: 1 to 4 times per year     Marital Status:    Housing Stability: Low Risk  (11/7/2023)    Housing Stability Vital Sign     Unable to Pay for Housing in the Last Year: No     Number of Places Lived in the Last Year: 1     Unstable Housing in the Last Year: No       OBJECTIVE:     Vital Signs Range (Last 24H):         Significant Labs:  Lab Results   Component Value Date    WBC 6.84 11/07/2023    HGB 16.3 11/07/2023    HCT 47.9 11/07/2023     (L) 11/07/2023    CHOL 154 11/15/2022    TRIG 249 (H) 11/15/2022    HDL 28 (L) 11/15/2022    ALT 29 11/07/2023    AST 23 11/07/2023     11/07/2023    K 4.6 11/07/2023     11/07/2023    CREATININE 1.3 11/07/2023    BUN 23 (H) 11/07/2023    CO2 30 (H) 11/07/2023    TSH 2.345 11/15/2022    PSA 0.61 12/09/2021    INR 1.0 11/07/2023    HGBA1C 5.6 11/07/2023       Diagnostic Studies: No relevant studies.    EKG:   Results for orders placed or performed during the hospital encounter of 11/07/23   EKG 12-lead    Collection Time: 11/07/23 12:13 PM    Narrative    Test Reason : I34.0,I48.0,Z01.818,    Vent. Rate : 056 BPM     Atrial Rate : 056 BPM     P-R Int : 214 ms          QRS Dur : 096 ms      QT Int : 444 ms       P-R-T Axes : 063 074 077  degrees     QTc Int : 428 ms    Sinus bradycardia with 1st degree A-V block  Minimal voltage criteria for LVH, may be normal variant  Borderline Abnormal ECG  When compared with ECG of 30-AUG-2023 11:13,  Questionable change in The axis  No significant change was found  Confirmed by Miri Hawk MD ([id number]) on 11/7/2023 1:28:34 PM    Referred By: JENNIFER ZELAYA           Confirmed By:Miri Hawk MD       2D ECHO:  TTE:  Results for orders placed or performed in visit on 08/18/23   Echo   Result Value Ref Range    BSA 2.1 m2    LVOT stroke volume 52.90 cm3    LVIDd 4.76 3.5 - 6.0 cm    LV Systolic Volume 49.46 mL    LV Systolic Volume Index 23.8 mL/m2    LVIDs 3.46 2.1 - 4.0 cm    LV Diastolic Volume 105.70 mL    LV Diastolic Volume Index 50.82 mL/m2    IVS 1.21 (A) 0.6 - 1.1 cm    LVOT diameter 2.40 cm    LVOT area 4.5 cm2    FS 27 (A) 28 - 44 %    Left Ventricle Relative Wall Thickness 0.42 cm    Posterior Wall 1.00 0.6 - 1.1 cm    LV mass 192.59 g    LV Mass Index 93 g/m2    MV Peak E Aryan 1.38 m/s    TDI LATERAL 0.14 m/s    TDI SEPTAL 0.13 m/s    E/E' ratio 10.22 m/s    TR Max Aryan 2.10 m/s    LV SEPTAL E/E' RATIO 10.62 m/s    LV LATERAL E/E' RATIO 9.86 m/s    LVOT peak aryan 0.68 m/s    Left Ventricular Outflow Tract Mean Velocity 0.50 cm/s    Left Ventricular Outflow Tract Mean Gradient 1.11 mmHg    LA size 5.76 cm    Left Atrium Major Axis 6.79 cm    Left Atrium Minor Axis 5.70 cm    RVDD 3.65 cm    Right ventricular length in diastole (apical 4-chamber view) 6.15 cm    RV mid diameter 1.89 cm    RV S' 12.39 cm/s    TAPSE 1.95 cm    RA Major Axis 5.60 cm    AV mean gradient 2 mmHg    AV peak gradient 3 mmHg    Ao peak aryan 0.88 m/s    Ao VTI 14.50 cm    LVOT peak VTI 11.70 cm    AV valve area 3.65 cm²    AV Velocity Ratio 0.77     AV index (prosthetic) 0.81     BIANCA by Velocity Ratio 3.49 cm²    Mr max aryan 4.15 m/s    Triscuspid Valve Regurgitation Peak Gradient 18 mmHg    Sinus 3.28 cm    STJ 2.51 cm    Ascending  aorta 2.70 cm    Mean e' 0.14 m/s    ZLVIDS -0.94     ZLVIDD -2.91     LA Volume Index 106.5 mL/m2    LA volume 221.50 cm3    LA WIDTH 7.3 cm    TV resting pulmonary artery pressure 26 mmHg    RV TB RVSP 10 mmHg    Est. RA pres 8 mmHg    Narrative      Left Ventricle: The left ventricle is normal in size. Mildly increased   wall thickness. Normal wall motion. There is normal systolic function with   a visually estimated ejection fraction of 60 - 65%.    Left Atrium: Left atrium is severely dilated.    Right Ventricle: Normal right ventricular cavity size. Wall thickness   is normal. Right ventricle wall motion  is normal. Systolic function is   normal.    Mitral Valve: Mildly thickened anterior leaflet. There is moderate   regurgitation with a posterolateral eccentriccally directed jet.  There is   moderate prolapse of the anterior mitral valve leaflet.    Pulmonary Artery: No pulmonary hypertension. The estimated pulmonary   artery systolic pressure is 26 mmHg.    IVC/SVC: Intermediate venous pressure at 8 mmHg.         JASON:  No results found for this or any previous visit.    ASSESSMENT/PLAN:           Pre-op Assessment    I have reviewed the Patient Summary Reports.     I have reviewed the Nursing Notes. I have reviewed the NPO Status.   I have reviewed the Medications.     Review of Systems  Anesthesia Hx:  No problems with previous Anesthesia   History of prior surgery of interest to airway management or planning:          Denies Family Hx of Anesthesia complications.    Denies Personal Hx of Anesthesia complications.                    Social:  Non-Smoker, No Alcohol Use       Hematology/Oncology:  Hematology Normal   Oncology Normal                                   EENT/Dental:   Some teeth chipped and broken          Cardiovascular:      Valvular problems/Murmurs ( moderate), MR   Dysrhythmias atrial fibrillation Angina  Orthopnea (And cough since atrial fibrillation started)   hyperlipidemia                              Pulmonary:  Pulmonary Normal                       Renal/:  Renal/ Normal                 Hepatic/GI:     GERD ( occasional but none today)             Musculoskeletal:  Musculoskeletal Normal                Neurological:  Neurology Normal                                      Endocrine:  Endocrine Normal            Psych:  Psychiatric History (ADHD) anxiety depression                Physical Exam  General: Well nourished, Cooperative, Alert and Oriented    Airway:  Mallampati: III   Mouth Opening: Normal  TM Distance: > 6 cm  Tongue: Normal  Neck ROM: Normal ROM    Dental:  Intact    Chest/Lungs:  Normal Respiratory Rate    Heart:  Rate: Normal        Anesthesia Plan  Type of Anesthesia, risks & benefits discussed:    Anesthesia Type: Gen ETT  Intra-op Monitoring Plan: Standard ASA Monitors, Art Line, Central Line and JASON  Post Op Pain Control Plan: multimodal analgesia, IV/PO Opioids PRN and peripheral nerve block  Induction:  IV and rapid sequence  Airway Plan: Direct and Video, Post-Induction  Informed Consent: Informed consent signed with the Patient and all parties understand the risks and agree with anesthesia plan.  All questions answered. Patient consented to blood products? Yes  ASA Score: 4  Day of Surgery Review of History & Physical: H&P Update referred to the surgeon/provider.    Ready For Surgery From Anesthesia Perspective.     .

## 2023-11-08 NOTE — TELEPHONE ENCOUNTER
Called pt and informed him of arrival time for surgery.  Pt instructed to report to [facility] at 5:00 tomorrow morning.  Pt reminded to perform shower with antibacterial soap tonight and tomorrow morning, and to become NPO at midnight.  Pt verbalized understanding.

## 2023-11-09 ENCOUNTER — ANESTHESIA (OUTPATIENT)
Dept: SURGERY | Facility: HOSPITAL | Age: 58
DRG: 220 | End: 2023-11-09
Payer: MEDICAID

## 2023-11-09 ENCOUNTER — HOSPITAL ENCOUNTER (INPATIENT)
Facility: HOSPITAL | Age: 58
LOS: 5 days | Discharge: HOME | DRG: 220 | End: 2023-11-14
Attending: THORACIC SURGERY (CARDIOTHORACIC VASCULAR SURGERY) | Admitting: THORACIC SURGERY (CARDIOTHORACIC VASCULAR SURGERY)
Payer: MEDICAID

## 2023-11-09 DIAGNOSIS — I49.9 ARRHYTHMIA: ICD-10-CM

## 2023-11-09 DIAGNOSIS — Z98.890 S/P MVR (MITRAL VALVE REPAIR): ICD-10-CM

## 2023-11-09 DIAGNOSIS — I10 PRIMARY HYPERTENSION: ICD-10-CM

## 2023-11-09 DIAGNOSIS — Z98.890 S/P MAZE OPERATION FOR ATRIAL FIBRILLATION: ICD-10-CM

## 2023-11-09 DIAGNOSIS — Z98.890 S/P MITRAL VALVE REPAIR: Primary | ICD-10-CM

## 2023-11-09 DIAGNOSIS — I34.0 MITRAL VALVE INSUFFICIENCY, UNSPECIFIED ETIOLOGY: ICD-10-CM

## 2023-11-09 DIAGNOSIS — Z98.890 HISTORY OF HEART SURGERY: ICD-10-CM

## 2023-11-09 DIAGNOSIS — Z86.79 S/P MAZE OPERATION FOR ATRIAL FIBRILLATION: ICD-10-CM

## 2023-11-09 DIAGNOSIS — E11.9 TYPE 2 DIABETES MELLITUS WITHOUT COMPLICATION, WITHOUT LONG-TERM CURRENT USE OF INSULIN: Primary | ICD-10-CM

## 2023-11-09 LAB
ALBUMIN SERPL BCP-MCNC: 2.9 G/DL (ref 3.5–5.2)
ALLENS TEST: ABNORMAL
ALP SERPL-CCNC: 49 U/L (ref 55–135)
ALT SERPL W/O P-5'-P-CCNC: 22 U/L (ref 10–44)
ANION GAP SERPL CALC-SCNC: 10 MMOL/L (ref 8–16)
APTT PPP: 30.6 SEC (ref 21–32)
AST SERPL-CCNC: 138 U/L (ref 10–40)
BASOPHILS # BLD AUTO: 0.04 K/UL (ref 0–0.2)
BASOPHILS NFR BLD: 0.4 % (ref 0–1.9)
BILIRUB SERPL-MCNC: 1 MG/DL (ref 0.1–1)
BUN SERPL-MCNC: 26 MG/DL (ref 6–20)
CALCIUM SERPL-MCNC: 7.8 MG/DL (ref 8.7–10.5)
CHLORIDE SERPL-SCNC: 106 MMOL/L (ref 95–110)
CO2 SERPL-SCNC: 21 MMOL/L (ref 23–29)
CREAT SERPL-MCNC: 1.3 MG/DL (ref 0.5–1.4)
DELSYS: ABNORMAL
DIFFERENTIAL METHOD: ABNORMAL
EOSINOPHIL # BLD AUTO: 0 K/UL (ref 0–0.5)
EOSINOPHIL NFR BLD: 0.2 % (ref 0–8)
ERYTHROCYTE [DISTWIDTH] IN BLOOD BY AUTOMATED COUNT: 12.3 % (ref 11.5–14.5)
ERYTHROCYTE [SEDIMENTATION RATE] IN BLOOD BY WESTERGREN METHOD: 20 MM/H
EST. GFR  (NO RACE VARIABLE): >60 ML/MIN/1.73 M^2
FIBRINOGEN PPP-MCNC: 232 MG/DL (ref 182–400)
FIO2: 100
FIO2: 100
FIO2: 50
FIO2: 50
GLUCOSE SERPL-MCNC: 142 MG/DL (ref 70–110)
GLUCOSE SERPL-MCNC: 173 MG/DL (ref 70–110)
GLUCOSE SERPL-MCNC: 191 MG/DL (ref 70–110)
GLUCOSE SERPL-MCNC: 196 MG/DL (ref 70–110)
GLUCOSE SERPL-MCNC: 212 MG/DL (ref 70–110)
HCO3 UR-SCNC: 20.3 MMOL/L (ref 24–28)
HCO3 UR-SCNC: 23.8 MMOL/L (ref 24–28)
HCO3 UR-SCNC: 27.5 MMOL/L (ref 24–28)
HCO3 UR-SCNC: 28.3 MMOL/L (ref 24–28)
HCO3 UR-SCNC: 29.8 MMOL/L (ref 24–28)
HCO3 UR-SCNC: 30.5 MMOL/L (ref 24–28)
HCT VFR BLD AUTO: 39 % (ref 40–54)
HCT VFR BLD CALC: 31 %PCV (ref 36–54)
HCT VFR BLD CALC: 33 %PCV (ref 36–54)
HCT VFR BLD CALC: 38 %PCV (ref 36–54)
HCT VFR BLD CALC: 39 %PCV (ref 36–54)
HGB BLD-MCNC: 13.6 G/DL (ref 14–18)
IMM GRANULOCYTES # BLD AUTO: 0.06 K/UL (ref 0–0.04)
IMM GRANULOCYTES NFR BLD AUTO: 0.6 % (ref 0–0.5)
INR PPP: 1.1 (ref 0.8–1.2)
LDH SERPL L TO P-CCNC: 0.68 MMOL/L (ref 0.36–1.25)
LDH SERPL L TO P-CCNC: 0.76 MMOL/L (ref 0.36–1.25)
LDH SERPL L TO P-CCNC: 2.09 MMOL/L (ref 0.36–1.25)
LDH SERPL L TO P-CCNC: 2.86 MMOL/L (ref 0.36–1.25)
LDH SERPL L TO P-CCNC: 3.3 MMOL/L (ref 0.36–1.25)
LYMPHOCYTES # BLD AUTO: 1.4 K/UL (ref 1–4.8)
LYMPHOCYTES NFR BLD: 13.2 % (ref 18–48)
MAGNESIUM SERPL-MCNC: 2.9 MG/DL (ref 1.6–2.6)
MCH RBC QN AUTO: 31.5 PG (ref 27–31)
MCHC RBC AUTO-ENTMCNC: 34.9 G/DL (ref 32–36)
MCV RBC AUTO: 90 FL (ref 82–98)
MIN VOL: 10.2
MIN VOL: 10.2
MIN VOL: 9.95
MIN VOL: 9.95
MODE: ABNORMAL
MONOCYTES # BLD AUTO: 1 K/UL (ref 0.3–1)
MONOCYTES NFR BLD: 9.1 % (ref 4–15)
NEUTROPHILS # BLD AUTO: 8.1 K/UL (ref 1.8–7.7)
NEUTROPHILS NFR BLD: 76.5 % (ref 38–73)
NRBC BLD-RTO: 0 /100 WBC
PCO2 BLDA: 34.2 MMHG (ref 35–45)
PCO2 BLDA: 39.1 MMHG (ref 35–45)
PCO2 BLDA: 41.9 MMHG (ref 35–45)
PCO2 BLDA: 42.6 MMHG (ref 35–45)
PCO2 BLDA: 43.8 MMHG (ref 35–45)
PCO2 BLDA: 46.7 MMHG (ref 35–45)
PEEP: 5
PH SMN: 7.34 [PH] (ref 7.35–7.45)
PH SMN: 7.38 [PH] (ref 7.35–7.45)
PH SMN: 7.42 [PH] (ref 7.35–7.45)
PH SMN: 7.43 [PH] (ref 7.35–7.45)
PH SMN: 7.43 [PH] (ref 7.35–7.45)
PH SMN: 7.49 [PH] (ref 7.35–7.45)
PHOSPHATE SERPL-MCNC: 3.7 MG/DL (ref 2.7–4.5)
PIP: 18
PIP: 18
PIP: 20
PIP: 20
PLATELET # BLD AUTO: 99 K/UL (ref 150–450)
PMV BLD AUTO: 11.2 FL (ref 9.2–12.9)
PO2 BLDA: 128 MMHG (ref 80–100)
PO2 BLDA: 199 MMHG (ref 80–100)
PO2 BLDA: 222 MMHG (ref 80–100)
PO2 BLDA: 224 MMHG (ref 80–100)
PO2 BLDA: 240 MMHG (ref 80–100)
PO2 BLDA: 382 MMHG (ref 80–100)
POC BE: -2 MMOL/L
POC BE: -5 MMOL/L
POC BE: 3 MMOL/L
POC BE: 4 MMOL/L
POC BE: 6 MMOL/L
POC BE: 6 MMOL/L
POC IONIZED CALCIUM: 1.05 MMOL/L (ref 1.06–1.42)
POC IONIZED CALCIUM: 1.06 MMOL/L (ref 1.06–1.42)
POC IONIZED CALCIUM: 1.06 MMOL/L (ref 1.06–1.42)
POC IONIZED CALCIUM: 1.08 MMOL/L (ref 1.06–1.42)
POC IONIZED CALCIUM: 1.15 MMOL/L (ref 1.06–1.42)
POC IONIZED CALCIUM: 1.17 MMOL/L (ref 1.06–1.42)
POC SATURATED O2: 100 % (ref 95–100)
POC SATURATED O2: 99 % (ref 95–100)
POC TCO2: 21 MMOL/L (ref 23–27)
POC TCO2: 25 MMOL/L (ref 23–27)
POC TCO2: 29 MMOL/L (ref 23–27)
POC TCO2: 30 MMOL/L (ref 23–27)
POC TCO2: 31 MMOL/L (ref 23–27)
POC TCO2: 32 MMOL/L (ref 23–27)
POCT GLUCOSE: 105 MG/DL (ref 70–110)
POCT GLUCOSE: 111 MG/DL (ref 70–110)
POCT GLUCOSE: 124 MG/DL (ref 70–110)
POCT GLUCOSE: 127 MG/DL (ref 70–110)
POCT GLUCOSE: 130 MG/DL (ref 70–110)
POCT GLUCOSE: 148 MG/DL (ref 70–110)
POCT GLUCOSE: 168 MG/DL (ref 70–110)
POCT GLUCOSE: 184 MG/DL (ref 70–110)
POCT GLUCOSE: 197 MG/DL (ref 70–110)
POCT GLUCOSE: 215 MG/DL (ref 70–110)
POCT GLUCOSE: 93 MG/DL (ref 70–110)
POTASSIUM BLD-SCNC: 4.4 MMOL/L (ref 3.5–5.1)
POTASSIUM BLD-SCNC: 4.5 MMOL/L (ref 3.5–5.1)
POTASSIUM BLD-SCNC: 4.6 MMOL/L (ref 3.5–5.1)
POTASSIUM BLD-SCNC: 4.6 MMOL/L (ref 3.5–5.1)
POTASSIUM BLD-SCNC: 4.8 MMOL/L (ref 3.5–5.1)
POTASSIUM BLD-SCNC: 4.9 MMOL/L (ref 3.5–5.1)
POTASSIUM SERPL-SCNC: 4.5 MMOL/L (ref 3.5–5.1)
POTASSIUM SERPL-SCNC: 5 MMOL/L (ref 3.5–5.1)
PROT SERPL-MCNC: 5.2 G/DL (ref 6–8.4)
PROTHROMBIN TIME: 12.2 SEC (ref 9–12.5)
RBC # BLD AUTO: 4.32 M/UL (ref 4.6–6.2)
SAMPLE: ABNORMAL
SAMPLE: NORMAL
SAMPLE: NORMAL
SITE: ABNORMAL
SODIUM BLD-SCNC: 135 MMOL/L (ref 136–145)
SODIUM BLD-SCNC: 136 MMOL/L (ref 136–145)
SODIUM BLD-SCNC: 137 MMOL/L (ref 136–145)
SODIUM SERPL-SCNC: 137 MMOL/L (ref 136–145)
SP02: 100
SP02: 100
SP02: 99
SP02: 99
SPECIMEN OUTDATE: NORMAL
VT: 480
WBC # BLD AUTO: 10.52 K/UL (ref 3.9–12.7)

## 2023-11-09 PROCEDURE — D9220A PRA ANESTHESIA: Mod: ,,, | Performed by: ANESTHESIOLOGY

## 2023-11-09 PROCEDURE — 37000008 HC ANESTHESIA 1ST 15 MINUTES: Performed by: THORACIC SURGERY (CARDIOTHORACIC VASCULAR SURGERY)

## 2023-11-09 PROCEDURE — 25000003 PHARM REV CODE 250: Performed by: STUDENT IN AN ORGANIZED HEALTH CARE EDUCATION/TRAINING PROGRAM

## 2023-11-09 PROCEDURE — 25000003 PHARM REV CODE 250: Performed by: THORACIC SURGERY (CARDIOTHORACIC VASCULAR SURGERY)

## 2023-11-09 PROCEDURE — 99223 1ST HOSP IP/OBS HIGH 75: CPT | Mod: ,,, | Performed by: NURSE PRACTITIONER

## 2023-11-09 PROCEDURE — 93312 ECHO TRANSESOPHAGEAL: CPT | Mod: 26,59,, | Performed by: ANESTHESIOLOGY

## 2023-11-09 PROCEDURE — 99900026 HC AIRWAY MAINTENANCE (STAT)

## 2023-11-09 PROCEDURE — 63600175 PHARM REV CODE 636 W HCPCS: Performed by: STUDENT IN AN ORGANIZED HEALTH CARE EDUCATION/TRAINING PROGRAM

## 2023-11-09 PROCEDURE — 25000003 PHARM REV CODE 250: Performed by: PHYSICIAN ASSISTANT

## 2023-11-09 PROCEDURE — 88304 PR  SURG PATH,LEVEL III: ICD-10-PCS | Mod: 26,,, | Performed by: PATHOLOGY

## 2023-11-09 PROCEDURE — 93312 TEE: ICD-10-PCS | Mod: 26,59,, | Performed by: ANESTHESIOLOGY

## 2023-11-09 PROCEDURE — 88304 TISSUE EXAM BY PATHOLOGIST: CPT | Mod: 26,,, | Performed by: PATHOLOGY

## 2023-11-09 PROCEDURE — 33426 PR MITRALPLASTY W PROSTHETIC RING: ICD-10-PCS | Mod: ,,, | Performed by: THORACIC SURGERY (CARDIOTHORACIC VASCULAR SURGERY)

## 2023-11-09 PROCEDURE — 36556 INSERT NON-TUNNEL CV CATH: CPT | Mod: 59,,, | Performed by: ANESTHESIOLOGY

## 2023-11-09 PROCEDURE — 94150 VITAL CAPACITY TEST: CPT

## 2023-11-09 PROCEDURE — 25000003 PHARM REV CODE 250

## 2023-11-09 PROCEDURE — D9220A PRA ANESTHESIA: ICD-10-PCS | Mod: ,,, | Performed by: ANESTHESIOLOGY

## 2023-11-09 PROCEDURE — 27800595 HC HEART VALVES

## 2023-11-09 PROCEDURE — 80053 COMPREHEN METABOLIC PANEL: CPT | Performed by: THORACIC SURGERY (CARDIOTHORACIC VASCULAR SURGERY)

## 2023-11-09 PROCEDURE — 64999 UNLISTED PX NERVOUS SYSTEM: CPT | Mod: ,,, | Performed by: ANESTHESIOLOGY

## 2023-11-09 PROCEDURE — 27201423 OPTIME MED/SURG SUP & DEVICES STERILE SUPPLY: Performed by: THORACIC SURGERY (CARDIOTHORACIC VASCULAR SURGERY)

## 2023-11-09 PROCEDURE — 37799 UNLISTED PX VASCULAR SURGERY: CPT

## 2023-11-09 PROCEDURE — 36556 CENTRAL LINE: ICD-10-PCS | Mod: 59,,, | Performed by: ANESTHESIOLOGY

## 2023-11-09 PROCEDURE — 94010 BREATHING CAPACITY TEST: CPT

## 2023-11-09 PROCEDURE — 93325 DOPPLER ECHO COLOR FLOW MAPG: CPT | Mod: 26,,, | Performed by: ANESTHESIOLOGY

## 2023-11-09 PROCEDURE — 20000000 HC ICU ROOM

## 2023-11-09 PROCEDURE — 93320 DOPPLER ECHO COMPLETE: CPT | Mod: 26,,, | Performed by: ANESTHESIOLOGY

## 2023-11-09 PROCEDURE — 82800 BLOOD PH: CPT

## 2023-11-09 PROCEDURE — 33259 ABLATE ATRIA W/BYPASS ADD-ON: CPT | Mod: ,,, | Performed by: THORACIC SURGERY (CARDIOTHORACIC VASCULAR SURGERY)

## 2023-11-09 PROCEDURE — 27000175 HC ADULT BYPASS PUMP

## 2023-11-09 PROCEDURE — 27000445 HC TEMPORARY PACEMAKER LEADS

## 2023-11-09 PROCEDURE — 33426 REPAIR OF MITRAL VALVE: CPT | Mod: ,,, | Performed by: THORACIC SURGERY (CARDIOTHORACIC VASCULAR SURGERY)

## 2023-11-09 PROCEDURE — 88304 TISSUE EXAM BY PATHOLOGIST: CPT | Performed by: PATHOLOGY

## 2023-11-09 PROCEDURE — 27200953 HC CARDIOPLEGIA SYSTEM

## 2023-11-09 PROCEDURE — 85730 THROMBOPLASTIN TIME PARTIAL: CPT | Performed by: PHYSICIAN ASSISTANT

## 2023-11-09 PROCEDURE — 27200667 HC PACEMAKER, TEMPORARY MONITORING, PER SHIFT

## 2023-11-09 PROCEDURE — 99223 PR INITIAL HOSPITAL CARE,LEVL III: ICD-10-PCS | Mod: ,,, | Performed by: NURSE PRACTITIONER

## 2023-11-09 PROCEDURE — 63600175 PHARM REV CODE 636 W HCPCS: Performed by: PHYSICIAN ASSISTANT

## 2023-11-09 PROCEDURE — 85520 HEPARIN ASSAY: CPT

## 2023-11-09 PROCEDURE — 36620 ARTERIAL: ICD-10-PCS | Mod: 59,,, | Performed by: ANESTHESIOLOGY

## 2023-11-09 PROCEDURE — 27201037 HC PRESSURE MONITORING SET UP

## 2023-11-09 PROCEDURE — P9045 ALBUMIN (HUMAN), 5%, 250 ML: HCPCS | Mod: JZ,JG | Performed by: PHYSICIAN ASSISTANT

## 2023-11-09 PROCEDURE — 93005 ELECTROCARDIOGRAM TRACING: CPT

## 2023-11-09 PROCEDURE — 27000191 HC C-V MONITORING

## 2023-11-09 PROCEDURE — C1729 CATH, DRAINAGE: HCPCS | Performed by: THORACIC SURGERY (CARDIOTHORACIC VASCULAR SURGERY)

## 2023-11-09 PROCEDURE — 36620 INSERTION CATHETER ARTERY: CPT | Mod: 59,,, | Performed by: ANESTHESIOLOGY

## 2023-11-09 PROCEDURE — 94799 UNLISTED PULMONARY SVC/PX: CPT | Mod: XB

## 2023-11-09 PROCEDURE — 63600175 PHARM REV CODE 636 W HCPCS: Performed by: ANESTHESIOLOGY

## 2023-11-09 PROCEDURE — 94640 AIRWAY INHALATION TREATMENT: CPT

## 2023-11-09 PROCEDURE — 94761 N-INVAS EAR/PLS OXIMETRY MLT: CPT

## 2023-11-09 PROCEDURE — 37000009 HC ANESTHESIA EA ADD 15 MINS: Performed by: THORACIC SURGERY (CARDIOTHORACIC VASCULAR SURGERY)

## 2023-11-09 PROCEDURE — 82330 ASSAY OF CALCIUM: CPT

## 2023-11-09 PROCEDURE — C9248 INJ, CLEVIDIPINE BUTYRATE: HCPCS | Mod: JZ,JG

## 2023-11-09 PROCEDURE — 94002 VENT MGMT INPAT INIT DAY: CPT

## 2023-11-09 PROCEDURE — 85014 HEMATOCRIT: CPT

## 2023-11-09 PROCEDURE — 82803 BLOOD GASES ANY COMBINATION: CPT

## 2023-11-09 PROCEDURE — 27202608 HC CANNULA, MISC

## 2023-11-09 PROCEDURE — 93320 PR DOPPLER ECHO HEART,COMPLETE: ICD-10-PCS | Mod: 26,,, | Performed by: ANESTHESIOLOGY

## 2023-11-09 PROCEDURE — 83605 ASSAY OF LACTIC ACID: CPT

## 2023-11-09 PROCEDURE — 85384 FIBRINOGEN ACTIVITY: CPT | Performed by: THORACIC SURGERY (CARDIOTHORACIC VASCULAR SURGERY)

## 2023-11-09 PROCEDURE — 99291 PR CRITICAL CARE, E/M 30-74 MINUTES: ICD-10-PCS | Mod: 25,,, | Performed by: ANESTHESIOLOGY

## 2023-11-09 PROCEDURE — 84132 ASSAY OF SERUM POTASSIUM: CPT

## 2023-11-09 PROCEDURE — 93010 ELECTROCARDIOGRAM REPORT: CPT | Mod: ,,, | Performed by: INTERNAL MEDICINE

## 2023-11-09 PROCEDURE — 27100088 HC CELL SAVER

## 2023-11-09 PROCEDURE — 93325 PR DOPPLER COLOR FLOW VELOCITY MAP: ICD-10-PCS | Mod: 26,,, | Performed by: ANESTHESIOLOGY

## 2023-11-09 PROCEDURE — 63600175 PHARM REV CODE 636 W HCPCS

## 2023-11-09 PROCEDURE — 93010 EKG 12-LEAD: ICD-10-PCS | Mod: ,,, | Performed by: INTERNAL MEDICINE

## 2023-11-09 PROCEDURE — C2618 PROBE/NEEDLE, CRYO: HCPCS | Performed by: THORACIC SURGERY (CARDIOTHORACIC VASCULAR SURGERY)

## 2023-11-09 PROCEDURE — 99291 CRITICAL CARE FIRST HOUR: CPT | Mod: 25,,, | Performed by: ANESTHESIOLOGY

## 2023-11-09 PROCEDURE — 36000713 HC OR TIME LEV V EA ADD 15 MIN: Performed by: THORACIC SURGERY (CARDIOTHORACIC VASCULAR SURGERY)

## 2023-11-09 PROCEDURE — 99900035 HC TECH TIME PER 15 MIN (STAT)

## 2023-11-09 PROCEDURE — 36000712 HC OR TIME LEV V 1ST 15 MIN: Performed by: THORACIC SURGERY (CARDIOTHORACIC VASCULAR SURGERY)

## 2023-11-09 PROCEDURE — 85610 PROTHROMBIN TIME: CPT | Performed by: PHYSICIAN ASSISTANT

## 2023-11-09 PROCEDURE — 64999 PERIPHERAL BLOCK: ICD-10-PCS | Mod: ,,, | Performed by: ANESTHESIOLOGY

## 2023-11-09 PROCEDURE — 84100 ASSAY OF PHOSPHORUS: CPT | Performed by: THORACIC SURGERY (CARDIOTHORACIC VASCULAR SURGERY)

## 2023-11-09 PROCEDURE — 85025 COMPLETE CBC W/AUTO DIFF WBC: CPT | Performed by: PHYSICIAN ASSISTANT

## 2023-11-09 PROCEDURE — 27100026 HC SHUNT SENSOR, TERUMO

## 2023-11-09 PROCEDURE — 84132 ASSAY OF SERUM POTASSIUM: CPT | Performed by: PHYSICIAN ASSISTANT

## 2023-11-09 PROCEDURE — 83735 ASSAY OF MAGNESIUM: CPT | Performed by: THORACIC SURGERY (CARDIOTHORACIC VASCULAR SURGERY)

## 2023-11-09 PROCEDURE — 84295 ASSAY OF SERUM SODIUM: CPT

## 2023-11-09 PROCEDURE — 33259 PR ABLATE/ RECONSTUCT ATRIA, W OTHER PROCED EXTENS W/ BYPASS: ICD-10-PCS | Mod: ,,, | Performed by: THORACIC SURGERY (CARDIOTHORACIC VASCULAR SURGERY)

## 2023-11-09 PROCEDURE — 82962 GLUCOSE BLOOD TEST: CPT | Performed by: THORACIC SURGERY (CARDIOTHORACIC VASCULAR SURGERY)

## 2023-11-09 PROCEDURE — 25000242 PHARM REV CODE 250 ALT 637 W/ HCPCS: Performed by: PHYSICIAN ASSISTANT

## 2023-11-09 PROCEDURE — 27100171 HC OXYGEN HIGH FLOW UP TO 24 HOURS

## 2023-11-09 DEVICE — IMPLANTABLE DEVICE: Type: IMPLANTABLE DEVICE | Site: HEART | Status: FUNCTIONAL

## 2023-11-09 RX ORDER — SODIUM CHLORIDE 0.9 % (FLUSH) 0.9 %
10 SYRINGE (ML) INJECTION
Status: DISCONTINUED | OUTPATIENT
Start: 2023-11-09 | End: 2023-11-14 | Stop reason: HOSPADM

## 2023-11-09 RX ORDER — NITROGLYCERIN 5 MG/ML
INJECTION, SOLUTION INTRAVENOUS
Status: DISCONTINUED | OUTPATIENT
Start: 2023-11-09 | End: 2023-11-09

## 2023-11-09 RX ORDER — METHOCARBAMOL 500 MG/1
500 TABLET, FILM COATED ORAL 4 TIMES DAILY
Status: DISCONTINUED | OUTPATIENT
Start: 2023-11-09 | End: 2023-11-10

## 2023-11-09 RX ORDER — DEXTROSE MONOHYDRATE, SODIUM CHLORIDE, AND POTASSIUM CHLORIDE 50; 1.49; 4.5 G/1000ML; G/1000ML; G/1000ML
INJECTION, SOLUTION INTRAVENOUS CONTINUOUS
Status: DISCONTINUED | OUTPATIENT
Start: 2023-11-09 | End: 2023-11-10

## 2023-11-09 RX ORDER — BUPROPION HYDROCHLORIDE 150 MG/1
300 TABLET ORAL NIGHTLY
Status: DISCONTINUED | OUTPATIENT
Start: 2023-11-10 | End: 2023-11-14 | Stop reason: HOSPADM

## 2023-11-09 RX ORDER — BUPROPION HYDROCHLORIDE 300 MG/1
300 TABLET ORAL DAILY
Status: DISCONTINUED | OUTPATIENT
Start: 2023-11-09 | End: 2023-11-09

## 2023-11-09 RX ORDER — FENTANYL CITRATE 50 UG/ML
INJECTION, SOLUTION INTRAMUSCULAR; INTRAVENOUS
Status: DISCONTINUED | OUTPATIENT
Start: 2023-11-09 | End: 2023-11-09

## 2023-11-09 RX ORDER — FENTANYL CITRATE 50 UG/ML
25 INJECTION, SOLUTION INTRAMUSCULAR; INTRAVENOUS
Status: DISCONTINUED | OUTPATIENT
Start: 2023-11-09 | End: 2023-11-09

## 2023-11-09 RX ORDER — SODIUM CHLORIDE 9 MG/ML
INJECTION, SOLUTION INTRAVENOUS
Status: DISCONTINUED | OUTPATIENT
Start: 2023-11-09 | End: 2023-11-11

## 2023-11-09 RX ORDER — ALBUMIN HUMAN 50 G/1000ML
25 SOLUTION INTRAVENOUS ONCE AS NEEDED
Status: COMPLETED | OUTPATIENT
Start: 2023-11-09 | End: 2023-11-09

## 2023-11-09 RX ORDER — TRANEXAMIC ACID 100 MG/ML
INJECTION, SOLUTION INTRAVENOUS
Status: DISCONTINUED | OUTPATIENT
Start: 2023-11-09 | End: 2023-11-09

## 2023-11-09 RX ORDER — METOPROLOL TARTRATE 25 MG/1
25 TABLET, FILM COATED ORAL
Status: DISCONTINUED | OUTPATIENT
Start: 2023-11-09 | End: 2023-11-09 | Stop reason: HOSPADM

## 2023-11-09 RX ORDER — DOCUSATE SODIUM 100 MG/1
100 CAPSULE, LIQUID FILLED ORAL 2 TIMES DAILY
Status: DISCONTINUED | OUTPATIENT
Start: 2023-11-09 | End: 2023-11-14 | Stop reason: HOSPADM

## 2023-11-09 RX ORDER — HYDROMORPHONE HYDROCHLORIDE 1 MG/ML
0.5 INJECTION, SOLUTION INTRAMUSCULAR; INTRAVENOUS; SUBCUTANEOUS
Status: DISCONTINUED | OUTPATIENT
Start: 2023-11-09 | End: 2023-11-10

## 2023-11-09 RX ORDER — ASPIRIN 325 MG
325 TABLET ORAL DAILY
Status: DISCONTINUED | OUTPATIENT
Start: 2023-11-09 | End: 2023-11-14 | Stop reason: HOSPADM

## 2023-11-09 RX ORDER — METOCLOPRAMIDE HYDROCHLORIDE 5 MG/ML
5 INJECTION INTRAMUSCULAR; INTRAVENOUS EVERY 6 HOURS PRN
Status: DISCONTINUED | OUTPATIENT
Start: 2023-11-09 | End: 2023-11-14 | Stop reason: HOSPADM

## 2023-11-09 RX ORDER — MUPIROCIN 20 MG/G
1 OINTMENT TOPICAL
Status: COMPLETED | OUTPATIENT
Start: 2023-11-09 | End: 2023-11-09

## 2023-11-09 RX ORDER — IPRATROPIUM BROMIDE AND ALBUTEROL SULFATE 2.5; .5 MG/3ML; MG/3ML
3 SOLUTION RESPIRATORY (INHALATION) EVERY 4 HOURS PRN
Status: ACTIVE | OUTPATIENT
Start: 2023-11-09 | End: 2023-11-10

## 2023-11-09 RX ORDER — MUPIROCIN 20 MG/G
1 OINTMENT TOPICAL 2 TIMES DAILY
Status: DISPENSED | OUTPATIENT
Start: 2023-11-09 | End: 2023-11-14

## 2023-11-09 RX ORDER — TRANEXAMIC ACID 100 MG/ML
INJECTION, SOLUTION INTRAVENOUS CONTINUOUS PRN
Status: DISCONTINUED | OUTPATIENT
Start: 2023-11-09 | End: 2023-11-09

## 2023-11-09 RX ORDER — EPINEPHRINE 0.1 MG/ML
INJECTION INTRAVENOUS
Status: DISCONTINUED | OUTPATIENT
Start: 2023-11-09 | End: 2023-11-09

## 2023-11-09 RX ORDER — MAGNESIUM SULFATE HEPTAHYDRATE 40 MG/ML
4 INJECTION, SOLUTION INTRAVENOUS
Status: DISCONTINUED | OUTPATIENT
Start: 2023-11-09 | End: 2023-11-11

## 2023-11-09 RX ORDER — MAGNESIUM SULFATE HEPTAHYDRATE 40 MG/ML
2 INJECTION, SOLUTION INTRAVENOUS
Status: DISCONTINUED | OUTPATIENT
Start: 2023-11-09 | End: 2023-11-11

## 2023-11-09 RX ORDER — KETAMINE HCL IN 0.9 % NACL 50 MG/5 ML
SYRINGE (ML) INTRAVENOUS
Status: DISCONTINUED | OUTPATIENT
Start: 2023-11-09 | End: 2023-11-09

## 2023-11-09 RX ORDER — IPRATROPIUM BROMIDE AND ALBUTEROL SULFATE 2.5; .5 MG/3ML; MG/3ML
3 SOLUTION RESPIRATORY (INHALATION) EVERY 4 HOURS
Status: DISCONTINUED | OUTPATIENT
Start: 2023-11-09 | End: 2023-11-10

## 2023-11-09 RX ORDER — BUPIVACAINE HYDROCHLORIDE 5 MG/ML
INJECTION, SOLUTION EPIDURAL; INTRACAUDAL
Status: COMPLETED | OUTPATIENT
Start: 2023-11-09 | End: 2023-11-09

## 2023-11-09 RX ORDER — ACETAMINOPHEN 325 MG/1
650 TABLET ORAL ONCE
Status: COMPLETED | OUTPATIENT
Start: 2023-11-09 | End: 2023-11-09

## 2023-11-09 RX ORDER — SODIUM CHLORIDE 9 MG/ML
INJECTION, SOLUTION INTRAVENOUS CONTINUOUS
Status: DISCONTINUED | OUTPATIENT
Start: 2023-11-09 | End: 2023-11-11

## 2023-11-09 RX ORDER — MIDAZOLAM HYDROCHLORIDE 1 MG/ML
INJECTION INTRAMUSCULAR; INTRAVENOUS
Status: DISCONTINUED | OUTPATIENT
Start: 2023-11-09 | End: 2023-11-09

## 2023-11-09 RX ORDER — ASPIRIN 325 MG
325 TABLET ORAL DAILY
Status: DISCONTINUED | OUTPATIENT
Start: 2023-11-09 | End: 2023-11-09

## 2023-11-09 RX ORDER — POTASSIUM CHLORIDE 29.8 MG/ML
40 INJECTION INTRAVENOUS
Status: DISCONTINUED | OUTPATIENT
Start: 2023-11-09 | End: 2023-11-11

## 2023-11-09 RX ORDER — PROPOFOL 10 MG/ML
VIAL (ML) INTRAVENOUS
Status: DISCONTINUED | OUTPATIENT
Start: 2023-11-09 | End: 2023-11-09

## 2023-11-09 RX ORDER — POTASSIUM CHLORIDE 14.9 MG/ML
60 INJECTION INTRAVENOUS
Status: DISCONTINUED | OUTPATIENT
Start: 2023-11-09 | End: 2023-11-11

## 2023-11-09 RX ORDER — LIDOCAINE HYDROCHLORIDE 10 MG/ML
1 INJECTION, SOLUTION EPIDURAL; INFILTRATION; INTRACAUDAL; PERINEURAL
Status: COMPLETED | OUTPATIENT
Start: 2023-11-09 | End: 2023-11-09

## 2023-11-09 RX ORDER — ACETAMINOPHEN 325 MG/1
650 TABLET ORAL EVERY 4 HOURS PRN
Status: DISCONTINUED | OUTPATIENT
Start: 2023-11-09 | End: 2023-11-09

## 2023-11-09 RX ORDER — POTASSIUM CHLORIDE 14.9 MG/ML
20 INJECTION INTRAVENOUS
Status: DISCONTINUED | OUTPATIENT
Start: 2023-11-09 | End: 2023-11-11

## 2023-11-09 RX ORDER — HEPARIN SODIUM 1000 [USP'U]/ML
INJECTION, SOLUTION INTRAVENOUS; SUBCUTANEOUS
Status: DISCONTINUED | OUTPATIENT
Start: 2023-11-09 | End: 2023-11-09

## 2023-11-09 RX ORDER — LIDOCAINE HYDROCHLORIDE 20 MG/ML
INJECTION, SOLUTION EPIDURAL; INFILTRATION; INTRACAUDAL; PERINEURAL
Status: DISCONTINUED | OUTPATIENT
Start: 2023-11-09 | End: 2023-11-09

## 2023-11-09 RX ORDER — TAMSULOSIN HYDROCHLORIDE 0.4 MG/1
0.4 CAPSULE ORAL DAILY
Status: DISCONTINUED | OUTPATIENT
Start: 2023-11-10 | End: 2023-11-14 | Stop reason: HOSPADM

## 2023-11-09 RX ORDER — PROPOFOL 10 MG/ML
INJECTION, EMULSION INTRAVENOUS
Status: COMPLETED
Start: 2023-11-09 | End: 2023-11-09

## 2023-11-09 RX ORDER — FENTANYL CITRATE 50 UG/ML
50 INJECTION, SOLUTION INTRAMUSCULAR; INTRAVENOUS
Status: DISCONTINUED | OUTPATIENT
Start: 2023-11-13 | End: 2023-11-09

## 2023-11-09 RX ORDER — MAGNESIUM SULFATE HEPTAHYDRATE 500 MG/ML
INJECTION, SOLUTION INTRAMUSCULAR; INTRAVENOUS
Status: DISCONTINUED | OUTPATIENT
Start: 2023-11-09 | End: 2023-11-09

## 2023-11-09 RX ORDER — PROTAMINE SULFATE 10 MG/ML
INJECTION, SOLUTION INTRAVENOUS
Status: DISCONTINUED | OUTPATIENT
Start: 2023-11-09 | End: 2023-11-09

## 2023-11-09 RX ORDER — POLYETHYLENE GLYCOL 3350 17 G/17G
17 POWDER, FOR SOLUTION ORAL DAILY
Status: DISCONTINUED | OUTPATIENT
Start: 2023-11-09 | End: 2023-11-14 | Stop reason: HOSPADM

## 2023-11-09 RX ORDER — BISACODYL 10 MG
10 SUPPOSITORY, RECTAL RECTAL DAILY PRN
Status: DISCONTINUED | OUTPATIENT
Start: 2023-11-09 | End: 2023-11-14 | Stop reason: HOSPADM

## 2023-11-09 RX ORDER — PROPOFOL 10 MG/ML
0-50 INJECTION, EMULSION INTRAVENOUS CONTINUOUS
Status: DISCONTINUED | OUTPATIENT
Start: 2023-11-09 | End: 2023-11-09

## 2023-11-09 RX ORDER — ACETAMINOPHEN 500 MG
1000 TABLET ORAL EVERY 8 HOURS
Status: DISCONTINUED | OUTPATIENT
Start: 2023-11-09 | End: 2023-11-14 | Stop reason: HOSPADM

## 2023-11-09 RX ORDER — ONDANSETRON 2 MG/ML
4 INJECTION INTRAMUSCULAR; INTRAVENOUS EVERY 12 HOURS PRN
Status: DISCONTINUED | OUTPATIENT
Start: 2023-11-09 | End: 2023-11-14 | Stop reason: HOSPADM

## 2023-11-09 RX ORDER — OXYCODONE HYDROCHLORIDE 5 MG/1
5 TABLET ORAL EVERY 4 HOURS PRN
Status: DISCONTINUED | OUTPATIENT
Start: 2023-11-09 | End: 2023-11-10

## 2023-11-09 RX ORDER — ROCURONIUM BROMIDE 10 MG/ML
INJECTION, SOLUTION INTRAVENOUS
Status: DISCONTINUED | OUTPATIENT
Start: 2023-11-09 | End: 2023-11-09

## 2023-11-09 RX ORDER — NAPROXEN SODIUM 220 MG/1
81 TABLET, FILM COATED ORAL DAILY
Status: DISCONTINUED | OUTPATIENT
Start: 2023-11-09 | End: 2023-11-09

## 2023-11-09 RX ORDER — OXYCODONE HYDROCHLORIDE 10 MG/1
10 TABLET ORAL EVERY 4 HOURS PRN
Status: DISCONTINUED | OUTPATIENT
Start: 2023-11-09 | End: 2023-11-10

## 2023-11-09 RX ORDER — FAMOTIDINE 10 MG/ML
20 INJECTION INTRAVENOUS 2 TIMES DAILY
Status: DISCONTINUED | OUTPATIENT
Start: 2023-11-09 | End: 2023-11-10

## 2023-11-09 RX ORDER — CEFAZOLIN SODIUM 1 G/3ML
INJECTION, POWDER, FOR SOLUTION INTRAMUSCULAR; INTRAVENOUS
Status: DISCONTINUED | OUTPATIENT
Start: 2023-11-09 | End: 2023-11-09

## 2023-11-09 RX ORDER — NAPROXEN SODIUM 220 MG/1
81 TABLET, FILM COATED ORAL ONCE
Status: DISCONTINUED | OUTPATIENT
Start: 2023-11-09 | End: 2023-11-09

## 2023-11-09 RX ORDER — ASPIRIN 300 MG/1
300 SUPPOSITORY RECTAL ONCE AS NEEDED
Status: DISCONTINUED | OUTPATIENT
Start: 2023-11-09 | End: 2023-11-12

## 2023-11-09 RX ADMIN — CLEVIPIDINE 2 MG/HR: 0.5 EMULSION INTRAVENOUS at 04:11

## 2023-11-09 RX ADMIN — CEFAZOLIN 2 G: 330 INJECTION, POWDER, FOR SOLUTION INTRAMUSCULAR; INTRAVENOUS at 12:11

## 2023-11-09 RX ADMIN — SODIUM CHLORIDE: 0.9 INJECTION, SOLUTION INTRAVENOUS at 06:11

## 2023-11-09 RX ADMIN — ACETAMINOPHEN 1000 MG: 500 TABLET ORAL at 09:11

## 2023-11-09 RX ADMIN — NITROGLYCERIN 50 MCG: 5 INJECTION, SOLUTION INTRAVENOUS at 11:11

## 2023-11-09 RX ADMIN — HYDROMORPHONE HYDROCHLORIDE 0.5 MG: 0.5 INJECTION, SOLUTION INTRAMUSCULAR; INTRAVENOUS; SUBCUTANEOUS at 05:11

## 2023-11-09 RX ADMIN — MUPIROCIN 1 G: 20 OINTMENT TOPICAL at 05:11

## 2023-11-09 RX ADMIN — PROTAMINE SULFATE 240 MG: 10 INJECTION, SOLUTION INTRAVENOUS at 11:11

## 2023-11-09 RX ADMIN — NITROGLYCERIN 25 MCG: 5 INJECTION, SOLUTION INTRAVENOUS at 08:11

## 2023-11-09 RX ADMIN — LIDOCAINE HYDROCHLORIDE 80 MG: 20 INJECTION, SOLUTION EPIDURAL; INFILTRATION; INTRACAUDAL at 07:11

## 2023-11-09 RX ADMIN — NOREPINEPHRINE BITARTRATE 0.04 MCG/KG/MIN: 1 INJECTION, SOLUTION, CONCENTRATE INTRAVENOUS at 07:11

## 2023-11-09 RX ADMIN — SODIUM CHLORIDE: 9 INJECTION, SOLUTION INTRAVENOUS at 05:11

## 2023-11-09 RX ADMIN — TRANEXAMIC ACID 1 MG/KG/HR: 100 INJECTION INTRAVENOUS at 07:11

## 2023-11-09 RX ADMIN — Medication 50 MG: at 07:11

## 2023-11-09 RX ADMIN — ALBUMIN (HUMAN) 25 G: 12.5 SOLUTION INTRAVENOUS at 03:11

## 2023-11-09 RX ADMIN — CEFAZOLIN 2 G: 2 INJECTION, POWDER, FOR SOLUTION INTRAMUSCULAR; INTRAVENOUS at 08:11

## 2023-11-09 RX ADMIN — ASPIRIN 325 MG ORAL TABLET 325 MG: 325 PILL ORAL at 08:11

## 2023-11-09 RX ADMIN — SODIUM CHLORIDE: 9 INJECTION, SOLUTION INTRAVENOUS at 03:11

## 2023-11-09 RX ADMIN — INSULIN HUMAN 1 UNITS/HR: 1 INJECTION, SOLUTION INTRAVENOUS at 02:11

## 2023-11-09 RX ADMIN — IPRATROPIUM BROMIDE AND ALBUTEROL SULFATE 3 ML: .5; 3 SOLUTION RESPIRATORY (INHALATION) at 07:11

## 2023-11-09 RX ADMIN — PROPOFOL 50 MCG/KG/MIN: 10 INJECTION, EMULSION INTRAVENOUS at 01:11

## 2023-11-09 RX ADMIN — ROCURONIUM BROMIDE 30 MG: 10 INJECTION INTRAVENOUS at 12:11

## 2023-11-09 RX ADMIN — TRANEXAMIC ACID 1000 MG: 100 INJECTION, SOLUTION INTRAVENOUS at 07:11

## 2023-11-09 RX ADMIN — LIDOCAINE HYDROCHLORIDE 10 MG: 10 INJECTION, SOLUTION EPIDURAL; INFILTRATION; INTRACAUDAL; PERINEURAL at 05:11

## 2023-11-09 RX ADMIN — HEPARIN SODIUM 5000 UNITS: 1000 INJECTION, SOLUTION INTRAVENOUS; SUBCUTANEOUS at 09:11

## 2023-11-09 RX ADMIN — LIDOCAINE HYDROCHLORIDE 100 MG: 20 INJECTION, SOLUTION EPIDURAL; INFILTRATION; INTRACAUDAL at 11:11

## 2023-11-09 RX ADMIN — FENTANYL CITRATE 25 MCG: 50 INJECTION INTRAMUSCULAR; INTRAVENOUS at 04:11

## 2023-11-09 RX ADMIN — IPRATROPIUM BROMIDE AND ALBUTEROL SULFATE 3 ML: .5; 3 SOLUTION RESPIRATORY (INHALATION) at 11:11

## 2023-11-09 RX ADMIN — FENTANYL CITRATE 200 MCG: 50 INJECTION INTRAMUSCULAR; INTRAVENOUS at 07:11

## 2023-11-09 RX ADMIN — DOCUSATE SODIUM 100 MG: 100 CAPSULE, LIQUID FILLED ORAL at 09:11

## 2023-11-09 RX ADMIN — PROPOFOL 40 MCG/KG/MIN: 10 INJECTION, EMULSION INTRAVENOUS at 03:11

## 2023-11-09 RX ADMIN — MIDAZOLAM HYDROCHLORIDE 2 MG: 1 INJECTION INTRAMUSCULAR; INTRAVENOUS at 06:11

## 2023-11-09 RX ADMIN — MUPIROCIN 1 G: 20 OINTMENT TOPICAL at 01:11

## 2023-11-09 RX ADMIN — ACETAMINOPHEN 1000 MG: 500 TABLET ORAL at 02:11

## 2023-11-09 RX ADMIN — SODIUM CHLORIDE, SODIUM GLUCONATE, SODIUM ACETATE, POTASSIUM CHLORIDE, MAGNESIUM CHLORIDE, SODIUM PHOSPHATE, DIBASIC, AND POTASSIUM PHOSPHATE: .53; .5; .37; .037; .03; .012; .00082 INJECTION, SOLUTION INTRAVENOUS at 07:11

## 2023-11-09 RX ADMIN — IPRATROPIUM BROMIDE AND ALBUTEROL SULFATE 3 ML: .5; 3 SOLUTION RESPIRATORY (INHALATION) at 03:11

## 2023-11-09 RX ADMIN — PROPOFOL 100 MG: 10 INJECTION, EMULSION INTRAVENOUS at 07:11

## 2023-11-09 RX ADMIN — EPINEPHRINE 0.02 MCG/KG/MIN: 1 INJECTION INTRAMUSCULAR; INTRAVENOUS; SUBCUTANEOUS at 07:11

## 2023-11-09 RX ADMIN — EPINEPHRINE 10 MCG: 0.1 INJECTION INTRAVENOUS at 11:11

## 2023-11-09 RX ADMIN — FAMOTIDINE 20 MG: 10 INJECTION, SOLUTION INTRAVENOUS at 02:11

## 2023-11-09 RX ADMIN — MAGNESIUM SULFATE HEPTAHYDRATE 2 G: 500 INJECTION, SOLUTION INTRAMUSCULAR; INTRAVENOUS at 11:11

## 2023-11-09 RX ADMIN — OXYCODONE HYDROCHLORIDE 5 MG: 5 TABLET ORAL at 04:11

## 2023-11-09 RX ADMIN — ACETAMINOPHEN 650 MG: 325 TABLET ORAL at 06:11

## 2023-11-09 RX ADMIN — FAMOTIDINE 20 MG: 10 INJECTION, SOLUTION INTRAVENOUS at 09:11

## 2023-11-09 RX ADMIN — PROPOFOL 35 MCG/KG/MIN: 10 INJECTION, EMULSION INTRAVENOUS at 03:11

## 2023-11-09 RX ADMIN — MUPIROCIN 1 G: 20 OINTMENT TOPICAL at 09:11

## 2023-11-09 RX ADMIN — EPINEPHRINE 20 MCG: 0.1 INJECTION INTRAVENOUS at 11:11

## 2023-11-09 RX ADMIN — METHOCARBAMOL 500 MG: 500 TABLET ORAL at 04:11

## 2023-11-09 RX ADMIN — HEPARIN SODIUM 29000 UNITS: 1000 INJECTION, SOLUTION INTRAVENOUS; SUBCUTANEOUS at 08:11

## 2023-11-09 RX ADMIN — BUPIVACAINE HYDROCHLORIDE 30 ML: 5 INJECTION, SOLUTION EPIDURAL; INTRACAUDAL; PERINEURAL at 08:11

## 2023-11-09 RX ADMIN — CEFAZOLIN 2 G: 330 INJECTION, POWDER, FOR SOLUTION INTRAMUSCULAR; INTRAVENOUS at 08:11

## 2023-11-09 RX ADMIN — PROPOFOL 80 MG: 10 INJECTION, EMULSION INTRAVENOUS at 08:11

## 2023-11-09 RX ADMIN — PROPOFOL 20 MG: 10 INJECTION, EMULSION INTRAVENOUS at 09:11

## 2023-11-09 RX ADMIN — ROCURONIUM BROMIDE 100 MG: 10 INJECTION INTRAVENOUS at 07:11

## 2023-11-09 RX ADMIN — METHOCARBAMOL 500 MG: 500 TABLET ORAL at 09:11

## 2023-11-09 NOTE — TRANSFER OF CARE
"Anesthesia Transfer of Care Note    Patient: Abimael Armendariz    Procedure(s) Performed: Procedure(s) (LRB):  SCHNEIDER MAZE PROCEDURE (N/A)  REPAIR, MITRAL VALVE, OPEN (N/A)    Patient location: ICU    Anesthesia Type: general    Transport from OR: Transported from OR intubated on 100% O2 by AMBU with adequate controlled ventilation    Post pain: adequate analgesia    Post assessment: no apparent anesthetic complications    Post vital signs: stable    Level of consciousness: sedated    Nausea/Vomiting: no nausea/vomiting    Complications: none    Transfer of care protocol was followed      Last vitals: Visit Vitals  /62 (BP Location: Right arm, Patient Position: Lying)   Pulse 64   Temp 36.8 °C (98.2 °F) (Oral)   Resp 20   Ht 5' 11" (1.803 m)   Wt 87.5 kg (193 lb)   SpO2 (!) 94%   BMI 26.92 kg/m²     "

## 2023-11-09 NOTE — ANESTHESIA PROCEDURE NOTES
Peripheral Block    Patient location during procedure: OR   Block not for primary anesthetic.  Reason for block: at surgeon's request and post-op pain management   Post-op Pain Location: Chest pain      Staffing  Authorizing Provider: Lisa Whiting MD  Performing Provider: Michela Ku MD    Staffing  Performed by: Michela Ku MD  Authorized by: Lisa Whiting MD    Preanesthetic Checklist  Completed: patient identified, IV checked, site marked, risks and benefits discussed, surgical consent, monitors and equipment checked, pre-op evaluation and timeout performed  Peripheral Block  Patient position: supine  Prep: ChloraPrep  Patient monitoring: heart rate, cardiac monitor, continuous pulse ox and continuous capnometry  Block type: Transversus thoracis  Laterality: bilateral  Injection technique: single shot  Needle  Needle type: Stimuplex   Needle gauge: 22 G  Needle length: 1.5 in  Needle localization: ultrasound guidance     Assessment  Injection assessment: negative aspiration  Heart rate change: no    Medications:    Medications: bupivacaine (pf) (MARCAINE) injection 0.5% - Perineural   30 mL - 11/9/2023 8:40:00 AM

## 2023-11-09 NOTE — ASSESSMENT & PLAN NOTE
Neuro/Psych:     - Sedation: propofol    - Pain:    - Scheduled Tylenol 1g q8h   - Fentanyl PRN while intubated, Oxy PRN             Cardiac:     - S/P MV repair, COZ/MAZE and LAAR with Dr. Bennett on 11/9/2023    - BP Goal:     - Cleviprex PRN    - Pressors: epi 0.04    - Anti-HTNs: Will start when appropriate    - Rhythm: NSR    - Beta blocker: Will start when appropriate        Pulmonary:     - Goal SpO2 >92%    - Will wean ventilator support as tolerated to extubate    - Chest Tubes x 2 meds    - ABGs PRN      Renal:    - Trend BUN/Cr     - Maintain Antunez, record strict Is/Os    Recent Labs   Lab 11/07/23  1246   BUN 23*   CREATININE 1.3         FEN / GI:     - Daily CMP, PRN K/Mag/Phos per protocol     - Replace electrolytes as needed    - Nutrition: NPO pending extubation    - Bowel Regimen: Miralax, docusate      ID:     - Afebrile    - WBC stable    - Abx: Complete perioperative cefazolin 2g Q8H x 5 doses    Recent Labs   Lab 11/07/23  1246 11/09/23  1259   WBC 6.84 10.52         Heme/Onc:     - Hgb 13.6 pre-operatively    - CBC daily    - ASA 325mg daily    Recent Labs   Lab 11/07/23  1246 11/09/23  1259   HGB 16.3 13.6*   * 99*   APTT 26.8 30.6   INR 1.0 1.1         Endocrine:     - CTS Goal -140    - HgbA1c: 5.6    - Endocrinology consulted for insulin management      PPx:   Feeding: npo  Analgesia/Sedation: multimodal  Thromboembolic Prevention: SCDs  HOB >30: Yes  Stress Ulcer: pepcid  Glucose Control: Yes, insulin management per Endocrinology     Lines/Drains/Airway:   ETT   Left radial arterial line   RIJ CVC   Antunez   Chest Tubes: 2   Pacing Wires: Temporary ventricular pacing wires      Dispo/Code Status/Palliative:     - Continue SICU Care    - Full Code

## 2023-11-09 NOTE — ANESTHESIA PROCEDURE NOTES
JASON    Diagnosis: severe mitral regurgitation  Patient location during procedure: OR  Exam type: Baseline    Staffing  Performed: anesthesiologist and fellow     Anesthesiologist: Lisa Whiting MD        Anesthesiologist Present  Yes      Setup & Induction  Patient preparation: bite block inserted  Probe Insertion: easy  Exam: completeDoppler Echo: 2D, continuous wave Doppler, 3D, color flow mapping and pulse wave Doppler.  Exam     Left Heart  Left Atruim: moderately abnormal (men 4.7-5.2; women 4.3-4.6)  Left appendage velocity:45    Left Ventricle: cm, mildy abnormal (men 6.0-6.3; women 5.3-5.6)  LV Wall Thickness (posterior wall):cm, normal (men 0.6-1.0 cm; women 0.6-0.9 cm)    LVAD:no  Estimated Ejection Fraction: > 55% normal  Regional Wall Abnormalities: no RWMA            Right Heart  Right Ventricle: dilated  Right Ventricle Function: normal  Right Atria:  moderately abnormal    Intra Atrial Septum  PFO: no shunt by color flow doppler  no IAS aneurysm  no lipomatous hypertrophy  no Atrial Septal Defect (Asd)    Right Ventricle  Size: dilated, Free Wall Thickness: normal    Aortic Valve:  Stenosis: none.  Morphology: trileaflet    Regurgitation: no aortic valve regurgitation      Mitral Valve:   Morphology:normal  Prolapse: A3  Jet Description: severeStenosis: no significant stenosis.    Tricuspid Valve:  Morphology: normal  Regurgitation: mild    Pulmonic Valve:  Morphology:normal  Regurgitation(color flow): mild    Great Vessels  Ascending Aorta Atherosclerosis: 2=mild dz (<3mm)  Aortic Arch Atherosclerosis: 2=mild dz (<3mm)  IABP: no  Descending Aorta Atherosclerosis: 2=mild dz (<3mm)  Aorta    Descending aorta IABP: no    Effusions none    SummaryFindings discussed with surgeon.    Other Findings   Diagnostic intraoperative JASON performed at the request of Dr. Bennett for mitral repair.     Baseline exam:  -Normal biventricular systolic function with LVEF>55% on low dose epi gtt  -Bilateral atrial  and ventricular enlargement   -No RWMA  -No AS or AI  -Severe MR with posteriorly-directed jet associated with severe A3 prolapse  -Mild TR with an TV annulus of 4.9 cm  -Mild PI  -No significant aortic pathology   -No effusions   -No PFO via color flow doppler    Post-procedure exam:  -s/p mitral repair with 36mm annuloplasty band  -Gtt: Epi 0.04  -Normal biventricular function  -No new  RWMA  -MV annuloplasty band is well seated and in adequate position with adequate leaflet excursion. There is no residual regurgitation. The mean pressure gradient across the valve is 3 mmHg.   -Other valves are unchanged in structure and function  -No PFO via color flow doppler  -No new aortic pathology   -No effusions    Stable s/p chest closure  Probe removed atraumatically

## 2023-11-09 NOTE — HPI
Abimael Armendariz is a 58 y.o. male with past Medical conditions includinh paroxysmal AF, Hyperlipidemia, Hypertension, Insomnia, ADHD (attention deficit hyperactivity disorder), Anxiety, depression. Recently diagnosis of afib, he reports he returned back home and was seen by Cardiology, 08/15/2023, where he was started on Lopressor and Eliquis with Holter monitor, echo and stress test ordered.  Echo completed 08/18/2023. Admitted 3 times within the diagnosis time  frame. First ED visit was for palpitations for which they adjusted his BB. He then developed chest pain and elevated heart rate and was seen at [facility], 08/17/2023- had negative cardiac ischemic work up. EKG during admission showed AFIB RVR for which he was given IV Cardizem x1 with rate improved and chest pain resolved. Subsequently underwent cardioversion. He was then readmitted again for CHF exacerbation. Independently perform ADLs. No Assistive devices for walking. Denies prior history of strokes, stents, sternotomies.      The patient presents to the SICU s/p MV repair, SCHNEIDER/MAZE and LAAR with Dr. Bennett on 11/09/2023. On admission, he is intubated, sedated with propofol, and in stable condition. Inotropic and vasopressor requirements upon admission are 0.04 mcg/kg/min of epinephrine, to maintain blood pressure at a MAP 60-80 and SBP < 140. Central access includes a RIJ CVC, arterial access includes a left radial arterial line.  He also has 2 mediastinal chest tubes with appropriate output.    Intraoperatively, the patient received 2.5L of crystalloid, 1.1L of cell saver. Urine output intraoperatively was 200cc. The pre-operative echocardiogram was notable for normal BiV function, moderate-severe MR. Post-operative echo was normal BiV function on epi 0.04, no MR, mild TR.    Immediate post-operative plans include hemodynamic stabilization and weaning of cardiac and respiratory support. Plan to wean vasopressors and inotropes as tolerated, wean  ventilator support with goal of early extubation, and closely monitor fluid status with strict Is/Os and continued fluid resuscitation as needed.

## 2023-11-09 NOTE — ASSESSMENT & PLAN NOTE
Home antihypertensives include meotprolol. Will hold in immediate post-operative setting and restart when clinically appropriate.    -- SBP goal < 140  -- PRN cleviprex

## 2023-11-09 NOTE — H&P
[facility] - Surgical Intensive Care  Critical Care - Surgery  History & Physical    Patient Name: Abimael Armendariz  MRN: [id number]  Admission Date: 11/9/2023  Code Status: Full Code  Attending Physician: Goran Bennett MD   Primary Care Provider: Sammy Trinidad MD   Principal Problem: Mitral regurgitation    Subjective:     HPI:  Abimael Armendariz is a 58 y.o. male with past Medical conditions includinh paroxysmal AF, Hyperlipidemia, Hypertension, Insomnia, ADHD (attention deficit hyperactivity disorder), Anxiety, depression. Recently diagnosis of afib, he reports he returned back home and was seen by Cardiology, 08/15/2023, where he was started on Lopressor and Eliquis with Holter monitor, echo and stress test ordered.  Echo completed 08/18/2023. Admitted 3 times within the diagnosis time  frame. First ED visit was for palpitations for which they adjusted his BB. He then developed chest pain and elevated heart rate and was seen at [facility], 08/17/2023- had negative cardiac ischemic work up. EKG during admission showed AFIB RVR for which he was given IV Cardizem x1 with rate improved and chest pain resolved. Subsequently underwent cardioversion. He was then readmitted again for CHF exacerbation. Independently perform ADLs. No Assistive devices for walking. Denies prior history of strokes, stents, sternotomies.      The patient presents to the SICU s/p MV repair, SCHNEIDER/MAZE and LAAR with Dr. Bennett on 11/09/2023. On admission, he is intubated, sedated with propofol, and in stable condition. Inotropic and vasopressor requirements upon admission are 0.04 mcg/kg/min of epinephrine, to maintain blood pressure at a MAP 60-80 and SBP < 140. Central access includes a RIJ CVC, arterial access includes a left radial arterial line.  He also has 2 mediastinal chest tubes with appropriate output.    Intraoperatively, the patient received 2.5L of crystalloid, 1.1L of cell saver. Urine output intraoperatively was 200cc. The  pre-operative echocardiogram was notable for normal BiV function, moderate-severe MR. Post-operative echo was normal BiV function on epi 0.04, no MR, mild TR.    Immediate post-operative plans include hemodynamic stabilization and weaning of cardiac and respiratory support. Plan to wean vasopressors and inotropes as tolerated, wean ventilator support with goal of early extubation, and closely monitor fluid status with strict Is/Os and continued fluid resuscitation as needed.      Hospital/ICU Course:  No notes on file    Follow-up For: Procedure(s) (LRB):  SCHNEIDER MAZE PROCEDURE (N/A)  REPAIR, MITRAL VALVE, OPEN (N/A)  EXCLUSION, LEFT ATRIAL APPENDAGE, OPEN, AS PART OF OPEN CHEST SURGERY (N/A)    Post-Operative Day: Day of Surgery     Past Medical History:   Diagnosis Date    ADHD (attention deficit hyperactivity disorder)     Anticoagulant long-term use     Anxiety     CHF (congestive heart failure)     Depression     Diabetes mellitus     Diverticulitis     Hyperlipidemia     Insomnia     Mitral regurgitation     Mitral valve disease 08/2023    PAF (paroxysmal atrial fibrillation)        Past Surgical History:   Procedure Laterality Date    ANGIOGRAM, CORONARY, WITH LEFT HEART CATHETERIZATION  8/30/2023    Procedure: Left Heart Cath;  Surgeon: Hilario Pretty MD;  Location: [facility] CATH;  Service: Cardiology;;    CORONARY ANGIOGRAPHY  8/30/2023    Procedure: Coronary angiogram study;  Surgeon: Hilario Pretty MD;  Location: [facility] CATH;  Service: Cardiology;;    KNEE ARTHROSCOPY W/ ACL RECONSTRUCTION AND HAMSTRING GRAFT  2003    right x 2; redo Verliner in [place] 1/2022    LUMBAR LAMINECTOMY FOR DECOMPRESSION  12/2022    TRANSESOPHAGEAL ECHOCARDIOGRAM WITH POSSIBLE CARDIOVERSION (JASON W/ POSS CARDIOVERSION) Bilateral 08/21/2023    Procedure: Transesophageal echo (JASON) intra-procedure log documentation;  Surgeon: Jesu Flores MD;  Location: [facility] CATH/EP LAB;  Service: Cardiology;  Laterality:  Bilateral;       Review of patient's allergies indicates:   Allergen Reactions    Latex, natural rubber Rash       Family History       Problem Relation (Age of Onset)    Alzheimer's disease Maternal Grandmother    Heart attack Father, Paternal Grandfather    Heart disease Father    No Known Problems Sister, Brother, Daughter, Son    Stroke Mother          Tobacco Use    Smoking status: Never    Smokeless tobacco: Current     Types: Chew    Tobacco comments:     2 dips per day down from can per day   Substance and Sexual Activity    Alcohol use: No    Drug use: No    Sexual activity: Yes     Partners: Female      Review of Systems   Unable to perform ROS: Intubated     Objective:     Vital Signs (Most Recent):  Temp: 98.2 °F (36.8 °C) (11/09/23 0532)  Pulse: (!) 123 (11/09/23 1329)  Resp: 20 (11/09/23 1329)  BP: 120/62 (11/09/23 0532)  SpO2: 100 % (11/09/23 1329) Vital Signs (24h Range):  Temp:  [98.2 °F (36.8 °C)] 98.2 °F (36.8 °C)  Pulse:  [] 123  Resp:  [20-30] 20  SpO2:  [94 %-100 %] 100 %  BP: (120)/(62) 120/62     Weight: 87.5 kg (193 lb)  Body mass index is 26.92 kg/m².      Intake/Output Summary (Last 24 hours) at 11/9/2023 1337  Last data filed at 11/9/2023 1250  Gross per 24 hour   Intake 1000 ml   Output 480 ml   Net 520 ml          Physical Exam  Constitutional:       General: He is not in acute distress.     Interventions: He is sedated and intubated.   Eyes:      Extraocular Movements: Extraocular movements intact.      Pupils: Pupils are equal, round, and reactive to light.   Neck:      Comments: [facility] CVC  Cardiovascular:      Rate and Rhythm: Normal rate and regular rhythm.      Pulses: Normal pulses.      Comments: Paced 80  Pulmonary:      Effort: No respiratory distress. He is intubated.   Abdominal:      General: Abdomen is flat.      Palpations: Abdomen is soft.   Musculoskeletal:      Right lower leg: No edema.   Skin:     General: Skin is warm and dry.      Capillary Refill: Capillary  "refill takes less than 2 seconds.      Comments: MSI clean, dry and intact   Neurological:      General: No focal deficit present.            Vents:  Vent Mode: A/C (11/09/23 1329)  Set Rate: 20 BPM (11/09/23 1329)  Vt Set: 490 mL (11/09/23 1329)  PEEP/CPAP: 5 cmH20 (11/09/23 1329)  Oxygen Concentration (%): 50 (11/09/23 1329)  Peak Airway Pressure: 20 cmH20 (11/09/23 1329)  Plateau Pressure: 0 cmH20 (11/09/23 1329)  Total Ve: 9.94 L/m (11/09/23 1329)  Negative Inspiratory Force (cm H2O): 0 (11/09/23 1329)  F/VT Ratio<105 (RSBI): (!) 41.15 (11/09/23 1329)    Lines/Drains/Airways       Central Venous Catheter Line  Duration             Introducer 11/09/23 0808 <1 day              Drain  Duration                  Urethral Catheter 11/09/23 0754 Temperature probe;Non-latex;Silicone 14 Fr. <1 day         Y Chest Tube 1 and 2 11/09/23 1202 1 Right Mediastinal 19 Fr. 2 Left Mediastinal 19 Fr. <1 day              Airway  Duration                  Airway - Non-Surgical 11/09/23 0711 <1 day              Arterial Line  Duration             Arterial Line 11/09/23 0806 Left Radial <1 day              Line  Duration                  Pacer Wires 11/09/23 1123 <1 day              Peripheral Intravenous Line  Duration                  Peripheral IV - Single Lumen 16 G Left Hand -- days         Peripheral IV - Single Lumen 11/09/23 0545 18 G Posterior;Right Hand <1 day                    Significant Labs:    CBC/Anemia Profile:  Recent Labs   Lab 11/09/23  1052 11/09/23  1259 11/09/23  1301   WBC  --  10.52  --    HGB  --  13.6*  --    HCT 31* 39.0* 38   PLT  --  99*  --    MCV  --  90  --    RDW  --  12.3  --         Chemistries:  No results for input(s): "NA", "K", "CL", "CO2", "BUN", "CREATININE", "CALCIUM", "ALBUMIN", "PROT", "BILITOT", "ALKPHOS", "ALT", "AST", "GLUCOSE", "MG", "PHOS" in the last 48 hours.    All pertinent labs within the past 24 hours have been reviewed.    Significant Imaging: I have reviewed all pertinent " imaging results/findings within the past 24 hours.  Assessment/Plan:     Cardiac/Vascular  * Mitral regurgitation    Neuro/Psych:     - Sedation: propofol    - Pain:    - Scheduled Tylenol 1g q8h   - Fentanyl PRN while intubated, Oxy PRN             Cardiac:     - S/P MV repair, COZ/MAZE and LAAR with Dr. Bennett on 11/9/2023    - BP Goal:     - Cleviprex PRN    - Pressors: epi 0.04    - Anti-HTNs: Will start when appropriate    - Rhythm: NSR    - Beta blocker: Will start when appropriate        Pulmonary:     - Goal SpO2 >92%    - Will wean ventilator support as tolerated to extubate    - Chest Tubes x 2 meds    - ABGs PRN      Renal:    - Trend BUN/Cr     - Maintain Antunez, record strict Is/Os    Recent Labs   Lab 11/07/23  1246   BUN 23*   CREATININE 1.3         FEN / GI:     - Daily CMP, PRN K/Mag/Phos per protocol     - Replace electrolytes as needed    - Nutrition: NPO pending extubation    - Bowel Regimen: Miralax, docusate      ID:     - Afebrile    - WBC stable    - Abx: Complete perioperative cefazolin 2g Q8H x 5 doses    Recent Labs   Lab 11/07/23  1246 11/09/23  1259   WBC 6.84 10.52         Heme/Onc:     - Hgb 13.6 pre-operatively    - CBC daily    - ASA 325mg daily    Recent Labs   Lab 11/07/23  1246 11/09/23  1259   HGB 16.3 13.6*   * 99*   APTT 26.8 30.6   INR 1.0 1.1         Endocrine:     - CTS Goal -140    - HgbA1c: 5.6    - Endocrinology consulted for insulin management      PPx:   Feeding: npo  Analgesia/Sedation: multimodal  Thromboembolic Prevention: SCDs  HOB >30: Yes  Stress Ulcer: pepcid  Glucose Control: Yes, insulin management per Endocrinology     Lines/Drains/Airway:   ETT   Left radial arterial line   RIJ CVC   Antunez   Chest Tubes: 2   Pacing Wires: Temporary ventricular pacing wires      Dispo/Code Status/Palliative:     - Continue SICU Care    - Full Code     Persistent atrial fibrillation  S/p SCHNEIDER/MAZE and LAAR     Hypertension  Home antihypertensives include meotprolol.  Will hold in immediate post-operative setting and restart when clinically appropriate.    -- SBP goal < 140  -- PRN cleviprex       Endocrine  Type 2 diabetes mellitus without complication, without long-term current use of insulin  A1c 5.6. Home medications metformin. Will hold while inpatient. Endocrinology consulted for insulin management.     -- BG goal 110-140  -- hypoglycemia protocol        \  Critical care was time spent personally by me on the following activities: development of treatment plan with patient or surrogate and bedside caregivers, discussions with consultants, evaluation of patient's response to treatment, examination of patient, ordering and performing treatments and interventions, ordering and review of laboratory studies, ordering and review of radiographic studies, pulse oximetry, re-evaluation of patient's condition.  This critical care time did not overlap with that of any other provider or involve time for any procedures.     Monica Ortiz NP  Critical Care - Surgery  [facility] - Surgical Intensive Care

## 2023-11-09 NOTE — BRIEF OP NOTE
[facility] - Surgery ([facility])  Cardiothoracic Surgery  Operative Note    SUMMARY     Date of Procedure: 11/9/2023     Procedure: Procedure(s) (LRB):    1)  REPAIR, MITRAL VALVE, with medial commissuroplasty and 36 mm Medtronic Simuplus band annuloplasty  59157    2)  SCHNEIDER MAZE PROCEDURE with complete left and right left atrial lesion sets and resection of left atrial appendage  23218    Surgeon(s) and Role:     * Jennifer Bennett MD - Primary     * Laci Ace MD - Fellow    Assisting Surgeon: None    Pre-Operative Diagnosis: Mitral valve insufficiency, unspecified etiology [I34.0]  Paroxysmal atrial fibrillation [I48.0]  MVP (mitral valve prolapse) [I34.1]    Post-Operative Diagnosis: Post-Op Diagnosis Codes:     * Mitral valve insufficiency, unspecified etiology [I34.0]     * Paroxysmal atrial fibrillation [I48.0]     * MVP (mitral valve prolapse) [I34.1]    Anesthesia: General    Operative Findings (including complications, if any): Severe prolapse of A3. No MR on post JASON.    Estimated Blood Loss (EBL): 100 mL           Implants:   Implant Name Type Inv. Item Serial No.  Lot No. LRB No. Used Action   BAND SIMUPLUS FLEX 36MM - [id number]  BAND SIMUPLUS FLEX 36MM [id number] [employer] [id number] N/A 1 Implanted       Specimens:   Specimen (24h ago, onward)       Start     Ordered    11/09/23 1125  Specimen to Pathology, Surgery Cardiovascular  Once        Comments: Pre-op Diagnosis: Mitral valve insufficiency, unspecified etiology [I34.0]Paroxysmal atrial fibrillation [I48.0]MVP (mitral valve prolapse) [I34.1]Procedure(s):SCHNEIDER MAZE PROCEDUREREPAIR, MITRAL VALVE, OPEN Number of specimens: 1Name of specimens: 1 Left Atrial Appendage- Perm     References:    Click here for ordering Quick Tip   Question Answer Comment   Procedure Type: Cardiovascular    Specimen Class: Routine/Screening    Which provider would you like to cc? JENNIFER BENNETT    Release to patient Immediate        11/09/23 1125                    Attestation:  I was present and scrubbed for the procedure.

## 2023-11-09 NOTE — SUBJECTIVE & OBJECTIVE
Follow-up For: Procedure(s) (LRB):  SCHNEIDER MAZE PROCEDURE (N/A)  REPAIR, MITRAL VALVE, OPEN (N/A)  EXCLUSION, LEFT ATRIAL APPENDAGE, OPEN, AS PART OF OPEN CHEST SURGERY (N/A)    Post-Operative Day: Day of Surgery     Past Medical History:   Diagnosis Date    ADHD (attention deficit hyperactivity disorder)     Anticoagulant long-term use     Anxiety     CHF (congestive heart failure)     Depression     Diabetes mellitus     Diverticulitis     Hyperlipidemia     Insomnia     Mitral regurgitation     Mitral valve disease 08/2023    PAF (paroxysmal atrial fibrillation)        Past Surgical History:   Procedure Laterality Date    ANGIOGRAM, CORONARY, WITH LEFT HEART CATHETERIZATION  8/30/2023    Procedure: Left Heart Cath;  Surgeon: Hilario Pretty MD;  Location: [facility] CATH;  Service: Cardiology;;    CORONARY ANGIOGRAPHY  8/30/2023    Procedure: Coronary angiogram study;  Surgeon: Hilario Pretty MD;  Location: [facility] CATH;  Service: Cardiology;;    KNEE ARTHROSCOPY W/ ACL RECONSTRUCTION AND HAMSTRING GRAFT  2003    right x 2; redo Verliner in [place] 1/2022    LUMBAR LAMINECTOMY FOR DECOMPRESSION  12/2022    TRANSESOPHAGEAL ECHOCARDIOGRAM WITH POSSIBLE CARDIOVERSION (JASON W/ POSS CARDIOVERSION) Bilateral 08/21/2023    Procedure: Transesophageal echo (JASON) intra-procedure log documentation;  Surgeon: Jesu Flores MD;  Location: [facility] CATH/EP LAB;  Service: Cardiology;  Laterality: Bilateral;       Review of patient's allergies indicates:   Allergen Reactions    Latex, natural rubber Rash       Family History       Problem Relation (Age of Onset)    Alzheimer's disease Maternal Grandmother    Heart attack Father, Paternal Grandfather    Heart disease Father    No Known Problems Sister, Brother, Daughter, Son    Stroke Mother          Tobacco Use    Smoking status: Never    Smokeless tobacco: Current     Types: Chew    Tobacco comments:     2 dips per day down from can per day   Substance and Sexual  Activity    Alcohol use: No    Drug use: No    Sexual activity: Yes     Partners: Female      Review of Systems   Unable to perform ROS: Intubated     Objective:     Vital Signs (Most Recent):  Temp: 98.2 °F (36.8 °C) (11/09/23 0532)  Pulse: (!) 123 (11/09/23 1329)  Resp: 20 (11/09/23 1329)  BP: 120/62 (11/09/23 0532)  SpO2: 100 % (11/09/23 1329) Vital Signs (24h Range):  Temp:  [98.2 °F (36.8 °C)] 98.2 °F (36.8 °C)  Pulse:  [] 123  Resp:  [20-30] 20  SpO2:  [94 %-100 %] 100 %  BP: (120)/(62) 120/62     Weight: 87.5 kg (193 lb)  Body mass index is 26.92 kg/m².      Intake/Output Summary (Last 24 hours) at 11/9/2023 1337  Last data filed at 11/9/2023 1250  Gross per 24 hour   Intake 1000 ml   Output 480 ml   Net 520 ml          Physical Exam  Constitutional:       General: He is not in acute distress.     Interventions: He is sedated and intubated.   Eyes:      Extraocular Movements: Extraocular movements intact.      Pupils: Pupils are equal, round, and reactive to light.   Neck:      Comments: [facility] CVC  Cardiovascular:      Rate and Rhythm: Normal rate and regular rhythm.      Pulses: Normal pulses.      Comments: Paced 80  Pulmonary:      Effort: No respiratory distress. He is intubated.   Abdominal:      General: Abdomen is flat.      Palpations: Abdomen is soft.   Musculoskeletal:      Right lower leg: No edema.   Skin:     General: Skin is warm and dry.      Capillary Refill: Capillary refill takes less than 2 seconds.      Comments: MSI clean, dry and intact   Neurological:      General: No focal deficit present.            Vents:  Vent Mode: A/C (11/09/23 1329)  Set Rate: 20 BPM (11/09/23 1329)  Vt Set: 490 mL (11/09/23 1329)  PEEP/CPAP: 5 cmH20 (11/09/23 1329)  Oxygen Concentration (%): 50 (11/09/23 1329)  Peak Airway Pressure: 20 cmH20 (11/09/23 1329)  Plateau Pressure: 0 cmH20 (11/09/23 1329)  Total Ve: 9.94 L/m (11/09/23 1329)  Negative Inspiratory Force (cm H2O): 0 (11/09/23 1329)  F/VT Ratio<105  "(RSBI): (!) 41.15 (11/09/23 1329)    Lines/Drains/Airways       Central Venous Catheter Line  Duration             Introducer 11/09/23 0808 <1 day              Drain  Duration                  Urethral Catheter 11/09/23 0754 Temperature probe;Non-latex;Silicone 14 Fr. <1 day         Y Chest Tube 1 and 2 11/09/23 1202 1 Right Mediastinal 19 Fr. 2 Left Mediastinal 19 Fr. <1 day              Airway  Duration                  Airway - Non-Surgical 11/09/23 0711 <1 day              Arterial Line  Duration             Arterial Line 11/09/23 0806 Left Radial <1 day              Line  Duration                  Pacer Wires 11/09/23 1123 <1 day              Peripheral Intravenous Line  Duration                  Peripheral IV - Single Lumen 16 G Left Hand -- days         Peripheral IV - Single Lumen 11/09/23 0545 18 G Posterior;Right Hand <1 day                    Significant Labs:    CBC/Anemia Profile:  Recent Labs   Lab 11/09/23  1052 11/09/23  1259 11/09/23  1301   WBC  --  10.52  --    HGB  --  13.6*  --    HCT 31* 39.0* 38   PLT  --  99*  --    MCV  --  90  --    RDW  --  12.3  --         Chemistries:  No results for input(s): "NA", "K", "CL", "CO2", "BUN", "CREATININE", "CALCIUM", "ALBUMIN", "PROT", "BILITOT", "ALKPHOS", "ALT", "AST", "GLUCOSE", "MG", "PHOS" in the last 48 hours.    All pertinent labs within the past 24 hours have been reviewed.    Significant Imaging: I have reviewed all pertinent imaging results/findings within the past 24 hours.  "

## 2023-11-09 NOTE — ANESTHESIA PROCEDURE NOTES
Arterial    Diagnosis: mitral regurgitation    Patient location during procedure: done in OR    Staffing  Authorizing Provider: Lisa Whiting MD  Performing Provider: Michela Ku MD    Staffing  Performed by: Michela Ku MD  Authorized by: Lisa Whiting MD    Anesthesiologist was present at the time of the procedure.    Preanesthetic Checklist  Completed: patient identified, IV checked, site marked, risks and benefits discussed, surgical consent, monitors and equipment checked, pre-op evaluation, timeout performed and anesthesia consent givenArterial  Skin Prep: chlorhexidine gluconate  Local Infiltration: lidocaine  Orientation: left  Location: radial    Catheter Size: 22 G  Catheter placement by Ultrasound guidance. Heme positive aspiration all ports.   Vessel Caliber: medium, patent  Needle advanced into vessel with real time Ultrasound guidance.Insertion Attempts: 1  Assessment  Dressing: secured with tape and tegaderm  Patient: Tolerated well

## 2023-11-09 NOTE — CONSULTS
[facility] - Surgical Intensive Care  Endocrinology  Diabetes Consult Note    Consult Requested by: Goran Bennett MD   Reason for admit: Mitral regurgitation    HISTORY OF PRESENT ILLNESS:  Reason for Consult: Management of T2DM, Hyperglycemia     Surgical Procedure and Date: s/p SCHNEIDER-MAZE procedure on 11/9/2023    Diabetes diagnosis year: BEE due to intubation    Home Diabetes Medications:  Metformin 500 mg BID (per chart review)    How often checking glucose at home?  BEE    BG readings on regimen: BEE    Diabetes Complications include:     Hyperglycemia    Complicating diabetes co morbidities:   HLD, HTN; and CTS      HPI: Abimael Armendariz is a 58 y.o. male with past Medical conditions includinh paroxysmal AF, Hyperlipidemia, Hypertension, Insomnia, ADHD (attention deficit hyperactivity disorder), Anxiety, depression. Recently diagnosis of afib, he reports he returned back home and was seen by Cardiology, 08/15/2023, where he was started on Lopressor and Eliquis with Holter monitor, echo and stress test ordered.  Echo completed 08/18/2023. Admitted 3 times within the diagnosis time  frame. First ED visit was for palpitations for which they adjusted his BB. He then developed chest pain and elevated heart rate and was seen at [facility], 08/17/2023- had negative cardiac ischemic work up. EKG during admission showed AFIB RVR for which he was given IV Cardizem x1 with rate improved and chest pain resolved. Subsequently underwent cardioversion. He was then readmitted again for CHF exacerbation. Independently perform ADLs. No Assistive devices for walking. Denies prior history of strokes, stents, sternotomies.  The patient presents to the SICU s/p MV repair, SCHNEIDER/MAZE and LAAR with Dr. Bennett on 11/09/2023.          Interval HPI:   Overnight events: Patient in room 19787/29907 A. Blood glucose stable. BG at and above goal on current insulin regimen (IIP). Steroid use- None. Day of Surgery  Renal function- Normal    Vasopressors-  Epinephrine       Endocrine will continue to follow and manage insulin orders inpatient.         Diet NPO Except for: Sips with Medication     Eating:   NPO  Nausea: No  Hypoglycemia and intervention: No  Fever: No  TPN and/or TF: No    PMH, PSH, FH, SH updated and reviewed     ROS:  Review of Systems  BEE due to intubation  .   Current Medications and/or Treatments Impacting Glycemic Control  Immunotherapy:    Immunosuppressants       None          Steroids:   Hormones (From admission, onward)      None          Pressors:    Autonomic Drugs (From admission, onward)      Start     Stop Route Frequency Ordered    11/09/23 1230  EPINEPHrine 5 mg in dextrose 5% 250 mL infusion (premix)        Question Answer Comment   Begin at (in mcg/kg/min): 0.02    Titrate by: (in mcg/kg/min) 0.02    Titrate interval: (in minutes) 5    Titrate to maintain: (SBP or MAP or Cardiac Index) MAP    Greater than: (in mmHg) 65    Cardiac index greater than: (in L/min) 2.2    Maximum dose: (in mcg/kg/min) 2        -- IV Continuous 11/09/23 1219          Hyperglycemia/Diabetes Medications:   Antihyperglycemics (From admission, onward)      Start     Stop Route Frequency Ordered    11/09/23 1230  insulin regular in 0.9 % NaCl 100 unit/100 mL (1 unit/mL) infusion        Question Answer Comment   Insulin rate changes (DO NOT MODIFY ANSWER) \\[email or web address]\epic\Images\Pharmacy\InsulinInfusions\CTS INSULIN [id number].pdf    Enter initial dose (Units/hr): 1        -- IV Continuous 11/09/23 1219             PHYSICAL EXAMINATION:  Vitals:    11/09/23 1329   BP:    Pulse: (!) 123   Resp: 20   Temp:      Body mass index is 26.92 kg/m².     Physical Exam     Constitutional: Well developed, obese, NAD.  ENT: External ears no masses with nose patent  Neck: Supple; trachea midline  Cardiovascular: Normal heart sounds, no LE edema. DP +2 bilaterally.  Lungs: Normal effort; lungs anterior bilaterally clear to auscultation.  Intubated on a  "ventilator.  Abdomen: Soft, no masses, no hernias.  Hypoactive BS noted.  MS: No clubbing or cyanosis of nails noted; unable to assess gait.  Skin: No rashes, lesions, or ulcers; no nodules.  Injection sites are ok. No lipo hypertropthy or atrophy.  Mid-sternal incision with telfa island dressing, CDI.  CT x 2.  LLE wrapped with ACE wrap.  Psychiatric: BEE  Neurological: BEE  Foot: Nails in good condition, no amputations noted      Labs Reviewed and Include   No results for input(s): "GLU", "CALCIUM", "ALBUMIN", "PROT", "NA", "K", "CO2", "CL", "BUN", "CREATININE", "ALKPHOS", "ALT", "AST", "BILITOT" in the last 24 hours.  Lab Results   Component Value Date    WBC 10.52 11/09/2023    HGB 13.6 (L) 11/09/2023    HCT 38 11/09/2023    MCV 90 11/09/2023    PLT 99 (L) 11/09/2023     No results for input(s): "TSH", "FREET4" in the last 168 hours.  Lab Results   Component Value Date    HGBA1C 5.6 11/07/2023       Nutritional status:   Body mass index is 26.92 kg/m².  Lab Results   Component Value Date    ALBUMIN 4.6 11/07/2023    ALBUMIN 4.4 08/30/2023    ALBUMIN 3.8 08/24/2023     No results found for: "PREALBUMIN"    Estimated Creatinine Clearance: 65.2 mL/min (based on SCr of 1.3 mg/dL).    Accu-Checks  Recent Labs     11/09/23  0546 11/09/23  1257   POCTGLUCOSE 111* 184*        ASSESSMENT and PLAN    Cardiac/Vascular  * Mitral regurgitation  Managed per primary team  Avoid hypoglycemia        S/P Maze operation for atrial fibrillation  Optimize BG control to improve wound healing        Hypertension  Uncontrolled HTN can worsen insulin resistance.       Endocrine  Type 2 diabetes mellitus without complication, without long-term current use of insulin  Endocrinology consulted for BG management.   BG goal 110-140 CTS      - IIP  - Requires intensive BG monitoring.   - BG checks q1hr  - Hypoglycemia protocol in place    - Notify endocrine if patient becomes hypokalemic (K < 3.3) and/or is not responding to replacement.   - " Notify endocrine if patient requiring > 20 units/hr.      ** Please notify Endocrine for any change and/or advance in diet**  ** Please call Endocrine for any BG related issues **    Discharge Planning:   TBD. Please notify endocrinology prior to discharge.          Plan discussed with patient, family, and RN at bedside.        Gilberto Merida, DNP, FNP  Endocrinology  [facility] - Surgical Intensive Care

## 2023-11-09 NOTE — SUBJECTIVE & OBJECTIVE
Interval HPI:   Overnight events: Patient in room 85739/54235 A. Blood glucose stable. BG at and above goal on current insulin regimen (IIP). Steroid use- None. Day of Surgery  Renal function- Normal   Vasopressors-  Epinephrine       Endocrine will continue to follow and manage insulin orders inpatient.         Diet NPO Except for: Sips with Medication     Eating:   NPO  Nausea: No  Hypoglycemia and intervention: No  Fever: No  TPN and/or TF: No    PMH, PSH, FH, SH updated and reviewed     ROS:  Review of Systems  BEE due to intubation  .   Current Medications and/or Treatments Impacting Glycemic Control  Immunotherapy:    Immunosuppressants       None          Steroids:   Hormones (From admission, onward)      None          Pressors:    Autonomic Drugs (From admission, onward)      Start     Stop Route Frequency Ordered    11/09/23 1230  EPINEPHrine 5 mg in dextrose 5% 250 mL infusion (premix)        Question Answer Comment   Begin at (in mcg/kg/min): 0.02    Titrate by: (in mcg/kg/min) 0.02    Titrate interval: (in minutes) 5    Titrate to maintain: (SBP or MAP or Cardiac Index) MAP    Greater than: (in mmHg) 65    Cardiac index greater than: (in L/min) 2.2    Maximum dose: (in mcg/kg/min) 2        -- IV Continuous 11/09/23 1219          Hyperglycemia/Diabetes Medications:   Antihyperglycemics (From admission, onward)      Start     Stop Route Frequency Ordered    11/09/23 1230  insulin regular in 0.9 % NaCl 100 unit/100 mL (1 unit/mL) infusion        Question Answer Comment   Insulin rate changes (DO NOT MODIFY ANSWER) \\[email or web address]\epic\Images\Pharmacy\InsulinInfusions\CTS INSULIN [id number].pdf    Enter initial dose (Units/hr): 1        -- IV Continuous 11/09/23 1219             PHYSICAL EXAMINATION:  Vitals:    11/09/23 1329   BP:    Pulse: (!) 123   Resp: 20   Temp:      Body mass index is 26.92 kg/m².     Physical Exam     Constitutional: Well developed, obese, NAD.  ENT: External ears no masses with nose  patent  Neck: Supple; trachea midline  Cardiovascular: Normal heart sounds, no LE edema. DP +2 bilaterally.  Lungs: Normal effort; lungs anterior bilaterally clear to auscultation.  Intubated on a ventilator.  Abdomen: Soft, no masses, no hernias.  Hypoactive BS noted.  MS: No clubbing or cyanosis of nails noted; unable to assess gait.  Skin: No rashes, lesions, or ulcers; no nodules.  Injection sites are ok. No lipo hypertropthy or atrophy.  Mid-sternal incision with telfa island dressing, CDI.  CT x 2.  LLE wrapped with ACE wrap.  Psychiatric: BEE  Neurological: BEE  Foot: Nails in good condition, no amputations noted

## 2023-11-09 NOTE — ASSESSMENT & PLAN NOTE
Endocrinology consulted for BG management.   BG goal 110-140 CTS      - IIP  - Requires intensive BG monitoring.   - BG checks q1hr  - Hypoglycemia protocol in place    - Notify endocrine if patient becomes hypokalemic (K < 3.3) and/or is not responding to replacement.   - Notify endocrine if patient requiring > 20 units/hr.      ** Please notify Endocrine for any change and/or advance in diet**  ** Please call Endocrine for any BG related issues **    Discharge Planning:   TBD. Please notify endocrinology prior to discharge.

## 2023-11-09 NOTE — ANESTHESIA PROCEDURE NOTES
Central Line    Diagnosis: Mitral Regurgitation  Patient location during procedure: done in OR    Staffing  Authorizing Provider: Lisa Whiting MD  Performing Provider: Michela Ku MD    Staffing  Performed by: Michela Ku MD  Authorized by: Lisa Whiting MD    Anesthesiologist was present at the time of the procedure.  Preanesthetic Checklist  Completed: patient identified, IV checked, site marked, risks and benefits discussed, surgical consent, monitors and equipment checked, pre-op evaluation, timeout performed and anesthesia consent given  Indication   Indication: hemodynamic monitoring, vascular access, med administration     Anesthesia   general anesthesia    Central Line   Skin Prep: skin prepped with ChloraPrep, skin prep agent completely dried prior to procedure  Sterile Barriers Followed: Yes    All five maximal barriers used- gloves, gown, cap, mask, and large sterile sheet    hand hygiene performed prior to central venous catheter insertion  Location: right internal jugular.   Catheter type: introducer  Catheter Size: 9 Fr  Ultrasound: vascular probe with ultrasound   Vessel Caliber: medium, patent, compressibility normal  Needle advanced into vessel with real time Ultrasound guidance.    Manometry: none  Insertion Attempts: 1   Securement:line sutured, chlorhexidine patch, sterile dressing applied and blood return through all ports    Post-Procedure    Adverse Events:none      Guidewire Guidewire removed intact.

## 2023-11-09 NOTE — NURSING
"Pt transported to SICU [address]2593 with portable telemetryAmbubag" in use. Pt connected to ICU monitor Ventilator. Angel NP, Charge RN, and RT all called to bedside for patient arrival. Report received from Anesthesia and  with CTS. New orders received and implemented. Labs sent. Pt assessed, immediate needs met. Propofol paused and pt woke up and followed commands in all extremities. Family brought to bedside, updated on the patient's current condition and PoC for remainder of shift. Family also given ICU Welcome packet and educated on visiting hours. All questions answered, emotional support provided.     Admit Skin Note: No breakdown noted (see 4EYES). Foams applied.     "

## 2023-11-09 NOTE — ANESTHESIA PROCEDURE NOTES
Intubation    Date/Time: 11/9/2023 7:11 AM    Performed by: Lisa Whiting MD  Authorized by: Lisa Whiting MD    Intubation:     Induction:  Intravenous    Intubated:  Postinduction    Mask Ventilation:  Moderately difficult with oral airway    Attempts:  1    Attempted By:  Staff anesthesiologist    Method of Intubation:  Direct    Blade:  Juanita 3    Laryngeal View Grade: Grade IIA - cords partially seen      Difficult Airway Encountered?: No      Complications:  None    Airway Device:  Oral endotracheal tube    Airway Device Size:  7.5    Style/Cuff Inflation:  Cuffed    Inflation Amount (mL):  8    Tube secured:  24    Secured at:  The lips    Placement Verified By:  Capnometry    Complicating Factors:  None    Findings Post-Intubation:  BS equal bilateral and atraumatic/condition of teeth unchanged

## 2023-11-09 NOTE — ASSESSMENT & PLAN NOTE
A1c 5.6. Home medications metformin. Will hold while inpatient. Endocrinology consulted for insulin management.     -- BG goal 110-140  -- hypoglycemia protocol

## 2023-11-09 NOTE — PROGRESS NOTES
Autotransfusion/Rapid Infusion Record:      11/09/2023  Autotransfusionist:  Petra Matthew    Surgeon(s) and Role:     * Goran Bennett MD - Primary     * Laci Ace MD - Fellow  Anesthesiologist:  Lisa Whiting MD    Past Medical History:   Diagnosis Date    ADHD (attention deficit hyperactivity disorder)     Anticoagulant long-term use     Anxiety     CHF (congestive heart failure)     Depression     Diabetes mellitus     Diverticulitis     Hyperlipidemia     Insomnia     Mitral regurgitation     Mitral valve disease 08/2023    PAF (paroxysmal atrial fibrillation)        Procedure(s) (LRB):  SCHNEIDER MAZE PROCEDURE (N/A)  REPAIR, MITRAL VALVE, OPEN (N/A)     12:14 PM    Equipment:    Cell Saver     R.I.S.  : Y'all Model: CATSmart or CATSplus : XillianTV   Model: [id number]     Serial number: [id number]   Serial number: 0   Disposable lot #: [id number]   Disposable lot #: 0     Were extra cardiotomies used for cell saver?  N   if yes, #:  0    Solutions:  Anticoagulant: ACD-A   Expiration date: 4/24 Volume used: 500   Wash solution: 0.9% NaCl   Expiration date: 8/25 Volume used: 3426     Cell saver checklist  Time completed:           [x]   Circuit assembled correctly     [x]   Cell saver powered and operational     [x]   Vacuum connected, functional, adjust to max -150mmHg     [x]   Anticoagulant drip rate adjusted     [x]   Transfer bag properly labeled with patient name, expiration time, volume,       anticoagulant, OR number, and initials     [x]   Cell saver disinfected after use (completed at end of case)       Cell Saver volumes:    Total volume processed:     4966 mL     Total volume pRBCs recovered     1157 mL     Volume pRBCs infused     1157 mL         RIS checklist   Time completed:  []   RIS circuit assembled correctly     []   RIS power and operational     []   RIS disinfected after use (completed at end of case)       RIS volumes:    Total volume infused:    (see anesthesia  record for blood       product information)   0 mL       Additional comments:

## 2023-11-09 NOTE — HPI
Reason for Consult: Management of T2DM, Hyperglycemia     Surgical Procedure and Date: s/p SCHNEIDER-MAZE procedure on 11/9/2023    Diabetes diagnosis year: BEE due to intubation    Home Diabetes Medications:  Metformin 500 mg BID (per chart review)    How often checking glucose at home?  BEE    BG readings on regimen: BEE    Diabetes Complications include:     Hyperglycemia    Complicating diabetes co morbidities:   HLD, HTN; and CTS      HPI: Abimael Armendariz is a 58 y.o. male with past Medical conditions includinh paroxysmal AF, Hyperlipidemia, Hypertension, Insomnia, ADHD (attention deficit hyperactivity disorder), Anxiety, depression. Recently diagnosis of afib, he reports he returned back home and was seen by Cardiology, 08/15/2023, where he was started on Lopressor and Eliquis with Holter monitor, echo and stress test ordered.  Echo completed 08/18/2023. Admitted 3 times within the diagnosis time  frame. First ED visit was for palpitations for which they adjusted his BB. He then developed chest pain and elevated heart rate and was seen at [facility], 08/17/2023- had negative cardiac ischemic work up. EKG during admission showed AFIB RVR for which he was given IV Cardizem x1 with rate improved and chest pain resolved. Subsequently underwent cardioversion. He was then readmitted again for CHF exacerbation. Independently perform ADLs. No Assistive devices for walking. Denies prior history of strokes, stents, sternotomies.  The patient presents to the SICU s/p MV repair, SCHNEIDER/MAZE and LAAR with Dr. Bennett on 11/09/2023.

## 2023-11-09 NOTE — ANESTHESIA POSTPROCEDURE EVALUATION
Anesthesia Post Evaluation    Patient: Abimael Armendariz    Procedure(s) Performed: Procedure(s) (LRB):  SCHNEIDER MAZE PROCEDURE (N/A)  REPAIR, MITRAL VALVE, OPEN (N/A)  EXCLUSION, LEFT ATRIAL APPENDAGE, OPEN, AS PART OF OPEN CHEST SURGERY (N/A)    Final Anesthesia Type: general      Patient location during evaluation: ICU  Patient participation: No - Unable to Participate, Sedation  Level of consciousness: sedated  Post-procedure vital signs: reviewed and stable  Pain management: adequate  Airway patency: patent    PONV status at discharge: No PONV  Anesthetic complications: no      Cardiovascular status: hemodynamically stable  Respiratory status: intubated            Vitals Value Taken Time   /76 11/09/23 1431   Temp 98.0 11/09/23 1457   Pulse 78 11/09/23 1456   Resp 14 11/09/23 1456   SpO2 97 % 11/09/23 1456   Vitals shown include unvalidated device data.      No case tracking events are documented in the log.      Pain/Xander Score: Pain Rating Prior to Med Admin: 0 (pre op rx) (11/9/2023  6:20 AM)

## 2023-11-10 LAB
ALBUMIN SERPL BCP-MCNC: 3.4 G/DL (ref 3.5–5.2)
ALBUMIN SERPL BCP-MCNC: 3.4 G/DL (ref 3.5–5.2)
ALP SERPL-CCNC: 40 U/L (ref 55–135)
ALP SERPL-CCNC: 42 U/L (ref 55–135)
ALT SERPL W/O P-5'-P-CCNC: 19 U/L (ref 10–44)
ALT SERPL W/O P-5'-P-CCNC: 22 U/L (ref 10–44)
ANION GAP SERPL CALC-SCNC: 11 MMOL/L (ref 8–16)
ANION GAP SERPL CALC-SCNC: 8 MMOL/L (ref 8–16)
APTT PPP: 30.2 SEC (ref 21–32)
AST SERPL-CCNC: 142 U/L (ref 10–40)
AST SERPL-CCNC: 156 U/L (ref 10–40)
BASOPHILS # BLD AUTO: 0.01 K/UL (ref 0–0.2)
BASOPHILS NFR BLD: 0.1 % (ref 0–1.9)
BILIRUB SERPL-MCNC: 0.4 MG/DL (ref 0.1–1)
BILIRUB SERPL-MCNC: 0.5 MG/DL (ref 0.1–1)
BUN SERPL-MCNC: 36 MG/DL (ref 6–20)
BUN SERPL-MCNC: 36 MG/DL (ref 6–20)
CALCIUM SERPL-MCNC: 7.8 MG/DL (ref 8.7–10.5)
CALCIUM SERPL-MCNC: 7.9 MG/DL (ref 8.7–10.5)
CHLORIDE SERPL-SCNC: 105 MMOL/L (ref 95–110)
CHLORIDE SERPL-SCNC: 106 MMOL/L (ref 95–110)
CO2 SERPL-SCNC: 20 MMOL/L (ref 23–29)
CO2 SERPL-SCNC: 22 MMOL/L (ref 23–29)
CREAT SERPL-MCNC: 1.9 MG/DL (ref 0.5–1.4)
CREAT SERPL-MCNC: 2 MG/DL (ref 0.5–1.4)
DIFFERENTIAL METHOD: ABNORMAL
EOSINOPHIL # BLD AUTO: 0 K/UL (ref 0–0.5)
EOSINOPHIL NFR BLD: 0 % (ref 0–8)
ERYTHROCYTE [DISTWIDTH] IN BLOOD BY AUTOMATED COUNT: 13 % (ref 11.5–14.5)
EST. GFR  (NO RACE VARIABLE): 37.7 ML/MIN/1.73 M^2
EST. GFR  (NO RACE VARIABLE): 40.1 ML/MIN/1.73 M^2
GLUCOSE SERPL-MCNC: 113 MG/DL (ref 70–110)
GLUCOSE SERPL-MCNC: 142 MG/DL (ref 70–110)
GLUCOSE SERPL-MCNC: 144 MG/DL (ref 70–110)
GLUCOSE SERPL-MCNC: 187 MG/DL (ref 70–110)
GLUCOSE SERPL-MCNC: 200 MG/DL (ref 70–110)
HCO3 UR-SCNC: 24 MMOL/L (ref 24–28)
HCO3 UR-SCNC: 25 MMOL/L (ref 24–28)
HCO3 UR-SCNC: 28.9 MMOL/L (ref 24–28)
HCT VFR BLD AUTO: 35.9 % (ref 40–54)
HCT VFR BLD CALC: 29 %PCV (ref 36–54)
HCT VFR BLD CALC: 33 %PCV (ref 36–54)
HCT VFR BLD CALC: 40 %PCV (ref 36–54)
HGB BLD-MCNC: 12.1 G/DL (ref 14–18)
IMM GRANULOCYTES # BLD AUTO: 0.03 K/UL (ref 0–0.04)
IMM GRANULOCYTES NFR BLD AUTO: 0.3 % (ref 0–0.5)
INR PPP: 1.1 (ref 0.8–1.2)
LYMPHOCYTES # BLD AUTO: 1.1 K/UL (ref 1–4.8)
LYMPHOCYTES NFR BLD: 10.9 % (ref 18–48)
MAGNESIUM SERPL-MCNC: 2.7 MG/DL (ref 1.6–2.6)
MCH RBC QN AUTO: 31.1 PG (ref 27–31)
MCHC RBC AUTO-ENTMCNC: 33.7 G/DL (ref 32–36)
MCV RBC AUTO: 92 FL (ref 82–98)
MONOCYTES # BLD AUTO: 0.9 K/UL (ref 0.3–1)
MONOCYTES NFR BLD: 9 % (ref 4–15)
NEUTROPHILS # BLD AUTO: 7.7 K/UL (ref 1.8–7.7)
NEUTROPHILS NFR BLD: 79.7 % (ref 38–73)
NRBC BLD-RTO: 0 /100 WBC
PCO2 BLDA: 40.3 MMHG (ref 35–45)
PCO2 BLDA: 40.8 MMHG (ref 35–45)
PCO2 BLDA: 45.1 MMHG (ref 35–45)
PH SMN: 7.33 [PH] (ref 7.35–7.45)
PH SMN: 7.4 [PH] (ref 7.35–7.45)
PH SMN: 7.46 [PH] (ref 7.35–7.45)
PHOSPHATE SERPL-MCNC: 5.6 MG/DL (ref 2.7–4.5)
PLATELET # BLD AUTO: 84 K/UL (ref 150–450)
PMV BLD AUTO: 10.9 FL (ref 9.2–12.9)
PO2 BLDA: 149 MMHG (ref 80–100)
PO2 BLDA: 158 MMHG (ref 80–100)
PO2 BLDA: 477 MMHG (ref 80–100)
POC BE: -2 MMOL/L
POC BE: 0 MMOL/L
POC BE: 5 MMOL/L
POC IONIZED CALCIUM: 1.17 MMOL/L (ref 1.06–1.42)
POC IONIZED CALCIUM: 1.19 MMOL/L (ref 1.06–1.42)
POC IONIZED CALCIUM: 1.22 MMOL/L (ref 1.06–1.42)
POC SATURATED O2: 100 % (ref 95–100)
POC SATURATED O2: 99 % (ref 95–100)
POC SATURATED O2: 99 % (ref 95–100)
POC TCO2: 25 MMOL/L (ref 23–27)
POC TCO2: 26 MMOL/L (ref 23–27)
POC TCO2: 30 MMOL/L (ref 23–27)
POCT GLUCOSE: 106 MG/DL (ref 70–110)
POCT GLUCOSE: 118 MG/DL (ref 70–110)
POCT GLUCOSE: 123 MG/DL (ref 70–110)
POCT GLUCOSE: 127 MG/DL (ref 70–110)
POCT GLUCOSE: 128 MG/DL (ref 70–110)
POCT GLUCOSE: 132 MG/DL (ref 70–110)
POCT GLUCOSE: 133 MG/DL (ref 70–110)
POCT GLUCOSE: 148 MG/DL (ref 70–110)
POCT GLUCOSE: 152 MG/DL (ref 70–110)
POCT GLUCOSE: 170 MG/DL (ref 70–110)
POCT GLUCOSE: 200 MG/DL (ref 70–110)
POTASSIUM BLD-SCNC: 4.1 MMOL/L (ref 3.5–5.1)
POTASSIUM BLD-SCNC: 4.2 MMOL/L (ref 3.5–5.1)
POTASSIUM BLD-SCNC: 4.3 MMOL/L (ref 3.5–5.1)
POTASSIUM SERPL-SCNC: 4.4 MMOL/L (ref 3.5–5.1)
POTASSIUM SERPL-SCNC: 4.5 MMOL/L (ref 3.5–5.1)
POTASSIUM SERPL-SCNC: 4.8 MMOL/L (ref 3.5–5.1)
POTASSIUM SERPL-SCNC: 5.4 MMOL/L (ref 3.5–5.1)
PROT SERPL-MCNC: 5.4 G/DL (ref 6–8.4)
PROT SERPL-MCNC: 5.6 G/DL (ref 6–8.4)
PROTHROMBIN TIME: 11.7 SEC (ref 9–12.5)
RBC # BLD AUTO: 3.89 M/UL (ref 4.6–6.2)
SAMPLE: ABNORMAL
SODIUM BLD-SCNC: 136 MMOL/L (ref 136–145)
SODIUM SERPL-SCNC: 136 MMOL/L (ref 136–145)
SODIUM SERPL-SCNC: 136 MMOL/L (ref 136–145)
WBC # BLD AUTO: 9.71 K/UL (ref 3.9–12.7)

## 2023-11-10 PROCEDURE — 80053 COMPREHEN METABOLIC PANEL: CPT | Performed by: STUDENT IN AN ORGANIZED HEALTH CARE EDUCATION/TRAINING PROGRAM

## 2023-11-10 PROCEDURE — 83735 ASSAY OF MAGNESIUM: CPT

## 2023-11-10 PROCEDURE — 27000221 HC OXYGEN, UP TO 24 HOURS

## 2023-11-10 PROCEDURE — 97535 SELF CARE MNGMENT TRAINING: CPT

## 2023-11-10 PROCEDURE — 97162 PT EVAL MOD COMPLEX 30 MIN: CPT

## 2023-11-10 PROCEDURE — 25000242 PHARM REV CODE 250 ALT 637 W/ HCPCS: Performed by: PHYSICIAN ASSISTANT

## 2023-11-10 PROCEDURE — 25000003 PHARM REV CODE 250: Performed by: PHYSICIAN ASSISTANT

## 2023-11-10 PROCEDURE — 63600175 PHARM REV CODE 636 W HCPCS

## 2023-11-10 PROCEDURE — 25000003 PHARM REV CODE 250: Performed by: STUDENT IN AN ORGANIZED HEALTH CARE EDUCATION/TRAINING PROGRAM

## 2023-11-10 PROCEDURE — 94799 UNLISTED PULMONARY SVC/PX: CPT | Mod: XB

## 2023-11-10 PROCEDURE — 94761 N-INVAS EAR/PLS OXIMETRY MLT: CPT

## 2023-11-10 PROCEDURE — 84132 ASSAY OF SERUM POTASSIUM: CPT | Performed by: PHYSICIAN ASSISTANT

## 2023-11-10 PROCEDURE — 25000003 PHARM REV CODE 250

## 2023-11-10 PROCEDURE — 63600175 PHARM REV CODE 636 W HCPCS: Performed by: NURSE PRACTITIONER

## 2023-11-10 PROCEDURE — 63600175 PHARM REV CODE 636 W HCPCS: Performed by: PHYSICIAN ASSISTANT

## 2023-11-10 PROCEDURE — 84100 ASSAY OF PHOSPHORUS: CPT

## 2023-11-10 PROCEDURE — 85730 THROMBOPLASTIN TIME PARTIAL: CPT | Performed by: PHYSICIAN ASSISTANT

## 2023-11-10 PROCEDURE — 85610 PROTHROMBIN TIME: CPT | Performed by: PHYSICIAN ASSISTANT

## 2023-11-10 PROCEDURE — 97165 OT EVAL LOW COMPLEX 30 MIN: CPT

## 2023-11-10 PROCEDURE — 97116 GAIT TRAINING THERAPY: CPT

## 2023-11-10 PROCEDURE — 99900035 HC TECH TIME PER 15 MIN (STAT)

## 2023-11-10 PROCEDURE — 99233 PR SUBSEQUENT HOSPITAL CARE,LEVL III: ICD-10-PCS | Mod: ,,, | Performed by: NURSE PRACTITIONER

## 2023-11-10 PROCEDURE — 99291 CRITICAL CARE FIRST HOUR: CPT | Mod: ,,, | Performed by: ANESTHESIOLOGY

## 2023-11-10 PROCEDURE — 94640 AIRWAY INHALATION TREATMENT: CPT

## 2023-11-10 PROCEDURE — 20600001 HC STEP DOWN PRIVATE ROOM

## 2023-11-10 PROCEDURE — 80053 COMPREHEN METABOLIC PANEL: CPT | Mod: 91

## 2023-11-10 PROCEDURE — 99291 PR CRITICAL CARE, E/M 30-74 MINUTES: ICD-10-PCS | Mod: ,,, | Performed by: ANESTHESIOLOGY

## 2023-11-10 PROCEDURE — 85025 COMPLETE CBC W/AUTO DIFF WBC: CPT | Performed by: PHYSICIAN ASSISTANT

## 2023-11-10 PROCEDURE — 99233 SBSQ HOSP IP/OBS HIGH 50: CPT | Mod: ,,, | Performed by: NURSE PRACTITIONER

## 2023-11-10 RX ORDER — METHOCARBAMOL 500 MG/1
500 TABLET, FILM COATED ORAL 4 TIMES DAILY
Status: DISCONTINUED | OUTPATIENT
Start: 2023-11-10 | End: 2023-11-10

## 2023-11-10 RX ORDER — METHOCARBAMOL 500 MG/1
1000 TABLET, FILM COATED ORAL 4 TIMES DAILY
Status: DISCONTINUED | OUTPATIENT
Start: 2023-11-11 | End: 2023-11-14 | Stop reason: HOSPADM

## 2023-11-10 RX ORDER — IBUPROFEN 200 MG
16 TABLET ORAL
Status: DISCONTINUED | OUTPATIENT
Start: 2023-11-10 | End: 2023-11-14 | Stop reason: HOSPADM

## 2023-11-10 RX ORDER — ALBUMIN HUMAN 50 G/1000ML
SOLUTION INTRAVENOUS
Status: COMPLETED
Start: 2023-11-10 | End: 2023-11-10

## 2023-11-10 RX ORDER — PRAMIPEXOLE DIHYDROCHLORIDE 0.12 MG/1
0.12 TABLET ORAL NIGHTLY
Status: DISCONTINUED | OUTPATIENT
Start: 2023-11-10 | End: 2023-11-14 | Stop reason: HOSPADM

## 2023-11-10 RX ORDER — ALBUMIN HUMAN 50 G/1000ML
12.5 SOLUTION INTRAVENOUS ONCE
Status: COMPLETED | OUTPATIENT
Start: 2023-11-10 | End: 2023-11-10

## 2023-11-10 RX ORDER — IBUPROFEN 200 MG
24 TABLET ORAL
Status: DISCONTINUED | OUTPATIENT
Start: 2023-11-10 | End: 2023-11-14 | Stop reason: HOSPADM

## 2023-11-10 RX ORDER — INSULIN ASPART 100 [IU]/ML
0-10 INJECTION, SOLUTION INTRAVENOUS; SUBCUTANEOUS
Status: DISCONTINUED | OUTPATIENT
Start: 2023-11-10 | End: 2023-11-14 | Stop reason: HOSPADM

## 2023-11-10 RX ORDER — OXYCODONE HYDROCHLORIDE 5 MG/1
5 TABLET ORAL EVERY 4 HOURS PRN
Status: DISCONTINUED | OUTPATIENT
Start: 2023-11-10 | End: 2023-11-14 | Stop reason: HOSPADM

## 2023-11-10 RX ORDER — OXYCODONE HYDROCHLORIDE 5 MG/1
5 TABLET ORAL ONCE
Status: COMPLETED | OUTPATIENT
Start: 2023-11-10 | End: 2023-11-10

## 2023-11-10 RX ORDER — OXYCODONE HYDROCHLORIDE 10 MG/1
10 TABLET ORAL EVERY 4 HOURS PRN
Status: DISCONTINUED | OUTPATIENT
Start: 2023-11-10 | End: 2023-11-14 | Stop reason: HOSPADM

## 2023-11-10 RX ORDER — FAMOTIDINE 20 MG/1
20 TABLET, FILM COATED ORAL DAILY
Status: DISCONTINUED | OUTPATIENT
Start: 2023-11-10 | End: 2023-11-14 | Stop reason: HOSPADM

## 2023-11-10 RX ORDER — OXYCODONE HYDROCHLORIDE 5 MG/1
5 TABLET ORAL EVERY 4 HOURS PRN
Status: DISCONTINUED | OUTPATIENT
Start: 2023-11-10 | End: 2023-11-10

## 2023-11-10 RX ORDER — GLUCAGON 1 MG
1 KIT INJECTION
Status: DISCONTINUED | OUTPATIENT
Start: 2023-11-10 | End: 2023-11-14 | Stop reason: HOSPADM

## 2023-11-10 RX ORDER — METHOCARBAMOL 500 MG/1
500 TABLET, FILM COATED ORAL ONCE
Status: COMPLETED | OUTPATIENT
Start: 2023-11-10 | End: 2023-11-10

## 2023-11-10 RX ORDER — HYDROMORPHONE HYDROCHLORIDE 1 MG/ML
0.5 INJECTION, SOLUTION INTRAMUSCULAR; INTRAVENOUS; SUBCUTANEOUS ONCE
Status: COMPLETED | OUTPATIENT
Start: 2023-11-10 | End: 2023-11-10

## 2023-11-10 RX ADMIN — ASPIRIN 325 MG ORAL TABLET 325 MG: 325 PILL ORAL at 09:11

## 2023-11-10 RX ADMIN — OXYCODONE HYDROCHLORIDE 5 MG: 5 TABLET ORAL at 08:11

## 2023-11-10 RX ADMIN — ACETAMINOPHEN 1000 MG: 500 TABLET ORAL at 09:11

## 2023-11-10 RX ADMIN — ACETAMINOPHEN 1000 MG: 500 TABLET ORAL at 01:11

## 2023-11-10 RX ADMIN — DOCUSATE SODIUM 100 MG: 100 CAPSULE, LIQUID FILLED ORAL at 08:11

## 2023-11-10 RX ADMIN — INSULIN ASPART 2 UNITS: 100 INJECTION, SOLUTION INTRAVENOUS; SUBCUTANEOUS at 12:11

## 2023-11-10 RX ADMIN — HYDROMORPHONE HYDROCHLORIDE 0.5 MG: 0.5 INJECTION, SOLUTION INTRAMUSCULAR; INTRAVENOUS; SUBCUTANEOUS at 05:11

## 2023-11-10 RX ADMIN — PRAMIPEXOLE DIHYDROCHLORIDE 0.12 MG: 0.12 TABLET ORAL at 09:11

## 2023-11-10 RX ADMIN — DOCUSATE SODIUM 100 MG: 100 CAPSULE, LIQUID FILLED ORAL at 09:11

## 2023-11-10 RX ADMIN — CEFAZOLIN 2 G: 2 INJECTION, POWDER, FOR SOLUTION INTRAMUSCULAR; INTRAVENOUS at 11:11

## 2023-11-10 RX ADMIN — METHOCARBAMOL 500 MG: 500 TABLET ORAL at 08:11

## 2023-11-10 RX ADMIN — MUPIROCIN 1 G: 20 OINTMENT TOPICAL at 08:11

## 2023-11-10 RX ADMIN — POLYETHYLENE GLYCOL 3350 17 G: 17 POWDER, FOR SOLUTION ORAL at 09:11

## 2023-11-10 RX ADMIN — OXYCODONE HYDROCHLORIDE 5 MG: 5 TABLET ORAL at 10:11

## 2023-11-10 RX ADMIN — HYDROMORPHONE HYDROCHLORIDE 0.5 MG: 0.5 INJECTION, SOLUTION INTRAMUSCULAR; INTRAVENOUS; SUBCUTANEOUS at 03:11

## 2023-11-10 RX ADMIN — IPRATROPIUM BROMIDE AND ALBUTEROL SULFATE 3 ML: .5; 3 SOLUTION RESPIRATORY (INHALATION) at 09:11

## 2023-11-10 RX ADMIN — ACETAMINOPHEN 1000 MG: 500 TABLET ORAL at 05:11

## 2023-11-10 RX ADMIN — IPRATROPIUM BROMIDE AND ALBUTEROL SULFATE 3 ML: .5; 3 SOLUTION RESPIRATORY (INHALATION) at 03:11

## 2023-11-10 RX ADMIN — CEFAZOLIN 2 G: 2 INJECTION, POWDER, FOR SOLUTION INTRAMUSCULAR; INTRAVENOUS at 03:11

## 2023-11-10 RX ADMIN — OXYCODONE HYDROCHLORIDE 5 MG: 5 TABLET ORAL at 12:11

## 2023-11-10 RX ADMIN — METHOCARBAMOL 500 MG: 500 TABLET ORAL at 10:11

## 2023-11-10 RX ADMIN — MUPIROCIN 1 G: 20 OINTMENT TOPICAL at 09:11

## 2023-11-10 RX ADMIN — OXYCODONE HYDROCHLORIDE 5 MG: 5 TABLET ORAL at 02:11

## 2023-11-10 RX ADMIN — CEFAZOLIN 2 G: 2 INJECTION, POWDER, FOR SOLUTION INTRAMUSCULAR; INTRAVENOUS at 08:11

## 2023-11-10 RX ADMIN — OXYCODONE HYDROCHLORIDE 10 MG: 10 TABLET ORAL at 05:11

## 2023-11-10 RX ADMIN — TAMSULOSIN HYDROCHLORIDE 0.4 MG: 0.4 CAPSULE ORAL at 09:11

## 2023-11-10 RX ADMIN — BUPROPION HYDROCHLORIDE 300 MG: 150 TABLET, FILM COATED, EXTENDED RELEASE ORAL at 08:11

## 2023-11-10 RX ADMIN — FAMOTIDINE 20 MG: 20 TABLET ORAL at 11:11

## 2023-11-10 NOTE — OP NOTE
DATE OF PROCEDURE:  11/9/2023     ATTENDING SURGEON:  Goran Bennett M.D.    ASSISTANT: Laci Ace MD, (Cardiothoracic Resident)     PREOPERATIVE DIAGNOSES:  1.  Severe mitral regurgitation.  2.  Atrial fibrillation  3.  Hypertension  4.  Hyperlipidemia    POSTOPERATIVE DIAGNOSES:  1.  Severe mitral regurgitation.  2.  Atrial fibrillation  3.  Hypertension  4.  Hyperlipidemia    OPERATION PERFORMED:      1)  Mitral valve repair with medial commissuroplasty and a 36 mm Medtronic Simuplus band annuloplasty.    2)  Maze procedure with complete left and right atrial lesion sets and resection of left atrial appendage     ANESTHESIA:  General endotracheal.     ESTIMATED BLOOD LOSS:  100 mL.      BRIEF HISTORY:  Mr. Armendariz is a very pleasant 58-year-old gentleman who was undergoing preoperative clearance for repair of a groin hernia when he was found to be in atrial fibrillation.  He had an echo which demonstrated severe mitral regurgitation.  He was admitted several times for arrhythmia, chest pain, and a diagnosis of congestive heart failure.  He now presents for mitral valve repair and Maze procedure.     PROCEDURE IN DETAIL:  After obtaining informed and written consent, the patient   was brought to the Operating Room and placed on the operating table in supine   position.  After induction of adequate general endotracheal anesthesia, the   patient's chest, abdomen, pelvis and bilateral lower extremities were prepped   and draped in the usual sterile fashion.  An upper midline skin incision was   made, and a median sternotomy was performed.  A pericardial well was created.    The patient was systemically heparinized.  Cannulation sutures were placed in   the aorta and in the superior vena cava.  The aortic cannula was inserted,   followed by the venous.  An IVC cannula was also placed.  Antegrade and   retrograde cardioplegia catheters were placed.  The patient was then put on   cardiopulmonary bypass.  Once on  bypass, circumferential control was obtained   over the superior vena cava.  The aortic crossclamp was applied, and the heart   was arrested using cold blood enhanced antegrade cardioplegia.  A prompt   electromechanical arrest was achieved.    Once the cardioplegia was all in, we first addressed the left-sided pulmonary veins.  A circumferential lesion was made using the AtriCure device.  This lesion was duplicated.  The left atrial appendage was then opened.  It was free of thrombus.  A connecting lesion was made from the appendage down onto the left superior pulmonary vein, again   using the AtriCure device.  The left atrial appendage was then resected using   the Knightsen stapler.  We then turned our attention to the right-sided pulmonary   veins, where again a double circumferential lesion was made using the AtriCure   device.  A left atriotomy was then made near the right-sided pulmonary veins.    Through the left atriotomy, connecting lesions were made from the atriotomy   first to the left superior pulmonary vein, and secondly from the atriotomy to   the left inferior pulmonary vein.  Finally, a connecting lesion was made that   was directed from the atriotomy towards the junction of the P2 and P3 scallops   of the posterior leaflet of the mitral valve.  Although this lesion was directed   towards the junction of the P2 and P3 scallops, it did not reach the mitral   annulus.  It was created using the AtriCure device.  The lesion was completed to   the level of the mitral annulus using the AtriCure cryoprobe. The coronary sinus lesion was then created using the Atricure cryoprobe. The Dumas   retractor was then placed for exposure, and the mitral valve was evaluated.    The A3 scallop of the anterior leaflet demonstrated   myxomatous change, and was quite redundant. Multiple nonpledgeted 2-0 Ethibond sutures were placed along   the posterior annulus from the medial trigone to the lateral trigone.  A medial  commissuroplasty was then performed using a running 5-0 Ethibond in two layers.  The valve was then   tested, and no significant mitral regurgitation was seen.  The annulus was   sized, and a 36 mm band was selected.  The band was brought up, the annuloplasty   sutures were passed through it, and it was slid into position.  It seated   nicely.  The sutures were tied and then cut.  The valve was again tested, and   again no mitral regurgitation was seen. The left atriotomy was then closed in a   single layer using running 4-0 Prolene suture.  Prior to closing the left   atrium, de-airing maneuvers were performed.  Once the left atrium was closed,   the hot shot was given retrograde.  Additional de-airing maneuvers were   performed, and the aortic cross-clamp was removed.      The right atrial appendage isolation lesion was created using the Atricure device.  The coronary sinus catheter was removed, and through the site in the right atrial free wall through which it had been placed lesions were created first into the superior vena cava and then secondly into the inferior vena cava, both using the Atricure device.  The coronary sinus catheter site was then oversewn with a pledgeted 4-0 Prolene suture.    The patient was subsequently weaned from cardiopulmonary bypass.  The patient did separate easily from bypass.  Once off bypass, all surgical sites were inspected.  There was good hemostasis.  The valve was evaluated using JASON.  There was no residual mitral   regurgitation seen. The test dose of protamine was administered, and this was well tolerated.  The total dose was then given.  Brandon through the total dose, all cannulas were removed. All surgical sites were again inspected, again there was good hemostasis.    Ventricular pacing wires were placed.  Drains were placed.  After again   confirming adequate hemostasis, the patient's chest was closed using eight #6   stainless steel wires to reapproximate the sternum.   The overlying soft tissues   were reapproximated using absorbable suture material.  The patient's chest was   washed and dried, and a dry dressing was applied.  The patient tolerated the   procedure well, there were no complications.  At the conclusion of the case,   sponge and instrument counts were correct.

## 2023-11-10 NOTE — SUBJECTIVE & OBJECTIVE
Interval History/Significant Events: POD 1 MVR, Schneider/maze, LAAR. Extubated overnight, 94% 2L NC. Maps high 50s after receiving oxy 10 and getting up to chair. Paced at 80, NSR.     Follow-up For: Procedure(s) (LRB):  SCHNEIDER MAZE PROCEDURE (N/A)  REPAIR, MITRAL VALVE, OPEN (N/A)  EXCLUSION, LEFT ATRIAL APPENDAGE, OPEN, AS PART OF OPEN CHEST SURGERY (N/A)    Post-Operative Day: 1 Day Post-Op    Objective:     Vital Signs (Most Recent):  Temp: 97.9 °F (36.6 °C) (11/10/23 0400)  Pulse: 80 (11/10/23 0600)  Resp: 15 (11/10/23 0600)  BP: 105/65 (11/10/23 0600)  SpO2: (!) 94 % (11/10/23 0600) Vital Signs (24h Range):  Temp:  [97.9 °F (36.6 °C)-100.6 °F (38.1 °C)] 97.9 °F (36.6 °C)  Pulse:  [] 80  Resp:  [13-36] 15  SpO2:  [88 %-100 %] 94 %  BP: ()/(54-79) 105/65  Arterial Line BP: ()/(44-70) 92/44     Weight: 87.5 kg (193 lb)  Body mass index is 26.92 kg/m².      Intake/Output Summary (Last 24 hours) at 11/10/2023 0656  Last data filed at 11/10/2023 0600  Gross per 24 hour   Intake 2770.92 ml   Output 2155 ml   Net 615.92 ml          Physical Exam  Vitals and nursing note reviewed.   Constitutional:       General: He is not in acute distress.     Appearance: He is not ill-appearing.   Cardiovascular:      Rate and Rhythm: Normal rate.      Comments: Paced at 80, NSR  Pulmonary:      Effort: Pulmonary effort is normal. No respiratory distress.      Comments: NC  Abdominal:      General: There is no distension.   Genitourinary:     Comments: Antunez  Skin:     General: Skin is warm and dry.      Comments: MSI c/d/I  , 130cc SS  Pacing wires   Neurological:      General: No focal deficit present.      Mental Status: He is alert.            Vents:  Vent Mode: Spont (11/09/23 1700)  Set Rate: 20 BPM (11/09/23 1511)  Vt Set: 490 mL (11/09/23 1511)  Pressure Support: 8 cmH20 (11/09/23 1700)  PEEP/CPAP: 5 cmH20 (11/09/23 1700)  Oxygen Concentration (%): 50 (Simultaneous filing. User may not have seen previous  "data.) (11/09/23 1700)  Peak Airway Pressure: 11 cmH20 (11/09/23 1700)  Plateau Pressure: 0 cmH20 (11/09/23 1700)  Total Ve: 0 L/m (11/09/23 1700)  Negative Inspiratory Force (cm H2O): -21 (11/09/23 1700)  F/VT Ratio<105 (RSBI): (!) 82.69 (11/09/23 1626)    Lines/Drains/Airways       Central Venous Catheter Line  Duration             Introducer 11/09/23 0808 <1 day    Percutaneous Central Line Insertion/Assessment - Triple Lumen  11/09/23 0800 <1 day              Drain  Duration                  Urethral Catheter 11/09/23 0754 Temperature probe;Non-latex;Silicone 14 Fr. <1 day         Y Chest Tube 1 and 2 11/09/23 1202 1 Right Mediastinal 19 Fr. 2 Left Mediastinal 19 Fr. <1 day              Arterial Line  Duration             Arterial Line 11/09/23 0806 Left Radial <1 day              Line  Duration                  Pacer Wires 11/09/23 1123 <1 day              Peripheral Intravenous Line  Duration                  Peripheral IV - Single Lumen 16 G Left Hand -- days         Peripheral IV - Single Lumen 11/09/23 0545 18 G Posterior;Right Hand 1 day                    Significant Labs:    CBC/Anemia Profile:  Recent Labs   Lab 11/09/23  1259 11/09/23  1301 11/09/23  1507 11/10/23  0327   WBC 10.52  --   --  9.71   HGB 13.6*  --   --  12.1*   HCT 39.0* 38 39 35.9*   PLT 99*  --   --  84*   MCV 90  --   --  92   RDW 12.3  --   --  13.0        Chemistries:  Recent Labs   Lab 11/09/23  1400 11/09/23  1947 11/10/23  0327 11/10/23  0524     --  136 136   K 4.5 5.0 5.4* 4.8     --  105 106   CO2 21*  --  20* 22*   BUN 26*  --  36* 36*   CREATININE 1.3  --  1.9* 2.0*   CALCIUM 7.8*  --  7.9* 7.8*   ALBUMIN 2.9*  --  3.4* 3.4*   PROT 5.2*  --  5.6* 5.4*   BILITOT 1.0  --  0.5 0.4   ALKPHOS 49*  --  40* 42*   ALT 22  --  22 19   *  --  156* 142*   MG 2.9*  --  2.7*  --    PHOS 3.7  --  5.6*  --        Lactic Acid: No results for input(s): "LACTATE" in the last 48 hours.    Significant Imaging:  I have " reviewed all pertinent imaging results/findings within the past 24 hours.

## 2023-11-10 NOTE — PLAN OF CARE
[facility] - Surgical Intensive Care  Initial Discharge Assessment       Primary Care Provider: Sammy Trinidad MD    Admission Diagnosis: Mitral valve insufficiency, unspecified etiology [I34.0]  Paroxysmal atrial fibrillation [I48.0]  MVP (mitral valve prolapse) [I34.1]    Admission Date: 11/9/2023  Expected Discharge Date:          [facility]ICAID [facility] Product Type: Managed Medicaid /     Extended Emergency Contact Information  Primary Emergency Contact: Radha Armendariz  Address: [id number] [place] [address] [place] [address] [place]  Mobile Phone: [phone]  Relation: Spouse  Preferred language: English   needed? No    Discharge Plan A: Home, Home with family  Discharge Plan B: Home Health      [id number] - [place] - [address]  [address] [address]  Phone: [phone] Fax: [phone]      Initial Assessment (most recent)       Adult Discharge Assessment - 11/10/23 1241          Discharge Assessment    Assessment Type Discharge Planning Assessment     Confirmed/corrected address, phone number and insurance Yes     Confirmed Demographics Correct on Facesheet     Source of Information patient     Communicated THEA with patient/caregiver Date not available/Unable to determine     Reason For Admission Mitral regurgitation     People in Home spouse     Do you expect to return to your current living situation? Yes     Do you have help at home or someone to help you manage your care at home? Yes     Who are your caregiver(s) and their phone number(s)? Radha Armendariz ( Spouse) [phone]     Prior to hospitilization cognitive status: Alert/Oriented     Current cognitive status: Alert/Oriented     Equipment Currently Used at Home none     Patient currently being followed by outpatient case management? No     Do you currently have service(s) that help you manage your care at home? No     Do you take prescription  medications? Yes     Do you have prescription coverage? Yes     Coverage MEDICAID - [facility] [facility]     Is the patient taking medications as prescribed? yes     Who is going to help you get home at discharge? Radha Armendariz     Are you on dialysis? No     Do you take coumadin? No     DME Needed Upon Discharge  other (see comments)   TBD    Discharge Plan A Home;Home with family     Discharge Plan B Home Health        Physical Activity    On average, how many days per week do you engage in moderate to strenuous exercise (like a brisk walk)? 0 days     On average, how many minutes do you engage in exercise at this level? 0 min        Financial Resource Strain    How hard is it for you to pay for the very basics like food, housing, medical care, and heating? Not hard at all        Housing Stability    In the last 12 months, was there a time when you were not able to pay the mortgage or rent on time? No     In the last 12 months, was there a time when you did not have a steady place to sleep or slept in a shelter (including now)? No        Transportation Needs    In the past 12 months, has lack of transportation kept you from medical appointments or from getting medications? No     In the past 12 months, has lack of transportation kept you from meetings, work, or from getting things needed for daily living? No        Food Insecurity    Within the past 12 months, you worried that your food would run out before you got the money to buy more. Never true     Within the past 12 months, the food you bought just didn't last and you didn't have money to get more. Never true        Social Connections    In a typical week, how many times do you talk on the phone with family, friends, or neighbors? More than three times a week     How often do you get together with friends or relatives? More than three times a week     Are you , , , , never , or living with a partner?          Alcohol Use    Q1: How often do you have a drink containing alcohol? Patient refused     Q2: How many drinks containing alcohol do you have on a typical day when you are drinking? Patient refused     Q3: How often do you have six or more drinks on one occasion? Patient refused                      This SW met with patient at bedside to complete DPA. Questions answered / contact numbers provided.  Use PREFERRED PHARMACY / BEDSIDE DELIVERY for any necessary medications at time of discharge. The patient is independent with all ADLs - does not use DME, In-home equipment, is not on HD, Coumadin or home oxygen.The patient's wife will be assisting with help upon discharge. The patient's wife  will be providing transportation home. Hospital follow up will be scheduled with PCP. Will continue to follow for course of hospitalization.    Discharge Plan A  home and Plan B home health have been determined by review of patient's clinical status, future medical and therapeutic needs, and coverage/benefits for post-acute care in coordination with multidisciplinary team members.      Desi Mckinley LMSW  Case Management [facility]

## 2023-11-10 NOTE — NURSING
"      SICU PLAN OF CARE NOTE    Dx: Mitral regurgitation    Vital Signs: BP (!) 88/57   Pulse 80   Temp 98.2 °F (36.8 °C) (Oral)   Resp (!) 23   Ht 5' 11" (1.803 m)   Wt 87.5 kg (193 lb)   SpO2 100%   BMI 26.92 kg/m²     SHIFT EVENTS:  Afebrile. Extubated at 1700 to 3L NC; orders via telephone from Angel BREWER. CTS team placed pt on backup rate of 40bpm. Sabrina then put pt on rate of 80 bpm due to hypotension. 500cc albumin given for hypotension. Pt responded well. CVP 7. Patient too drowsy for swallow study; Verbal orders from  to delay aspirin until 1900.     Neuro: AAO x4, Follows Commands, and Moves All Extremities    Respiratory: Nasal Cannula 5L    Cardiac: Paced @ 80 bpm    Diet: NPO    Gtts: Insulin     Urine Output: Urinary Catheter 720 ml/shift    Drains:     Chest Tube, total output 350 cc /  shift    Labs/Accuchecks: daily labs; q1h accuchecks.    "

## 2023-11-10 NOTE — ASSESSMENT & PLAN NOTE
Neuro/Psych:     - Sedation: none    - Pain:    - Scheduled Tylenol 1g q8h   - Oxy 5 PRN             Cardiac:     - S/P MV repair, COZ/MAZE and LAAR with Dr. Bennett on 11/9/2023    - BP Goal: MAP >60   - MAP high 50s this am, ordered 250cc albumin     - Cleviprex PRN    - Pressors: off gtts    - Anti-HTNs: Will start when appropriate    - Rhythm: NSR, underlying rhythm junctional at 40   - paced at 80    - Beta blocker: Will start when appropriate  - ASA started last night        Pulmonary:     - Goal SpO2 >92%    - 2l NC, wean as tolerated     - Chest Tubes x 2 meds   - 180 (660cc)    - ABGs PRN      Renal:    - Trend BUN/Cr     - Maintain Antunez, record strict Is/Os    Recent Labs   Lab 11/09/23  1400 11/10/23  0327 11/10/23  0524   BUN 26* 36* 36*   CREATININE 1.3 1.9* 2.0*     - UOP: 700 (1.5L)  - flomax daily       FEN / GI:     - Daily CMP, PRN K/Mag/Phos per protocol    - repeat BMP improved this am     - Replace electrolytes as needed    - Nutrition: CLD, ADAT    - Bowel Regimen: Miralax, docusate      ID:     - Afebrile, 100.6 but covered in heating blankets     - WBC stable    - Abx: Complete perioperative cefazolin 2g Q8H x 5 doses    Recent Labs   Lab 11/07/23  1246 11/09/23  1259 11/10/23  0327   WBC 6.84 10.52 9.71           Heme/Onc:     - Hgb 13.6 pre-operatively    - CBC daily    - ASA 325mg daily    Recent Labs   Lab 11/07/23  1246 11/09/23  1259 11/10/23  0327   HGB 16.3 13.6* 12.1*   * 99* 84*   APTT 26.8 30.6 30.2   INR 1.0 1.1 1.1           Endocrine:     - CTS Goal -140    - HgbA1c: 5.6    - Endocrinology consulted for insulin management      PPx:   Feeding: CLD, ADAT  Analgesia/Sedation: multimodal  Thromboembolic Prevention: SCDs  HOB >30: Yes  Stress Ulcer: pepcid  Glucose Control: Yes, insulin management per Endocrinology     Lines/Drains/Airway:   Left radial arterial line   RIJ CVC   Atnunez   Chest Tubes: 2   Pacing Wires: Temporary ventricular pacing wires      Dispo/Code  Status/Palliative:     - Continue SICU Care    - Full Code

## 2023-11-10 NOTE — PT/OT/SLP EVAL
Occupational Therapy   Evaluation/Treatment    Name: Abimael Armendariz  MRN: [id number]  Admitting Diagnosis: Mitral regurgitation  Recent Surgery: Procedure(s) (LRB):  SCHNEIDER MAZE PROCEDURE (N/A)  REPAIR, MITRAL VALVE, OPEN (N/A)  EXCLUSION, LEFT ATRIAL APPENDAGE, OPEN, AS PART OF OPEN CHEST SURGERY (N/A) 1 Day Post-Op    Recommendations:     Discharge Recommendations: No Therapy Indicated  Discharge Equipment Recommendations:  none  Barriers to discharge:  None    Assessment:     Abimael Armendariz is a 58 y.o. male with a medical diagnosis of Mitral regurgitation. Performance deficits affecting function: weakness, impaired endurance, impaired self care skills, impaired functional mobility, gait instability, impaired balance, impaired cardiopulmonary response to activity. Patient tolerated treatment session and was motivated to participate in therapy. VSS throughout session. Patient would benefit from continued skilled acute OT 5x/wk to improve functional mobility, increase independence with ADLs, and address established goals prior to discharge.     Rehab Prognosis: Good; patient would benefit from acute skilled OT services to address these deficits and reach maximum level of function.       Plan:     Patient to be seen 5 x/week to address the above listed problems via self-care/home management, therapeutic exercises, therapeutic activities  Plan of Care Expires: 12/10/23  Plan of Care Reviewed with: patient    Subjective     Chief Complaint: none  Patient/Family Comments/goals: patient agreed to therapy.     Occupational Profile:  Living Environment: Patient lives with wife in a H with no LAWRENCE to enter. Patient has a tub shower combo with no bench or grab bar. Patient was independent with ADLs and functional mobility PTA.   Equipment Used at Home:  (owns a BSC, but does not use it.)    Pain/Comfort:  Pain Rating 1: 2/10  Location - Side 1: Bilateral  Location - Orientation 1: generalized  Location 1: chest  Pain  Rating Post-Intervention 1: 2/10    Patients cultural, spiritual, Jain conflicts given the current situation: no    Objective:     Communicated with: NSG prior to session.  Patient found up in chair with telemetry, pulse ox (continuous), blood pressure cuff, central line, oxygen, external pacer, arterial line, chest tube, valente catheter, peripheral IV upon OT entry to room.    General Precautions: Standard, fall, sternal  Orthopedic Precautions: N/A  Braces: N/A  Respiratory Status: Nasal cannula    Occupational Performance:    Functional Mobility/Transfers:  Patient completed Sit <> Stand Transfer with stand by assistance  with  no assistive device   Functional Mobility: Patient ambulated bedside chair>sink with no AD and SBA for ADLs. Patient then ambulated in room prior to returning to bedside chair with SBA and no AD.     Activities of Daily Living:  Grooming: stand by assistance standing at sink for oral care, washing hands, and washing face     Cognitive/Visual Perceptual:  Cognitive/Psychosocial Skills:     -       Oriented to: Person, Place, Time, and Situation   -       Follows Commands/attention:Follows multistep  commands  -       Communication: clear/fluent  -       Memory: No Deficits noted  -       Safety awareness/insight to disability: intact   -       Mood/Affect/Coping skills/emotional control: Appropriate to situation  Visual/Perceptual:      -Intact      Physical Exam:  Upper Extremity Range of Motion:     -       Right Upper Extremity: WFL  -       Left Upper Extremity: WFL   Strength:    -       Right Upper Extremity: WFL  -       Left Upper Extremity: WFL  Fine Motor Coordination:    -       Intact  Gross motor coordination:   WFL    AMPAC 6 Click ADL:  AMPAC Total Score: 19    Treatment & Education:  Role of OT and POC  ADL retraining  Functional mobility training  Safety  Importance EOB/OOB activity    Co-treatment performed due to patient's multiple deficits requiring two skilled  therapists to appropriately and safely assess patient's strength and endurance while facilitating functional tasks in addition to accommodating for patient's activity tolerance.     Patient left up in chair with all lines intact, call button in reach, and all needs met.     GOALS:   Multidisciplinary Problems       Occupational Therapy Goals          Problem: Occupational Therapy    Goal Priority Disciplines Outcome Interventions   Occupational Therapy Goal     OT, PT/OT Ongoing, Progressing    Description: Goals to be met by: 12/1/2023    Patient will increase functional independence with ADLs by performing:    UE Dressing with Set-up Assistance.  LE Dressing with Supervision.  Grooming while standing at sink with Modified [place].  Toileting from toilet with Modified [place] for hygiene and clothing management.   Supine to sit with Supervision.  Stand pivot transfers with Modified [place].  Toilet transfer to toilet with Modified [place].                         History:     Past Medical History:   Diagnosis Date    ADHD (attention deficit hyperactivity disorder)     Anticoagulant long-term use     Anxiety     CHF (congestive heart failure)     Depression     Diabetes mellitus     Diverticulitis     Hyperlipidemia     Insomnia     Mitral regurgitation     Mitral valve disease 08/2023    PAF (paroxysmal atrial fibrillation)          Past Surgical History:   Procedure Laterality Date    ANGIOGRAM, CORONARY, WITH LEFT HEART CATHETERIZATION  8/30/2023    Procedure: Left Heart Cath;  Surgeon: Hilario Pretty MD;  Location: [facility] CATH;  Service: Cardiology;;    CORONARY ANGIOGRAPHY  8/30/2023    Procedure: Coronary angiogram study;  Surgeon: Hilario Pretty MD;  Location: [facility] CATH;  Service: Cardiology;;    SCHNEIDER MAZE PROCEDURE N/A 11/9/2023    Procedure: SCHNEIDER MAZE PROCEDURE;  Surgeon: Goran Bennett MD;  Location: [facility] OR [address] [place] [address];  Service: Cardiovascular;  Laterality: N/A;     EXCLUSION, LEFT ATRIAL APPENDAGE, OPEN, AS PART OF OPEN CHEST SURGERY N/A 11/9/2023    Procedure: EXCLUSION, LEFT ATRIAL APPENDAGE, OPEN, AS PART OF OPEN CHEST SURGERY;  Surgeon: Goran Bennett MD;  Location: [facility] OR [address] [place] [address];  Service: Cardiovascular;  Laterality: N/A;    KNEE ARTHROSCOPY W/ ACL RECONSTRUCTION AND HAMSTRING GRAFT  2003    right x 2; redo [place] in [place] 1/2022    LUMBAR LAMINECTOMY FOR DECOMPRESSION  12/2022    REPAIR, MITRAL VALVE, OPEN N/A 11/9/2023    Procedure: REPAIR, MITRAL VALVE, OPEN;  Surgeon: Goran Bennett MD;  Location: [facility] OR [address] [place] [address];  Service: Cardiovascular;  Laterality: N/A;    TRANSESOPHAGEAL ECHOCARDIOGRAM WITH POSSIBLE CARDIOVERSION (JASON W/ POSS CARDIOVERSION) Bilateral 08/21/2023    Procedure: Transesophageal echo (JASNO) intra-procedure log documentation;  Surgeon: Jesu Flores MD;  Location: [facility] CATH/EP LAB;  Service: Cardiology;  Laterality: Bilateral;       Time Tracking:     OT Date of Treatment: 11/10/23  OT Start Time: 1050  OT Stop Time: 1111  OT Total Time (min): 21 min    Billable Minutes:Evaluation 10  Self Care/Home Management 11    11/10/2023

## 2023-11-10 NOTE — PLAN OF CARE
SICU PLAN OF CARE NOTE    Dx: Mitral regurgitation    Goals of Care: Keep MAP btw 60-80    Vital Signs (last 12 hours):   Temp:  [97.9 °F (36.6 °C)-100.6 °F (38.1 °C)]   Pulse:  [46-80]   Resp:  [13-32]   BP: ()/(54-79)   SpO2:  [88 %-100 %]   Arterial Line BP: ()/(49-70)      Neuro: AAO x4, Follows Commands, and Moves All Extremities    Cardiac: Vpaced at 80, CVP 7 - 5 -    Respiratory: Nasal Cannula 2L    Gtts: none    Urine Output: Urinary Catheter 700 cc/shift        Drains: Chest tube: 180 cc/shift    Diet: Clear Liquids     Labs/Accuchecks: Q1 Accucheck    Skin: Midline incision on chest with island border dressing,  Pacer wires/chest tube incision    Shift Events: No acute events noted, pain managed with PRN meds,  Intermittent hypotension noted once up in chair, CTS team notified, no new orders received.

## 2023-11-10 NOTE — PLAN OF CARE
Problem: Occupational Therapy  Goal: Occupational Therapy Goal  Description: Goals to be met by: 12/1/2023    Patient will increase functional independence with ADLs by performing:    UE Dressing with Set-up Assistance.  LE Dressing with Supervision.  Grooming while standing at sink with Modified [place].  Toileting from toilet with Modified [place] for hygiene and clothing management.   Supine to sit with Supervision.  Stand pivot transfers with Modified [place].  Toilet transfer to toilet with Modified [place].    Outcome: Ongoing, Progressing   Patient's goals are set.

## 2023-11-10 NOTE — ASSESSMENT & PLAN NOTE
Endocrinology consulted for BG management.   BG goal 110-140 (CTS Protocol)     - D/C IIP  - Novolog (Insulin Aspart) prn for BG excursions [facility] SSI (150/25)  - BG checks AC/HS  - Hypoglycemia protocol in place      ** Please notify Endocrine for any change and/or advance in diet**  ** Please call Endocrine for any BG related issues **    Discharge Planning:   TBD. Please notify endocrinology prior to discharge.

## 2023-11-10 NOTE — PROGRESS NOTES
[facility] - [facility]ensive [facility]  Critical Care - Surgery  Progress Note    Patient Name: Abimael Armendariz  MRN: [id number]  Admission Date: 11/9/2023  Hospital Length of Stay: 1 days  Code Status: Full Code  Attending Provider: Goran Bennett MD  Primary Care Provider: Sammy Trinidad MD   Principal Problem: Mitral regurgitation    Subjective:     Hospital/ICU Course:  No notes on file    Interval History/Significant Events: POD 1 MVR, Schneider/maze, LAAR. Extubated overnight, 94% 2L NC. Maps high 50s after receiving oxy 10 and getting up to chair. Paced at 80, NSR.     Follow-up For: Procedure(s) (LRB):  SCHNEIDER MAZE PROCEDURE (N/A)  REPAIR, MITRAL VALVE, OPEN (N/A)  EXCLUSION, LEFT ATRIAL APPENDAGE, OPEN, AS PART OF OPEN CHEST SURGERY (N/A)    Post-Operative Day: 1 Day Post-Op    Objective:     Vital Signs (Most Recent):  Temp: 97.9 °F (36.6 °C) (11/10/23 0400)  Pulse: 80 (11/10/23 0600)  Resp: 15 (11/10/23 0600)  BP: 105/65 (11/10/23 0600)  SpO2: (!) 94 % (11/10/23 0600) Vital Signs (24h Range):  Temp:  [97.9 °F (36.6 °C)-100.6 °F (38.1 °C)] 97.9 °F (36.6 °C)  Pulse:  [] 80  Resp:  [13-36] 15  SpO2:  [88 %-100 %] 94 %  BP: ()/(54-79) 105/65  Arterial Line BP: ()/(44-70) 92/44     Weight: 87.5 kg (193 lb)  Body mass index is 26.92 kg/m².      Intake/Output Summary (Last 24 hours) at 11/10/2023 0656  Last data filed at 11/10/2023 0600  Gross per 24 hour   Intake 2770.92 ml   Output 2155 ml   Net 615.92 ml          Physical Exam  Vitals and nursing note reviewed.   Constitutional:       General: He is not in acute distress.     Appearance: He is not ill-appearing.   Cardiovascular:      Rate and Rhythm: Normal rate.      Comments: Paced at 80, NSR  Pulmonary:      Effort: Pulmonary effort is normal. No respiratory distress.      Comments: NC  Abdominal:      General: There is no distension.   Genitourinary:     Comments: Antunez  Skin:     General: Skin is warm and dry.      Comments: MSI c/d/I  CT  140, 130cc SS  Pacing wires   Neurological:      General: No focal deficit present.      Mental Status: He is alert.            Vents:  Vent Mode: Spont (11/09/23 1700)  Set Rate: 20 BPM (11/09/23 1511)  Vt Set: 490 mL (11/09/23 1511)  Pressure Support: 8 cmH20 (11/09/23 1700)  PEEP/CPAP: 5 cmH20 (11/09/23 1700)  Oxygen Concentration (%): 50 (Simultaneous filing. User may not have seen previous data.) (11/09/23 1700)  Peak Airway Pressure: 11 cmH20 (11/09/23 1700)  Plateau Pressure: 0 cmH20 (11/09/23 1700)  Total Ve: 0 L/m (11/09/23 1700)  Negative Inspiratory Force (cm H2O): -21 (11/09/23 1700)  F/VT Ratio<105 (RSBI): (!) 82.69 (11/09/23 1626)    Lines/Drains/Airways       Central Venous Catheter Line  Duration             Introducer 11/09/23 0808 <1 day    Percutaneous Central Line Insertion/Assessment - Triple Lumen  11/09/23 0800 <1 day              Drain  Duration                  Urethral Catheter 11/09/23 0754 Temperature probe;Non-latex;Silicone 14 Fr. <1 day         Y Chest Tube 1 and 2 11/09/23 1202 1 Right Mediastinal 19 Fr. 2 Left Mediastinal 19 Fr. <1 day              Arterial Line  Duration             Arterial Line 11/09/23 0806 Left Radial <1 day              Line  Duration                  Pacer Wires 11/09/23 1123 <1 day              Peripheral Intravenous Line  Duration                  Peripheral IV - Single Lumen 16 G Left Hand -- days         Peripheral IV - Single Lumen 11/09/23 0545 18 G Posterior;Right Hand 1 day                    Significant Labs:    CBC/Anemia Profile:  Recent Labs   Lab 11/09/23  1259 11/09/23  1301 11/09/23  1507 11/10/23  0327   WBC 10.52  --   --  9.71   HGB 13.6*  --   --  12.1*   HCT 39.0* 38 39 35.9*   PLT 99*  --   --  84*   MCV 90  --   --  92   RDW 12.3  --   --  13.0        Chemistries:  Recent Labs   Lab 11/09/23  1400 11/09/23  1947 11/10/23  0327 11/10/23  0524     --  136 136   K 4.5 5.0 5.4* 4.8     --  105 106   CO2 21*  --  20* 22*   BUN 26*   "--  36* 36*   CREATININE 1.3  --  1.9* 2.0*   CALCIUM 7.8*  --  7.9* 7.8*   ALBUMIN 2.9*  --  3.4* 3.4*   PROT 5.2*  --  5.6* 5.4*   BILITOT 1.0  --  0.5 0.4   ALKPHOS 49*  --  40* 42*   ALT 22  --  22 19   *  --  156* 142*   MG 2.9*  --  2.7*  --    PHOS 3.7  --  5.6*  --        Lactic Acid: No results for input(s): "LACTATE" in the last 48 hours.    Significant Imaging:  I have reviewed all pertinent imaging results/findings within the past 24 hours.  Assessment/Plan:     Cardiac/Vascular  * Mitral regurgitation    Neuro/Psych:     - Sedation: none    - Pain:    - Scheduled Tylenol 1g q8h   - Oxy 5 PRN             Cardiac:     - S/P MV repair, COZ/MAZE and LAAR with Dr. Bennett on 11/9/2023    - BP Goal: MAP >60   - MAP high 50s this am, ordered 250cc albumin     - Cleviprex PRN    - Pressors: off gtts    - Anti-HTNs: Will start when appropriate    - Rhythm: NSR, underlying rhythm junctional at 40   - paced at 80    - Beta blocker: Will start when appropriate  - ASA started last night        Pulmonary:     - Goal SpO2 >92%    - 2l NC, wean as tolerated     - Chest Tubes x 2 meds   - 180 (660cc)    - ABGs PRN      Renal:    - Trend BUN/Cr     - Maintain Antunez, record strict Is/Os    Recent Labs   Lab 11/09/23  1400 11/10/23  0327 11/10/23  0524   BUN 26* 36* 36*   CREATININE 1.3 1.9* 2.0*     - UOP: 700 (1.5L)  - flomax daily       FEN / GI:     - Daily CMP, PRN K/Mag/Phos per protocol    - repeat BMP improved this am     - Replace electrolytes as needed    - Nutrition: CLD, ADAT    - Bowel Regimen: Miralax, docusate      ID:     - Afebrile, 100.6 but covered in heating blankets     - WBC stable    - Abx: Complete perioperative cefazolin 2g Q8H x 5 doses    Recent Labs   Lab 11/07/23  1246 11/09/23  1259 11/10/23  0327   WBC 6.84 10.52 9.71           Heme/Onc:     - Hgb 13.6 pre-operatively    - CBC daily    - ASA 325mg daily    Recent Labs   Lab 11/07/23  1246 11/09/23  1259 11/10/23  0327   HGB 16.3 13.6* " 12.1*   * 99* 84*   APTT 26.8 30.6 30.2   INR 1.0 1.1 1.1           Endocrine:     - CTS Goal -140    - HgbA1c: 5.6    - Endocrinology consulted for insulin management      PPx:   Feeding: CLD, ADAT  Analgesia/Sedation: multimodal  Thromboembolic Prevention: SCDs  HOB >30: Yes  Stress Ulcer: pepcid  Glucose Control: Yes, insulin management per Endocrinology     Lines/Drains/Airway:   Left radial arterial line   RIJ CVC   Antunez   Chest Tubes: 2   Pacing Wires: Temporary ventricular pacing wires      Dispo/Code Status/Palliative:     - Continue SICU Care    - Full Code           Vivi Anderson MD  Critical Care - Surgery  [facility]

## 2023-11-10 NOTE — PROGRESS NOTES
[facility] - Surgical Intensive Care  Endocrinology  Progress Note    Admit Date: 11/9/2023     Reason for Consult: Management of T2DM, Hyperglycemia     Surgical Procedure and Date: s/p SCHNEIDER-MAZE procedure on 11/9/2023    Diabetes diagnosis year: BEE due to intubation    Home Diabetes Medications:  Metformin 500 mg BID (per chart review)    How often checking glucose at home?  BEE    BG readings on regimen: BEE    Diabetes Complications include:     Hyperglycemia    Complicating diabetes co morbidities:   HLD, HTN; and CTS      HPI: Abimael Armendariz is a 58 y.o. male with past Medical conditions includinh paroxysmal AF, Hyperlipidemia, Hypertension, Insomnia, ADHD (attention deficit hyperactivity disorder), Anxiety, depression. Recently diagnosis of afib, he reports he returned back home and was seen by Cardiology, 08/15/2023, where he was started on Lopressor and Eliquis with Holter monitor, echo and stress test ordered.  Echo completed 08/18/2023. Admitted 3 times within the diagnosis time  frame. First ED visit was for palpitations for which they adjusted his BB. He then developed chest pain and elevated heart rate and was seen at [facility], 08/17/2023- had negative cardiac ischemic work up. EKG during admission showed AFIB RVR for which he was given IV Cardizem x1 with rate improved and chest pain resolved. Subsequently underwent cardioversion. He was then readmitted again for CHF exacerbation. Independently perform ADLs. No Assistive devices for walking. Denies prior history of strokes, stents, sternotomies.  The patient presents to the SICU s/p MV repair, SCHNEIDER/MAZE and LAAR with Dr. Bennett on 11/09/2023.          Interval HPI:   Overnight events: No acute events overnight. Patient in room 54586/41424 A. Blood glucose stable. BG at and above goal on current insulin regimen (IIP- off drip overnight, low insulin requirements. ). Steroid use- None. 1 Day Post-Op  Renal function- Abnormal - Creatinine 2.0  "  Vasopressors-  None       Endocrine will continue to follow and manage insulin orders inpatient.       Diet Adult Regular (IDDSI Level 7)     Eatin%  Nausea: No  Hypoglycemia and intervention: No  Fever: No  TPN and/or TF: No      BP (!) 96/53 Comment: Simultaneous filing. User may not have seen previous data.  Pulse (!) 52   Temp 97.5 °F (36.4 °C) (Oral)   Resp 20   Ht 5' 11" (1.803 m)   Wt 87.5 kg (193 lb)   SpO2 95%   BMI 26.92 kg/m²     Labs Reviewed and Include    Recent Labs   Lab 11/10/23  0524 11/10/23  0808   *  --    CALCIUM 7.8*  --    ALBUMIN 3.4*  --    PROT 5.4*  --      --    K 4.8 4.5   CO2 22*  --      --    BUN 36*  --    CREATININE 2.0*  --    ALKPHOS 42*  --    ALT 19  --    *  --    BILITOT 0.4  --      Lab Results   Component Value Date    WBC 9.71 11/10/2023    HGB 12.1 (L) 11/10/2023    HCT 35.9 (L) 11/10/2023    MCV 92 11/10/2023    PLT 84 (L) 11/10/2023     No results for input(s): "TSH", "FREET4" in the last 168 hours.  Lab Results   Component Value Date    HGBA1C 5.6 2023       Nutritional status:   Body mass index is 26.92 kg/m².  Lab Results   Component Value Date    ALBUMIN 3.4 (L) 11/10/2023    ALBUMIN 3.4 (L) 11/10/2023    ALBUMIN 2.9 (L) 2023     No results found for: "PREALBUMIN"    Estimated Creatinine Clearance: 42.4 mL/min (A) (based on SCr of 2 mg/dL (H)).    Accu-Checks  Recent Labs     23  2213 23  2303 11/10/23  0011 11/10/23  0111 11/10/23  0219 11/10/23  0325 11/10/23  0410 11/10/23  0529 11/10/23  0736 11/10/23  0806   POCTGLUCOSE 105 124* 123* 106 118* 133* 128* 132* 170* 148*       Current Medications and/or Treatments Impacting Glycemic Control  Immunotherapy:    Immunosuppressants       None          Steroids:   Hormones (From admission, onward)      None          Pressors:    Autonomic Drugs (From admission, onward)      None          Hyperglycemia/Diabetes Medications:   Antihyperglycemics (From " admission, onward)      Start     Stop Route Frequency Ordered    11/10/23 1214  insulin aspart U-100 pen 0-10 Units         -- SubQ Before meals & nightly PRN 11/10/23 1115            ASSESSMENT and PLAN    Cardiac/Vascular  * Mitral regurgitation  Managed per primary team  Avoid hypoglycemia        S/P Maze operation for atrial fibrillation  Optimize BG control to improve wound healing        Hypertension  Uncontrolled HTN can worsen insulin resistance.       Endocrine  Type 2 diabetes mellitus without complication, without long-term current use of insulin  Endocrinology consulted for BG management.   BG goal 110-140 (CTS Protocol)     - D/C IIP  - Novolog (Insulin Aspart) prn for BG excursions [facility] SSI (150/25)  - BG checks AC/HS  - Hypoglycemia protocol in place      ** Please notify Endocrine for any change and/or advance in diet**  ** Please call Endocrine for any BG related issues **    Discharge Planning:   TBD. Please notify endocrinology prior to discharge.             Gilberto Merida, DNP, FNP  Endocrinology  [facility] - Surgical [facility]

## 2023-11-11 LAB
ALBUMIN SERPL BCP-MCNC: 3.2 G/DL (ref 3.5–5.2)
ALP SERPL-CCNC: 40 U/L (ref 55–135)
ALT SERPL W/O P-5'-P-CCNC: 11 U/L (ref 10–44)
ANION GAP SERPL CALC-SCNC: 10 MMOL/L (ref 8–16)
APTT PPP: 26.2 SEC (ref 21–32)
AST SERPL-CCNC: 83 U/L (ref 10–40)
BASOPHILS # BLD AUTO: 0.01 K/UL (ref 0–0.2)
BASOPHILS NFR BLD: 0.1 % (ref 0–1.9)
BILIRUB SERPL-MCNC: 0.4 MG/DL (ref 0.1–1)
BUN SERPL-MCNC: 36 MG/DL (ref 6–20)
CALCIUM SERPL-MCNC: 8.3 MG/DL (ref 8.7–10.5)
CHLORIDE SERPL-SCNC: 101 MMOL/L (ref 95–110)
CO2 SERPL-SCNC: 24 MMOL/L (ref 23–29)
CREAT SERPL-MCNC: 1.8 MG/DL (ref 0.5–1.4)
DIFFERENTIAL METHOD: ABNORMAL
EOSINOPHIL # BLD AUTO: 0 K/UL (ref 0–0.5)
EOSINOPHIL NFR BLD: 0.4 % (ref 0–8)
ERYTHROCYTE [DISTWIDTH] IN BLOOD BY AUTOMATED COUNT: 12.8 % (ref 11.5–14.5)
EST. GFR  (NO RACE VARIABLE): 42.8 ML/MIN/1.73 M^2
GLUCOSE SERPL-MCNC: 138 MG/DL (ref 70–110)
HCT VFR BLD AUTO: 30.6 % (ref 40–54)
HGB BLD-MCNC: 10.2 G/DL (ref 14–18)
IMM GRANULOCYTES # BLD AUTO: 0.02 K/UL (ref 0–0.04)
IMM GRANULOCYTES NFR BLD AUTO: 0.3 % (ref 0–0.5)
INR PPP: 1 (ref 0.8–1.2)
LYMPHOCYTES # BLD AUTO: 0.8 K/UL (ref 1–4.8)
LYMPHOCYTES NFR BLD: 10.3 % (ref 18–48)
MAGNESIUM SERPL-MCNC: 2.6 MG/DL (ref 1.6–2.6)
MCH RBC QN AUTO: 30.6 PG (ref 27–31)
MCHC RBC AUTO-ENTMCNC: 33.3 G/DL (ref 32–36)
MCV RBC AUTO: 92 FL (ref 82–98)
MONOCYTES # BLD AUTO: 0.7 K/UL (ref 0.3–1)
MONOCYTES NFR BLD: 9.2 % (ref 4–15)
NEUTROPHILS # BLD AUTO: 5.8 K/UL (ref 1.8–7.7)
NEUTROPHILS NFR BLD: 79.7 % (ref 38–73)
NRBC BLD-RTO: 0 /100 WBC
PHOSPHATE SERPL-MCNC: 3.5 MG/DL (ref 2.7–4.5)
PLATELET # BLD AUTO: 78 K/UL (ref 150–450)
PMV BLD AUTO: 10.8 FL (ref 9.2–12.9)
POCT GLUCOSE: 122 MG/DL (ref 70–110)
POCT GLUCOSE: 133 MG/DL (ref 70–110)
POCT GLUCOSE: 143 MG/DL (ref 70–110)
POCT GLUCOSE: 165 MG/DL (ref 70–110)
POTASSIUM SERPL-SCNC: 4.5 MMOL/L (ref 3.5–5.1)
POTASSIUM SERPL-SCNC: 4.9 MMOL/L (ref 3.5–5.1)
PROT SERPL-MCNC: 5.4 G/DL (ref 6–8.4)
PROTHROMBIN TIME: 10.5 SEC (ref 9–12.5)
RBC # BLD AUTO: 3.33 M/UL (ref 4.6–6.2)
SODIUM SERPL-SCNC: 135 MMOL/L (ref 136–145)
WBC # BLD AUTO: 7.29 K/UL (ref 3.9–12.7)

## 2023-11-11 PROCEDURE — 99499 UNLISTED E&M SERVICE: CPT | Mod: ,,, | Performed by: ANESTHESIOLOGY

## 2023-11-11 PROCEDURE — 80053 COMPREHEN METABOLIC PANEL: CPT | Performed by: THORACIC SURGERY (CARDIOTHORACIC VASCULAR SURGERY)

## 2023-11-11 PROCEDURE — 99499 NO LOS: ICD-10-PCS | Mod: ,,, | Performed by: ANESTHESIOLOGY

## 2023-11-11 PROCEDURE — 84100 ASSAY OF PHOSPHORUS: CPT | Performed by: THORACIC SURGERY (CARDIOTHORACIC VASCULAR SURGERY)

## 2023-11-11 PROCEDURE — 25000003 PHARM REV CODE 250

## 2023-11-11 PROCEDURE — 83735 ASSAY OF MAGNESIUM: CPT | Performed by: THORACIC SURGERY (CARDIOTHORACIC VASCULAR SURGERY)

## 2023-11-11 PROCEDURE — 63600175 PHARM REV CODE 636 W HCPCS

## 2023-11-11 PROCEDURE — 85730 THROMBOPLASTIN TIME PARTIAL: CPT | Performed by: THORACIC SURGERY (CARDIOTHORACIC VASCULAR SURGERY)

## 2023-11-11 PROCEDURE — 84132 ASSAY OF SERUM POTASSIUM: CPT | Performed by: PHYSICIAN ASSISTANT

## 2023-11-11 PROCEDURE — 25000003 PHARM REV CODE 250: Performed by: STUDENT IN AN ORGANIZED HEALTH CARE EDUCATION/TRAINING PROGRAM

## 2023-11-11 PROCEDURE — 25000003 PHARM REV CODE 250: Performed by: PHYSICIAN ASSISTANT

## 2023-11-11 PROCEDURE — 85025 COMPLETE CBC W/AUTO DIFF WBC: CPT | Performed by: THORACIC SURGERY (CARDIOTHORACIC VASCULAR SURGERY)

## 2023-11-11 PROCEDURE — 20600001 HC STEP DOWN PRIVATE ROOM

## 2023-11-11 PROCEDURE — 85610 PROTHROMBIN TIME: CPT | Performed by: THORACIC SURGERY (CARDIOTHORACIC VASCULAR SURGERY)

## 2023-11-11 PROCEDURE — 63600175 PHARM REV CODE 636 W HCPCS: Performed by: PHYSICIAN ASSISTANT

## 2023-11-11 RX ORDER — ATORVASTATIN CALCIUM 40 MG/1
40 TABLET, FILM COATED ORAL NIGHTLY
Status: DISCONTINUED | OUTPATIENT
Start: 2023-11-12 | End: 2023-11-14 | Stop reason: HOSPADM

## 2023-11-11 RX ORDER — ATORVASTATIN CALCIUM 20 MG/1
40 TABLET, FILM COATED ORAL NIGHTLY
Status: DISCONTINUED | OUTPATIENT
Start: 2023-11-11 | End: 2023-11-11

## 2023-11-11 RX ORDER — FUROSEMIDE 10 MG/ML
20 INJECTION INTRAMUSCULAR; INTRAVENOUS
Status: DISCONTINUED | OUTPATIENT
Start: 2023-11-11 | End: 2023-11-14 | Stop reason: HOSPADM

## 2023-11-11 RX ORDER — METOPROLOL TARTRATE 25 MG/1
12.5 TABLET ORAL 2 TIMES DAILY
Status: DISCONTINUED | OUTPATIENT
Start: 2023-11-11 | End: 2023-11-14 | Stop reason: HOSPADM

## 2023-11-11 RX ADMIN — ACETAMINOPHEN 1000 MG: 500 TABLET ORAL at 01:11

## 2023-11-11 RX ADMIN — ACETAMINOPHEN 1000 MG: 500 TABLET ORAL at 05:11

## 2023-11-11 RX ADMIN — MUPIROCIN 1 G: 20 OINTMENT TOPICAL at 08:11

## 2023-11-11 RX ADMIN — METHOCARBAMOL 1000 MG: 500 TABLET ORAL at 09:11

## 2023-11-11 RX ADMIN — DOCUSATE SODIUM 100 MG: 100 CAPSULE, LIQUID FILLED ORAL at 08:11

## 2023-11-11 RX ADMIN — OXYCODONE HYDROCHLORIDE 10 MG: 10 TABLET ORAL at 05:11

## 2023-11-11 RX ADMIN — PRAMIPEXOLE DIHYDROCHLORIDE 0.12 MG: 0.12 TABLET ORAL at 09:11

## 2023-11-11 RX ADMIN — FAMOTIDINE 20 MG: 20 TABLET ORAL at 08:11

## 2023-11-11 RX ADMIN — FUROSEMIDE 20 MG: 10 INJECTION, SOLUTION INTRAMUSCULAR; INTRAVENOUS at 09:11

## 2023-11-11 RX ADMIN — BUPROPION HYDROCHLORIDE 300 MG: 150 TABLET, FILM COATED, EXTENDED RELEASE ORAL at 09:11

## 2023-11-11 RX ADMIN — DOCUSATE SODIUM 100 MG: 100 CAPSULE, LIQUID FILLED ORAL at 09:11

## 2023-11-11 RX ADMIN — CEFAZOLIN 2 G: 2 INJECTION, POWDER, FOR SOLUTION INTRAMUSCULAR; INTRAVENOUS at 03:11

## 2023-11-11 RX ADMIN — TAMSULOSIN HYDROCHLORIDE 0.4 MG: 0.4 CAPSULE ORAL at 08:11

## 2023-11-11 RX ADMIN — METHOCARBAMOL 1000 MG: 500 TABLET ORAL at 04:11

## 2023-11-11 RX ADMIN — POLYETHYLENE GLYCOL 3350 17 G: 17 POWDER, FOR SOLUTION ORAL at 08:11

## 2023-11-11 RX ADMIN — FUROSEMIDE 20 MG: 10 INJECTION, SOLUTION INTRAMUSCULAR; INTRAVENOUS at 08:11

## 2023-11-11 RX ADMIN — OXYCODONE HYDROCHLORIDE 5 MG: 5 TABLET ORAL at 06:11

## 2023-11-11 RX ADMIN — METOPROLOL TARTRATE 12.5 MG: 25 TABLET, FILM COATED ORAL at 09:11

## 2023-11-11 RX ADMIN — ASPIRIN 325 MG ORAL TABLET 325 MG: 325 PILL ORAL at 08:11

## 2023-11-11 RX ADMIN — METHOCARBAMOL 1000 MG: 500 TABLET ORAL at 01:11

## 2023-11-11 RX ADMIN — METHOCARBAMOL 1000 MG: 500 TABLET ORAL at 08:11

## 2023-11-11 RX ADMIN — ACETAMINOPHEN 1000 MG: 500 TABLET ORAL at 09:11

## 2023-11-11 RX ADMIN — MUPIROCIN 1 G: 20 OINTMENT TOPICAL at 09:11

## 2023-11-11 RX ADMIN — METOPROLOL TARTRATE 12.5 MG: 25 TABLET, FILM COATED ORAL at 08:11

## 2023-11-11 NOTE — CARE UPDATE
Care Update:     No acute events overnight. Patient on the SICU in room 68703/43041 A. Blood glucose stable. BG at and above goal on current insulin regimen (SSI ). Steroid use- None . 2 Days Post-Op  Renal function- Abnormal - Creatinine 1.8   Vasopressors-  None       Diet Adult Regular (IDDSI Level 7)     POCT Glucose   Date Value Ref Range Status   11/10/2023 200 (H) 70 - 110 mg/dL Final   11/10/2023 127 (H) 70 - 110 mg/dL Final   11/10/2023 152 (H) 70 - 110 mg/dL Final   11/10/2023 148 (H) 70 - 110 mg/dL Final   11/10/2023 170 (H) 70 - 110 mg/dL Final   11/10/2023 132 (H) 70 - 110 mg/dL Final   11/10/2023 128 (H) 70 - 110 mg/dL Final   11/10/2023 133 (H) 70 - 110 mg/dL Final   11/10/2023 118 (H) 70 - 110 mg/dL Final   11/10/2023 106 70 - 110 mg/dL Final   11/10/2023 123 (H) 70 - 110 mg/dL Final   11/09/2023 124 (H) 70 - 110 mg/dL Final   11/09/2023 105 70 - 110 mg/dL Final   11/09/2023 93 70 - 110 mg/dL Final   11/09/2023 127 (H) 70 - 110 mg/dL Final   11/09/2023 130 (H) 70 - 110 mg/dL Final   11/09/2023 148 (H) 70 - 110 mg/dL Final   11/09/2023 168 (H) 70 - 110 mg/dL Final   11/09/2023 215 (H) 70 - 110 mg/dL Final   11/09/2023 197 (H) 70 - 110 mg/dL Final   11/09/2023 184 (H) 70 - 110 mg/dL Final   11/09/2023 111 (H) 70 - 110 mg/dL Final     Lab Results   Component Value Date    HGBA1C 5.6 11/07/2023       Diabetes Medications    Home Medication           metFORMIN (GLUCOPHAGE) 500 MG tablet Take 1 tablet (500 mg total) by mouth 2 (two) times daily with meals.          Endocrine  Type 2 diabetes mellitus without complication, without long-term current use of insulin  Endocrinology consulted for BG management.   BG goal 110-140 (CTS Protocol)      - Novolog (Insulin Aspart) prn for BG excursions [facility] SSI (150/25)  - BG checks AC/HS  - Hypoglycemia protocol in place     ** Please notify Endocrine for any change and/or advance in diet**  ** Please call Endocrine for any BG related issues **    Discharge Planning:    TBD. Please notify endocrinology prior to discharge.      Gilberto Merida DNP, FNP-C  Department of Endocrinology  Inpatient Glycemic Management

## 2023-11-11 NOTE — PLAN OF CARE
Problem: Adult Inpatient Plan of Care  Goal: Readiness for Transition of Care  Outcome: Ongoing, Progressing     Problem: Diabetes Comorbidity  Goal: Blood Glucose Level Within Targeted Range  Outcome: Ongoing, Progressing     Problem: Activity Intolerance (Cardiovascular Surgery)  Goal: Improved Activity Tolerance  Outcome: Ongoing, Progressing

## 2023-11-11 NOTE — PROGRESS NOTES
[facility] - Surgical Intensive Care  Critical Care - Surgery  Progress Note    Patient Name: Abimael Armendariz  MRN: [id number]  Admission Date: 11/9/2023  Hospital Length of Stay: 2 days  Code Status: Full Code  Attending Provider: Goran Bennett MD  Primary Care Provider: Sammy Trinidad MD   Principal Problem: Mitral regurgitation    Subjective:     Hospital/ICU Course:  No notes on file    Interval History/Significant Events: NAEON. No vasoactive medications.  Progressing well. Stable for stepdown for floor.     Follow-up For: Procedure(s) (LRB):  SCHNEIDER MAZE PROCEDURE (N/A)  REPAIR, MITRAL VALVE, OPEN (N/A)  EXCLUSION, LEFT ATRIAL APPENDAGE, OPEN, AS PART OF OPEN CHEST SURGERY (N/A)    Post-Operative Day: 2 Days Post-Op    Objective:     Vital Signs (Most Recent):  Temp: 98 °F (36.7 °C) (11/11/23 0300)  Pulse: 71 (11/11/23 0645)  Resp: (!) 25 (11/11/23 0645)  BP: 112/60 (11/11/23 0635)  SpO2: 98 % (11/11/23 0635) Vital Signs (24h Range):  Temp:  [97.8 °F (36.6 °C)-98 °F (36.7 °C)] 98 °F (36.7 °C)  Pulse:  [51-74] 71  Resp:  [9-33] 25  SpO2:  [80 %-99 %] 98 %  BP: ()/(48-73) 112/60  Arterial Line BP: ()/(39-79) 147/56     Weight: 87.5 kg (193 lb)  Body mass index is 26.92 kg/m².      Intake/Output Summary (Last 24 hours) at 11/11/2023 0721  Last data filed at 11/11/2023 0500  Gross per 24 hour   Intake 952.76 ml   Output 2605 ml   Net -1652.24 ml          Physical Exam  Vitals and nursing note reviewed.   Constitutional:       General: He is not in acute distress.     Appearance: He is not ill-appearing.   HENT:      Head: Normocephalic and atraumatic.   Eyes:      Extraocular Movements: Extraocular movements intact.   Cardiovascular:      Rate and Rhythm: Normal rate.      Comments: Paced at 80, NSR  Pulmonary:      Effort: Pulmonary effort is normal. No respiratory distress.      Comments: NC  Abdominal:      General: There is no distension.   Genitourinary:     Comments: Antunez  Skin:      General: Skin is warm and dry.      Comments: MSI c/d/I  , 130cc SS  Pacing wires   Neurological:      General: No focal deficit present.      Mental Status: He is alert.            Vents:  Vent Mode: Spont (11/09/23 1700)  Set Rate: 20 BPM (11/09/23 1511)  Vt Set: 490 mL (11/09/23 1511)  Pressure Support: 8 cmH20 (11/09/23 1700)  PEEP/CPAP: 5 cmH20 (11/09/23 1700)  Oxygen Concentration (%): 50 (Simultaneous filing. User may not have seen previous data.) (11/09/23 1700)  Peak Airway Pressure: 11 cmH20 (11/09/23 1700)  Plateau Pressure: 0 cmH20 (11/09/23 1700)  Total Ve: 0 L/m (11/09/23 1700)  Negative Inspiratory Force (cm H2O): -21 (11/09/23 1700)  F/VT Ratio<105 (RSBI): (!) 82.69 (11/09/23 1626)    Lines/Drains/Airways       Drain  Duration                  Urethral Catheter 11/09/23 0754 Temperature probe;Non-latex;Silicone 14 Fr. 1 day         Y Chest Tube 1 and 2 11/09/23 1202 1 Right Mediastinal 19 Fr. 2 Left Mediastinal 19 Fr. 1 day              Line  Duration                  Pacer Wires 11/09/23 1123 1 day              Peripheral Intravenous Line  Duration                  Peripheral IV - Single Lumen 16 G Left Hand -- days         Peripheral IV - Single Lumen 11/09/23 0545 18 G Posterior;Right Hand 2 days                    Significant Labs:    CBC/Anemia Profile:  Recent Labs   Lab 11/09/23  1259 11/09/23  1301 11/09/23  1507 11/10/23  0327 11/11/23  0424   WBC 10.52  --   --  9.71 7.29   HGB 13.6*  --   --  12.1* 10.2*   HCT 39.0*   < > 39 35.9* 30.6*   PLT 99*  --   --  84* 78*   MCV 90  --   --  92 92   RDW 12.3  --   --  13.0 12.8    < > = values in this interval not displayed.        Chemistries:  Recent Labs   Lab 11/09/23  1400 11/09/23  1947 11/10/23  0327 11/10/23  0524 11/10/23  0808 11/10/23  2016 11/11/23  0424     --  136 136  --   --  135*   K 4.5   < > 5.4* 4.8 4.5 4.4 4.5     --  105 106  --   --  101   CO2 21*  --  20* 22*  --   --  24   BUN 26*  --  36* 36*  --   --   36*   CREATININE 1.3  --  1.9* 2.0*  --   --  1.8*   CALCIUM 7.8*  --  7.9* 7.8*  --   --  8.3*   ALBUMIN 2.9*  --  3.4* 3.4*  --   --  3.2*   PROT 5.2*  --  5.6* 5.4*  --   --  5.4*   BILITOT 1.0  --  0.5 0.4  --   --  0.4   ALKPHOS 49*  --  40* 42*  --   --  40*   ALT 22  --  22 19  --   --  11   *  --  156* 142*  --   --  83*   MG 2.9*  --  2.7*  --   --   --  2.6   PHOS 3.7  --  5.6*  --   --   --  3.5    < > = values in this interval not displayed.       All pertinent labs within the past 24 hours have been reviewed.    Significant Imaging:  I have reviewed all pertinent imaging results/findings within the past 24 hours.  Assessment/Plan:     Cardiac/Vascular  * Mitral regurgitation    Neuro/Psych:     - Sedation: none    - Pain:    - Scheduled Tylenol 1g q8h   - Oxy 5, 10mg PRN             Cardiac:     - S/P MV repair, COZ/MAZE and LAAR with Dr. Bennett on 11/9/2023    - BP Goal: MAP >60   - MAP high 50s this am, ordered 250cc albumin     - Cleviprex PRN    - Pressors: off gtts    - Anti-HTNs: Will start when appropriate    - Rhythm: NSR, previously paced VVI 80bpm, underlying rhythm junctional at 40bpm. No longer paced, rate at 70, NSR.    - Beta blocker: Will start when appropriate    -       Pulmonary:     - Goal SpO2 >92%    - 2l NC, wean as tolerated     - Chest Tubes x 2 meds   - 180 (660cc)    - ABGs PRN      Renal:    - Trend BUN/Cr     - Maintain Antunez, record strict Is/Os    - UOP 2470/24 hours    - Net -1767/24 hours    - Start diurese 20mg lasix bid    Recent Labs   Lab 11/10/23  0327 11/10/23  0524 11/11/23  0424   BUN 36* 36* 36*   CREATININE 1.9* 2.0* 1.8*           FEN / GI:     - Daily CMP, PRN K/Mag/Phos per protocol     - Replace electrolytes as needed    - Nutrition: Cardiac diet    - Bowel Regimen: Miralax, docusate      ID:     - Afebrile, 100.6 but covered in heating blankets     - WBC stable    - Abx: Complete perioperative cefazolin 2g Q8H x 5 doses    Recent Labs   Lab  11/09/23  1259 11/10/23  0327 11/11/23  0424   WBC 10.52 9.71 7.29           Heme/Onc:     - Hgb 13.6 pre-operatively    - CBC daily    - ASA 325mg daily    - Thrombocytopenic 99-84-78    Recent Labs   Lab 11/09/23  1259 11/10/23  0327 11/11/23  0424   HGB 13.6* 12.1* 10.2*   PLT 99* 84* 78*   APTT 30.6 30.2 26.2   INR 1.1 1.1 1.0           Endocrine:     - CTS Goal -140    - HgbA1c: 5.6    - Endocrinology consulted for insulin management      PPx:   Feeding: Cardiac diet  Analgesia/Sedation: multimodal  Thromboembolic Prevention: SCDs  HOB >30: Yes  Stress Ulcer: pepcid  Glucose Control: Yes, insulin management per Endocrinology     Lines/Drains/Airway:   Left radial arterial line   RIJ CVC   Chest Tubes: 2   Pacing Wires: Temporary ventricular pacing wires      Dispo/Code Status/Palliative:     - Stepdown from ICU    - Full Code         Critical secondary to Patient has a condition that poses threat to life and bodily function: Major cardiac surgery     Critical care was time spent personally by me on the following activities: development of treatment plan with patient or surrogate and bedside caregivers, discussions with consultants, evaluation of patient's response to treatment, examination of patient, ordering and performing treatments and interventions, ordering and review of laboratory studies, ordering and review of radiographic studies, pulse oximetry, re-evaluation of patient's condition.  This critical care time did not overlap with that of any other provider or involve time for any procedures.     Roman Lanza, DO  Critical Care - Surgery  [facility] - [facility]

## 2023-11-11 NOTE — SUBJECTIVE & OBJECTIVE
Interval History/Significant Events: NAEON. No vasoactive medications.  Progressing well. Stable for stepdown for floor.     Follow-up For: Procedure(s) (LRB):  SCHNEIDER MAZE PROCEDURE (N/A)  REPAIR, MITRAL VALVE, OPEN (N/A)  EXCLUSION, LEFT ATRIAL APPENDAGE, OPEN, AS PART OF OPEN CHEST SURGERY (N/A)    Post-Operative Day: 2 Days Post-Op    Objective:     Vital Signs (Most Recent):  Temp: 98 °F (36.7 °C) (11/11/23 0300)  Pulse: 71 (11/11/23 0645)  Resp: (!) 25 (11/11/23 0645)  BP: 112/60 (11/11/23 0635)  SpO2: 98 % (11/11/23 0635) Vital Signs (24h Range):  Temp:  [97.8 °F (36.6 °C)-98 °F (36.7 °C)] 98 °F (36.7 °C)  Pulse:  [51-74] 71  Resp:  [9-33] 25  SpO2:  [80 %-99 %] 98 %  BP: ()/(48-73) 112/60  Arterial Line BP: ()/(39-79) 147/56     Weight: 87.5 kg (193 lb)  Body mass index is 26.92 kg/m².      Intake/Output Summary (Last 24 hours) at 11/11/2023 0721  Last data filed at 11/11/2023 0500  Gross per 24 hour   Intake 952.76 ml   Output 2605 ml   Net -1652.24 ml          Physical Exam  Vitals and nursing note reviewed.   Constitutional:       General: He is not in acute distress.     Appearance: He is not ill-appearing.   HENT:      Head: Normocephalic and atraumatic.   Eyes:      Extraocular Movements: Extraocular movements intact.   Cardiovascular:      Rate and Rhythm: Normal rate.      Comments: Paced at 80, NSR  Pulmonary:      Effort: Pulmonary effort is normal. No respiratory distress.      Comments: NC  Abdominal:      General: There is no distension.   Genitourinary:     Comments: Antunez  Skin:     General: Skin is warm and dry.      Comments: MSI c/d/I  , 130cc SS  Pacing wires   Neurological:      General: No focal deficit present.      Mental Status: He is alert.            Vents:  Vent Mode: Spont (11/09/23 1700)  Set Rate: 20 BPM (11/09/23 1511)  Vt Set: 490 mL (11/09/23 1511)  Pressure Support: 8 cmH20 (11/09/23 1700)  PEEP/CPAP: 5 cmH20 (11/09/23 1700)  Oxygen Concentration (%): 50  (Simultaneous filing. User may not have seen previous data.) (11/09/23 1700)  Peak Airway Pressure: 11 cmH20 (11/09/23 1700)  Plateau Pressure: 0 cmH20 (11/09/23 1700)  Total Ve: 0 L/m (11/09/23 1700)  Negative Inspiratory Force (cm H2O): -21 (11/09/23 1700)  F/VT Ratio<105 (RSBI): (!) 82.69 (11/09/23 1626)    Lines/Drains/Airways       Drain  Duration                  Urethral Catheter 11/09/23 0754 Temperature probe;Non-latex;Silicone 14 Fr. 1 day         Y Chest Tube 1 and 2 11/09/23 1202 1 Right Mediastinal 19 Fr. 2 Left Mediastinal 19 Fr. 1 day              Line  Duration                  Pacer Wires 11/09/23 1123 1 day              Peripheral Intravenous Line  Duration                  Peripheral IV - Single Lumen 16 G Left Hand -- days         Peripheral IV - Single Lumen 11/09/23 0545 18 G Posterior;Right Hand 2 days                    Significant Labs:    CBC/Anemia Profile:  Recent Labs   Lab 11/09/23  1259 11/09/23  1301 11/09/23  1507 11/10/23  0327 11/11/23  0424   WBC 10.52  --   --  9.71 7.29   HGB 13.6*  --   --  12.1* 10.2*   HCT 39.0*   < > 39 35.9* 30.6*   PLT 99*  --   --  84* 78*   MCV 90  --   --  92 92   RDW 12.3  --   --  13.0 12.8    < > = values in this interval not displayed.        Chemistries:  Recent Labs   Lab 11/09/23  1400 11/09/23  1947 11/10/23  0327 11/10/23  0524 11/10/23  0808 11/10/23  2016 11/11/23  0424     --  136 136  --   --  135*   K 4.5   < > 5.4* 4.8 4.5 4.4 4.5     --  105 106  --   --  101   CO2 21*  --  20* 22*  --   --  24   BUN 26*  --  36* 36*  --   --  36*   CREATININE 1.3  --  1.9* 2.0*  --   --  1.8*   CALCIUM 7.8*  --  7.9* 7.8*  --   --  8.3*   ALBUMIN 2.9*  --  3.4* 3.4*  --   --  3.2*   PROT 5.2*  --  5.6* 5.4*  --   --  5.4*   BILITOT 1.0  --  0.5 0.4  --   --  0.4   ALKPHOS 49*  --  40* 42*  --   --  40*   ALT 22  --  22 19  --   --  11   *  --  156* 142*  --   --  83*   MG 2.9*  --  2.7*  --   --   --  2.6   PHOS 3.7  --  5.6*  --   --    --  3.5    < > = values in this interval not displayed.       All pertinent labs within the past 24 hours have been reviewed.    Significant Imaging:  I have reviewed all pertinent imaging results/findings within the past 24 hours.

## 2023-11-11 NOTE — PLAN OF CARE
SICU PLAN OF CARE NOTE    Dx: Mitral regurgitation    Goals of Care: MAP 60-80    Vital Signs (last 12 hours):   Temp:  [97.9 °F (36.6 °C)-98 °F (36.7 °C)]   Pulse:  [64-73]   Resp:  [16-32]   BP: ()/(53-73)   SpO2:  [94 %-98 %]      Neuro: AAO x4, Follows Commands, and Moves All Extremities    Cardiac: NSR    Respiratory: Nasal Cannula    Urine Output: Urinary Catheter 1295 cc/shift    Drains: Chest Tube, total output 120 cc /  shift    Diet: Regular Diet     Labs/Accuchecks: Accuchecks ACHS.    Skin: incisions per documentation. Weight shifting assistance provided as needed.    Shift Events: NAEON. Pain managed with PRN analgesics. Pt remaining in chair overnight d/t preference. UOP adequate. Chest tube output minimal. No BM overnight.

## 2023-11-11 NOTE — PLAN OF CARE
"      SICU PLAN OF CARE NOTE    Dx: Mitral regurgitation    Shift Events: Pt. OOBTC, removed central line and art line per orders, prn oxy and dilaudid given.     Goals of Care: MAP 60-80    Neuro: AAO x4    Vital Signs: BP (!) 94/55   Pulse 66   Temp 97.8 °F (36.6 °C) (Oral)   Resp (!) 24   Ht 5' 11" (1.803 m)   Wt 87.5 kg (193 lb)   SpO2 96%   BMI 26.92 kg/m²     Respiratory: Nasal Cannula @ 2 L NC    Diet: Regular Diet    Urine Output: Urinary Catheter 1175 cc/shift    Drains: Chest Tube, total output 140 cc /  shift    Labs/Accuchecks: Accucheck ACHS and at midnight, daily labs.    Skin: Sacral and heel foam dressings in place, Bed plugged in and inflated. No evidence of skin breakdown throughout shift       "

## 2023-11-11 NOTE — PLAN OF CARE
Recommendations     Continue Regular diet.  Add Boost Plus ONS BID if PO intake remains decreased.  RD to monitor & follow-up.     Goals: Meet % EEN, EPN by RD f/u date  Nutrition Goal Status: new  Communication of RD Recs: other (comment) (POC)

## 2023-11-11 NOTE — ASSESSMENT & PLAN NOTE
Neuro/Psych:     - Sedation: none    - Pain:    - Scheduled Tylenol 1g q8h   - Oxy 5, 10mg PRN             Cardiac:     - S/P MV repair, COZ/MAZE and LAAR with Dr. Bennett on 11/9/2023    - BP Goal: MAP >60   - MAP high 50s this am, ordered 250cc albumin     - Cleviprex PRN    - Pressors: off gtts    - Anti-HTNs: Will start when appropriate    - Rhythm: NSR, previously paced VVI 80bpm, underlying rhythm junctional at 40bpm. No longer paced, rate at 70, NSR.    - Beta blocker: Will start when appropriate    -       Pulmonary:     - Goal SpO2 >92%    - 2l NC, wean as tolerated     - Chest Tubes x 2 meds   - 180 (660cc)    - ABGs PRN      Renal:    - Trend BUN/Cr     - Maintain Antunez, record strict Is/Os    - UOP 2470/24 hours    - Net -1767/24 hours    - Start diurese 20mg lasix bid    Recent Labs   Lab 11/10/23  0327 11/10/23  0524 11/11/23  0424   BUN 36* 36* 36*   CREATININE 1.9* 2.0* 1.8*           FEN / GI:     - Daily CMP, PRN K/Mag/Phos per protocol     - Replace electrolytes as needed    - Nutrition: Cardiac diet    - Bowel Regimen: Miralax, docusate      ID:     - Afebrile, 100.6 but covered in heating blankets     - WBC stable    - Abx: Complete perioperative cefazolin 2g Q8H x 5 doses    Recent Labs   Lab 11/09/23  1259 11/10/23  0327 11/11/23  0424   WBC 10.52 9.71 7.29           Heme/Onc:     - Hgb 13.6 pre-operatively    - CBC daily    - ASA 325mg daily    - Thrombocytopenic 99-84-78    Recent Labs   Lab 11/09/23  1259 11/10/23  0327 11/11/23  0424   HGB 13.6* 12.1* 10.2*   PLT 99* 84* 78*   APTT 30.6 30.2 26.2   INR 1.1 1.1 1.0           Endocrine:     - CTS Goal -140    - HgbA1c: 5.6    - Endocrinology consulted for insulin management      PPx:   Feeding: Cardiac diet  Analgesia/Sedation: multimodal  Thromboembolic Prevention: SCDs  HOB >30: Yes  Stress Ulcer: pepcid  Glucose Control: Yes, insulin management per Endocrinology     Lines/Drains/Airway:   Left radial arterial line   RIJ  CVC   Chest Tubes: 2   Pacing Wires: Temporary ventricular pacing wires      Dispo/Code Status/Palliative:     - Stepdown from ICU    - Full Code

## 2023-11-11 NOTE — NURSING TRANSFER
Nursing Transfer Note      11/11/2023   5:09 PM    Nurse giving handoff:MARILYN Irwin  Nurse receiving handoff:MARILYN Ngo    Reason patient is being transferred: stepdown to CSU    Transfer To:  from SICU [id number]    Transfer via wheelchair    Transfer with cardiac monitoring    Transported by MARILYN Irwin, [facility]    Transfer Vital Signs:  Blood Pressure:115/61  Heart Rate:66  O2:95  Temperature:98.1  Respirations:18    Telemetry: Telemetry  Will  Order for Tele Monitor? Yes    Additional Lines: Chest Tube and Antunez Catheter      Medicines sent: insulin aspart, mupriocin ointment, throat spray    Patient belongings transferred with patient: Yes by family member    Chart send with patient: Yes    Notified: family member present at bedside    MARILYN Ngo at bedside with MARILYN Irwin upon arrival, patient remained stable during transportation.

## 2023-11-11 NOTE — CONSULTS
"  [facility] - Surgical Intensive Care  Adult Nutrition  Consult Note    SUMMARY     Recommendations    Continue Regular diet.  Add Boost Plus ONS BID if PO intake remains decreased.  RD to monitor & follow-up.    Goals: Meet % EEN, EPN by RD f/u date  Nutrition Goal Status: new  Communication of RD Recs: other (comment) (POC)    Assessment and Plan    Nutrition Problem:  Increased nutrient needs    Related to (etiology):   Physiological causes     Signs and Symptoms (as evidenced by):   S/p cardiac surgery     Interventions(treatment strategy):  Collaboration of nutrition care w/ other providers  ONS    Nutrition Diagnosis Status:   New    Reason for Assessment    Reason For Assessment: consult  Diagnosis: other (see comments) (Mitral regurgitation)   Relevant Medical History: HTN, HLD  Interdisciplinary Rounds: did not attend    General Information Comments: Pt tolerating diet, consuming breakfast during visit - reports decreased appetite 2/2 something "stuck in throat". Good appetite PTA & UBW of 190#. Appears nourished w/ no indicators of malnutrition. S/p Aviles maze procedure, Mitral valve repair.  Nutrition Discharge Planning: Adequate PO intake    Nutrition/Diet History    Spiritual, Cultural Beliefs, Spiritism Practices, Values that Affect Care: no  Factors Affecting Nutritional Intake: difficulty/impaired swallowing    Anthropometrics    Temp: 98.4 °F (36.9 °C)  Height Method: Stated  Height: 5' 11" (180.3 cm)  Height (inches): 71 in  Weight Method: Stated  Weight: 87.5 kg (192 lb 14.4 oz)  Weight (lb): 192.9 lb  Ideal Body Weight (IBW), Male: 172 lb  % Ideal Body Weight, Male (lb): 112.15 %  BMI (Calculated): 26.9  BMI Grade: 25 - 29.9 - overweight    Lab/Procedures/Meds    Pertinent Labs Reviewed: reviewed  Pertinent Labs Comments: Creat 1.8, GFR 42.8  Pertinent Medications Reviewed: reviewed  Pertinent Medications Comments: Statin    Estimated/Assessed Needs    Weight Used For Calorie Calculations: " 87.5 kg (192 lb 14.4 oz)    Energy Calorie Requirements (kcal): 2054 kcal/d  Energy Need Method: [place]-St Jeor (1.2 PAL)    Protein Requirements:  g/d (1-1.2 g/kg)  Weight Used For Protein Calculations: 87.5 kg (192 lb 14.4 oz)    Estimated Fluid Requirement Method: other (see comments) (Per MD or 1 mL/kcal)  RDA Method (mL): 2054    Nutrition Prescription Ordered    Current Diet Order: Regular    Evaluation of Received Nutrient/Fluid Intake    I/O: -1.4L since admit    Comments: LBM: PTA    Tolerance: tolerating    Nutrition Risk    Level of Risk/Frequency of Follow-up:  (1x/week)     Monitor and Evaluation    Food and Nutrient Intake: food and beverage intake, energy intake  Food and Nutrient Adminstration: diet order  Physical Activity and Function: nutrition-related ADLs and IADLs  Anthropometric Measurements: weight, weight change  Biochemical Data, Medical Tests and Procedures: inflammatory profile, glucose/endocrine profile, lipid profile, gastrointestinal profile, electrolyte and renal panel  Nutrition-Focused Physical Findings: overall appearance     Nutrition Follow-Up    RD Follow-up?: Yes

## 2023-11-12 LAB
ALBUMIN SERPL BCP-MCNC: 3.1 G/DL (ref 3.5–5.2)
ALP SERPL-CCNC: 44 U/L (ref 55–135)
ALT SERPL W/O P-5'-P-CCNC: 9 U/L (ref 10–44)
ANION GAP SERPL CALC-SCNC: 7 MMOL/L (ref 8–16)
APTT PPP: 28.3 SEC (ref 21–32)
AST SERPL-CCNC: 56 U/L (ref 10–40)
BASOPHILS # BLD AUTO: 0.01 K/UL (ref 0–0.2)
BASOPHILS NFR BLD: 0.2 % (ref 0–1.9)
BILIRUB SERPL-MCNC: 0.5 MG/DL (ref 0.1–1)
BUN SERPL-MCNC: 32 MG/DL (ref 6–20)
CALCIUM SERPL-MCNC: 8.4 MG/DL (ref 8.7–10.5)
CHLORIDE SERPL-SCNC: 100 MMOL/L (ref 95–110)
CO2 SERPL-SCNC: 30 MMOL/L (ref 23–29)
CREAT SERPL-MCNC: 1.5 MG/DL (ref 0.5–1.4)
DIFFERENTIAL METHOD: ABNORMAL
EOSINOPHIL # BLD AUTO: 0.1 K/UL (ref 0–0.5)
EOSINOPHIL NFR BLD: 0.8 % (ref 0–8)
ERYTHROCYTE [DISTWIDTH] IN BLOOD BY AUTOMATED COUNT: 12.8 % (ref 11.5–14.5)
EST. GFR  (NO RACE VARIABLE): 53.3 ML/MIN/1.73 M^2
GLUCOSE SERPL-MCNC: 114 MG/DL (ref 70–110)
HCT VFR BLD AUTO: 27.4 % (ref 40–54)
HGB BLD-MCNC: 9.2 G/DL (ref 14–18)
IMM GRANULOCYTES # BLD AUTO: 0.01 K/UL (ref 0–0.04)
IMM GRANULOCYTES NFR BLD AUTO: 0.2 % (ref 0–0.5)
INR PPP: 1 (ref 0.8–1.2)
LYMPHOCYTES # BLD AUTO: 1.3 K/UL (ref 1–4.8)
LYMPHOCYTES NFR BLD: 20.8 % (ref 18–48)
MAGNESIUM SERPL-MCNC: 2.4 MG/DL (ref 1.6–2.6)
MAGNESIUM SERPL-MCNC: 2.4 MG/DL (ref 1.6–2.6)
MCH RBC QN AUTO: 30.9 PG (ref 27–31)
MCHC RBC AUTO-ENTMCNC: 33.6 G/DL (ref 32–36)
MCV RBC AUTO: 92 FL (ref 82–98)
MONOCYTES # BLD AUTO: 0.7 K/UL (ref 0.3–1)
MONOCYTES NFR BLD: 10.7 % (ref 4–15)
NEUTROPHILS # BLD AUTO: 4.2 K/UL (ref 1.8–7.7)
NEUTROPHILS NFR BLD: 67.3 % (ref 38–73)
NRBC BLD-RTO: 0 /100 WBC
PHOSPHATE SERPL-MCNC: 2.8 MG/DL (ref 2.7–4.5)
PLATELET # BLD AUTO: 96 K/UL (ref 150–450)
PMV BLD AUTO: 11.2 FL (ref 9.2–12.9)
POCT GLUCOSE: 115 MG/DL (ref 70–110)
POCT GLUCOSE: 118 MG/DL (ref 70–110)
POCT GLUCOSE: 137 MG/DL (ref 70–110)
POCT GLUCOSE: 150 MG/DL (ref 70–110)
POTASSIUM SERPL-SCNC: 4.2 MMOL/L (ref 3.5–5.1)
PROT SERPL-MCNC: 5.4 G/DL (ref 6–8.4)
PROTHROMBIN TIME: 10.3 SEC (ref 9–12.5)
RBC # BLD AUTO: 2.98 M/UL (ref 4.6–6.2)
SODIUM SERPL-SCNC: 137 MMOL/L (ref 136–145)
WBC # BLD AUTO: 6.26 K/UL (ref 3.9–12.7)

## 2023-11-12 PROCEDURE — 85730 THROMBOPLASTIN TIME PARTIAL: CPT | Performed by: PHYSICIAN ASSISTANT

## 2023-11-12 PROCEDURE — 25000003 PHARM REV CODE 250: Performed by: PHYSICIAN ASSISTANT

## 2023-11-12 PROCEDURE — 25000003 PHARM REV CODE 250

## 2023-11-12 PROCEDURE — 80053 COMPREHEN METABOLIC PANEL: CPT

## 2023-11-12 PROCEDURE — 85025 COMPLETE CBC W/AUTO DIFF WBC: CPT | Performed by: PHYSICIAN ASSISTANT

## 2023-11-12 PROCEDURE — 83735 ASSAY OF MAGNESIUM: CPT | Performed by: PHYSICIAN ASSISTANT

## 2023-11-12 PROCEDURE — 20600001 HC STEP DOWN PRIVATE ROOM

## 2023-11-12 PROCEDURE — 36415 COLL VENOUS BLD VENIPUNCTURE: CPT | Performed by: PHYSICIAN ASSISTANT

## 2023-11-12 PROCEDURE — 25000003 PHARM REV CODE 250: Performed by: STUDENT IN AN ORGANIZED HEALTH CARE EDUCATION/TRAINING PROGRAM

## 2023-11-12 PROCEDURE — 84100 ASSAY OF PHOSPHORUS: CPT | Performed by: PHYSICIAN ASSISTANT

## 2023-11-12 PROCEDURE — 85610 PROTHROMBIN TIME: CPT | Performed by: PHYSICIAN ASSISTANT

## 2023-11-12 PROCEDURE — 25000003 PHARM REV CODE 250: Performed by: THORACIC SURGERY (CARDIOTHORACIC VASCULAR SURGERY)

## 2023-11-12 PROCEDURE — 63600175 PHARM REV CODE 636 W HCPCS

## 2023-11-12 RX ORDER — LIDOCAINE 50 MG/G
2 PATCH TOPICAL
Status: DISCONTINUED | OUTPATIENT
Start: 2023-11-12 | End: 2023-11-14 | Stop reason: HOSPADM

## 2023-11-12 RX ORDER — SIMETHICONE 80 MG
1 TABLET,CHEWABLE ORAL 3 TIMES DAILY
Status: DISCONTINUED | OUTPATIENT
Start: 2023-11-12 | End: 2023-11-14 | Stop reason: HOSPADM

## 2023-11-12 RX ORDER — TRAZODONE HYDROCHLORIDE 50 MG/1
100 TABLET ORAL NIGHTLY
Status: DISCONTINUED | OUTPATIENT
Start: 2023-11-12 | End: 2023-11-14 | Stop reason: HOSPADM

## 2023-11-12 RX ORDER — TRAZODONE HYDROCHLORIDE 50 MG/1
100 TABLET ORAL NIGHTLY
Status: DISCONTINUED | OUTPATIENT
Start: 2023-11-12 | End: 2023-11-12

## 2023-11-12 RX ADMIN — TRAZODONE HYDROCHLORIDE 100 MG: 50 TABLET ORAL at 09:11

## 2023-11-12 RX ADMIN — METOPROLOL TARTRATE 12.5 MG: 25 TABLET, FILM COATED ORAL at 08:11

## 2023-11-12 RX ADMIN — ASPIRIN 325 MG ORAL TABLET 325 MG: 325 PILL ORAL at 08:11

## 2023-11-12 RX ADMIN — DOCUSATE SODIUM 100 MG: 100 CAPSULE, LIQUID FILLED ORAL at 08:11

## 2023-11-12 RX ADMIN — ACETAMINOPHEN 1000 MG: 500 TABLET ORAL at 09:11

## 2023-11-12 RX ADMIN — METOPROLOL TARTRATE 12.5 MG: 25 TABLET, FILM COATED ORAL at 09:11

## 2023-11-12 RX ADMIN — SIMETHICONE 80 MG: 80 TABLET, CHEWABLE ORAL at 09:11

## 2023-11-12 RX ADMIN — OXYCODONE HYDROCHLORIDE 5 MG: 5 TABLET ORAL at 01:11

## 2023-11-12 RX ADMIN — TRAZODONE HYDROCHLORIDE 100 MG: 50 TABLET ORAL at 01:11

## 2023-11-12 RX ADMIN — OXYCODONE HYDROCHLORIDE 5 MG: 5 TABLET ORAL at 11:11

## 2023-11-12 RX ADMIN — METHOCARBAMOL 1000 MG: 500 TABLET ORAL at 04:11

## 2023-11-12 RX ADMIN — ATORVASTATIN CALCIUM 40 MG: 40 TABLET, FILM COATED ORAL at 09:11

## 2023-11-12 RX ADMIN — OXYCODONE HYDROCHLORIDE 5 MG: 5 TABLET ORAL at 08:11

## 2023-11-12 RX ADMIN — FUROSEMIDE 20 MG: 10 INJECTION, SOLUTION INTRAMUSCULAR; INTRAVENOUS at 08:11

## 2023-11-12 RX ADMIN — ACETAMINOPHEN 1000 MG: 500 TABLET ORAL at 05:11

## 2023-11-12 RX ADMIN — MUPIROCIN 1 G: 20 OINTMENT TOPICAL at 08:11

## 2023-11-12 RX ADMIN — FAMOTIDINE 20 MG: 20 TABLET ORAL at 08:11

## 2023-11-12 RX ADMIN — METHOCARBAMOL 1000 MG: 500 TABLET ORAL at 08:11

## 2023-11-12 RX ADMIN — DOCUSATE SODIUM 100 MG: 100 CAPSULE, LIQUID FILLED ORAL at 09:11

## 2023-11-12 RX ADMIN — OXYCODONE HYDROCHLORIDE 5 MG: 5 TABLET ORAL at 06:11

## 2023-11-12 RX ADMIN — PRAMIPEXOLE DIHYDROCHLORIDE 0.12 MG: 0.12 TABLET ORAL at 09:11

## 2023-11-12 RX ADMIN — BUPROPION HYDROCHLORIDE 300 MG: 150 TABLET, FILM COATED, EXTENDED RELEASE ORAL at 09:11

## 2023-11-12 RX ADMIN — METHOCARBAMOL 1000 MG: 500 TABLET ORAL at 09:11

## 2023-11-12 RX ADMIN — POLYETHYLENE GLYCOL 3350 17 G: 17 POWDER, FOR SOLUTION ORAL at 08:11

## 2023-11-12 RX ADMIN — FUROSEMIDE 20 MG: 10 INJECTION, SOLUTION INTRAMUSCULAR; INTRAVENOUS at 09:11

## 2023-11-12 RX ADMIN — TAMSULOSIN HYDROCHLORIDE 0.4 MG: 0.4 CAPSULE ORAL at 08:11

## 2023-11-12 RX ADMIN — OXYCODONE HYDROCHLORIDE 5 MG: 5 TABLET ORAL at 02:11

## 2023-11-12 NOTE — SUBJECTIVE & OBJECTIVE
Interval History: No acute events overnight. Reports chest discomfort that is relieved with passage of flatus. Tolerating diet, ambulating.    Review of Systems   Constitutional: Negative for chills, decreased appetite, fever and malaise/fatigue.   Cardiovascular:  Negative for dyspnea on exertion and palpitations.   Respiratory:  Negative for cough and shortness of breath.    Skin:  Negative for rash.   Musculoskeletal:  Negative for myalgias.   Gastrointestinal:  Negative for abdominal pain.   Neurological:  Negative for dizziness, focal weakness, light-headedness and paresthesias.   Psychiatric/Behavioral:  Negative for altered mental status.      Medications:  Continuous Infusions:  Scheduled Meds:   acetaminophen  1,000 mg Oral Q8H    aspirin  325 mg Oral Daily    atorvastatin  40 mg Oral QHS    buPROPion  300 mg Oral QHS    docusate sodium  100 mg Oral BID    famotidine  20 mg Oral Daily    furosemide (LASIX) injection  20 mg Intravenous Q12H    methocarbamoL  1,000 mg Oral QID    metoprolol tartrate  12.5 mg Oral BID    mupirocin  1 g Nasal BID    polyethylene glycol  17 g Oral Daily    pramipexole  0.125 mg Oral QHS    tamsulosin  0.4 mg Oral Daily    traZODone  100 mg Oral QHS     PRN Meds:bisacodyL, dextrose 10%, dextrose 10%, glucagon (human recombinant), glucose, glucose, insulin aspart U-100, metoclopramide HCl, ondansetron, oxyCODONE, oxyCODONE, sodium chloride 0.9%     Objective:     Vital Signs (Most Recent):  Temp: 98.8 °F (37.1 °C) (11/12/23 0748)  Pulse: 73 (11/12/23 0748)  Resp: 18 (11/12/23 0748)  BP: 132/63 (11/12/23 0748)  SpO2: 95 % (11/12/23 0748) Vital Signs (24h Range):  Temp:  [97.8 °F (36.6 °C)-98.8 °F (37.1 °C)] 98.8 °F (37.1 °C)  Pulse:  [61-73] 73  Resp:  [12-23] 18  SpO2:  [91 %-100 %] 95 %  BP: ()/(53-67) 132/63     Weight: 87.5 kg (192 lb 14.4 oz)  Body mass index is 26.9 kg/m².    SpO2: 95 %       Intake/Output - Last 3 Shifts         11/10 0700  11/11 0659 11/11 0700 11/12  0659 11/12 0700  11/13 0659    P.O. 720      I.V. (mL/kg) 95.8 (1.1)      IV Piggyback 147.1      Total Intake(mL/kg) 962.9 (11)      Urine (mL/kg/hr) 2470 (1.2) 3490 (1.7)     Chest Tube 260 320     Total Output 2730 3810     Net -1767.1 -3810                    Lines/Drains/Airways       Drain  Duration                  Urethral Catheter 11/09/23 0754 Temperature probe;Non-latex;Silicone 14 Fr. 3 days         Y Chest Tube 1 and 2 11/09/23 1202 1 Right Mediastinal 19 Fr. 2 Left Mediastinal 19 Fr. 2 days              Line  Duration                  Pacer Wires 11/09/23 1123 2 days              Peripheral Intravenous Line  Duration                  Peripheral IV - Single Lumen 16 G Left Hand -- days         Peripheral IV - Single Lumen 11/09/23 0545 18 G Posterior;Right Hand 3 days                     Physical Exam  Vitals reviewed.   Constitutional:       General: He is not in acute distress.     Appearance: He is not toxic-appearing.   Cardiovascular:      Rate and Rhythm: Normal rate and regular rhythm.   Pulmonary:      Effort: Pulmonary effort is normal. No respiratory distress.   Abdominal:      General: There is no distension.   Musculoskeletal:         General: Normal range of motion.   Neurological:      General: No focal deficit present.      Mental Status: He is alert and oriented to person, place, and time.   Psychiatric:         Mood and Affect: Mood normal.         Thought Content: Thought content normal.            Significant Labs:  All pertinent labs from the last 24 hours have been reviewed.    Significant Diagnostics:  I have reviewed all pertinent imaging results/findings within the past 24 hours.

## 2023-11-12 NOTE — PLAN OF CARE
- Patient is S/P MVR, COZ/MAZE and LAAR on 11/9.  - Alert and orientedX4.  - Anxious but cooperative.  - VSS. Afebrile. Spo2 maintained@RA. O2 contd @1l via NC for pts comfort during the night.  - Scheduled medicines given as per MAR.  - Trazodone given for sleep.  - Oxycodone given for pain management.  - Mediastinal incision in situ with island border dsg.  - Left chest tube in situ. Serosanguinous fluid draining into pleur- evac to -ve suction 20 cm of H2O.  - Pacing wires in situ.   - Antunez's catheter in situ. Urine output adequate. See I/O flowsheet.  - Bed in low position. Wheels locked. Call light within patient's reach.  - Patient aware of calling for assistance.  - Will continue to monitor.

## 2023-11-12 NOTE — ASSESSMENT & PLAN NOTE
58M with a history of atrial fibrillation and found to have severe mitral regurgitation during preoperative clearance prior to groin hernia surgery. He has been admitted several times for arrhythmia, chest pain, and a diagnosis of congestive heart failure.  He is now POD 3 mitral valve repair with medial commissuroplasty and mitral annuloplasty, Maze procedure, and left atrial appendage resection.     - ASA, BB, statin  - Continue lasix 20 bid, remove valente catheter  - V-wires and mediastinal tubes still in place, output to high to remove  - multimodal analgesia  - add simethicone for gas bloat symptoms  - cardiac diet with 1500 cc fluid restriction  - sternal precautions  - PT/OT  - Encouraged ambulation today

## 2023-11-12 NOTE — PROGRESS NOTES
[facility] - Cardiology Stepdown  Cardiothoracic Surgery  Progress Note    Patient Name: Abimael Armendariz  MRN: [id number]  Admission Date: 11/9/2023  Hospital Length of Stay: 3 days  Code Status: Full Code   Attending Physician: Goran Bennett MD   Referring Provider: Goran Bennett MD  Principal Problem:Mitral regurgitation      Subjective:     Post-Op Info:  Procedure(s) (LRB):  SCHNEIDER MAZE PROCEDURE (N/A)  REPAIR, MITRAL VALVE, OPEN (N/A)  EXCLUSION, LEFT ATRIAL APPENDAGE, OPEN, AS PART OF OPEN CHEST SURGERY (N/A)   3 Days Post-Op     Interval History: No acute events overnight. Reports chest discomfort that is relieved with passage of flatus. Tolerating diet, ambulating.    Review of Systems   Constitutional: Negative for chills, decreased appetite, fever and malaise/fatigue.   Cardiovascular:  Negative for dyspnea on exertion and palpitations.   Respiratory:  Negative for cough and shortness of breath.    Skin:  Negative for rash.   Musculoskeletal:  Negative for myalgias.   Gastrointestinal:  Negative for abdominal pain.   Neurological:  Negative for dizziness, focal weakness, light-headedness and paresthesias.   Psychiatric/Behavioral:  Negative for altered mental status.      Medications:  Continuous Infusions:  Scheduled Meds:   acetaminophen  1,000 mg Oral Q8H    aspirin  325 mg Oral Daily    atorvastatin  40 mg Oral QHS    buPROPion  300 mg Oral QHS    docusate sodium  100 mg Oral BID    famotidine  20 mg Oral Daily    furosemide (LASIX) injection  20 mg Intravenous Q12H    methocarbamoL  1,000 mg Oral QID    metoprolol tartrate  12.5 mg Oral BID    mupirocin  1 g Nasal BID    polyethylene glycol  17 g Oral Daily    pramipexole  0.125 mg Oral QHS    tamsulosin  0.4 mg Oral Daily    traZODone  100 mg Oral QHS     PRN Meds:bisacodyL, dextrose 10%, dextrose 10%, glucagon (human recombinant), glucose, glucose, insulin aspart U-100, metoclopramide HCl, ondansetron, oxyCODONE, oxyCODONE, sodium chloride  0.9%     Objective:     Vital Signs (Most Recent):  Temp: 98.8 °F (37.1 °C) (11/12/23 0748)  Pulse: 73 (11/12/23 0748)  Resp: 18 (11/12/23 0748)  BP: 132/63 (11/12/23 0748)  SpO2: 95 % (11/12/23 0748) Vital Signs (24h Range):  Temp:  [97.8 °F (36.6 °C)-98.8 °F (37.1 °C)] 98.8 °F (37.1 °C)  Pulse:  [61-73] 73  Resp:  [12-23] 18  SpO2:  [91 %-100 %] 95 %  BP: ()/(53-67) 132/63     Weight: 87.5 kg (192 lb 14.4 oz)  Body mass index is 26.9 kg/m².    SpO2: 95 %       Intake/Output - Last 3 Shifts         11/10 0700 11/11 0659 11/11 0700 11/12 0659 11/12 0700  11/13 0659    P.O. 720      I.V. (mL/kg) 95.8 (1.1)      IV Piggyback 147.1      Total Intake(mL/kg) 962.9 (11)      Urine (mL/kg/hr) 2470 (1.2) 3490 (1.7)     Chest Tube 260 320     Total Output 2730 3810     Net -1767.1 -3810                    Lines/Drains/Airways       Drain  Duration                  Urethral Catheter 11/09/23 0754 Temperature probe;Non-latex;Silicone 14 Fr. 3 days         Y Chest Tube 1 and 2 11/09/23 1202 1 Right Mediastinal 19 Fr. 2 Left Mediastinal 19 Fr. 2 days              Line  Duration                  Pacer Wires 11/09/23 1123 2 days              Peripheral Intravenous Line  Duration                  Peripheral IV - Single Lumen 16 G Left Hand -- days         Peripheral IV - Single Lumen 11/09/23 0545 18 G Posterior;Right Hand 3 days                     Physical Exam  Vitals reviewed.   Constitutional:       General: He is not in acute distress.     Appearance: He is not toxic-appearing.   Cardiovascular:      Rate and Rhythm: Normal rate and regular rhythm.   Pulmonary:      Effort: Pulmonary effort is normal. No respiratory distress.   Abdominal:      General: There is no distension.   Musculoskeletal:         General: Normal range of motion.   Neurological:      General: No focal deficit present.      Mental Status: He is alert and oriented to person, place, and time.   Psychiatric:         Mood and Affect: Mood normal.          Thought Content: Thought content normal.            Significant Labs:  All pertinent labs from the last 24 hours have been reviewed.    Significant Diagnostics:  I have reviewed all pertinent imaging results/findings within the past 24 hours.  Assessment/Plan:     * Mitral regurgitation  58M with a history of atrial fibrillation and found to have severe mitral regurgitation during preoperative clearance prior to groin hernia surgery. He has been admitted several times for arrhythmia, chest pain, and a diagnosis of congestive heart failure.  He is now POD 3 mitral valve repair with medial commissuroplasty and mitral annuloplasty, Maze procedure, and left atrial appendage resection.     - ASA, BB, statin  - Continue lasix 20 bid, remove valente catheter  - V-wires and mediastinal tubes still in place, output to high to remove  - multimodal analgesia  - add simethicone for gas bloat symptoms  - cardiac diet with 1500 cc fluid restriction  - sternal precautions  - PT/OT  - Encouraged ambulation today      Type 2 diabetes mellitus without complication, without long-term current use of insulin  Endocrine following        Lul Bishop, DO  Cardiothoracic Surgery  [facility] - Cardiology Stepdown

## 2023-11-12 NOTE — H&P
Patient admitted to CSU via wheelchair. Patient is awake, alert and oriented. Chest tube to - 20 cmH2O suction. No air leak noted. No tidal noted. Temporary pacemaker in place with back up set at 40 beats per minute. Patient currently v-paced 60's. Antunez in place from ICU. Vitals stable. Patient complained of incisional pain and received oxycodone 10 mg PO. Patient complained of some anxiety and was able to rest after given a few moments. Patient wife is at bedside.

## 2023-11-12 NOTE — CARE UPDATE
Care Update:     No acute events overnight. Patient on the CSU in room 322/322 A. Blood glucose stable. BG at and above goal on current insulin regimen (SSI ). Steroid use- None . 3 Days Post-Op  Renal function- Abnormal - Creatinine 1.5   Vasopressors-  None       Diet Adult Regular (IDDSI Level 7)     POCT Glucose   Date Value Ref Range Status   11/11/2023 165 (H) 70 - 110 mg/dL Final   11/11/2023 133 (H) 70 - 110 mg/dL Final   11/11/2023 143 (H) 70 - 110 mg/dL Final   11/11/2023 122 (H) 70 - 110 mg/dL Final   11/10/2023 200 (H) 70 - 110 mg/dL Final   11/10/2023 127 (H) 70 - 110 mg/dL Final   11/10/2023 152 (H) 70 - 110 mg/dL Final   11/10/2023 148 (H) 70 - 110 mg/dL Final   11/10/2023 170 (H) 70 - 110 mg/dL Final   11/10/2023 132 (H) 70 - 110 mg/dL Final   11/10/2023 128 (H) 70 - 110 mg/dL Final   11/10/2023 133 (H) 70 - 110 mg/dL Final   11/10/2023 118 (H) 70 - 110 mg/dL Final   11/10/2023 106 70 - 110 mg/dL Final   11/10/2023 123 (H) 70 - 110 mg/dL Final   11/09/2023 124 (H) 70 - 110 mg/dL Final   11/09/2023 105 70 - 110 mg/dL Final   11/09/2023 93 70 - 110 mg/dL Final   11/09/2023 127 (H) 70 - 110 mg/dL Final   11/09/2023 130 (H) 70 - 110 mg/dL Final   11/09/2023 148 (H) 70 - 110 mg/dL Final   11/09/2023 168 (H) 70 - 110 mg/dL Final   11/09/2023 215 (H) 70 - 110 mg/dL Final   11/09/2023 197 (H) 70 - 110 mg/dL Final   11/09/2023 184 (H) 70 - 110 mg/dL Final     Lab Results   Component Value Date    HGBA1C 5.6 11/07/2023       Diabetes Medications    Home Medication           metFORMIN (GLUCOPHAGE) 500 MG tablet Take 1 tablet (500 mg total) by mouth 2 (two) times daily with meals.          Endocrine  Type 2 diabetes mellitus without complication, without long-term current use of insulin  Endocrinology consulted for BG management.   BG goal 110-140 (CTS Protocol)      - Novolog (Insulin Aspart) prn for BG excursions [facility] SSI (150/25)  - BG checks AC/HS  - Hypoglycemia protocol in place     ** Please notify  Endocrine for any change and/or advance in diet**  ** Please call Endocrine for any BG related issues **    Discharge Planning:   TBD. Please notify endocrinology prior to discharge.      Gilberto Merida DNP, FNP-C  Department of Endocrinology  Inpatient Glycemic Management

## 2023-11-13 ENCOUNTER — TELEPHONE (OUTPATIENT)
Dept: CARDIAC REHAB | Facility: CLINIC | Age: 58
End: 2023-11-13
Payer: MEDICAID

## 2023-11-13 PROBLEM — Z98.890 S/P MVR (MITRAL VALVE REPAIR): Status: ACTIVE | Noted: 2023-11-13

## 2023-11-13 LAB
ALBUMIN SERPL BCP-MCNC: 3.1 G/DL (ref 3.5–5.2)
ALP SERPL-CCNC: 48 U/L (ref 55–135)
ALT SERPL W/O P-5'-P-CCNC: 13 U/L (ref 10–44)
ANION GAP SERPL CALC-SCNC: 7 MMOL/L (ref 8–16)
AST SERPL-CCNC: 50 U/L (ref 10–40)
BASOPHILS # BLD AUTO: 0.01 K/UL (ref 0–0.2)
BASOPHILS NFR BLD: 0.2 % (ref 0–1.9)
BILIRUB SERPL-MCNC: 0.7 MG/DL (ref 0.1–1)
BUN SERPL-MCNC: 28 MG/DL (ref 6–20)
CALCIUM SERPL-MCNC: 8.4 MG/DL (ref 8.7–10.5)
CHLORIDE SERPL-SCNC: 98 MMOL/L (ref 95–110)
CO2 SERPL-SCNC: 32 MMOL/L (ref 23–29)
CREAT SERPL-MCNC: 1.3 MG/DL (ref 0.5–1.4)
DIFFERENTIAL METHOD: ABNORMAL
EOSINOPHIL # BLD AUTO: 0.1 K/UL (ref 0–0.5)
EOSINOPHIL NFR BLD: 1.3 % (ref 0–8)
ERYTHROCYTE [DISTWIDTH] IN BLOOD BY AUTOMATED COUNT: 12.7 % (ref 11.5–14.5)
EST. GFR  (NO RACE VARIABLE): >60 ML/MIN/1.73 M^2
GLUCOSE SERPL-MCNC: 115 MG/DL (ref 70–110)
HCT VFR BLD AUTO: 25.8 % (ref 40–54)
HGB BLD-MCNC: 8.5 G/DL (ref 14–18)
IMM GRANULOCYTES # BLD AUTO: 0.03 K/UL (ref 0–0.04)
IMM GRANULOCYTES NFR BLD AUTO: 0.6 % (ref 0–0.5)
LYMPHOCYTES # BLD AUTO: 1.3 K/UL (ref 1–4.8)
LYMPHOCYTES NFR BLD: 23.5 % (ref 18–48)
MAGNESIUM SERPL-MCNC: 2.3 MG/DL (ref 1.6–2.6)
MCH RBC QN AUTO: 31 PG (ref 27–31)
MCHC RBC AUTO-ENTMCNC: 32.9 G/DL (ref 32–36)
MCV RBC AUTO: 94 FL (ref 82–98)
MONOCYTES # BLD AUTO: 0.7 K/UL (ref 0.3–1)
MONOCYTES NFR BLD: 13.2 % (ref 4–15)
NEUTROPHILS # BLD AUTO: 3.3 K/UL (ref 1.8–7.7)
NEUTROPHILS NFR BLD: 61.2 % (ref 38–73)
NRBC BLD-RTO: 0 /100 WBC
PHOSPHATE SERPL-MCNC: 3 MG/DL (ref 2.7–4.5)
PLATELET # BLD AUTO: 105 K/UL (ref 150–450)
PMV BLD AUTO: 10.6 FL (ref 9.2–12.9)
POCT GLUCOSE: 102 MG/DL (ref 70–110)
POCT GLUCOSE: 125 MG/DL (ref 70–110)
POCT GLUCOSE: 99 MG/DL (ref 70–110)
POTASSIUM SERPL-SCNC: 4.1 MMOL/L (ref 3.5–5.1)
PROT SERPL-MCNC: 5.6 G/DL (ref 6–8.4)
RBC # BLD AUTO: 2.74 M/UL (ref 4.6–6.2)
SODIUM SERPL-SCNC: 137 MMOL/L (ref 136–145)
WBC # BLD AUTO: 5.44 K/UL (ref 3.9–12.7)

## 2023-11-13 PROCEDURE — 20600001 HC STEP DOWN PRIVATE ROOM

## 2023-11-13 PROCEDURE — 97116 GAIT TRAINING THERAPY: CPT

## 2023-11-13 PROCEDURE — 25000003 PHARM REV CODE 250: Performed by: STUDENT IN AN ORGANIZED HEALTH CARE EDUCATION/TRAINING PROGRAM

## 2023-11-13 PROCEDURE — 94761 N-INVAS EAR/PLS OXIMETRY MLT: CPT

## 2023-11-13 PROCEDURE — 63600175 PHARM REV CODE 636 W HCPCS

## 2023-11-13 PROCEDURE — 25000003 PHARM REV CODE 250

## 2023-11-13 PROCEDURE — 25000003 PHARM REV CODE 250: Performed by: THORACIC SURGERY (CARDIOTHORACIC VASCULAR SURGERY)

## 2023-11-13 PROCEDURE — 85025 COMPLETE CBC W/AUTO DIFF WBC: CPT | Performed by: PHYSICIAN ASSISTANT

## 2023-11-13 PROCEDURE — 80053 COMPREHEN METABOLIC PANEL: CPT

## 2023-11-13 PROCEDURE — 84100 ASSAY OF PHOSPHORUS: CPT | Performed by: PHYSICIAN ASSISTANT

## 2023-11-13 PROCEDURE — 36415 COLL VENOUS BLD VENIPUNCTURE: CPT

## 2023-11-13 PROCEDURE — 83735 ASSAY OF MAGNESIUM: CPT | Performed by: PHYSICIAN ASSISTANT

## 2023-11-13 PROCEDURE — 25000003 PHARM REV CODE 250: Performed by: PHYSICIAN ASSISTANT

## 2023-11-13 PROCEDURE — 97530 THERAPEUTIC ACTIVITIES: CPT

## 2023-11-13 RX ORDER — GUAIFENESIN 600 MG/1
600 TABLET, EXTENDED RELEASE ORAL 2 TIMES DAILY
Status: DISCONTINUED | OUTPATIENT
Start: 2023-11-13 | End: 2023-11-14 | Stop reason: HOSPADM

## 2023-11-13 RX ORDER — FLUTICASONE PROPIONATE 50 MCG
2 SPRAY, SUSPENSION (ML) NASAL DAILY
Status: DISCONTINUED | OUTPATIENT
Start: 2023-11-13 | End: 2023-11-14 | Stop reason: HOSPADM

## 2023-11-13 RX ORDER — CETIRIZINE HYDROCHLORIDE 5 MG/1
5 TABLET ORAL DAILY
Status: DISCONTINUED | OUTPATIENT
Start: 2023-11-13 | End: 2023-11-14 | Stop reason: HOSPADM

## 2023-11-13 RX ADMIN — ATORVASTATIN CALCIUM 40 MG: 40 TABLET, FILM COATED ORAL at 09:11

## 2023-11-13 RX ADMIN — METHOCARBAMOL 1000 MG: 500 TABLET ORAL at 09:11

## 2023-11-13 RX ADMIN — ACETAMINOPHEN 1000 MG: 500 TABLET ORAL at 01:11

## 2023-11-13 RX ADMIN — DOCUSATE SODIUM 100 MG: 100 CAPSULE, LIQUID FILLED ORAL at 09:11

## 2023-11-13 RX ADMIN — CETIRIZINE HYDROCHLORIDE 5 MG: 5 TABLET, FILM COATED ORAL at 01:11

## 2023-11-13 RX ADMIN — ACETAMINOPHEN 1000 MG: 500 TABLET ORAL at 09:11

## 2023-11-13 RX ADMIN — BUPROPION HYDROCHLORIDE 300 MG: 150 TABLET, FILM COATED, EXTENDED RELEASE ORAL at 09:11

## 2023-11-13 RX ADMIN — FUROSEMIDE 20 MG: 10 INJECTION, SOLUTION INTRAMUSCULAR; INTRAVENOUS at 09:11

## 2023-11-13 RX ADMIN — GUAIFENESIN 600 MG: 600 TABLET, EXTENDED RELEASE ORAL at 01:11

## 2023-11-13 RX ADMIN — SIMETHICONE 80 MG: 80 TABLET, CHEWABLE ORAL at 09:11

## 2023-11-13 RX ADMIN — METHOCARBAMOL 1000 MG: 500 TABLET ORAL at 05:11

## 2023-11-13 RX ADMIN — FAMOTIDINE 20 MG: 20 TABLET ORAL at 09:11

## 2023-11-13 RX ADMIN — METOPROLOL TARTRATE 12.5 MG: 25 TABLET, FILM COATED ORAL at 09:11

## 2023-11-13 RX ADMIN — METHOCARBAMOL 1000 MG: 500 TABLET ORAL at 01:11

## 2023-11-13 RX ADMIN — MUPIROCIN 1 G: 20 OINTMENT TOPICAL at 09:11

## 2023-11-13 RX ADMIN — SIMETHICONE 80 MG: 80 TABLET, CHEWABLE ORAL at 03:11

## 2023-11-13 RX ADMIN — GUAIFENESIN 600 MG: 600 TABLET, EXTENDED RELEASE ORAL at 09:11

## 2023-11-13 RX ADMIN — ACETAMINOPHEN 1000 MG: 500 TABLET ORAL at 05:11

## 2023-11-13 RX ADMIN — TAMSULOSIN HYDROCHLORIDE 0.4 MG: 0.4 CAPSULE ORAL at 09:11

## 2023-11-13 RX ADMIN — MUPIROCIN 1 G: 20 OINTMENT TOPICAL at 10:11

## 2023-11-13 RX ADMIN — PRAMIPEXOLE DIHYDROCHLORIDE 0.12 MG: 0.12 TABLET ORAL at 09:11

## 2023-11-13 RX ADMIN — TRAZODONE HYDROCHLORIDE 100 MG: 50 TABLET ORAL at 09:11

## 2023-11-13 RX ADMIN — POLYETHYLENE GLYCOL 3350 17 G: 17 POWDER, FOR SOLUTION ORAL at 09:11

## 2023-11-13 RX ADMIN — ASPIRIN 325 MG ORAL TABLET 325 MG: 325 PILL ORAL at 09:11

## 2023-11-13 NOTE — PLAN OF CARE
Plan of care discussed with patient. Patient is free of fall/trauma/injury. Denies CP, SOB, or pain/discomfort. All questions addressed. Will continue to monitor    VSS. PRN pain meds given w relief. CT to -20 H2O suction, output monitored. Sternal precautions in place. Pacerwires in place & setting unchanged from previous shift. IS encouraged    Problem: Adult Inpatient Plan of Care  Goal: Patient-Specific Goal (Individualized)  Outcome: Ongoing, Progressing

## 2023-11-13 NOTE — CONSULTS
Letter regarding Phase II cardiac rehab was sent to patient, along with telephone # for [facility].  Aniya Monsalve RN  Cardiac Rehab Nurse

## 2023-11-13 NOTE — PT/OT/SLP PROGRESS
Occupational Therapy   Treatment    Name: Abimael Armendariz  MRN: [id number]  Admitting Diagnosis:  S/P MVR (mitral valve repair)  4 Days Post-Op    Recommendations:     Discharge Recommendations: No Therapy Indicated  Discharge Equipment Recommendations:  none  Barriers to discharge:  None    Assessment:     Abimael Armendariz is a 58 y.o. male with a medical diagnosis of S/P MVR (mitral valve repair).  He presents with impaired ADL and mobility performance deficits. Pt tolerated session well today with focus on toileting and functional mobility. Pt adhered to 3/3 sternal precautions today. Patient continues to demonstrate the need for occupational therapy on a scheduled basis exhibited by decreased independence with self-care and functional mobility   Performance deficits affecting function are weakness, gait instability, impaired endurance, impaired balance, impaired functional mobility, impaired self care skills.     Rehab Prognosis:  Good; patient would benefit from acute skilled OT services to address these deficits and reach maximum level of function.       Plan:     Patient to be seen 5 x/week to address the above listed problems via self-care/home management, therapeutic activities, therapeutic exercises  Plan of Care Expires: 12/10/23  Plan of Care Reviewed with: patient    Subjective     Chief Complaint: none stated   Patient/Family Comments/goals: to go home this week  Pain/Comfort:  Pain Rating 1: 0/10  Pain Rating Post-Intervention 1: 0/10    Objective:     Communicated with: RN prior to session.  Patient found up in chair with telemetry upon OT entry to room.    General Precautions: Standard, fall, sternal    Orthopedic Precautions:N/A  Braces: N/A  Respiratory Status: Room air     Occupational Performance:     Bed Mobility:    NT    Functional Mobility/Transfers:  Patient completed Sit <> Stand Transfer with stand by assistance  with  no assistive device   Patient completed Toilet Transfer Step Transfer  technique with stand by assistance with  no AD  Functional Mobility: Pt demonstrated functional mobility training to simulate household and community environment gait training during session. Pt tolerated ~8 minutes total using no AD with no LOB and good visual search and navigation strategies.       Activities of Daily Living:  Upper Body Dressing: minimum assistance donning gown in standing   Toileting: stand by assistance to have bowel movement  Feeding: setup A for meal in chair       [facility] 6 Click ADL:      Treatment & Education:  Pt educated on role of occupational therapy, POC, and safety during ADLs and functional mobility. Pt and OT discussed importance of safe, continued mobility to optimize daily living skills. Pt verbalized understanding.     White board updated during session. Pt given instruction to call for medical staff/nurse for assistance.       Patient left up in chair with all lines intact, call button in reach, and RN notified    GOALS:   Multidisciplinary Problems       Occupational Therapy Goals          Problem: Occupational Therapy    Goal Priority Disciplines Outcome Interventions   Occupational Therapy Goal     OT, PT/OT Ongoing, Progressing    Description: Goals to be met by: 12/1/2023    Patient will increase functional independence with ADLs by performing:    UE Dressing with Set-up Assistance.  LE Dressing with Supervision.  Grooming while standing at sink with Modified [place].  Toileting from toilet with Modified [place] for hygiene and clothing management.   Supine to sit with Supervision.  Stand pivot transfers with Modified [place].  Toilet transfer to toilet with Modified [place].                         Time Tracking:     OT Date of Treatment: 11/13/23  OT Start Time: 1249  OT Stop Time: 1301  OT Total Time (min): 12 min    Billable Minutes:Therapeutic Activity 12 min    OT/DAISY: OT          11/13/2023

## 2023-11-13 NOTE — LETTER
November 13, 2023    Abimael Armendariz  [address] [facility] [address]             [facility]  [address] [facility].  [place] [address]  Phone: [phone]                                  [facility]   [address] [facility]   [place] [address]  [phone]         [facility]   [address] [place] [address] [phone]         [facility]    [address]  [place] [address] [phone]   Re: Abimael Armendariz  Clinic number: [id number]    Dear Mr. Armendariz:    You were recently admitted to an [facility] facility for cardiac (heart) problem.  Your physician has referred you to [facility].  Cardiac Rehab Phase 2 is an educational and exercise program, conducted in a outpatient setting, proven to help reduce your risk for recurrent heart events.    Cardiac rehab has two major parts:    1. Exercise training to help you achieve cardiovascular fitness while learning how to exercise safely and improve muscle strength and endurance.  Your exercise prescription will be based on the results of the cardiopulmonary stress test (CPX) which will be done before entering the program and at completion.  2. Education, counseling and training to help you understand your heart condition and find ways to reduce your risk of future heart problems.  The cardiac rehab team will help you learn how to cope with the stress of adjusting to a new lifestyle and to deal with your fears about the future.    Phase 2 is a 36-session program, meeting 3 times a week for 12 weeks.  Each session consists of an hour of exercise and half-hour dedicated to the educational topic of the day.  Class days vary per location.  Please contact your nearest facility for details.    Through cardiac rehab you will learn:  About your heart condition, medical therapies, and medication  Risk factors in y our lifestyle contributing to  heart disease  New strategies to modify your risk factors  About a healthy diet that can lower your blood cholesterol, control weight, help prevent or control high blood pressure, and diabetes  How to stop smoking  How to manage stress    If you are interested in getting started, call the [facility] of your choosing.     Sincerely,     [facility]

## 2023-11-13 NOTE — PLAN OF CARE
Problem: Occupational Therapy  Goal: Occupational Therapy Goal  Description: Goals to be met by: 12/1/2023    Patient will increase functional independence with ADLs by performing:    UE Dressing with Set-up Assistance.  LE Dressing with Supervision.  Grooming while standing at sink with Modified [place].  Toileting from toilet with Modified [place] for hygiene and clothing management.   Supine to sit with Supervision.  Stand pivot transfers with Modified [place].  Toilet transfer to toilet with Modified [place].      Frequency decreased to 3x/wk due to pt making excellent progress.  Outcome: Ongoing, Progressing

## 2023-11-13 NOTE — SUBJECTIVE & OBJECTIVE
Interval History: NAEON. AFVSS. Intrinsic HR 80s, pacer wire disconnected to pacer box and off. Med chest tubes removed, put out 130 ml in 24 hours. Encourage increase ambulation and work with therapy. Plan for discharge tomorrow if stable. ECHO tomorrow morning.     Review of Systems   Constitutional: Negative for malaise/fatigue.   Cardiovascular:  Negative for chest pain, claudication, dyspnea on exertion, irregular heartbeat, leg swelling and palpitations.   Respiratory:  Negative for cough and shortness of breath.    Hematologic/Lymphatic: Negative for bleeding problem.   Gastrointestinal:  Negative for abdominal pain.   Genitourinary:  Negative for dysuria.   Neurological:  Negative for headaches and weakness.     Medications:  Continuous Infusions:  Scheduled Meds:   acetaminophen  1,000 mg Oral Q8H    aspirin  325 mg Oral Daily    atorvastatin  40 mg Oral QHS    buPROPion  300 mg Oral QHS    docusate sodium  100 mg Oral BID    famotidine  20 mg Oral Daily    furosemide (LASIX) injection  20 mg Intravenous Q12H    LIDOcaine  2 patch Transdermal Q24H    methocarbamoL  1,000 mg Oral QID    metoprolol tartrate  12.5 mg Oral BID    mupirocin  1 g Nasal BID    polyethylene glycol  17 g Oral Daily    pramipexole  0.125 mg Oral QHS    simethicone  1 tablet Oral TID    tamsulosin  0.4 mg Oral Daily    traZODone  100 mg Oral QHS     PRN Meds:bisacodyL, dextrose 10%, dextrose 10%, glucagon (human recombinant), glucose, glucose, insulin aspart U-100, metoclopramide HCl, ondansetron, oxyCODONE, oxyCODONE, sodium chloride 0.9%     Objective:     Vital Signs (Most Recent):  Temp: 98.1 °F (36.7 °C) (11/13/23 1140)  Pulse: 74 (11/13/23 1140)  Resp: 18 (11/13/23 1140)  BP: (!) 93/51 (11/13/23 1140)  SpO2: 96 % (11/13/23 1140) Vital Signs (24h Range):  Temp:  [97.8 °F (36.6 °C)-99.3 °F (37.4 °C)] 98.1 °F (36.7 °C)  Pulse:  [66-84] 74  Resp:  [16-20] 18  SpO2:  [90 %-96 %] 96 %  BP: ()/(50-60) 93/51     Weight: 86.5 kg  (190 lb 11.2 oz)  Body mass index is 26.6 kg/m².    SpO2: 96 %       Intake/Output - Last 3 Shifts         11/11 0700  11/12 0659 11/12 0700 11/13 0659 11/13 0700 11/14 0659    P.O.  1262     I.V. (mL/kg)       IV Piggyback       Total Intake(mL/kg)  1262 (14.6)     Urine (mL/kg/hr) 3490 (1.7) 2650 (1.3)     Chest Tube 320 130     Total Output 3810 2780     Net -3810 -1518            Urine Occurrence  1 x             Lines/Drains/Airways       Drain  Duration                  Y Chest Tube 1 and 2 11/09/23 1202 1 Right Mediastinal 19 Fr. 2 Left Mediastinal 19 Fr. 3 days              Line  Duration                  Pacer Wires 11/09/23 1123 4 days              Peripheral Intravenous Line  Duration                  Peripheral IV - Single Lumen 16 G Left Hand -- days         Peripheral IV - Single Lumen 11/09/23 0545 18 G Posterior;Right Hand 4 days                     Physical Exam  Constitutional:       Appearance: Normal appearance.   HENT:      Head: Normocephalic.   Eyes:      Pupils: Pupils are equal, round, and reactive to light.   Cardiovascular:      Rate and Rhythm: Normal rate and regular rhythm.      Pulses: Normal pulses.   Pulmonary:      Effort: Pulmonary effort is normal.   Abdominal:      General: Abdomen is flat. Bowel sounds are normal.      Palpations: Abdomen is soft.   Musculoskeletal:         General: Normal range of motion.   Skin:     General: Skin is warm and dry.      Comments: MSI CDI   Neurological:      General: No focal deficit present.      Mental Status: He is alert.            Significant Labs:  CBC:   Recent Labs   Lab 11/13/23 0221   WBC 5.44   RBC 2.74*   HGB 8.5*   HCT 25.8*   *   MCV 94   MCH 31.0   MCHC 32.9     CMP:   Recent Labs   Lab 11/13/23 0221   *   CALCIUM 8.4*   ALBUMIN 3.1*   PROT 5.6*      K 4.1   CO2 32*   CL 98   BUN 28*   CREATININE 1.3   ALKPHOS 48*   ALT 13   AST 50*   BILITOT 0.7     Coagulation:   Recent Labs   Lab 11/12/23  8102   INR 1.0    APTT 28.3       Significant Diagnostics:  I have reviewed all pertinent imaging results/findings within the past 24 hours.

## 2023-11-13 NOTE — PROGRESS NOTES
[facility] - Cardiology Stepdown  Cardiothoracic Surgery  Progress Note    Patient Name: Abimael Armendariz  MRN: [id number]  Admission Date: 11/9/2023  Hospital Length of Stay: 4 days  Code Status: Full Code   Attending Physician: Goran Bennett MD   Referring Provider: Goran Bennett MD  Principal Problem:S/P MVR (mitral valve repair)            Subjective:     Post-Op Info:  Procedure(s) (LRB):  SCHNEIDER MAZE PROCEDURE (N/A)  REPAIR, MITRAL VALVE, OPEN (N/A)  EXCLUSION, LEFT ATRIAL APPENDAGE, OPEN, AS PART OF OPEN CHEST SURGERY (N/A)   4 Days Post-Op     Interval History: NAEON. AFVSS. Intrinsic HR 80s, pacer wire disconnected to pacer box and off. Med chest tubes removed, put out 130 ml in 24 hours. Encourage increase ambulation and work with therapy. Plan for discharge tomorrow if stable. ECHO tomorrow morning.     Review of Systems   Constitutional: Negative for malaise/fatigue.   Cardiovascular:  Negative for chest pain, claudication, dyspnea on exertion, irregular heartbeat, leg swelling and palpitations.   Respiratory:  Negative for cough and shortness of breath.    Hematologic/Lymphatic: Negative for bleeding problem.   Gastrointestinal:  Negative for abdominal pain.   Genitourinary:  Negative for dysuria.   Neurological:  Negative for headaches and weakness.     Medications:  Continuous Infusions:  Scheduled Meds:   acetaminophen  1,000 mg Oral Q8H    aspirin  325 mg Oral Daily    atorvastatin  40 mg Oral QHS    buPROPion  300 mg Oral QHS    docusate sodium  100 mg Oral BID    famotidine  20 mg Oral Daily    furosemide (LASIX) injection  20 mg Intravenous Q12H    LIDOcaine  2 patch Transdermal Q24H    methocarbamoL  1,000 mg Oral QID    metoprolol tartrate  12.5 mg Oral BID    mupirocin  1 g Nasal BID    polyethylene glycol  17 g Oral Daily    pramipexole  0.125 mg Oral QHS    simethicone  1 tablet Oral TID    tamsulosin  0.4 mg Oral Daily    traZODone  100 mg Oral QHS     PRN Meds:bisacodyL, dextrose  10%, dextrose 10%, glucagon (human recombinant), glucose, glucose, insulin aspart U-100, metoclopramide HCl, ondansetron, oxyCODONE, oxyCODONE, sodium chloride 0.9%     Objective:     Vital Signs (Most Recent):  Temp: 98.1 °F (36.7 °C) (11/13/23 1140)  Pulse: 74 (11/13/23 1140)  Resp: 18 (11/13/23 1140)  BP: (!) 93/51 (11/13/23 1140)  SpO2: 96 % (11/13/23 1140) Vital Signs (24h Range):  Temp:  [97.8 °F (36.6 °C)-99.3 °F (37.4 °C)] 98.1 °F (36.7 °C)  Pulse:  [66-84] 74  Resp:  [16-20] 18  SpO2:  [90 %-96 %] 96 %  BP: ()/(50-60) 93/51     Weight: 86.5 kg (190 lb 11.2 oz)  Body mass index is 26.6 kg/m².    SpO2: 96 %       Intake/Output - Last 3 Shifts         11/11 0700  11/12 0659 11/12 0700  11/13 0659 11/13 0700  11/14 0659    P.O.  1262     I.V. (mL/kg)       IV Piggyback       Total Intake(mL/kg)  1262 (14.6)     Urine (mL/kg/hr) 3490 (1.7) 2650 (1.3)     Chest Tube 320 130     Total Output 3810 2780     Net -3810 -1518            Urine Occurrence  1 x             Lines/Drains/Airways       Drain  Duration                  Y Chest Tube 1 and 2 11/09/23 1202 1 Right Mediastinal 19 Fr. 2 Left Mediastinal 19 Fr. 3 days              Line  Duration                  Pacer Wires 11/09/23 1123 4 days              Peripheral Intravenous Line  Duration                  Peripheral IV - Single Lumen 16 G Left Hand -- days         Peripheral IV - Single Lumen 11/09/23 0545 18 G Posterior;Right Hand 4 days                     Physical Exam  Constitutional:       Appearance: Normal appearance.   HENT:      Head: Normocephalic.   Eyes:      Pupils: Pupils are equal, round, and reactive to light.   Cardiovascular:      Rate and Rhythm: Normal rate and regular rhythm.      Pulses: Normal pulses.   Pulmonary:      Effort: Pulmonary effort is normal.   Abdominal:      General: Abdomen is flat. Bowel sounds are normal.      Palpations: Abdomen is soft.   Musculoskeletal:         General: Normal range of motion.   Skin:      General: Skin is warm and dry.      Comments: MSI CDI   Neurological:      General: No focal deficit present.      Mental Status: He is alert.            Significant Labs:  CBC:   Recent Labs   Lab 11/13/23 0221   WBC 5.44   RBC 2.74*   HGB 8.5*   HCT 25.8*   *   MCV 94   MCH 31.0   MCHC 32.9     CMP:   Recent Labs   Lab 11/13/23 0221   *   CALCIUM 8.4*   ALBUMIN 3.1*   PROT 5.6*      K 4.1   CO2 32*   CL 98   BUN 28*   CREATININE 1.3   ALKPHOS 48*   ALT 13   AST 50*   BILITOT 0.7     Coagulation:   Recent Labs   Lab 11/12/23 0417   INR 1.0   APTT 28.3       Significant Diagnostics:  I have reviewed all pertinent imaging results/findings within the past 24 hours.  Assessment/Plan:     * S/P MVR (mitral valve repair)  58M with a history of atrial fibrillation and found to have severe mitral regurgitation during preoperative clearance prior to groin hernia surgery. He has been admitted several times for arrhythmia, chest pain, and a diagnosis of congestive heart failure.  He is now POD 3 mitral valve repair with medial commissuroplasty and mitral annuloplasty, Maze procedure, and left atrial appendage resection.     - ASA, BB, statin  - Continue lasix 20 bid, remove valente catheter  - V-wires disconnected to pacer box, intrinsic HR 80s   - mediastinal tubes removed 11/13/23  - multimodal analgesia  - simethicone for gas bloat symptoms  - cardiac diet with 1500 cc fluid restriction  - sternal precautions  - PT/OT  - Encouraged ambulation today   - ECHO AM tomorrow       Dispo- CSU to home when stable     S/P Maze operation for atrial fibrillation  See s/p MVR     Type 2 diabetes mellitus without complication, without long-term current use of insulin  Endocrine following            Annia Payne PA-C  Cardiothoracic Surgery  [facility] - Cardiology Stepdown

## 2023-11-13 NOTE — TELEPHONE ENCOUNTER
Letter regarding Phase II cardiac rehab was sent to patient, along with telephone # for [facility] cardiac rehab.  Aniya Monsalve RN  Cardiac Rehab Nurse

## 2023-11-13 NOTE — LETTER
November 13, 2023    Abimael Armendariz  [address] [address] [facility] [address]             [facility]  [address] [facility].  [place] [address]  Phone: [phone]                                  [facility]   [address] [facility]   [place] [address]  [phone]         [facility]   [address] [place] [address] [phone]         [facility]    [address]  [place] [address] [phone]   Re: Abimael Armendariz  Clinic number: [id number]    Dear Mr. Armendariz:    You were recently admitted to an [facility] facility for cardiac (heart) problem.  Your physician has referred you to [facility].  Cardiac Rehab Phase 2 is an educational and exercise program, conducted in a outpatient setting, proven to help reduce your risk for recurrent heart events.    Cardiac rehab has two major parts:    1. Exercise training to help you achieve cardiovascular fitness while learning how to exercise safely and improve muscle strength and endurance.  Your exercise prescription will be based on the results of the cardiopulmonary stress test (CPX) which will be done before entering the program and at completion.  2. Education, counseling and training to help you understand your heart condition and find ways to reduce your risk of future heart problems.  The cardiac rehab team will help you learn how to cope with the stress of adjusting to a new lifestyle and to deal with your fears about the future.    Phase 2 is a 36-session program, meeting 3 times a week for 12 weeks.  Each session consists of an hour of exercise and half-hour dedicated to the educational topic of the day.  Class days vary per location.  Please contact your nearest facility for details.    Through cardiac rehab you will learn:  About your heart condition, medical therapies, and medication  Risk factors in y our lifestyle contributing  to heart disease  New strategies to modify your risk factors  About a healthy diet that can lower your blood cholesterol, control weight, help prevent or control high blood pressure, and diabetes  How to stop smoking  How to manage stress    If you are interested in getting started, call the [facility] of your choosing.     Sincerely,     [facility]

## 2023-11-13 NOTE — ASSESSMENT & PLAN NOTE
58M with a history of atrial fibrillation and found to have severe mitral regurgitation during preoperative clearance prior to groin hernia surgery. He has been admitted several times for arrhythmia, chest pain, and a diagnosis of congestive heart failure.  He is now POD 3 mitral valve repair with medial commissuroplasty and mitral annuloplasty, Maze procedure, and left atrial appendage resection.     - ASA, BB, statin  - Continue lasix 20 bid, remove valente catheter  - V-wires disconnected to pacer box, intrinsic HR 80s   - mediastinal tubes removed 11/13/23  - multimodal analgesia  - simethicone for gas bloat symptoms  - cardiac diet with 1500 cc fluid restriction  - sternal precautions  - PT/OT  - Encouraged ambulation today   - ECHO AM tomorrow       Dispo- CSU to home when stable

## 2023-11-13 NOTE — PLAN OF CARE
Problem: Adult Inpatient Plan of Care  Goal: Plan of Care Review  Outcome: Ongoing, Progressing  Goal: Patient-Specific Goal (Individualized)  Outcome: Ongoing, Progressing  Goal: Absence of Hospital-Acquired Illness or Injury  Outcome: Ongoing, Progressing  Goal: Optimal Comfort and Wellbeing  Outcome: Ongoing, Progressing  Goal: Readiness for Transition of Care  Outcome: Ongoing, Progressing     Problem: Diabetes Comorbidity  Goal: Blood Glucose Level Within Targeted Range  Outcome: Ongoing, Progressing     Problem: Activity Intolerance (Cardiovascular Surgery)  Goal: Improved Activity Tolerance  Outcome: Ongoing, Progressing     Problem: Adjustment to Surgery (Cardiovascular Surgery)  Goal: Optimal Coping with Heart Surgery  Outcome: Ongoing, Progressing     Problem: Bleeding (Cardiovascular Surgery)  Goal: Bleeding (Cardiovascular Surgery)  Outcome: Ongoing, Progressing     Problem: Bowel Motility Impaired (Cardiovascular Surgery)  Goal: Effective Bowel Elimination (Cardiovascular Surgery)  Outcome: Ongoing, Progressing     Problem: Cardiac Function Impaired (Cardiovascular Surgery)  Goal: Effective Cardiac Function  Outcome: Ongoing, Progressing     Problem: Cerebral Tissue Perfusion (Cardiovascular Surgery)  Goal: Optimal Cerebral Tissue Perfusion (Cardiovascular Surgery)  Outcome: Ongoing, Progressing     Problem: Fluid and Electrolyte Imbalance (Cardiovascular Surgery)  Goal: Fluid and Electrolyte Balance (Cardiovascular Surgery)  Outcome: Ongoing, Progressing     Problem: Glycemic Control Impaired (Cardiovascular Surgery)  Goal: Blood Glucose Level Within Targeted Range (Cardiovascular Surgery)  Outcome: Ongoing, Progressing     Problem: Infection (Cardiovascular Surgery)  Goal: Absence of Infection Signs and Symptoms  Outcome: Ongoing, Progressing     Problem: Ongoing Anesthesia Effects (Cardiovascular Surgery)  Goal: Anesthesia/Sedation Recovery  Outcome: Ongoing, Progressing     Problem: Pain  (Cardiovascular Surgery)  Goal: Acceptable Pain Control  Outcome: Ongoing, Progressing     Problem: Postoperative Nausea and Vomiting (Cardiovascular Surgery)  Goal: Nausea and Vomiting Relief (Cardiovascular Surgery)  Outcome: Ongoing, Progressing     Problem: Postoperative Urinary Retention (Cardiovascular Surgery)  Goal: Effective Urinary Elimination (Cardiovascular Surgery)  Outcome: Ongoing, Progressing     Problem: Infection  Goal: Absence of Infection Signs and Symptoms  Outcome: Ongoing, Progressing     Problem: Communication Impairment (Mechanical Ventilation, Invasive)  Goal: Effective Communication  Outcome: Ongoing, Progressing     Problem: Device-Related Complication Risk (Mechanical Ventilation, Invasive)  Goal: Optimal Device Function  Outcome: Ongoing, Progressing     Problem: Inability to Wean (Mechanical Ventilation, Invasive)  Goal: Mechanical Ventilation Liberation  Outcome: Ongoing, Progressing     Problem: Nutrition Impairment (Mechanical Ventilation, Invasive)  Goal: Optimal Nutrition Delivery  Outcome: Ongoing, Progressing     Problem: Skin and Tissue Injury (Mechanical Ventilation, Invasive)  Goal: Absence of Device-Related Skin and Tissue Injury  Outcome: Ongoing, Progressing     Problem: Ventilator-Induced Lung Injury (Mechanical Ventilation, Invasive)  Goal: Absence of Ventilator-Induced Lung Injury  Outcome: Ongoing, Progressing     Problem: Device-Related Complication Risk (Artificial Airway)  Goal: Optimal Device Function  Outcome: Ongoing, Progressing     Problem: Skin Injury Risk Increased  Goal: Skin Health and Integrity  Outcome: Ongoing, Progressing     Problem: Fall Injury Risk  Goal: Absence of Fall and Fall-Related Injury  Outcome: Ongoing, Progressing

## 2023-11-13 NOTE — PT/OT/SLP PROGRESS
Physical Therapy  Treatment    Abimael Armendariz   [id number]    Time Tracking:     PT Received On: 11/13/23   PT Start Time: 1020   PT Stop Time: 1034   PT Total Time (min): 14 min    Billable Minutes: Gait Training 14 minutes       Recommendations:     Therapy Intensity Recommendations at Discharge: No Therapy Indicated     Equipment Needed After Discharge: none    Barriers to Discharge: None    Patient Information:     Recent Surgery: Procedure(s) (LRB):  SCHNEIDER MAZE PROCEDURE (N/A)  REPAIR, MITRAL VALVE, OPEN (N/A)  EXCLUSION, LEFT ATRIAL APPENDAGE, OPEN, AS PART OF OPEN CHEST SURGERY (N/A) 4 Days Post-Op    Diagnosis: Mitral regurgitation    Length of Stay: 4 days    General Precautions: Standard, fall, sternal  Orthopedic Precautions: N/A  Brace: N/A    Assessment:     Abimael Armendariz tolerated treatment well today. He's able to verbalize 3/3 of his sternal precautions. Met goal for transfers today (supervision from chair). Ambulates 350 ft in hallways with supervision, no AD utilized; gait is steady, able to hold conversation while walking. C/o mild leg weakness while walking, attributable to increased time in bed post-op. Progressing well post-op, decreasing POC frequency to 3x/week. Discussed PT role, POC, and goals with patient and/or family; verbalized understanding. Abimael Armendariz will continue to benefit from acute PT services to promote mobility during this admission and improve return to PLOF.    Problem List: weakness, impaired endurance, impaired functional mobility, impaired cardiopulmonary response to activity, decreased upper extremity function, decreased lower extremity function    Rehab Prognosis: Good; patient would benefit from acute skilled PT services to address these deficits and reach maximum level of function.    Plan:     Patient to be seen 3 x/week to address the above listed problems via gait training, therapeutic activities, therapeutic exercises, neuromuscular re-education    Plan of  "Care Expires: 12/09/23  Plan of Care reviewed with: patient    Subjective:     Communicated with RN prior to treatment, appropriate to see for treatment.    Pt found reclined in bedside chair upon PT entry to room, agreeable to treatment.    Patient commenting: "I'm doing well, I walked in the hallway a little bit yesterday too."    Does this patient have any cultural, spiritual, Taoist conflicts given the current situation? Patient/family has no barriers to learning. Patient/family verbalizes understanding of his/her program and goals and demonstrates them correctly. No cultural, spiritual, or educational needs identified.    Objective:     Patient found with: telemetry (pacer wires)    Pain:  Pain Rating 1: 0/10  Pain Rating Post-Intervention 1: 0/10    Functional Mobility:    Bed Mobility:  NT; patient sitting up out of bed before and after session    Transfers:  Sit to Stand: Supervision from chair with no AD x 1 trial(s)    Gait:  350 feet in hallways with supervision, no AD utilized; gait is steady, able to hold conversation while walking. C/o mild leg weakness while walking, attributable to increased time in bed post-op    Assist level: Supervision  Device: no AD    Balance:  Static Sit: Independent at edge of chair    Static Stand: Supervision with no AD    AM-PAC 6 CLICK MOBILITY  Turning over in bed (including adjusting bedclothes, sheets and blankets)?: 4  Sitting down on and standing up from a chair with arms (e.g., wheelchair, bedside commode, etc.): 4  Moving from lying on back to sitting on the side of the bed?: 4  Moving to and from a bed to a chair (including a wheelchair)?: 4  Need to walk in hospital room?: 4  Climbing 3-5 steps with a railing?: 3  Basic Mobility Total Score: 23    Patient was left sitting up in bedside chair with all lines intact and call button in reach.    GOALS:   Multidisciplinary Problems       Physical Therapy Goals          Problem: Physical Therapy    Goal Priority " Disciplines Outcome Goal Variances Interventions   Physical Therapy Goal     PT, PT/OT Ongoing, Progressing     Description: Goals to be met by: 2023    Patient will increase functional independence with mobility by performin. Supine to sit with Supervision  2. Sit to stand transfer with Supervision - MET ()  3. Gait  x 400 feet with Supervision.                      Jaden Alicea, PT  2023

## 2023-11-13 NOTE — HPI
Mr. Armendariz is a very pleasant 58-year-old gentleman who was undergoing preoperative clearance for repair of a groin hernia when he was found to be in atrial fibrillation.  He had an echo which demonstrated severe mitral regurgitation.  He was admitted several times for arrhythmia, chest pain, and a diagnosis of congestive heart failure.  He now presents for mitral valve repair and Maze procedure.

## 2023-11-13 NOTE — HOSPITAL COURSE
On 11/9/23, the patient was taken to the Operating Room for the above stated procedure. Please see the previously dictated operative report for complete details. Postoperatively, the patient was taken from the  Operating Room to the ICU where the vital signs were monitored and pain was kept under control. The patient was weaned from the drips and extubated in the ICU per protocol. Once hemodynamically stable, the patient was transferred to the Cardiac Step-Down floor for continued strengthening and ambulation. On postoperative day 6, the patient was ready for discharge to home. At the time of discharge, the patient was ambulating unassisted. Pain was well controlled with oral analgesics and the patient was tolerating the diet.     MOBILITY AND ACTIVITY: As tolerated. Patient may shower. No heavy lifting of greater than 5 pounds and no driving.     DIET: An 1800-calorie ADA with a 1500 mL fluid restriction.     WOUND CARE INSTRUCTIONS: Check for redness, swelling and drainage around the  incision or wound. Patient is to call for any obvious bleeding, drainage, pus from the wound, unusual problems or difficulties or temperature of greater than 101   degrees.     FOLLOWUP: Follow up with Dr. Bennett in approximately 3 weeks. Prior to this  appointment, the patient will have a chest x-ray and EKG.     Patient not placed on Ace-Inhibitor at the time of discharge due to potential for hypotension       DISCHARGE CONDITION: At the time of discharge, the patient was in sinus rhythm and afebrile with stable vital signs.

## 2023-11-13 NOTE — CARE UPDATE
Care Update:     No acute events overnight. Patient on the CSU in room 322/322 A. Blood glucose stable. BG at goal on current insulin regimen (SSI ). Steroid use- None . 4 Days Post-Op  Renal function- Normal   Vasopressors-  None       Diet Cardiac Fluid - 1500mL     POCT Glucose   Date Value Ref Range Status   11/13/2023 125 (H) 70 - 110 mg/dL Final   11/13/2023 99 70 - 110 mg/dL Final   11/13/2023 102 70 - 110 mg/dL Final   11/12/2023 150 (H) 70 - 110 mg/dL Final   11/12/2023 137 (H) 70 - 110 mg/dL Final   11/12/2023 115 (H) 70 - 110 mg/dL Final   11/12/2023 118 (H) 70 - 110 mg/dL Final   11/11/2023 165 (H) 70 - 110 mg/dL Final   11/11/2023 133 (H) 70 - 110 mg/dL Final   11/11/2023 143 (H) 70 - 110 mg/dL Final   11/11/2023 122 (H) 70 - 110 mg/dL Final   11/10/2023 200 (H) 70 - 110 mg/dL Final   11/10/2023 127 (H) 70 - 110 mg/dL Final     Lab Results   Component Value Date    HGBA1C 5.6 11/07/2023       Diabetes Medications    Home Medication           metFORMIN (GLUCOPHAGE) 500 MG tablet Take 1 tablet (500 mg total) by mouth 2 (two) times daily with meals.          Endocrine  Type 2 diabetes mellitus without complication, without long-term current use of insulin  Endocrinology consulted for BG management.   BG goal 110-140 (CTS Protocol)      - Novolog (Insulin Aspart) prn for BG excursions [facility] (150/25)  - BG checks AC/HS  - Hypoglycemia protocol in place     ** Please notify Endocrine for any change and/or advance in diet**  ** Please call Endocrine for any BG related issues **    Discharge Planning:   TBD. Please notify endocrinology prior to discharge.will likely sign off soon.

## 2023-11-14 VITALS
WEIGHT: 194.69 LBS | HEART RATE: 72 BPM | DIASTOLIC BLOOD PRESSURE: 59 MMHG | TEMPERATURE: 99 F | BODY MASS INDEX: 27.26 KG/M2 | SYSTOLIC BLOOD PRESSURE: 105 MMHG | OXYGEN SATURATION: 96 % | RESPIRATION RATE: 20 BRPM | HEIGHT: 71 IN

## 2023-11-14 LAB
ALBUMIN SERPL BCP-MCNC: 3 G/DL (ref 3.5–5.2)
ALP SERPL-CCNC: 53 U/L (ref 55–135)
ALT SERPL W/O P-5'-P-CCNC: 34 U/L (ref 10–44)
ANION GAP SERPL CALC-SCNC: 7 MMOL/L (ref 8–16)
ASCENDING AORTA: 2.85 CM
AST SERPL-CCNC: 64 U/L (ref 10–40)
AV INDEX (PROSTH): 0.61
AV MEAN GRADIENT: 4 MMHG
AV PEAK GRADIENT: 7 MMHG
AV VALVE AREA BY VELOCITY RATIO: 2.94 CM²
AV VALVE AREA: 2.57 CM²
AV VELOCITY RATIO: 0.7
BASOPHILS # BLD AUTO: 0.01 K/UL (ref 0–0.2)
BASOPHILS NFR BLD: 0.1 % (ref 0–1.9)
BILIRUB SERPL-MCNC: 0.8 MG/DL (ref 0.1–1)
BSA FOR ECHO PROCEDURE: 2.08 M2
BUN SERPL-MCNC: 29 MG/DL (ref 6–20)
CALCIUM SERPL-MCNC: 8.4 MG/DL (ref 8.7–10.5)
CHLORIDE SERPL-SCNC: 97 MMOL/L (ref 95–110)
CO2 SERPL-SCNC: 34 MMOL/L (ref 23–29)
CREAT SERPL-MCNC: 1.2 MG/DL (ref 0.5–1.4)
CV ECHO LV RWT: 0.35 CM
DIFFERENTIAL METHOD: ABNORMAL
DOP CALC AO PEAK VEL: 1.32 M/S
DOP CALC AO VTI: 26.33 CM
DOP CALC LVOT AREA: 4.2 CM2
DOP CALC LVOT DIAMETER: 2.32 CM
DOP CALC LVOT PEAK VEL: 0.92 M/S
DOP CALC LVOT STROKE VOLUME: 67.56 CM3
DOP CALC MV VTI: 27.83 CM
DOP CALCLVOT PEAK VEL VTI: 15.99 CM
E WAVE DECELERATION TIME: 251.21 MSEC
E/A RATIO: 1.79
E/E' RATIO: 12 M/S
ECHO LV POSTERIOR WALL: 0.96 CM (ref 0.6–1.1)
EOSINOPHIL # BLD AUTO: 0.2 K/UL (ref 0–0.5)
EOSINOPHIL NFR BLD: 2.9 % (ref 0–8)
ERYTHROCYTE [DISTWIDTH] IN BLOOD BY AUTOMATED COUNT: 12.4 % (ref 11.5–14.5)
EST. GFR  (NO RACE VARIABLE): >60 ML/MIN/1.73 M^2
FINAL PATHOLOGIC DIAGNOSIS: NORMAL
FRACTIONAL SHORTENING: 30 % (ref 28–44)
GLUCOSE SERPL-MCNC: 105 MG/DL (ref 70–110)
GROSS: NORMAL
HCT VFR BLD AUTO: 26.4 % (ref 40–54)
HGB BLD-MCNC: 8.9 G/DL (ref 14–18)
HR MV ECHO: 72 BPM
IMM GRANULOCYTES # BLD AUTO: 0.06 K/UL (ref 0–0.04)
IMM GRANULOCYTES NFR BLD AUTO: 0.8 % (ref 0–0.5)
INTERVENTRICULAR SEPTUM: 0.99 CM (ref 0.6–1.1)
LA MAJOR: 6.08 CM
LA MINOR: 6.27 CM
LA WIDTH: 5.56 CM
LEFT ATRIUM SIZE: 4.73 CM
LEFT ATRIUM VOLUME INDEX MOD: 59.4 ML/M2
LEFT ATRIUM VOLUME INDEX: 66.3 ML/M2
LEFT ATRIUM VOLUME MOD: 123.64 CM3
LEFT ATRIUM VOLUME: 138 CM3
LEFT INTERNAL DIMENSION IN SYSTOLE: 3.76 CM (ref 2.1–4)
LEFT VENTRICLE DIASTOLIC VOLUME INDEX: 68.16 ML/M2
LEFT VENTRICLE DIASTOLIC VOLUME: 141.78 ML
LEFT VENTRICLE MASS INDEX: 96 G/M2
LEFT VENTRICLE SYSTOLIC VOLUME INDEX: 29 ML/M2
LEFT VENTRICLE SYSTOLIC VOLUME: 60.23 ML
LEFT VENTRICULAR INTERNAL DIMENSION IN DIASTOLE: 5.41 CM (ref 3.5–6)
LEFT VENTRICULAR MASS: 200.57 G
LV LATERAL E/E' RATIO: 11.33 M/S
LV SEPTAL E/E' RATIO: 12.75 M/S
LYMPHOCYTES # BLD AUTO: 1.9 K/UL (ref 1–4.8)
LYMPHOCYTES NFR BLD: 26.6 % (ref 18–48)
Lab: NORMAL
MAGNESIUM SERPL-MCNC: 2.2 MG/DL (ref 1.6–2.6)
MCH RBC QN AUTO: 30.9 PG (ref 27–31)
MCHC RBC AUTO-ENTMCNC: 33.7 G/DL (ref 32–36)
MCV RBC AUTO: 92 FL (ref 82–98)
MONOCYTES # BLD AUTO: 0.7 K/UL (ref 0.3–1)
MONOCYTES NFR BLD: 10.4 % (ref 4–15)
MV A" WAVE DURATION": 7.23 MSEC
MV MEAN GRADIENT: 2 MMHG
MV PEAK A VEL: 0.57 M/S
MV PEAK E VEL: 1.02 M/S
MV PEAK GRADIENT: 5 MMHG
MV VALVE AREA BY CONTINUITY EQUATION: 2.43 CM2
NEUTROPHILS # BLD AUTO: 4.2 K/UL (ref 1.8–7.7)
NEUTROPHILS NFR BLD: 59.2 % (ref 38–73)
NRBC BLD-RTO: 0 /100 WBC
PHOSPHATE SERPL-MCNC: 3.4 MG/DL (ref 2.7–4.5)
PISA TR MAX VEL: 2.08 M/S
PLATELET # BLD AUTO: 127 K/UL (ref 150–450)
PMV BLD AUTO: 10.4 FL (ref 9.2–12.9)
POCT GLUCOSE: 122 MG/DL (ref 70–110)
POCT GLUCOSE: 138 MG/DL (ref 70–110)
POCT GLUCOSE: 94 MG/DL (ref 70–110)
POTASSIUM SERPL-SCNC: 4.4 MMOL/L (ref 3.5–5.1)
PROT SERPL-MCNC: 5.7 G/DL (ref 6–8.4)
PULM VEIN S/D RATIO: 1
PV PEAK D VEL: 0.47 M/S
PV PEAK S VEL: 0.47 M/S
RA MAJOR: 5.2 CM
RA PRESSURE ESTIMATED: 8 MMHG
RA WIDTH: 4.44 CM
RBC # BLD AUTO: 2.88 M/UL (ref 4.6–6.2)
RIGHT VENTRICULAR END-DIASTOLIC DIMENSION: 4.15 CM
RV TB RVSP: 10 MMHG
SINUS: 3.45 CM
SODIUM SERPL-SCNC: 138 MMOL/L (ref 136–145)
STJ: 2.52 CM
TDI LATERAL: 0.09 M/S
TDI SEPTAL: 0.08 M/S
TDI: 0.09 M/S
TR MAX PG: 17 MMHG
TRICUSPID ANNULAR PLANE SYSTOLIC EXCURSION: 1.27 CM
TV REST PULMONARY ARTERY PRESSURE: 25 MMHG
WBC # BLD AUTO: 7.13 K/UL (ref 3.9–12.7)
Z-SCORE OF LEFT VENTRICULAR DIMENSION IN END DIASTOLE: -1.63
Z-SCORE OF LEFT VENTRICULAR DIMENSION IN END SYSTOLE: -0.28

## 2023-11-14 PROCEDURE — 36415 COLL VENOUS BLD VENIPUNCTURE: CPT

## 2023-11-14 PROCEDURE — 25000003 PHARM REV CODE 250: Performed by: STUDENT IN AN ORGANIZED HEALTH CARE EDUCATION/TRAINING PROGRAM

## 2023-11-14 PROCEDURE — 25000003 PHARM REV CODE 250

## 2023-11-14 PROCEDURE — 63600175 PHARM REV CODE 636 W HCPCS

## 2023-11-14 PROCEDURE — 83735 ASSAY OF MAGNESIUM: CPT | Performed by: PHYSICIAN ASSISTANT

## 2023-11-14 PROCEDURE — 84100 ASSAY OF PHOSPHORUS: CPT | Performed by: PHYSICIAN ASSISTANT

## 2023-11-14 PROCEDURE — 80053 COMPREHEN METABOLIC PANEL: CPT

## 2023-11-14 PROCEDURE — 85025 COMPLETE CBC W/AUTO DIFF WBC: CPT | Performed by: PHYSICIAN ASSISTANT

## 2023-11-14 PROCEDURE — 97116 GAIT TRAINING THERAPY: CPT

## 2023-11-14 RX ORDER — POTASSIUM CHLORIDE 20 MEQ/1
TABLET, EXTENDED RELEASE ORAL
Qty: 30 TABLET | Refills: 11 | Status: SHIPPED | OUTPATIENT
Start: 2023-11-14 | End: 2023-12-07 | Stop reason: ALTCHOICE

## 2023-11-14 RX ORDER — OXYCODONE HYDROCHLORIDE 5 MG/1
5 TABLET ORAL EVERY 4 HOURS PRN
Qty: 42 TABLET | Refills: 0 | Status: SHIPPED | OUTPATIENT
Start: 2023-11-14 | End: 2023-12-07 | Stop reason: ALTCHOICE

## 2023-11-14 RX ORDER — ACETAMINOPHEN 500 MG
1000 TABLET ORAL EVERY 8 HOURS PRN
Qty: 30 TABLET | Refills: 0 | Status: SHIPPED | OUTPATIENT
Start: 2023-11-14 | End: 2023-12-07 | Stop reason: ALTCHOICE

## 2023-11-14 RX ORDER — DOCUSATE SODIUM 100 MG/1
100 CAPSULE, LIQUID FILLED ORAL 2 TIMES DAILY PRN
Qty: 30 CAPSULE | Refills: 0 | Status: SHIPPED | OUTPATIENT
Start: 2023-11-14 | End: 2023-12-07 | Stop reason: ALTCHOICE

## 2023-11-14 RX ORDER — LIDOCAINE 50 MG/G
3 PATCH TOPICAL DAILY PRN
Qty: 5 PATCH | Refills: 0 | Status: SHIPPED | OUTPATIENT
Start: 2023-11-14 | End: 2023-12-07 | Stop reason: ALTCHOICE

## 2023-11-14 RX ORDER — ATORVASTATIN CALCIUM 40 MG/1
40 TABLET, FILM COATED ORAL NIGHTLY
Qty: 90 TABLET | Refills: 3 | Status: SHIPPED | OUTPATIENT
Start: 2023-11-14 | End: 2023-12-28

## 2023-11-14 RX ORDER — METOPROLOL TARTRATE 25 MG/1
12.5 TABLET, FILM COATED ORAL 2 TIMES DAILY
Qty: 30 TABLET | Refills: 11 | Status: SHIPPED | OUTPATIENT
Start: 2023-11-14 | End: 2023-12-27

## 2023-11-14 RX ORDER — FUROSEMIDE 20 MG/1
TABLET ORAL
Qty: 60 TABLET | Refills: 11 | Status: SHIPPED | OUTPATIENT
Start: 2023-11-14 | End: 2023-12-07 | Stop reason: ALTCHOICE

## 2023-11-14 RX ORDER — METHOCARBAMOL 500 MG/1
1000 TABLET, FILM COATED ORAL 4 TIMES DAILY
Qty: 60 TABLET | Refills: 0 | Status: SHIPPED | OUTPATIENT
Start: 2023-11-14 | End: 2023-12-07 | Stop reason: ALTCHOICE

## 2023-11-14 RX ORDER — TAMSULOSIN HYDROCHLORIDE 0.4 MG/1
0.4 CAPSULE ORAL DAILY
Qty: 30 CAPSULE | Refills: 11 | Status: SHIPPED | OUTPATIENT
Start: 2023-11-14 | End: 2023-12-07 | Stop reason: ALTCHOICE

## 2023-11-14 RX ORDER — ASPIRIN 325 MG
325 TABLET, DELAYED RELEASE (ENTERIC COATED) ORAL DAILY
Qty: 30 TABLET | Refills: 11 | Status: SHIPPED | OUTPATIENT
Start: 2023-11-14 | End: 2024-11-13

## 2023-11-14 RX ORDER — FAMOTIDINE 20 MG/1
20 TABLET, FILM COATED ORAL DAILY
Qty: 30 TABLET | Refills: 11 | Status: SHIPPED | OUTPATIENT
Start: 2023-11-14 | End: 2023-12-07 | Stop reason: ALTCHOICE

## 2023-11-14 RX ORDER — SIMETHICONE 80 MG
80 TABLET,CHEWABLE ORAL 3 TIMES DAILY
Qty: 30 TABLET | Refills: 0 | Status: SHIPPED | OUTPATIENT
Start: 2023-11-14

## 2023-11-14 RX ADMIN — GUAIFENESIN 600 MG: 600 TABLET, EXTENDED RELEASE ORAL at 09:11

## 2023-11-14 RX ADMIN — SIMETHICONE 80 MG: 80 TABLET, CHEWABLE ORAL at 09:11

## 2023-11-14 RX ADMIN — METHOCARBAMOL 1000 MG: 500 TABLET ORAL at 09:11

## 2023-11-14 RX ADMIN — TAMSULOSIN HYDROCHLORIDE 0.4 MG: 0.4 CAPSULE ORAL at 09:11

## 2023-11-14 RX ADMIN — CETIRIZINE HYDROCHLORIDE 5 MG: 5 TABLET, FILM COATED ORAL at 09:11

## 2023-11-14 RX ADMIN — METOPROLOL TARTRATE 12.5 MG: 25 TABLET, FILM COATED ORAL at 09:11

## 2023-11-14 RX ADMIN — FAMOTIDINE 20 MG: 20 TABLET ORAL at 09:11

## 2023-11-14 RX ADMIN — FUROSEMIDE 20 MG: 10 INJECTION, SOLUTION INTRAMUSCULAR; INTRAVENOUS at 09:11

## 2023-11-14 RX ADMIN — ACETAMINOPHEN 1000 MG: 500 TABLET ORAL at 05:11

## 2023-11-14 RX ADMIN — ASPIRIN 325 MG ORAL TABLET 325 MG: 325 PILL ORAL at 09:11

## 2023-11-14 NOTE — DISCHARGE SUMMARY
[facility] - Cardiology Stepdown  Cardiothoracic Surgery  Discharge Summary      Patient Name: Abimael Armendariz  MRN: [id number]  Admission Date: 11/9/2023  Hospital Length of Stay: 5 days  Discharge Date and Time:  11/14/2023 10:57 AM  Attending Physician: Goran Bennett MD   Discharging Provider: Annia Payne PA-C  Primary Care Provider: Sammy Trinidad MD    HPI:    Mr. Armendariz is a very pleasant 58-year-old gentleman who was undergoing preoperative clearance for repair of a groin hernia when he was found to be in atrial fibrillation.  He had an echo which demonstrated severe mitral regurgitation.  He was admitted several times for arrhythmia, chest pain, and a diagnosis of congestive heart failure.  He now presents for mitral valve repair and Maze procedure.     Procedure(s) (LRB):  SCHNEIDER MAZE PROCEDURE (N/A)  REPAIR, MITRAL VALVE, OPEN (N/A)  EXCLUSION, LEFT ATRIAL APPENDAGE, OPEN, AS PART OF OPEN CHEST SURGERY (N/A)      Indwelling Lines/Drains at time of discharge:   Lines/Drains/Airways       Line  Duration                  Pacer Wires 11/09/23 1123 4 days                  Hospital Course: On 11/9/23, the patient was taken to the Operating Room for the above stated procedure. Please see the previously dictated operative report for complete details. Postoperatively, the patient was taken from the  Operating Room to the ICU where the vital signs were monitored and pain was kept under control. The patient was weaned from the drips and extubated in the ICU per protocol. Once hemodynamically stable, the patient was transferred to the Cardiac Step-Down floor for continued strengthening and ambulation. On postoperative day 6, the patient was ready for discharge to home. At the time of discharge, the patient was ambulating unassisted. Pain was well controlled with oral analgesics and the patient was tolerating the diet.     MOBILITY AND ACTIVITY: As tolerated. Patient may shower. No heavy lifting of greater  than 5 pounds and no driving.     DIET: An 1800-calorie ADA with a 1500 mL fluid restriction.     WOUND CARE INSTRUCTIONS: Check for redness, swelling and drainage around the  incision or wound. Patient is to call for any obvious bleeding, drainage, pus from the wound, unusual problems or difficulties or temperature of greater than 101   degrees.     FOLLOWUP: Follow up with Dr. Bennett in approximately 3 weeks. Prior to this  appointment, the patient will have a chest x-ray and EKG.     Patient not placed on Ace-Inhibitor at the time of discharge due to potential for hypotension       DISCHARGE CONDITION: At the time of discharge, the patient was in sinus rhythm and afebrile with stable vital signs.      Goals of Care Treatment Preferences:  Code Status: Full Code      Consults (From admission, onward)          Status Ordering Provider     Inpatient consult to Cardiac Rehab  Once        Provider:  (Not yet assigned)    Completed TIN CONWAY     Inpatient consult to Registered Dietitian/Nutritionist  Once        Provider:  (Not yet assigned)    Completed TIN CONWAY     Consult to Endocrinology  Once        Provider:  (Not yet assigned)    Completed TIN CONWAY     Consult Case Management/Social Work  Once        Provider:  (Not yet assigned)    Completed TIN CONWAY            Significant Diagnostic Studies:     Pending Diagnostic Studies:       Procedure Component Value Units Date/Time    Specimen to Pathology, Surgery Cardiovascular [[id number]] Collected: 11/09/23 1127    Order Status: Sent Lab Status: In process Updated: 11/09/23 1435    Specimen: Tissue             No new Assessment & Plan notes have been filed under this hospital service since the last note was generated.  Service: Cardiothoracic Surgery    Final Active Diagnoses:    Diagnosis Date Noted POA    PRINCIPAL PROBLEM:  S/P MVR (mitral valve repair) [Z98.890] 11/13/2023 Not Applicable    S/P Maze operation  for atrial fibrillation [Z98.890, Z86.79] 11/09/2023 Not Applicable    Persistent atrial fibrillation [I48.19] 08/15/2023 Yes    Type 2 diabetes mellitus without complication, without long-term current use of insulin [E11.9] 11/30/2022 Yes    Hypertension [I10]  Yes    Mitral regurgitation [I34.0]  Yes     Chronic      Problems Resolved During this Admission:      Discharged Condition: stable    Disposition: Home or Self Care    Follow Up:    Patient Instructions:   No discharge procedures on file.  Medications:  Reconciled Home Medications:      Medication List        START taking these medications      acetaminophen 500 MG tablet  Commonly known as: TYLENOL  Take 2 tablets (1,000 mg total) by mouth every 8 (eight) hours as needed for Pain.     aspirin 325 MG EC tablet  Commonly known as: ECOTRIN  Take 1 tablet (325 mg total) by mouth once daily.  Replaces: aspirin 81 MG Chew     atorvastatin 40 MG tablet  Commonly known as: LIPITOR  Take 1 tablet (40 mg total) by mouth every evening.     docusate sodium 100 MG capsule  Commonly known as: COLACE  Take 1 capsule (100 mg total) by mouth 2 (two) times daily as needed for Constipation.     famotidine 20 MG tablet  Commonly known as: PEPCID  Take 1 tablet (20 mg total) by mouth once daily.     LIDOcaine 5 %  Commonly known as: LIDODERM  Place 3 patches onto the skin daily as needed (pain). Cut in half and place on affected areas. Do not place on top of surgical incisional sites. Remove & Discard patch within 12 hours or as directed by MD     methocarbamoL 1,000 mg Tab  Take 1,000 mg by mouth 4 (four) times daily. for 10 days     oxyCODONE 5 MG immediate release tablet  Commonly known as: ROXICODONE  Take 1 tablet (5 mg total) by mouth every 4 (four) hours as needed for Pain.     potassium chloride SA 20 MEQ tablet  Commonly known as: K-DUR,KLOR-CON  Take one tablet by mouth twice daily for 7 days then decrease to once daily     simethicone 80 MG chewable  tablet  Commonly known as: MYLICON  Take 1 tablet (80 mg total) by mouth 3 (three) times daily.     tamsulosin 0.4 mg Cap  Commonly known as: FLOMAX  Take 1 capsule (0.4 mg total) by mouth once daily.            CHANGE how you take these medications      buPROPion 300 MG 24 hr tablet  Commonly known as: WELLBUTRIN XL  Take 1 tablet (300 mg total) by mouth once daily.  What changed: when to take this     furosemide 20 MG tablet  Commonly known as: LASIX  Take one tablet by mouth twice daily for 7 days then decrease to once daily  What changed:   medication strength  how much to take  how to take this  when to take this  additional instructions     metoprolol tartrate 25 MG tablet  Commonly known as: LOPRESSOR  Take 0.5 tablets (12.5 mg total) by mouth 2 (two) times daily.  What changed: how much to take     pramipexole 0.25 MG tablet  Commonly known as: MIRAPEX  TAKE 1 TABLET BY MOUTH EVERY DAY  What changed:   how much to take  how to take this  when to take this            CONTINUE taking these medications      blood sugar diagnostic Strp  To check BG qd times daily, to use with insurance preferred meter     blood-glucose meter kit  To check BG qd times daily, to use with insurance preferred meter     fluticasone propionate 50 mcg/actuation nasal spray  Commonly known as: FLONASE  1 spray by Each Nostril route daily as needed.     lancets Misc  To check BG qd times daily, to use with insurance preferred meter     metFORMIN 500 MG tablet  Commonly known as: GLUCOPHAGE  Take 1 tablet (500 mg total) by mouth 2 (two) times daily with meals.     multivitamin per tablet  Commonly known as: THERAGRAN  Take 1 tablet by mouth every evening.     polyethylene glycol 17 gram Pwpk  Commonly known as: GLYCOLAX  Take 17 g by mouth nightly as needed. 2 packs     traZODone 100 MG tablet  Commonly known as: DESYREL  Take 2 tablets (200 mg total) by mouth every evening.            STOP taking these medications      amiodarone 200 MG  Tab  Commonly known as: PACERONE     apixaban 5 mg Tab  Commonly known as: ELIQUIS     aspirin 81 MG Chew  Replaced by: aspirin 325 MG EC tablet     spironolactone 25 MG tablet  Commonly known as: ALDACTONE            Time spent on the discharge of patient: 55 minutes    TYRA LopezC  Cardiothoracic Surgery  [facility] - Cardiology Stepdown

## 2023-11-14 NOTE — PT/OT/SLP PROGRESS
"Physical Therapy  Treatment and Discharge    Abimael Armendariz   [id number]    Time Tracking:     PT Received On: 11/14/23   PT Start Time: 1041   PT Stop Time: 1057   PT Total Time (min): 16 min    Billable Minutes: Gait Training 10 and Therapeutic Activity 6       Recommendations:     Therapy Intensity Recommendations at Discharge: No Therapy Indicated     Equipment Needed After Discharge: none    Barriers to Discharge: None    Patient Information:     Recent Surgery: Procedure(s) (LRB):  SCHNEIDER MAZE PROCEDURE (N/A)  REPAIR, MITRAL VALVE, OPEN (N/A)  EXCLUSION, LEFT ATRIAL APPENDAGE, OPEN, AS PART OF OPEN CHEST SURGERY (N/A) 5 Days Post-Op    Diagnosis: S/P MVR (mitral valve repair)    Length of Stay: 5 days    General Precautions: Standard, fall, sternal  Orthopedic Precautions: N/A  Brace: N/A    Assessment:     Abimael Armendariz tolerated treatment well today. Pt was able to GT for 800ft today independently in hallway while having conversation w PT w no SOB observed, pt required no breaks during walking session. Pt was seated at bedside commode on PT entry and was able to perform task independently. Pt also able to put on shirt independently prior to GT for session. Discussed pt's plan to sleep in recliner at home, as well as sternal precations. Discussed discharge from acute PT services with patient as there are no further acute PT needs identified at this time; verbalized understanding. Will now discharge from acute PT services.    Problem List: impaired endurance, decreased upper extremity function, impaired cardiopulmonary response to activity, orthopedic precautions    Plan:     Discharge from acute PT services.    Plan of Care reviewed with: patient    Subjective:     Communicated with RN prior to treatment, appropriate to see for treatment.    Pt found seated on bedside commode/toilet upon PT entry to room, agreeable to treatment.    Patient commenting: "I'm ready to go home"    Does this patient have any " cultural, spiritual, Nondenominational conflicts given the current situation? Patient has no barriers to learning. Patient verbalizes understanding of his/her program and goals and demonstrates them correctly. No cultural, spiritual, or educational needs identified.    Objective:     Patient found with: telemetry    Pain:  Pain Rating 1: 0/10              Pain Rating Post-Intervention 1: 0/10    Functional Mobility:    Bed Mobility:  NT; patient sitting up out of bed before and after session    Transfers:  Toilet Transfer: Independent to transition on/off bedside commode with no AD x 1 trial(s)    Gait:  800 feet   independently in hallway while having conversation w PT w no SOB observed, pt required no breaks during walking session.  Assist level: Independent  Device: no AD    Balance:    Static Stand: Independent with no AD        AM-PAC 6 CLICK MOBILITY  Turning over in bed (including adjusting bedclothes, sheets and blankets)?: 4  Sitting down on and standing up from a chair with arms (e.g., wheelchair, bedside commode, etc.): 4  Moving from lying on back to sitting on the side of the bed?: 4  Moving to and from a bed to a chair (including a wheelchair)?: 4  Need to walk in hospital room?: 4  Climbing 3-5 steps with a railing?: 4  Basic Mobility Total Score: 24    Patient was left standing in room with call button in reach.    GOALS:   Multidisciplinary Problems       Physical Therapy Goals          Problem: Physical Therapy    Goal Priority Disciplines Outcome Goal Variances Interventions   Physical Therapy Goal     PT, PT/OT Ongoing, Progressing     Description: Pt D/C from, acute PT on 11/14, see progress made towards goals below    1. Supine to sit with Supervision - Pt declined (plans to sleep in recliner at home  2. Sit to stand transfer with Supervision - MET (11/13)  3. Gait  x 400 feet with Supervision. - MET (11/14)                       Jaden Alicea, PT  11/14/2023    .

## 2023-11-14 NOTE — PLAN OF CARE
11/14/23 1259   Final Note   Assessment Type Final Discharge Note   Anticipated Discharge Disposition Home   What phone number can be called within the next 1-3 days to see how you are doing after discharge? [phone]   Hospital Resources/Appts/Education Provided Provided patient/caregiver with written discharge plan information;Appointments scheduled and added to AVS   Post-Acute Status   Discharge Delays None known at this time     Patient discharging home /self care. Per physician discharge summary: DISCHARGE CONDITION: At the time of discharge, the patient was in sinus rhythm and afebrile with stable vital signs.       Jermaine James RN    [phone]      Future Appointments   Date Time Provider Department Center   12/7/2023  2:15 PM NOM OIC-XRAY [facility]   12/7/2023  3:15 PM EKG, APPT [facility]   12/7/2023  3:30 PM Goran Bennett MD [facility]   12/28/2023  8:20 AM Sammy Trinidad MD [facility] [place] [facility]         Discharge Plan A and Plan B have been determined by review of patient's clinical status, future medical and therapeutic needs, and coverage/benefits for post-acute care in coordination with multidisciplinary team members.

## 2023-11-14 NOTE — PLAN OF CARE
Problem: Adult Inpatient Plan of Care  Goal: Plan of Care Review  Outcome: Ongoing, Progressing  Goal: Patient-Specific Goal (Individualized)  Outcome: Ongoing, Progressing  Goal: Absence of Hospital-Acquired Illness or Injury  Outcome: Ongoing, Progressing  Goal: Optimal Comfort and Wellbeing  Outcome: Ongoing, Progressing  Goal: Readiness for Transition of Care  Outcome: Ongoing, Progressing     Problem: Diabetes Comorbidity  Goal: Blood Glucose Level Within Targeted Range  Outcome: Ongoing, Progressing     Problem: Activity Intolerance (Cardiovascular Surgery)  Goal: Improved Activity Tolerance  Outcome: Ongoing, Progressing     Problem: Adjustment to Surgery (Cardiovascular Surgery)  Goal: Optimal Coping with Heart Surgery  Outcome: Ongoing, Progressing     Problem: Bleeding (Cardiovascular Surgery)  Goal: Bleeding (Cardiovascular Surgery)  Outcome: Ongoing, Progressing     Problem: Bowel Motility Impaired (Cardiovascular Surgery)  Goal: Effective Bowel Elimination (Cardiovascular Surgery)  Outcome: Ongoing, Progressing

## 2023-11-14 NOTE — ADDENDUM NOTE
Addendum  created 11/14/23 1007 by Lisa Whiting MD    Attestation recorded in Intraprocedure, Intraprocedure Attestations filed, Intraprocedure Event edited, Intraprocedure Staff edited

## 2023-11-14 NOTE — PLAN OF CARE
Abimael Armendariz tolerated treatment well today. Pt was able to GT for 800ft today independently in hallway while having conversation w PT w no SOB observed, pt required no breaks during walking session. Pt was seated at bedside commode on PT entry and was able to perform task independently. Pt also able to put on shirt independently prior to GT for session. Discussed pt's plan to sleep in recliner at home, as well as sternal precations. Discussed discharge from acute PT services with patient as there are no further acute PT needs identified at this time; verbalized understanding. Will now discharge from acute PT services.     Problem: Physical Therapy  Goal: Physical Therapy Goal  Description: Pt D/C from, acute PT on 11/14, see progress made towards goals below    1. Supine to sit with Supervision - Pt declined (plans to sleep in recliner at home  2. Sit to stand transfer with Supervision - MET (11/13)  3. Gait  x 400 feet with Supervision. - MET (11/14)  Outcome: Met    Jaden Alicea, PT  11/14/2023

## 2023-11-14 NOTE — CARE UPDATE
Care Update:     No acute events overnight. Patient on the CSU in room 322/322 A. Blood glucose stable. BG at goal on current insulin regimen (SSI ). Steroid use- None . 5 Days Post-Op  Renal function- Normal   Vasopressors-  None       Diet Cardiac Fluid - 1500mL     POCT Glucose   Date Value Ref Range Status   11/14/2023 94 70 - 110 mg/dL Final   11/13/2023 138 (H) 70 - 110 mg/dL Final   11/13/2023 125 (H) 70 - 110 mg/dL Final   11/13/2023 99 70 - 110 mg/dL Final   11/13/2023 102 70 - 110 mg/dL Final   11/12/2023 150 (H) 70 - 110 mg/dL Final   11/12/2023 137 (H) 70 - 110 mg/dL Final   11/12/2023 115 (H) 70 - 110 mg/dL Final   11/12/2023 118 (H) 70 - 110 mg/dL Final   11/11/2023 165 (H) 70 - 110 mg/dL Final   11/11/2023 133 (H) 70 - 110 mg/dL Final   11/11/2023 143 (H) 70 - 110 mg/dL Final     Lab Results   Component Value Date    HGBA1C 5.6 11/07/2023       Diabetes Medications    Home Medication           metFORMIN (GLUCOPHAGE) 500 MG tablet Take 1 tablet (500 mg total) by mouth 2 (two) times daily with meals.          Endocrine  Type 2 diabetes mellitus without complication, without long-term current use of insulin  Endocrinology consulted for BG management.   BG goal 110-140 (CTS Protocol)      - Novolog (Insulin Aspart) prn for BG excursions [facility] SSI (150/25)  - BG checks AC/HS  - Hypoglycemia protocol in place     ** Please notify Endocrine for any change and/or advance in diet**  ** Please call Endocrine for any BG related issues **    Discharge Planning:   TBD. Please notify endocrinology prior to discharge.will likely sign off soon.

## 2023-11-14 NOTE — NURSING
IV and tele removed. Discharge paperwork reviewed and given to pt and pt's spouse. Verbalized understanding. No questions or concerns. Pt's spouse picked up meds from [facility].    1523 - Pt left unit via wheelchair with transport and spouse.

## 2023-11-14 NOTE — PT/OT/SLP PROGRESS
Occupational Therapy      Patient Name:  Abimael Armendariz   MRN:  [id number]    Patient not seen today secondary to Other (Comment) (Pt with discharge orders in place). Will follow-up if pt remains hospitalized and appropriate for therapy.    Sophia Mcgrath OT  11/14/2023

## 2023-11-16 ENCOUNTER — PATIENT MESSAGE (OUTPATIENT)
Dept: CARDIOTHORACIC SURGERY | Facility: CLINIC | Age: 58
End: 2023-11-16
Payer: MEDICAID

## 2023-11-16 ENCOUNTER — TELEPHONE (OUTPATIENT)
Dept: CARDIOTHORACIC SURGERY | Facility: CLINIC | Age: 58
End: 2023-11-16
Payer: MEDICAID

## 2023-11-16 NOTE — TELEPHONE ENCOUNTER
Called pt following hospital discharge and spoke with pt and his wife.  Reiterated the need for pt to clean his incision everyday with soap and water, and to walk as much as he can. Pt instructed to call the clinic with any questions or concerns.  Pt verbalized understanding.

## 2023-11-21 ENCOUNTER — TELEPHONE (OUTPATIENT)
Dept: CARDIOTHORACIC SURGERY | Facility: CLINIC | Age: 58
End: 2023-11-21
Payer: MEDICAID

## 2023-11-21 NOTE — TELEPHONE ENCOUNTER
Received call from pt reporting he has some skin irritation on his left buttock. He describes it as red, raised, and burning. Advised pt to keep area clean with mild soap and water, pat dry, and may apply barrier ointment such as Aquaphor or Abbie's Butt Paste. Pt should avoid excessive pressure on area. Pt should follow up with PCP if area does not improve or gets worse. Pt verbalized understanding of all information.

## 2023-11-29 DIAGNOSIS — E11.9 TYPE 2 DIABETES MELLITUS WITHOUT COMPLICATION, WITHOUT LONG-TERM CURRENT USE OF INSULIN: Primary | ICD-10-CM

## 2023-11-29 DIAGNOSIS — Z12.5 PROSTATE CANCER SCREENING: ICD-10-CM

## 2023-11-29 DIAGNOSIS — E11.9 TYPE 2 DIABETES MELLITUS WITHOUT COMPLICATION: ICD-10-CM

## 2023-11-29 DIAGNOSIS — E78.5 DYSLIPIDEMIA: ICD-10-CM

## 2023-11-30 ENCOUNTER — PATIENT MESSAGE (OUTPATIENT)
Dept: FAMILY MEDICINE | Facility: CLINIC | Age: 58
End: 2023-11-30
Payer: MEDICAID

## 2023-11-30 NOTE — PROGRESS NOTES
Order in for lipid panel.  He is also due for a microalbumin/creatinine ratio and a PSA if he wishes to have it.    Orders are in for lipid panel, urine microalbumin/creatinine ratio, and PSA.  If he does not wish to have the PSA it can be canceled.

## 2023-12-05 ENCOUNTER — TELEPHONE (OUTPATIENT)
Dept: CARDIOTHORACIC SURGERY | Facility: CLINIC | Age: 58
End: 2023-12-05
Payer: MEDICAID

## 2023-12-05 NOTE — TELEPHONE ENCOUNTER
Received call from pt reporting that he accidentally took two doses of each of his morning medications today - aspirin, furosemide, tamsulosin, metformin, pepcid, and metoprolol. Pt reports he took the second dose just a few minutes ago. Pt reports he feels fine now. I instructed pt to take his BP and HR. Pt reports he took three readings which were 119/80, HR 83; 116/77, HR 83; and 123/78 HR 84. I notified MAURICIO Payne who advised pt should hold evening dose of furosemide and metoprolol and continue to monitor BP and HR especially if he feels dizzy or lightheaded. Relayed this information to pt and reinforced importance of monitoring BP and HR. Pt verbalized understanding.

## 2023-12-06 NOTE — PROGRESS NOTES
Patient seen and examined. Patient is progressively increasing activity. No significant complaints.     Sternum: stable, incision CDI  Chest xray: Acceptable post op chest  EKG: NSR  Discharge TTE 11/14:    Left Ventricle: The left ventricle is normal in size. Ventricular mass is normal. Normal wall thickness. Normal wall motion. There is normal systolic function with a visually estimated ejection fraction of 60 - 65%. Diastolic function cannot be reliably determined in the presence of mitral valve ring/replacement. Normal left ventricular filling pressure.    Right Ventricle: Normal right ventricular cavity size. Wall thickness is normal. Right ventricle wall motion  is normal. Systolic function is normal.    Left Atrium: Left atrium is severely dilated.    Aortic Valve: There is mild annular calcification present. There is no significant regurgitation.    Mitral Valve: The mitral valve status is post valvuloplasty with a 36 mm Medtronic Simplus band . There is mild mitral annular calcification present. The mean pressure gradient across the mitral valve is 2 mmHg at a heart rate of 72 bpm.    Pulmonary Artery: The estimated pulmonary artery systolic pressure is 25 mmHg.    IVC/SVC: Intermediate venous pressure at 8 mmHg.        Assessment:  S/p Surgery 11/9  1)  Mitral valve repair with medial commissuroplasty and a 36 mm Medtronic Simuplus band annuloplasty.   2)  Maze procedure with complete left and right atrial lesion sets and resection of left atrial appendage    Plan:  Continue to maintain sternal precautions- on 12/21 ok to push and pull with arms and lift no more than 20 lbs. On 2/1, sternal restrictions are lifted.   Can begin driving as long as he has power steering  Can begin cardiac rehab in the next couple of weeks  We will refer to cardiology to assume care   DC lasix  DC potassium        No scheduled appointment, RTC prn      CTS Attending Note:    I have personally taken the history and examined  this patient and agree with the NAFISA's note as stated above.

## 2023-12-07 ENCOUNTER — HOSPITAL ENCOUNTER (OUTPATIENT)
Dept: CARDIOLOGY | Facility: CLINIC | Age: 58
Discharge: HOME | End: 2023-12-07
Attending: THORACIC SURGERY (CARDIOTHORACIC VASCULAR SURGERY)
Payer: MEDICAID

## 2023-12-07 ENCOUNTER — HOSPITAL ENCOUNTER (OUTPATIENT)
Dept: RADIOLOGY | Facility: HOSPITAL | Age: 58
Discharge: HOME | End: 2023-12-07
Attending: THORACIC SURGERY (CARDIOTHORACIC VASCULAR SURGERY)
Payer: MEDICAID

## 2023-12-07 ENCOUNTER — OFFICE VISIT (OUTPATIENT)
Dept: CARDIOTHORACIC SURGERY | Facility: CLINIC | Age: 58
End: 2023-12-07
Payer: MEDICAID

## 2023-12-07 VITALS
WEIGHT: 196.44 LBS | BODY MASS INDEX: 27.4 KG/M2 | OXYGEN SATURATION: 95 % | DIASTOLIC BLOOD PRESSURE: 70 MMHG | HEART RATE: 83 BPM | SYSTOLIC BLOOD PRESSURE: 126 MMHG

## 2023-12-07 DIAGNOSIS — Z86.79 S/P MAZE OPERATION FOR ATRIAL FIBRILLATION: ICD-10-CM

## 2023-12-07 DIAGNOSIS — Z98.890 S/P MVR (MITRAL VALVE REPAIR): ICD-10-CM

## 2023-12-07 DIAGNOSIS — Z98.890 S/P MAZE OPERATION FOR ATRIAL FIBRILLATION: ICD-10-CM

## 2023-12-07 DIAGNOSIS — Z98.890 S/P MITRAL VALVE REPAIR: ICD-10-CM

## 2023-12-07 DIAGNOSIS — Z98.890 S/P MITRAL VALVE REPAIR: Primary | ICD-10-CM

## 2023-12-07 PROCEDURE — 99024 PR POST-OP FOLLOW-UP VISIT: ICD-10-PCS | Mod: ,,, | Performed by: THORACIC SURGERY (CARDIOTHORACIC VASCULAR SURGERY)

## 2023-12-07 PROCEDURE — 93005 ELECTROCARDIOGRAM TRACING: CPT | Mod: PBBFAC | Performed by: INTERNAL MEDICINE

## 2023-12-07 PROCEDURE — 3074F SYST BP LT 130 MM HG: CPT | Mod: CPTII,,, | Performed by: THORACIC SURGERY (CARDIOTHORACIC VASCULAR SURGERY)

## 2023-12-07 PROCEDURE — 3078F DIAST BP <80 MM HG: CPT | Mod: CPTII,,, | Performed by: THORACIC SURGERY (CARDIOTHORACIC VASCULAR SURGERY)

## 2023-12-07 PROCEDURE — 71046 X-RAY EXAM CHEST 2 VIEWS: CPT | Mod: TC

## 2023-12-07 PROCEDURE — 93010 EKG 12-LEAD: ICD-10-PCS | Mod: S$PBB,,, | Performed by: INTERNAL MEDICINE

## 2023-12-07 PROCEDURE — 99999 PR PBB SHADOW E&M-EST. PATIENT-LVL IV: ICD-10-PCS | Mod: PBBFAC,,, | Performed by: THORACIC SURGERY (CARDIOTHORACIC VASCULAR SURGERY)

## 2023-12-07 PROCEDURE — 3074F PR MOST RECENT SYSTOLIC BLOOD PRESSURE < 130 MM HG: ICD-10-PCS | Mod: CPTII,,, | Performed by: THORACIC SURGERY (CARDIOTHORACIC VASCULAR SURGERY)

## 2023-12-07 PROCEDURE — 4010F PR ACE/ARB THEARPY RXD/TAKEN: ICD-10-PCS | Mod: CPTII,,, | Performed by: THORACIC SURGERY (CARDIOTHORACIC VASCULAR SURGERY)

## 2023-12-07 PROCEDURE — 3044F PR MOST RECENT HEMOGLOBIN A1C LEVEL <7.0%: ICD-10-PCS | Mod: CPTII,,, | Performed by: THORACIC SURGERY (CARDIOTHORACIC VASCULAR SURGERY)

## 2023-12-07 PROCEDURE — 99999 PR PBB SHADOW E&M-EST. PATIENT-LVL IV: CPT | Mod: PBBFAC,,, | Performed by: THORACIC SURGERY (CARDIOTHORACIC VASCULAR SURGERY)

## 2023-12-07 PROCEDURE — 99214 OFFICE O/P EST MOD 30 MIN: CPT | Mod: PBBFAC,25 | Performed by: THORACIC SURGERY (CARDIOTHORACIC VASCULAR SURGERY)

## 2023-12-07 PROCEDURE — 71046 XR CHEST PA AND LATERAL: ICD-10-PCS | Mod: 26,,, | Performed by: RADIOLOGY

## 2023-12-07 PROCEDURE — 71046 X-RAY EXAM CHEST 2 VIEWS: CPT | Mod: 26,,, | Performed by: RADIOLOGY

## 2023-12-07 PROCEDURE — 1159F PR MEDICATION LIST DOCUMENTED IN MEDICAL RECORD: ICD-10-PCS | Mod: CPTII,,, | Performed by: THORACIC SURGERY (CARDIOTHORACIC VASCULAR SURGERY)

## 2023-12-07 PROCEDURE — 3078F PR MOST RECENT DIASTOLIC BLOOD PRESSURE < 80 MM HG: ICD-10-PCS | Mod: CPTII,,, | Performed by: THORACIC SURGERY (CARDIOTHORACIC VASCULAR SURGERY)

## 2023-12-07 PROCEDURE — 4010F ACE/ARB THERAPY RXD/TAKEN: CPT | Mod: CPTII,,, | Performed by: THORACIC SURGERY (CARDIOTHORACIC VASCULAR SURGERY)

## 2023-12-07 PROCEDURE — 1159F MED LIST DOCD IN RCRD: CPT | Mod: CPTII,,, | Performed by: THORACIC SURGERY (CARDIOTHORACIC VASCULAR SURGERY)

## 2023-12-07 PROCEDURE — 99024 POSTOP FOLLOW-UP VISIT: CPT | Mod: ,,, | Performed by: THORACIC SURGERY (CARDIOTHORACIC VASCULAR SURGERY)

## 2023-12-07 PROCEDURE — 3044F HG A1C LEVEL LT 7.0%: CPT | Mod: CPTII,,, | Performed by: THORACIC SURGERY (CARDIOTHORACIC VASCULAR SURGERY)

## 2023-12-07 PROCEDURE — 93010 ELECTROCARDIOGRAM REPORT: CPT | Mod: S$PBB,,, | Performed by: INTERNAL MEDICINE

## 2023-12-07 NOTE — PATIENT INSTRUCTIONS
Please make appointments to check in with your PCP and Cardiologist in the next 2 to 6 weeks.    Continue walking during cooler parts of the day or early evening to build up your endurance.     You may drive, if you have power steering.    Your lifting restriction is still in place until you are 12 weeks out from your surgery:     ** 5 pounds first 6 weeks after surgery **   **20 pounds for weeks 7 - 12 after surgery **    COVID Precautions:    Continue to take precautions against COVID. Mask up when around unknown people, wash / sanitize hands frequently. Avoid large groups of people until at least 12 weeks post op.

## 2023-12-07 NOTE — LETTER
December 7, 2023        Hilario Pretty MD  [address] [facility] [address]             [facility] Cardi[facility] 2nd Fl  [address]  [facility] [address]  Phone: [phone]   Patient: Abimael Armendariz   MR Number: [id number]   YOB: 1965   Date of Visit: 12/7/2023       Dear Dr. Pretty:    Thank you for referring Abimael Armendariz to me for evaluation. Below are the relevant portions of my assessment and plan of care.            If you have questions, please do not hesitate to call me. I look forward to following Abimael along with you.    Sincerely,      Goran Bennett MD           CC  No Recipients

## 2023-12-08 ENCOUNTER — TELEPHONE (OUTPATIENT)
Dept: CARDIOTHORACIC SURGERY | Facility: CLINIC | Age: 58
End: 2023-12-08
Payer: MEDICAID

## 2023-12-08 DIAGNOSIS — G25.81 RESTLESS LEG SYNDROME: ICD-10-CM

## 2023-12-08 NOTE — TELEPHONE ENCOUNTER
Received call from pt asking if he can continue to take Tylenol as needed for pain. Pt also asking if he can alternate ibuprofen as needed. Reviewed pt's medications and since he is not taking coumadin or other anticoagulant and is more then 2 weeks past surgery advised him that he may take ibuprofen as needed. Pt verbalized understanding.

## 2023-12-15 RX ORDER — PRAMIPEXOLE DIHYDROCHLORIDE 0.25 MG/1
TABLET ORAL
Qty: 90 TABLET | Refills: 3 | Status: SHIPPED | OUTPATIENT
Start: 2023-12-15

## 2023-12-18 ENCOUNTER — TELEPHONE (OUTPATIENT)
Dept: CARDIOTHORACIC SURGERY | Facility: CLINIC | Age: 58
End: 2023-12-18
Payer: MEDICAID

## 2023-12-18 NOTE — TELEPHONE ENCOUNTER
Received call from pt reporting he received an implant card in the mail from [employer]. Explained to pt that an annuloplasty band was used for his valve repair and [employer]tronic is the   of that band and the card can be kept at home if he needs it for future reference. Pt also reported that his DBP has been running int he 90s. He reports that normally it runs int he 70s. Pt denies any symptoms. Pt confirms he is still taking metoprolol as prescribed. Pt notes he has a follow up with his primary cardiologist scheduled for January 2. Advised pt to continue monitoring BP and HR and to bring log of readings to his cardiology appointment. Pt verbalized understanding.

## 2023-12-22 ENCOUNTER — TELEPHONE (OUTPATIENT)
Dept: CARDIOLOGY | Facility: CLINIC | Age: 58
End: 2023-12-22
Payer: MEDICAID

## 2023-12-22 ENCOUNTER — LAB VISIT (OUTPATIENT)
Dept: LAB | Facility: HOSPITAL | Age: 58
End: 2023-12-22
Attending: FAMILY MEDICINE
Payer: MEDICAID

## 2023-12-22 DIAGNOSIS — E78.5 DYSLIPIDEMIA: ICD-10-CM

## 2023-12-22 DIAGNOSIS — Z12.5 PROSTATE CANCER SCREENING: ICD-10-CM

## 2023-12-22 DIAGNOSIS — E11.9 TYPE 2 DIABETES MELLITUS WITHOUT COMPLICATION: ICD-10-CM

## 2023-12-22 LAB
CHOLEST SERPL-MCNC: 77 MG/DL (ref 120–199)
CHOLEST/HDLC SERPL: 3.2 {RATIO} (ref 2–5)
COMPLEXED PSA SERPL-MCNC: 0.64 NG/ML (ref 0–4)
HDLC SERPL-MCNC: 24 MG/DL (ref 40–75)
HDLC SERPL: 31.2 % (ref 20–50)
LDLC SERPL CALC-MCNC: 38.6 MG/DL (ref 63–159)
NONHDLC SERPL-MCNC: 53 MG/DL
TRIGL SERPL-MCNC: 72 MG/DL (ref 30–150)

## 2023-12-22 PROCEDURE — 84153 ASSAY OF PSA TOTAL: CPT | Performed by: FAMILY MEDICINE

## 2023-12-22 PROCEDURE — 36415 COLL VENOUS BLD VENIPUNCTURE: CPT | Mod: PO | Performed by: FAMILY MEDICINE

## 2023-12-22 PROCEDURE — 80061 LIPID PANEL: CPT | Performed by: FAMILY MEDICINE

## 2023-12-26 DIAGNOSIS — I10 PRIMARY HYPERTENSION: Primary | ICD-10-CM

## 2023-12-26 DIAGNOSIS — E11.9 TYPE 2 DIABETES MELLITUS WITHOUT COMPLICATION, WITHOUT LONG-TERM CURRENT USE OF INSULIN: ICD-10-CM

## 2023-12-26 NOTE — TELEPHONE ENCOUNTER
No care due was identified.  [facility] Embedded Care Due Messages. Reference number: [id number].   12/26/2023 2:51:46 PM CST

## 2023-12-27 RX ORDER — METFORMIN HYDROCHLORIDE 500 MG/1
500 TABLET ORAL 2 TIMES DAILY WITH MEALS
Qty: 180 TABLET | Refills: 0 | Status: SHIPPED | OUTPATIENT
Start: 2023-12-27 | End: 2024-02-14

## 2023-12-27 RX ORDER — METOPROLOL TARTRATE 25 MG/1
25 TABLET, FILM COATED ORAL 2 TIMES DAILY
Qty: 60 TABLET | Refills: 2 | Status: SHIPPED | OUTPATIENT
Start: 2023-12-27 | End: 2023-12-28

## 2023-12-27 NOTE — TELEPHONE ENCOUNTER
Refill Decision Note   Abimael Armendariz  is requesting a refill authorization.  Brief Assessment and Rationale for Refill:  Approve     Medication Therapy Plan:  ED documentation reviewed. No changes to therapy noted. FOVS 12/28/23.      Extended chart review required: Yes   Comments:     Note composed:1:21 PM 12/27/2023

## 2023-12-27 NOTE — TELEPHONE ENCOUNTER
Refill Request Routed to the [facility] Review Pool for the pharmacist to review.

## 2023-12-28 ENCOUNTER — OFFICE VISIT (OUTPATIENT)
Dept: FAMILY MEDICINE | Facility: CLINIC | Age: 58
End: 2023-12-28
Attending: FAMILY MEDICINE
Payer: MEDICAID

## 2023-12-28 VITALS
BODY MASS INDEX: 26.74 KG/M2 | HEART RATE: 82 BPM | TEMPERATURE: 98 F | DIASTOLIC BLOOD PRESSURE: 81 MMHG | HEIGHT: 71 IN | SYSTOLIC BLOOD PRESSURE: 135 MMHG | WEIGHT: 191 LBS | RESPIRATION RATE: 17 BRPM | OXYGEN SATURATION: 96 %

## 2023-12-28 DIAGNOSIS — E78.5 DYSLIPIDEMIA: ICD-10-CM

## 2023-12-28 DIAGNOSIS — I48.19 PERSISTENT ATRIAL FIBRILLATION: Primary | ICD-10-CM

## 2023-12-28 DIAGNOSIS — Z98.890 S/P MAZE OPERATION FOR ATRIAL FIBRILLATION: ICD-10-CM

## 2023-12-28 DIAGNOSIS — E11.9 TYPE 2 DIABETES MELLITUS WITHOUT COMPLICATION, WITHOUT LONG-TERM CURRENT USE OF INSULIN: ICD-10-CM

## 2023-12-28 DIAGNOSIS — Z98.890 S/P MVR (MITRAL VALVE REPAIR): ICD-10-CM

## 2023-12-28 DIAGNOSIS — I10 PRIMARY HYPERTENSION: ICD-10-CM

## 2023-12-28 DIAGNOSIS — Z23 NEED FOR VACCINATION: ICD-10-CM

## 2023-12-28 DIAGNOSIS — Z91.89 PNEUMOCOCCAL VACCINATION INDICATED: ICD-10-CM

## 2023-12-28 DIAGNOSIS — Z86.79 S/P MAZE OPERATION FOR ATRIAL FIBRILLATION: ICD-10-CM

## 2023-12-28 PROCEDURE — 1160F PR REVIEW ALL MEDS BY PRESCRIBER/CLIN PHARMACIST DOCUMENTED: ICD-10-PCS | Mod: CPTII,,, | Performed by: FAMILY MEDICINE

## 2023-12-28 PROCEDURE — 3044F PR MOST RECENT HEMOGLOBIN A1C LEVEL <7.0%: ICD-10-PCS | Mod: CPTII,,, | Performed by: FAMILY MEDICINE

## 2023-12-28 PROCEDURE — 3066F PR DOCUMENTATION OF TREATMENT FOR NEPHROPATHY: ICD-10-PCS | Mod: CPTII,,, | Performed by: FAMILY MEDICINE

## 2023-12-28 PROCEDURE — 99999PBSHW FLU VACCINE (QUAD) GREATER THAN OR EQUAL TO 3YO PRESERVATIVE FREE IM: Mod: PBBFAC,,,

## 2023-12-28 PROCEDURE — 99215 OFFICE O/P EST HI 40 MIN: CPT | Mod: PBBFAC,PN | Performed by: FAMILY MEDICINE

## 2023-12-28 PROCEDURE — 3075F PR MOST RECENT SYSTOLIC BLOOD PRESS GE 130-139MM HG: ICD-10-PCS | Mod: CPTII,,, | Performed by: FAMILY MEDICINE

## 2023-12-28 PROCEDURE — 4010F PR ACE/ARB THEARPY RXD/TAKEN: ICD-10-PCS | Mod: CPTII,,, | Performed by: FAMILY MEDICINE

## 2023-12-28 PROCEDURE — 3066F NEPHROPATHY DOC TX: CPT | Mod: CPTII,,, | Performed by: FAMILY MEDICINE

## 2023-12-28 PROCEDURE — 4010F ACE/ARB THERAPY RXD/TAKEN: CPT | Mod: CPTII,,, | Performed by: FAMILY MEDICINE

## 2023-12-28 PROCEDURE — 99999 PR PBB SHADOW E&M-EST. PATIENT-LVL V: ICD-10-PCS | Mod: PBBFAC,,, | Performed by: FAMILY MEDICINE

## 2023-12-28 PROCEDURE — 99999PBSHW FLU VACCINE (QUAD) GREATER THAN OR EQUAL TO 3YO PRESERVATIVE FREE IM: ICD-10-PCS | Mod: PBBFAC,,,

## 2023-12-28 PROCEDURE — 99999PBSHW PNEUMOCOCCAL CONJUGATE VACCINE 20-VALENT: Mod: PBBFAC,,,

## 2023-12-28 PROCEDURE — 99214 PR OFFICE/OUTPT VISIT, EST, LEVL IV, 30-39 MIN: ICD-10-PCS | Mod: S$PBB,,, | Performed by: FAMILY MEDICINE

## 2023-12-28 PROCEDURE — 1159F PR MEDICATION LIST DOCUMENTED IN MEDICAL RECORD: ICD-10-PCS | Mod: CPTII,,, | Performed by: FAMILY MEDICINE

## 2023-12-28 PROCEDURE — 3008F PR BODY MASS INDEX (BMI) DOCUMENTED: ICD-10-PCS | Mod: CPTII,,, | Performed by: FAMILY MEDICINE

## 2023-12-28 PROCEDURE — 3008F BODY MASS INDEX DOCD: CPT | Mod: CPTII,,, | Performed by: FAMILY MEDICINE

## 2023-12-28 PROCEDURE — 3079F PR MOST RECENT DIASTOLIC BLOOD PRESSURE 80-89 MM HG: ICD-10-PCS | Mod: CPTII,,, | Performed by: FAMILY MEDICINE

## 2023-12-28 PROCEDURE — 99999 PR PBB SHADOW E&M-EST. PATIENT-LVL V: CPT | Mod: PBBFAC,,, | Performed by: FAMILY MEDICINE

## 2023-12-28 PROCEDURE — 3061F NEG MICROALBUMINURIA REV: CPT | Mod: CPTII,,, | Performed by: FAMILY MEDICINE

## 2023-12-28 PROCEDURE — 90677 PCV20 VACCINE IM: CPT | Mod: PBBFAC,PN

## 2023-12-28 PROCEDURE — 99214 OFFICE O/P EST MOD 30 MIN: CPT | Mod: S$PBB,,, | Performed by: FAMILY MEDICINE

## 2023-12-28 PROCEDURE — 1160F RVW MEDS BY RX/DR IN RCRD: CPT | Mod: CPTII,,, | Performed by: FAMILY MEDICINE

## 2023-12-28 PROCEDURE — 3061F PR NEG MICROALBUMINURIA RESULT DOCUMENTED/REVIEW: ICD-10-PCS | Mod: CPTII,,, | Performed by: FAMILY MEDICINE

## 2023-12-28 PROCEDURE — 90471 IMMUNIZATION ADMIN: CPT | Mod: PBBFAC,PN

## 2023-12-28 PROCEDURE — 3044F HG A1C LEVEL LT 7.0%: CPT | Mod: CPTII,,, | Performed by: FAMILY MEDICINE

## 2023-12-28 PROCEDURE — 3075F SYST BP GE 130 - 139MM HG: CPT | Mod: CPTII,,, | Performed by: FAMILY MEDICINE

## 2023-12-28 PROCEDURE — 3079F DIAST BP 80-89 MM HG: CPT | Mod: CPTII,,, | Performed by: FAMILY MEDICINE

## 2023-12-28 PROCEDURE — 1159F MED LIST DOCD IN RCRD: CPT | Mod: CPTII,,, | Performed by: FAMILY MEDICINE

## 2023-12-28 RX ORDER — ATORVASTATIN CALCIUM 40 MG/1
20 TABLET, FILM COATED ORAL NIGHTLY
Qty: 45 TABLET | Refills: 3
Start: 2023-12-28 | End: 2024-02-16

## 2023-12-28 RX ORDER — METOPROLOL TARTRATE 25 MG/1
12.5 TABLET, FILM COATED ORAL 2 TIMES DAILY
Qty: 60 TABLET | Refills: 2
Start: 2023-12-28 | End: 2024-02-12

## 2023-12-28 NOTE — PROGRESS NOTES
Subjective:       Patient ID: Abimael Armendariz is a 58 y.o. male.    Chief Complaint: Diabetes (Pt states that he is here for his fu)    58-year-old male coming in to follow-up on multiple medical problems including diabetes, atrial fibrillation, hypertension, hyperlipidemia, and is status post cardioversion, cardiac ablation, Maze procedure and mitral valve replacement recently.  The patient states that he is currently taking 1/2 of a Lopressor 25 mg b.i.d. on instructions of Dr. Pretty but [facility] is still indicating a 25 b.i.d..  This was corrected according to the patient's stated dosage.  He is due for an eye exam and coming up due for a foot check.  He is due for a flu vaccine and a Prevnar 20 and agrees to have those done today.  Lab was done recently November 7th with an A1c of 5.6 and December 22nd he had a PSA of 0.64, a urine microalbumin/creatinine ratio of 6.2, and excellent cholesterol readings with a total cholesterol of 77, triglyceride of 72, HDL of 24 with a target based on his total cholesterol of about 20 or better and his LDL was down to 38.  He is requesting to reduce his atorvastatin 40 mg.  He has been taking trazodone 200 mg at night for sleep and was concerned about whether it would give him any risk of other problems.  I informed him that I usually do not go past 150 mg when using it as a sleep aid and rarely go that high but as long as he is tolerating it not having any drowsiness problems I do not believe he is getting into any trouble with it.  He is taking metformin 500 mg b.i.d. with no GI side effects and he is on a full-dose aspirin and not on any other anticoagulation at this time.  His back is still doing well after his lumbar laminectomy late last year.    Past Medical History:  No date: ADHD (attention deficit hyperactivity disorder)  No date: Anticoagulant long-term use  No date: Anxiety  No date: CHF (congestive heart failure)  No date: Depression  No date: Diabetes  mellitus  No date: Diverticulitis  No date: Hyperlipidemia  No date: Insomnia  No date: Mitral regurgitation  08/2023: Mitral valve disease  No date: PAF (paroxysmal atrial fibrillation)    Past Surgical History:  8/30/2023: ANGIOGRAM, CORONARY, WITH LEFT HEART CATHETERIZATION      Comment:  Procedure: Left Heart Cath;  Surgeon: Hilario Pretty MD;  Location: [facility] CATH;  Service:                Cardiology;;  8/30/2023: CORONARY ANGIOGRAPHY      Comment:  Procedure: Coronary angiogram study;  Surgeon:                Hilario Pretty MD;  Location: [facility] CATH;                 Service: Cardiology;;  11/9/2023: SCHNEIDER MAZE PROCEDURE; N/A      Comment:  Procedure: SCHNEIDER MAZE PROCEDURE;  Surgeon: Goran Bennett MD;  Location: [facility] OR [facility] FLR;  Service:                Cardiovascular;  Laterality: N/A;  11/9/2023: EXCLUSION, LEFT ATRIAL APPENDAGE, OPEN, AS PART OF OPEN   CHEST SURGERY; N/A      Comment:  Procedure: EXCLUSION, LEFT ATRIAL APPENDAGE, OPEN, AS                PART OF OPEN CHEST SURGERY;  Surgeon: Goran Bennett MD;  Location: [facility] OR [facility] FLR;  Service:                Cardiovascular;  Laterality: N/A;  2003: KNEE ARTHROSCOPY W/ ACL RECONSTRUCTION AND HAMSTRING GRAFT      Comment:  right x 2; redo [place] in [place] 1/2022 12/2022: LUMBAR LAMINECTOMY FOR DECOMPRESSION  11/9/2023: REPAIR, MITRAL VALVE, OPEN; N/A      Comment:  Procedure: REPAIR, MITRAL VALVE, OPEN;  Surgeon:                Goran Bennett MD;  Location: [facility] OR [facility] FLR;                 Service: Cardiovascular;  Laterality: N/A;  08/21/2023: TRANSESOPHAGEAL ECHOCARDIOGRAM WITH POSSIBLE   CARDIOVERSION (JASON W/ POSS CARDIOVERSION); Bilateral      Comment:  Procedure: Transesophageal echo (JASON) intra-procedure                log documentation;  Surgeon: Jesu Flores MD;                 Location: [facility] CATH/EP LAB;  Service: Cardiology;                 Laterality:  Bilateral;    Current Outpatient Medications on File Prior to Visit:  aspirin (ECOTRIN) 325 MG EC tablet, Take 1 tablet (325 mg total) by mouth once daily., Disp: 30 tablet, Rfl: 11  blood sugar diagnostic Strp, To check BG qd times daily, to use with insurance preferred meter, Disp: 30 strip, Rfl: 11  blood-glucose meter kit, To check BG qd times daily, to use with insurance preferred meter, Disp: 1 each, Rfl: 0  buPROPion (WELLBUTRIN XL) 300 MG 24 hr tablet, Take 1 tablet (300 mg total) by mouth once daily. (Patient taking differently: Take 300 mg by mouth every evening.), Disp: 90 tablet, Rfl: 3  fluticasone propionate (FLONASE) 50 mcg/actuation nasal spray, 1 spray by Each Nostril route daily as needed., Disp: , Rfl:   lancets Misc, To check BG qd times daily, to use with insurance preferred meter, Disp: 100 each, Rfl: 2  metFORMIN (GLUCOPHAGE) 500 MG tablet, TAKE 1 TABLET BY MOUTH TWICE A DAY WITH MEALS, Disp: 180 tablet, Rfl: 0  multivitamin (THERAGRAN) per tablet, Take 1 tablet by mouth every evening., Disp: , Rfl:   pramipexole (MIRAPEX) 0.25 MG tablet, TAKE 1 TABLET BY MOUTH EVERY DAY, Disp: 90 tablet, Rfl: 3  traZODone (DESYREL) 100 MG tablet, Take 2 tablets (200 mg total) by mouth every evening., Disp: 180 tablet, Rfl: 3  [DISCONTINUED] atorvastatin (LIPITOR) 40 MG tablet, Take 1 tablet (40 mg total) by mouth every evening., Disp: 90 tablet, Rfl: 3  [DISCONTINUED] metoprolol tartrate (LOPRESSOR) 25 MG tablet, TAKE 1 TABLET BY MOUTH TWICE A DAY, Disp: 60 tablet, Rfl: 2  simethicone (MYLICON) 80 MG chewable tablet, Chew and swallow 1 tablet (80 mg total) by mouth 3 (three) times daily. (Patient not taking: Reported on 12/28/2023), Disp: 30 tablet, Rfl: 0    No current facility-administered medications on file prior to visit.          Review of Systems   Constitutional:  Negative for chills, diaphoresis and fever.   Respiratory:  Positive for chest tightness (He appears to be having a little costochondritis  especially on 1st awakening in the morning but it resolves usually after getting up and moving around). Negative for shortness of breath.    Cardiovascular:  Negative for chest pain, palpitations and leg swelling.   Gastrointestinal:  Negative for constipation, diarrhea, nausea and vomiting.   Endocrine: Negative for polydipsia and polyuria.       Objective:      Physical Exam  Vitals and nursing note reviewed.   Constitutional:       General: He is not in acute distress.     Appearance: Normal appearance. He is not ill-appearing, toxic-appearing or diaphoretic.      Comments: Fair blood pressure control  Normal pulse with regular rhythm  Mildly overweight with a BMI of 26.6 he is down 1.7 lb from my last visit with him February 27, 2023   Eyes:      General: No scleral icterus.     Extraocular Movements: Extraocular movements intact.      Pupils: Pupils are equal, round, and reactive to light.   Cardiovascular:      Rate and Rhythm: Normal rate and regular rhythm.      Pulses: Normal pulses.           Dorsalis pedis pulses are 2+ on the right side and 2+ on the left side.        Posterior tibial pulses are 2+ on the right side and 2+ on the left side.      Heart sounds: Normal heart sounds. No murmur heard.     No friction rub. No gallop.   Pulmonary:      Effort: Pulmonary effort is normal. No respiratory distress.      Breath sounds: Normal breath sounds. No stridor. No wheezing, rhonchi or rales.   Chest:      Chest wall: No tenderness.   Musculoskeletal:      Right lower leg: No edema.      Left lower leg: No edema.      Right foot: Normal range of motion. No deformity, bunion, Charcot foot, foot drop or prominent metatarsal heads.      Left foot: Normal range of motion. No deformity, bunion, Charcot foot, foot drop or prominent metatarsal heads.   Feet:      Right foot:      Protective Sensation: 9 sites tested.  9 sites sensed.      Skin integrity: Skin integrity normal. No ulcer, blister, skin breakdown,  erythema, warmth, callus, dry skin or fissure.      Toenail Condition: Right toenails are normal.      Left foot:      Protective Sensation: 9 sites tested.  9 sites sensed.      Skin integrity: Skin integrity normal. No ulcer, blister, skin breakdown, erythema, warmth, callus, dry skin or fissure.      Toenail Condition: Left toenails are normal.      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  It does not appear to be fungal in nature  Skin:     Capillary Refill: Capillary refill takes less than 2 seconds.   Neurological:      General: No focal deficit present.      Mental Status: He is alert and oriented to person, place, and time. Mental status is at baseline.      Comments: Proprioception intact all 10 toes   Psychiatric:         Mood and Affect: Mood normal.         Behavior: Behavior normal.         Thought Content: Thought content normal.         Judgment: Judgment normal.         Assessment:       1. Persistent atrial fibrillation    2. S/P Maze operation for atrial fibrillation    3. S/P MVR (mitral valve repair)    4. Primary hypertension    5. Dyslipidemia    6. Type 2 diabetes mellitus without complication, without long-term current use of insulin    7. Pneumococcal vaccination indicated    8. BMI 26.0-26.9,adult        Plan:       1. Persistent atrial fibrillation  Appears resolved, followed by Cardiology    2. S/P Maze operation for atrial fibrillation  Stable, followed by Cardiology    3. S/P MVR (mitral valve repair)  No murmur audible, followed by Cardiology    4. Primary hypertension  Adequate control  - Comprehensive Metabolic Panel; Future  - Lipid Panel; Future  - metoprolol tartrate (LOPRESSOR) 25 MG tablet; Take 0.5 tablets (12.5 mg total) by mouth 2 (two) times  daily.  Dispense: 60 tablet; Refill: 2    5. Dyslipidemia  Lab Results   Component Value Date    CHOL 77 (L) 12/22/2023    CHOL 154 11/15/2022    CHOL 134 12/09/2021     Lab Results   Component Value Date    HDL 24 (L) 12/22/2023    HDL 28 (L) 11/15/2022    HDL 32 (L) 12/09/2021     Lab Results   Component Value Date    LDLCALC 38.6 (L) 12/22/2023    LDLCALC 76.2 11/15/2022    LDLCALC 79.0 12/09/2021     Lab Results   Component Value Date    TRIG 72 12/22/2023    TRIG 249 (H) 11/15/2022    TRIG 115 12/09/2021       Lab Results   Component Value Date    CHOLHDL 31.2 12/22/2023    CHOLHDL 18.2 (L) 11/15/2022    CHOLHDL 23.9 12/09/2021     We are going to try reducing him to 20 mg of atorvastatin and recheck with his next A1c in six months.  If he does not maintain control we will go back up to 40 mg.  Target for the LDL is less than 70  - Comprehensive Metabolic Panel; Future  - Lipid Panel; Future  - atorvastatin (LIPITOR) 40 MG tablet; Take 0.5 tablets (20 mg total) by mouth every evening.  Dispense: 45 tablet; Refill: 3    6. Type 2 diabetes mellitus without complication, without long-term current use of insulin  Lab Results   Component Value Date    HGBA1C 5.6 11/07/2023     Well controlled, recheck six months  - Comprehensive Metabolic Panel; Future  - Hemoglobin A1C; Future    7. Pneumococcal vaccination indicated  Prevnar 20 given in addition to a standard flu vaccine  - (In Office Administered) Pneumococcal Conjugate Vaccine (20 Valent) (IM) (Preferred)    8. BMI 26.0-26.9,adult  Good weight for his build and activity, he is quite muscular

## 2024-01-09 NOTE — ADDENDUM NOTE
Addendum  created 01/09/24 0856 by Michela Ku MD    Clinical Note Signed, Diagnosis association updated, Intraprocedure Blocks edited, SmartForm saved

## 2024-01-12 ENCOUNTER — OFFICE VISIT (OUTPATIENT)
Dept: CARDIOLOGY | Facility: CLINIC | Age: 59
End: 2024-01-12
Payer: MEDICAID

## 2024-01-12 VITALS
HEART RATE: 81 BPM | WEIGHT: 190 LBS | SYSTOLIC BLOOD PRESSURE: 121 MMHG | HEIGHT: 71 IN | BODY MASS INDEX: 26.6 KG/M2 | DIASTOLIC BLOOD PRESSURE: 76 MMHG

## 2024-01-12 DIAGNOSIS — E78.5 DYSLIPIDEMIA: ICD-10-CM

## 2024-01-12 DIAGNOSIS — Z98.890 S/P MVR (MITRAL VALVE REPAIR): Primary | ICD-10-CM

## 2024-01-12 DIAGNOSIS — Z86.79 S/P MAZE OPERATION FOR ATRIAL FIBRILLATION: ICD-10-CM

## 2024-01-12 DIAGNOSIS — Z98.890 S/P MAZE OPERATION FOR ATRIAL FIBRILLATION: ICD-10-CM

## 2024-01-12 DIAGNOSIS — I10 PRIMARY HYPERTENSION: ICD-10-CM

## 2024-01-12 PROCEDURE — 1160F RVW MEDS BY RX/DR IN RCRD: CPT | Mod: CPTII,,, | Performed by: INTERNAL MEDICINE

## 2024-01-12 PROCEDURE — 1159F MED LIST DOCD IN RCRD: CPT | Mod: CPTII,,, | Performed by: INTERNAL MEDICINE

## 2024-01-12 PROCEDURE — 99214 OFFICE O/P EST MOD 30 MIN: CPT | Mod: S$PBB,,, | Performed by: INTERNAL MEDICINE

## 2024-01-12 PROCEDURE — 99213 OFFICE O/P EST LOW 20 MIN: CPT | Mod: PBBFAC,PO | Performed by: INTERNAL MEDICINE

## 2024-01-12 PROCEDURE — 3008F BODY MASS INDEX DOCD: CPT | Mod: CPTII,,, | Performed by: INTERNAL MEDICINE

## 2024-01-12 PROCEDURE — 3074F SYST BP LT 130 MM HG: CPT | Mod: CPTII,,, | Performed by: INTERNAL MEDICINE

## 2024-01-12 PROCEDURE — 3078F DIAST BP <80 MM HG: CPT | Mod: CPTII,,, | Performed by: INTERNAL MEDICINE

## 2024-01-12 PROCEDURE — 99999 PR PBB SHADOW E&M-EST. PATIENT-LVL III: CPT | Mod: PBBFAC,,, | Performed by: INTERNAL MEDICINE

## 2024-01-12 NOTE — PROGRESS NOTES
Subjective:    Patient ID:  Abimael Armendariz is a 58 y.o. male who presents for follow-up of MVR    HPI  He comes for follow up with no major problems, no chest pain, no shortness of breath.  He did have mitral valve repair with a Maze procedure 2 months ago  Having no issues whatsoever.    Taking aspirin and metoprolol as well as Lipitor.    Review of Systems   Constitutional: Negative for decreased appetite, malaise/fatigue, weight gain and weight loss.   Cardiovascular:  Negative for chest pain, dyspnea on exertion, leg swelling, palpitations and syncope.   Respiratory:  Negative for cough and shortness of breath.    Gastrointestinal: Negative.    Neurological:  Negative for weakness.   All other systems reviewed and are negative.     Objective:      Physical Exam  Vitals and nursing note reviewed.   Constitutional:       Appearance: Normal appearance. He is well-developed.   HENT:      Head: Normocephalic.   Eyes:      Pupils: Pupils are equal, round, and reactive to light.   Neck:      Thyroid: No thyromegaly.      Vascular: No carotid bruit or JVD.   Cardiovascular:      Rate and Rhythm: Normal rate and regular rhythm.      Chest Wall: PMI is not displaced.      Pulses: Normal pulses and intact distal pulses.      Heart sounds: Normal heart sounds. No murmur heard.     No gallop.   Pulmonary:      Effort: Pulmonary effort is normal.      Breath sounds: Normal breath sounds.   Abdominal:      Palpations: Abdomen is soft. There is no mass.      Tenderness: There is no abdominal tenderness.   Musculoskeletal:         General: Normal range of motion.      Cervical back: Normal range of motion and neck supple.   Skin:     General: Skin is warm.   Neurological:      Mental Status: He is alert and oriented to person, place, and time.      Sensory: No sensory deficit.      Deep Tendon Reflexes: Reflexes are normal and symmetric.         Most Recent EKG Results  Results for orders placed or performed during the  hospital encounter of 12/07/23   EKG 12-lead    Collection Time: 12/07/23  2:34 PM    Narrative    Test Reason : Z98.890,Z98.890,Z86.79,    Vent. Rate : 084 BPM     Atrial Rate : 084 BPM     P-R Int : 224 ms          QRS Dur : 094 ms      QT Int : 394 ms       P-R-T Axes : 058 090 255 degrees     QTc Int : 465 ms    Sinus rhythm with 1st degree A-V block  Rightward axis  T wave abnormality, consider inferolateral ischemia  Abnormal ECG  When compared with ECG of 09-NOV-2023 12:55,  Sinus rhythm has replaced Electronic ventricular pacemaker  Confirmed by Miri Hawk MD ([id number]) on 12/7/2023 4:19:51 PM    Referred By: JENNIFER ZELAYA           Confirmed By:Miri Hawk MD       Most Recent Echocardiogram Results  Results for orders placed during the hospital encounter of 11/09/23    Echo    Interpretation Summary    Left Ventricle: The left ventricle is normal in size. Ventricular mass is normal. Normal wall thickness. Normal wall motion. There is normal systolic function with a visually estimated ejection fraction of 60 - 65%. Diastolic function cannot be reliably determined in the presence of mitral valve ring/replacement. Normal left ventricular filling pressure.    Right Ventricle: Normal right ventricular cavity size. Wall thickness is normal. Right ventricle wall motion  is normal. Systolic function is normal.    Left Atrium: Left atrium is severely dilated.    Aortic Valve: There is mild annular calcification present. There is no significant regurgitation.    Mitral Valve: The mitral valve status is post valvuloplasty with a 36 mm Medtronic Simplus band . There is mild mitral annular calcification present. The mean pressure gradient across the mitral valve is 2 mmHg at a heart rate of 72 bpm.    Pulmonary Artery: The estimated pulmonary artery systolic pressure is 25 mmHg.    IVC/SVC: Intermediate venous pressure at 8 mmHg.    1)  Mitral valve repair with medial commissuroplasty and a 36 mm Medtronic Simuplus  band annuloplasty.  2)  Maze procedure with complete left and right atrial lesion sets and resection of left atrial appendage      Most Recent Nuclear Stress Test Results  Results for orders placed during the hospital encounter of 08/24/23    Nuclear Stress - Cardiology Interpreted    Interpretation Summary    Abnormal myocardial perfusion scan.    There is a small to moderate sized, fixed perfusion abnormality consistent with scar in the basal to apical inferior wall(s).    There are no other significant perfusion abnormalities.    The gated perfusion images showed an ejection fraction of 47% post stress.    LV cavity size is mildly enlarged at rest and mildly enlarged at stress.    The ECG portion of the study is negative for ischemia.    The patient reported no chest pain during the stress test.    Equivocal study, fixed inferior defect, can not rule out attenuation artifact versus scar tissue.  Clinical correlation warranted.  Left ventricle does appear to be dilated.  Nuclear EF 47%, correlate with echo.      Most Recent Cardiac PET Stress Test Results  No results found for this or any previous visit.      Most Recent Cardiovascular Angiogram results  Results for orders placed during the hospital encounter of 08/30/23    Cardiac catheterization    Conclusion    There was non-obstructive coronary artery disease..    There was severe (3/4+) mitral regurgitation.    The ejection fraction was greater than 55% by visual estimate.    The left ventricular systolic function was normal.    The left ventricular end diastolic pressure was elevated.    The procedure log was documented by Documenter: Stacie Fuller RN and verified by Hilario Pretty MD.    Date: 8/30/2023  Time: 1:28 PM      Other Most Recent Cardiology Results  Results for orders placed during the hospital encounter of 11/09/23    PERFUSION RECORD      Labs reviewed    Assessment:       1. S/P MVR (mitral valve repair)    2. S/P Maze operation  for atrial fibrillation    3. Dyslipidemia    4. Primary hypertension         Plan:     Continue:  ASA and Statin  Regular exercise program  Weight loss  Low cholesterol diet    Follow-up in six-month with echocardiogram

## 2024-02-05 ENCOUNTER — TELEPHONE (OUTPATIENT)
Dept: CARDIOLOGY | Facility: CLINIC | Age: 59
End: 2024-02-05
Payer: MEDICAID

## 2024-02-05 DIAGNOSIS — R00.0 TACHYCARDIA: ICD-10-CM

## 2024-02-05 DIAGNOSIS — I34.0 MITRAL VALVE INSUFFICIENCY, UNSPECIFIED ETIOLOGY: Chronic | ICD-10-CM

## 2024-02-05 DIAGNOSIS — I48.19 PERSISTENT ATRIAL FIBRILLATION: ICD-10-CM

## 2024-02-05 DIAGNOSIS — I49.3 PVC (PREMATURE VENTRICULAR CONTRACTION): ICD-10-CM

## 2024-02-05 DIAGNOSIS — I10 HYPERTENSION, UNSPECIFIED TYPE: Primary | ICD-10-CM

## 2024-02-05 NOTE — TELEPHONE ENCOUNTER
Message sent from pt:  recently prescribed trintellix ( to replace my bupropion xl 300mg & L-Methylfolate forte.   My blood pressure today was 155/95 today (  every day varying between 155/95- 150/90) for 2 weeks   My resting heart rate is  between 78-81   My portable ekg device (Kardia) did show tachycardia & premature ventricular contractions in between normal sinus rhythm readings.   I feel some tightness in my chest at times. But I cant decide if thats part of the operation healing process or coming from the heart

## 2024-02-12 ENCOUNTER — PATIENT MESSAGE (OUTPATIENT)
Dept: FAMILY MEDICINE | Facility: CLINIC | Age: 59
End: 2024-02-12
Payer: MEDICAID

## 2024-02-12 DIAGNOSIS — G47.00 INSOMNIA, UNSPECIFIED TYPE: ICD-10-CM

## 2024-02-12 DIAGNOSIS — E11.9 TYPE 2 DIABETES MELLITUS WITHOUT COMPLICATION, WITHOUT LONG-TERM CURRENT USE OF INSULIN: ICD-10-CM

## 2024-02-12 DIAGNOSIS — I10 PRIMARY HYPERTENSION: ICD-10-CM

## 2024-02-12 RX ORDER — METOPROLOL TARTRATE 25 MG/1
25 TABLET, FILM COATED ORAL 2 TIMES DAILY
Qty: 180 TABLET | Refills: 3 | Status: SHIPPED | OUTPATIENT
Start: 2024-02-12

## 2024-02-12 RX ORDER — TRAZODONE HYDROCHLORIDE 100 MG/1
200 TABLET ORAL NIGHTLY
Qty: 180 TABLET | Refills: 1 | Status: SHIPPED | OUTPATIENT
Start: 2024-02-12 | End: 2025-02-11

## 2024-02-12 NOTE — TELEPHONE ENCOUNTER
Care Due:                  Date            Visit Type   Department     Provider  --------------------------------------------------------------------------------                                EP -                              PRIMARY      [facility]  Last Visit: 12-      CARE ([facility])   PRACTICE       Sammy Trinidad                               -                              PRIMARY      [facility]  Next Visit: 07-      CARE ([facility])   PRACTICE       Sammy Trinidad                                                            Last  Test          Frequency    Reason                     Performed    Due Date  --------------------------------------------------------------------------------    HBA1C.......  6 months...  metFORMIN................  11- 05-    [employer] Embedded Care Due Messages. Reference number: [id number].   2/12/2024 3:44:24 PM CST

## 2024-02-14 RX ORDER — METFORMIN HYDROCHLORIDE 500 MG/1
500 TABLET ORAL 2 TIMES DAILY WITH MEALS
Qty: 180 TABLET | Refills: 0 | Status: SHIPPED | OUTPATIENT
Start: 2024-02-14

## 2024-02-14 NOTE — TELEPHONE ENCOUNTER
Refill Decision Note   Varghese Kala  is requesting a refill authorization.  Brief Assessment and Rationale for Refill:  Approve     Medication Therapy Plan:  FLOS 7.1.2024      Comments:     Note composed:2:02 PM 02/14/2024

## 2024-02-16 ENCOUNTER — TELEPHONE (OUTPATIENT)
Dept: FAMILY MEDICINE | Facility: CLINIC | Age: 59
End: 2024-02-16
Payer: MEDICAID

## 2024-02-16 DIAGNOSIS — E78.5 DYSLIPIDEMIA: ICD-10-CM

## 2024-02-16 RX ORDER — ATORVASTATIN CALCIUM 20 MG/1
20 TABLET, FILM COATED ORAL NIGHTLY
Qty: 90 TABLET | Refills: 3 | Status: SHIPPED | OUTPATIENT
Start: 2024-02-16 | End: 2025-02-15

## 2024-02-16 NOTE — TELEPHONE ENCOUNTER
----- Message from Blas Tucker sent at 2/16/2024  2:52 PM CST -----  Regarding: Pharm Auth  Contact: Pharmacy  Type:  Pharmacy Calling to Clarify an RX    Name of Caller:Adilene    Pharmacy Name:  [id number] - [place] - [address] [facility]  [address]  [place] [address]  Phone: [phone] Fax: [phone]        Prescription Name:atorvastatin (LIPITOR) 40 MG tablet     What do they need to clarify?:Missing prescription    Best Call Back Number:[phone]    Additional Information: [facility] called them and they never received the prescription.      Please advise -- Thank you

## 2024-02-16 NOTE — TELEPHONE ENCOUNTER
Looks like he was supposed to have Atorvastatin changed to 20mg according to lab and last office note 12/28/23?

## 2024-02-20 ENCOUNTER — HOSPITAL ENCOUNTER (OUTPATIENT)
Dept: CARDIOLOGY | Facility: HOSPITAL | Age: 59
Discharge: HOME | End: 2024-02-20
Attending: INTERNAL MEDICINE
Payer: MEDICAID

## 2024-02-20 VITALS — HEIGHT: 71 IN | WEIGHT: 190 LBS | BODY MASS INDEX: 26.6 KG/M2

## 2024-02-20 DIAGNOSIS — I34.0 MITRAL VALVE INSUFFICIENCY, UNSPECIFIED ETIOLOGY: Chronic | ICD-10-CM

## 2024-02-20 DIAGNOSIS — I10 HYPERTENSION, UNSPECIFIED TYPE: ICD-10-CM

## 2024-02-20 DIAGNOSIS — I49.3 PVC (PREMATURE VENTRICULAR CONTRACTION): ICD-10-CM

## 2024-02-20 DIAGNOSIS — I48.19 PERSISTENT ATRIAL FIBRILLATION: ICD-10-CM

## 2024-02-20 DIAGNOSIS — R00.0 TACHYCARDIA: ICD-10-CM

## 2024-02-20 LAB
ASCENDING AORTA: 2.57 CM
AV INDEX (PROSTH): 0.65
AV MEAN GRADIENT: 3 MMHG
AV PEAK GRADIENT: 5 MMHG
AV VALVE AREA BY VELOCITY RATIO: 3.64 CM²
AV VALVE AREA: 3.41 CM²
AV VELOCITY RATIO: 0.7
BSA FOR ECHO PROCEDURE: 2.08 M2
CV ECHO LV RWT: 0.25 CM
DOP CALC AO PEAK VEL: 1.09 M/S
DOP CALC AO VTI: 23.3 CM
DOP CALC LVOT AREA: 5.2 CM2
DOP CALC LVOT DIAMETER: 2.58 CM
DOP CALC LVOT PEAK VEL: 0.76 M/S
DOP CALC LVOT STROKE VOLUME: 79.42 CM3
DOP CALCLVOT PEAK VEL VTI: 15.2 CM
ECHO LV POSTERIOR WALL: 0.75 CM (ref 0.6–1.1)
EJECTION FRACTION: 50 %
FRACTIONAL SHORTENING: 29 % (ref 28–44)
INTERVENTRICULAR SEPTUM: 0.93 CM (ref 0.6–1.1)
IVRT: 99.9 MSEC
LEFT ATRIUM SIZE: 5.26 CM
LEFT ATRIUM VOLUME INDEX MOD: 52.1 ML/M2
LEFT ATRIUM VOLUME MOD: 107.29 CM3
LEFT INTERNAL DIMENSION IN SYSTOLE: 4.28 CM (ref 2.1–4)
LEFT VENTRICLE DIASTOLIC VOLUME INDEX: 87.81 ML/M2
LEFT VENTRICLE DIASTOLIC VOLUME: 180.88 ML
LEFT VENTRICLE MASS INDEX: 96 G/M2
LEFT VENTRICLE SYSTOLIC VOLUME INDEX: 39.8 ML/M2
LEFT VENTRICLE SYSTOLIC VOLUME: 81.97 ML
LEFT VENTRICULAR INTERNAL DIMENSION IN DIASTOLE: 6.01 CM (ref 3.5–6)
LEFT VENTRICULAR MASS: 198.35 G
LVOT MG: 1.14 MMHG
LVOT MV: 0.5 CM/S
PISA MRMAX VEL: 5.29 M/S
PISA TR MAX VEL: 2.36 M/S
PULM VEIN S/D RATIO: 1.89
PV PEAK D VEL: 0.27 M/S
PV PEAK S VEL: 0.51 M/S
RA PRESSURE ESTIMATED: 8 MMHG
RIGHT VENTRICULAR END-DIASTOLIC DIMENSION: 4.23 CM
RIGHT VENTRICULAR LENGTH IN DIASTOLE (APICAL 4-CHAMBER VIEW): 5.45 CM
RV MID DIAMA: 2.63 CM
RV TB RVSP: 10 MMHG
RV TISSUE DOPPLER FREE WALL SYSTOLIC VELOCITY 1 (APICAL 4 CHAMBER VIEW): 8.05 CM/S
SINUS: 3.15 CM
STJ: 2.69 CM
TDI LATERAL: 0.06 M/S
TDI SEPTAL: 0.08 M/S
TDI: 0.07 M/S
TR MAX PG: 22 MMHG
TRICUSPID ANNULAR PLANE SYSTOLIC EXCURSION: 1.36 CM
TV REST PULMONARY ARTERY PRESSURE: 30 MMHG
Z-SCORE OF LEFT VENTRICULAR DIMENSION IN END DIASTOLE: -0.34
Z-SCORE OF LEFT VENTRICULAR DIMENSION IN END SYSTOLE: 0.92

## 2024-02-20 PROCEDURE — 93306 TTE W/DOPPLER COMPLETE: CPT | Mod: PO

## 2024-02-20 PROCEDURE — 93243 EXT ECG>48HR<7D SCAN A/R: CPT | Mod: PO

## 2024-02-20 PROCEDURE — 93306 TTE W/DOPPLER COMPLETE: CPT | Mod: 26,,, | Performed by: INTERNAL MEDICINE

## 2024-02-20 PROCEDURE — 93244 EXT ECG>48HR<7D REV&INTERPJ: CPT | Mod: ,,, | Performed by: INTERNAL MEDICINE

## 2024-02-26 ENCOUNTER — PATIENT MESSAGE (OUTPATIENT)
Dept: CARDIOLOGY | Facility: CLINIC | Age: 59
End: 2024-02-26
Payer: MEDICAID

## 2024-02-27 LAB
OHS CV EVENT MONITOR DAY: 0
OHS CV HOLTER LENGTH DECIMAL HOURS: 72
OHS CV HOLTER LENGTH HOURS: 72
OHS CV HOLTER LENGTH MINUTES: 0
OHS CV HOLTER SINUS AVERAGE HR: 72
OHS CV HOLTER SINUS MAX HR: 90
OHS CV HOLTER SINUS MIN HR: 120

## 2024-02-27 NOTE — PROGRESS NOTES
Holter monitor shows basically 1 short episode of what appears to be atrial fibrillation , lasting approximately 1 minute

## 2024-03-24 NOTE — DISCHARGE SUMMARY
[facility]  Discharge Summary  Patient Name: Abimael Armendariz MRN: [id number]   Patient Class: OP- Observation  Length of Stay: 0   Admission Date: 8/19/2023  9:23 PM Attending Physician: Tez Squires MD   Primary Care Provider: Sammy Trinidad MD Face-to-Face encounter date: 8/21/23   Chief Complaint: Chest Pain (Recent dx of Afib. Intermittent left sided chest pain radiating down left arm) and Shortness of Breath    Date of Discharge: 8/21/23  Discharge Disposition:Home or Self Care   Condition: Stable       Reason for Hospitalization     Active Hospital Problems    Diagnosis    *A-fib    Type 2 diabetes mellitus without complication, without long-term current use of insulin    BMI 29.0-29.9,adult    Restless leg syndrome    Anticoagulant long-term use         Brief History of Present Illness      Abimael Armendariz is a 58 y.o. male with a history as  has a past medical history of ADHD (attention deficit hyperactivity disorder), Anxiety, Depression, Hyperlipidemia, Hypertension, Insomnia, and Mitral regurgitation. who presented to the ED with a Chest Pain (Recent dx of Afib. Intermittent left sided chest pain radiating down left arm) and Shortness of Breath     Patient presents to the emergency room, wife at bedside, with complaint of left-sided chest pain for last 2 days with associated dyspnea on exertion.       Patient reports recently diagnosed with atrial fibrillation 2 weeks ago while in [place] undergoing preop clearance. He reports he returned back home and was seen by Cardiology, 08/15/2023, where he was started on Lopressor and Eliquis with Holter monitor, echo and stress test ordered.  Echo completed 08/18/2023. Patient was still with chest pain and elevated heart rate and was seen at [facility], 08/17/2023- Lopressor increased to 25 mg t.i.d., discharged.      Denies fever, chills, diaphoresis, dizziness, HA, NVD, recent trauma or any other associated symptoms. No aggravating and  "alleviating factor.  Recently diagnosed with atrial fibrillation.  Pertinent family history father (d) MI.  Does not smoke cigarettes, drink or do illegal drugs. Does chew tobacco.      Lab and imaging obtained and reviewed.  CBC shows MCH 31.5 otherwise unremarkable.  PT/INR within normal range.  Chemistry profile shows sodium 134 Ag 6 BUN 23 GFR 57.9 glucose 140.  .  High sensitive troponin within normal range.  EKG shows AFib with RVR rate 117 bpm.  On admit, afebrile  RR 24 /75 sats 97% on room air.            For the full HPI please refer to the History & Physical from this admission.    Hospital Course By Problem with Pertinent Findings     AFIB with RVR- New diagnosis   Continue Metoprolol 25 mg PO TID  Continue Eliquis 5 mg PO BID  Give digoxin 250 X 1 for rate control since bp is soft  JASON and CV completed  f/u with cardiology           Patient was seen and examined on the date of discharge and determined to be suitable for discharge.    Physical Exam  /89 (BP Location: Right arm, Patient Position: Lying) Comment: 102  Pulse (!) 131   Temp (!) 100.4 °F (38 °C) (Oral)   Resp 18   Ht 5' 11" (1.803 m)   Wt 88 kg (194 lb)   SpO2 (!) 92%   BMI 27.06 kg/m²   Vitals reviewed.    Constitutional: No distress.   HENT: Atraumatic.   Cardiovascular: Normal rate, regular rhythm.  S1 S2.    Pulmonary/Chest: Effort normal. Clear to auscultation bilaterally. No wheezes.   Abdominal: Soft. Bowel sounds are normal. Exhibits no distension and no mass. No tenderness  Neurological: Alert.   Skin: Skin is warm and dry.     Following labs were Reviewed   No results for input(s): "WBC", "HGB", "HCT", "PLT", "GLUCOSE", "CALCIUM", "ALBUMIN", "PROT", "NA", "K", "CO2", "CL", "BUN", "CREATININE", "ALKPHOS", "ALT", "AST", "BILITOT" in the last 24 hours.  No results found for: "POCTGLUCOSE"     All labs within the past 24 hours have been reviewed    Microbiology Results (last 7 days)       ** No results " found for the last 168 hours. **          X-Ray Chest AP Portable   Final Result          No results found for this or any previous visit.      Consultants and Procedures   Consultants:  Consults (From admission, onward)          Status Ordering Provider     Inpatient consult to Cardiology  Once        Provider:  Jesu Flores MD    Completed CHERELLE KEENAN            Procedures:   Procedure(s) (LRB):  Transesophageal echo (JASON) intra-procedure log documentation (Bilateral)     Discharge Information:   Diet:  Resume Cardiac diet/Diabetic Diet    Physical Activity:  Activity as tolerated    Instructions:  1. Take all medications as prescribed  2. Keep all follow-up appointments  3. Return to the hospital or call your primary care physicians if any worsening symptoms such as chest pain, shortness of breath, bleeding,  intractable pain, fever >101 occur.      Follow-Up Appointments:  Please call your primary care physician to schedule an appointment in 1 week time.     Follow-up Information       Sammy Trinidad MD Follow up in 1 week(s).    Specialty: Family Medicine  Contact information:  [address]  [address]  [place] [address]  [phone]               Mario Johnston MD Follow up in 1 week(s).    Specialties: Cardiology, Cardiovascular Disease  Contact information:  [address]  [address]  [place] [address]  [phone]                               Pending laboratory work/Tests to be performed/followed by the Primary care Physician:    The patient was discharged with discharge instructions reviewed in written and verbal form. All questions were answered and prescriptions were provided. The importance of making follow up appointments and compliance of medications has been emphasized. The patient will follow up in 1 week or sooner as needed with the PCP. Tthe patient understands and agrees with the plan. Upon discharge, patient needs to be on following medications.    Discharge  Medications:     Medication List        CHANGE how you take these medications      buPROPion 300 MG 24 hr tablet  Commonly known as: WELLBUTRIN XL  Take 1 tablet (300 mg total) by mouth once daily.  What changed: when to take this            CONTINUE taking these medications      blood sugar diagnostic Strp  To check BG qd times daily, to use with insurance preferred meter     blood-glucose meter kit  To check BG qd times daily, to use with insurance preferred meter     fluticasone propionate 50 mcg/actuation nasal spray  Commonly known as: FLONASE     lancets Misc  To check BG qd times daily, to use with insurance preferred meter     multivitamin per tablet  Commonly known as: THERAGRAN                I spent 22 minutes preparing the discharge for this patient including reviewing records from previous encounters, preparation of discharge summary, assessing and final examination of the patient, discharge medicine reconciliation, discussing plan of care, follow up and education and prescriptions.       Tez Sqiures  [facility] Hospitalist

## 2024-04-04 ENCOUNTER — PATIENT MESSAGE (OUTPATIENT)
Dept: ADMINISTRATIVE | Facility: HOSPITAL | Age: 59
End: 2024-04-04
Payer: MEDICAID

## 2024-04-11 ENCOUNTER — TELEPHONE (OUTPATIENT)
Dept: FAMILY MEDICINE | Facility: CLINIC | Age: 59
End: 2024-04-11
Payer: MEDICAID

## 2024-04-11 NOTE — TELEPHONE ENCOUNTER
Spoke to pt he states that his hernia is irritated and is protruding by his belly button which he states that it is painful. Scheduled appt for 4/15 at 3:00 PM

## 2024-04-15 ENCOUNTER — TELEPHONE (OUTPATIENT)
Dept: FAMILY MEDICINE | Facility: CLINIC | Age: 59
End: 2024-04-15

## 2024-04-15 ENCOUNTER — OFFICE VISIT (OUTPATIENT)
Dept: FAMILY MEDICINE | Facility: CLINIC | Age: 59
End: 2024-04-15
Attending: FAMILY MEDICINE
Payer: MEDICAID

## 2024-04-15 VITALS
HEIGHT: 71 IN | DIASTOLIC BLOOD PRESSURE: 70 MMHG | OXYGEN SATURATION: 96 % | BODY MASS INDEX: 29.55 KG/M2 | TEMPERATURE: 98 F | HEART RATE: 88 BPM | WEIGHT: 211.06 LBS | SYSTOLIC BLOOD PRESSURE: 126 MMHG

## 2024-04-15 DIAGNOSIS — K40.90 RIGHT INGUINAL HERNIA: Primary | ICD-10-CM

## 2024-04-15 PROCEDURE — 99212 OFFICE O/P EST SF 10 MIN: CPT | Mod: S$PBB,,, | Performed by: FAMILY MEDICINE

## 2024-04-15 PROCEDURE — 1160F RVW MEDS BY RX/DR IN RCRD: CPT | Mod: CPTII,,, | Performed by: FAMILY MEDICINE

## 2024-04-15 PROCEDURE — 3074F SYST BP LT 130 MM HG: CPT | Mod: CPTII,,, | Performed by: FAMILY MEDICINE

## 2024-04-15 PROCEDURE — 3008F BODY MASS INDEX DOCD: CPT | Mod: CPTII,,, | Performed by: FAMILY MEDICINE

## 2024-04-15 PROCEDURE — 99215 OFFICE O/P EST HI 40 MIN: CPT | Mod: PBBFAC,PN | Performed by: FAMILY MEDICINE

## 2024-04-15 PROCEDURE — 3078F DIAST BP <80 MM HG: CPT | Mod: CPTII,,, | Performed by: FAMILY MEDICINE

## 2024-04-15 PROCEDURE — 1159F MED LIST DOCD IN RCRD: CPT | Mod: CPTII,,, | Performed by: FAMILY MEDICINE

## 2024-04-15 PROCEDURE — 99999 PR PBB SHADOW E&M-EST. PATIENT-LVL V: CPT | Mod: PBBFAC,,, | Performed by: FAMILY MEDICINE

## 2024-04-15 NOTE — TELEPHONE ENCOUNTER
Patient needing referral General Surgery for hernia. Wasn't able to schedule and was hoping you could find a time.     Thanks!

## 2024-04-15 NOTE — PROGRESS NOTES
Subjective:       Patient ID: Abimael Armendariz is a 59 y.o. male.    Chief Complaint: No chief complaint on file.    59-year-old male noted to have a small fat containing inguinal hernia as an incidental finding on a January 7, 2021 CT of abdomen and pelvis.  He was asymptomatic at that time and observation was elected.  The patient comes in today with increased size and increased irritation from the hernia giving him local pressure but no change in bowel habits or evidence of obstruction.  He has no blood in bowel movements.  Urinary function is unchanged as well.  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    Past Medical History:  No date: ADHD (attention deficit hyperactivity disorder)  No date: Anticoagulant long-term use  No date: Anxiety  No date: CHF (congestive heart failure)  No date: Depression  No date: Diabetes mellitus  No date: Diverticulitis  No date: Hyperlipidemia  No date: Insomnia  No date: Mitral regurgitation  08/2023: Mitral valve disease  No date: PAF (paroxysmal atrial fibrillation)    Past Surgical History:  8/30/2023: ANGIOGRAM, CORONARY, WITH LEFT HEART CATHETERIZATION      Comment:  Procedure: Left Heart Cath;  Surgeon: Hilario Pretty MD;  Location: [facility] CATH;  Service:                Cardiology;;  8/30/2023: CORONARY ANGIOGRAPHY      Comment:  Procedure: Coronary angiogram study;  Surgeon:                Hilario Pretty MD;  Location: [facility] CATH;                 Service: Cardiology;;  11/9/2023: SCHNEIDER MAZE PROCEDURE; N/A      Comment:  Procedure: SCHNEIDER MAZE PROCEDURE;  Surgeon: Goran Bennett MD;  Location: [facility] OR [address] [place] [address];  Service:                Cardiovascular;  Laterality: N/A;  11/9/2023: EXCLUSION, LEFT ATRIAL APPENDAGE, OPEN, AS PART OF OPEN   CHEST SURGERY; N/A      Comment:  Procedure: EXCLUSION, LEFT ATRIAL  APPENDAGE, OPEN, AS                PART OF OPEN CHEST SURGERY;  Surgeon: Goran Bennett MD;  Location: [facility] OR [facility] FLR;  Service:                Cardiovascular;  Laterality: N/A;  2003: KNEE ARTHROSCOPY W/ ACL RECONSTRUCTION AND HAMSTRING GRAFT      Comment:  right x 2; redo Verliner in [place] 1/2022 12/2022: LUMBAR LAMINECTOMY FOR DECOMPRESSION  11/9/2023: REPAIR, MITRAL VALVE, OPEN; N/A      Comment:  Procedure: REPAIR, MITRAL VALVE, OPEN;  Surgeon:                Goran Bennett MD;  Location: [facility] OR [facility] FLR;                 Service: Cardiovascular;  Laterality: N/A;  08/21/2023: TRANSESOPHAGEAL ECHOCARDIOGRAM WITH POSSIBLE   CARDIOVERSION (JASON W/ POSS CARDIOVERSION); Bilateral      Comment:  Procedure: Transesophageal echo (JASON) intra-procedure                log documentation;  Surgeon: Jesu Flores MD;                 Location: [facility] CATH/EP LAB;  Service: Cardiology;                 Laterality: Bilateral;    Current Outpatient Medications on File Prior to Visit:  aspirin (ECOTRIN) 325 MG EC tablet, Take 1 tablet (325 mg total) by mouth once daily., Disp: 30 tablet, Rfl: 11  atorvastatin (LIPITOR) 20 MG tablet, Take 1 tablet (20 mg total) by mouth every evening., Disp: 90 tablet, Rfl: 3  blood sugar diagnostic Strp, To check BG qd times daily, to use with insurance preferred meter, Disp: 30 strip, Rfl: 11  fluticasone propionate (FLONASE) 50 mcg/actuation nasal spray, 1 spray by Each Nostril route daily as needed., Disp: , Rfl:   lancets Misc, To check BG qd times daily, to use with insurance preferred meter, Disp: 100 each, Rfl: 2  metFORMIN (GLUCOPHAGE) 500 MG tablet, Take 1 tablet (500 mg total) by mouth 2 (two) times daily with meals., Disp: 180 tablet, Rfl: 0  metoprolol tartrate (LOPRESSOR) 25 MG tablet, TAKE 1 TABLET BY MOUTH TWICE A DAY, Disp: 180 tablet, Rfl: 3  multivitamin (THERAGRAN) per tablet, Take 1 tablet by mouth every evening., Disp: , Rfl:   pramipexole  (MIRAPEX) 0.25 MG tablet, TAKE 1 TABLET BY MOUTH EVERY DAY, Disp: 90 tablet, Rfl: 3  simethicone (MYLICON) 80 MG chewable tablet, Chew and swallow 1 tablet (80 mg total) by mouth 3 (three) times daily., Disp: 30 tablet, Rfl: 0  traZODone (DESYREL) 100 MG tablet, Take 2 tablets (200 mg total) by mouth every evening., Disp: 180 tablet, Rfl: 1  blood-glucose meter kit, To check BG qd times daily, to use with insurance preferred meter, Disp: 1 each, Rfl: 0  buPROPion (WELLBUTRIN XL) 300 MG 24 hr tablet, Take 1 tablet (300 mg total) by mouth once daily. (Patient taking differently: Take 300 mg by mouth every evening.), Disp: 90 tablet, Rfl: 3    No current facility-administered medications on file prior to visit.          Review of Systems   Gastrointestinal:  Negative for abdominal distention, abdominal pain, blood in stool, constipation, diarrhea, nausea and vomiting.   Genitourinary:  Positive for scrotal swelling (Just above right scrotum). Negative for decreased urine volume, dysuria, frequency, hematuria, testicular pain and urgency.       Objective:      Physical Exam  Abdominal:      Hernia: A hernia is present. Hernia is present in the left inguinal area (Slight bulge, does not appear to be an actual hernia) and right inguinal area (Prominent right inguinal hernia with palpable loop of bowel on Valsalva).         Assessment:       1. Right inguinal hernia        Plan:       1. Right inguinal hernia  Worsening and symptomatic.  Previously fat containing only now appears to contain bowel loop  - Ambulatory referral/consult to General Surgery; Future

## 2024-04-24 DIAGNOSIS — I10 PRIMARY HYPERTENSION: ICD-10-CM

## 2024-04-24 DIAGNOSIS — I48.19 PERSISTENT ATRIAL FIBRILLATION: Primary | ICD-10-CM

## 2024-04-24 DIAGNOSIS — I48.0 PAROXYSMAL ATRIAL FIBRILLATION: ICD-10-CM

## 2024-04-24 RX ORDER — APIXABAN 5 MG/1
5 TABLET, FILM COATED ORAL 2 TIMES DAILY
Qty: 60 TABLET | Refills: 0 | Status: SHIPPED | OUTPATIENT
Start: 2024-04-24 | End: 2024-04-25

## 2024-04-24 NOTE — TELEPHONE ENCOUNTER
----- Message from Ruthie Mosquera, Patient Care Assistant sent at 4/24/2024 12:43 PM CDT -----  Regarding: refill  Contact: Shatovia with [employer]  Type:  RX Refill Request    Who Called:  Shatovia with [employer]    Refill or New Rx:  refill    RX Name and Strength:  Eliquis 5 mg  How is the patient currently taking it? 1xday   Is this a 30 day or 90 day RX:  90    Preferred Pharmacy with phone number:    [facility]pharm[facility][id number] - [place] - [address] [facility]  [address] [place] [address]  Phone: [phone] Fax: [phone]    Local or Mail Order:  local   Ordering Provider:  Stefanie Salas Call Back Number:  [phone] ticket # [id number]    Additional Information:  please call to advise. Thanks!

## 2024-04-25 ENCOUNTER — OFFICE VISIT (OUTPATIENT)
Dept: SURGERY | Facility: CLINIC | Age: 59
End: 2024-04-25
Payer: MEDICAID

## 2024-04-25 VITALS
BODY MASS INDEX: 28.98 KG/M2 | HEART RATE: 83 BPM | WEIGHT: 207 LBS | SYSTOLIC BLOOD PRESSURE: 133 MMHG | TEMPERATURE: 98 F | DIASTOLIC BLOOD PRESSURE: 93 MMHG | HEIGHT: 71 IN

## 2024-04-25 DIAGNOSIS — K40.90 RIGHT INGUINAL HERNIA: ICD-10-CM

## 2024-04-25 PROCEDURE — 99204 OFFICE O/P NEW MOD 45 MIN: CPT | Mod: S$PBB,,, | Performed by: STUDENT IN AN ORGANIZED HEALTH CARE EDUCATION/TRAINING PROGRAM

## 2024-04-25 PROCEDURE — 99999 PR PBB SHADOW E&M-EST. PATIENT-LVL IV: CPT | Mod: PBBFAC,,, | Performed by: STUDENT IN AN ORGANIZED HEALTH CARE EDUCATION/TRAINING PROGRAM

## 2024-04-25 PROCEDURE — 1159F MED LIST DOCD IN RCRD: CPT | Mod: CPTII,,, | Performed by: STUDENT IN AN ORGANIZED HEALTH CARE EDUCATION/TRAINING PROGRAM

## 2024-04-25 PROCEDURE — 99214 OFFICE O/P EST MOD 30 MIN: CPT | Mod: PBBFAC,PN | Performed by: STUDENT IN AN ORGANIZED HEALTH CARE EDUCATION/TRAINING PROGRAM

## 2024-04-25 PROCEDURE — 3075F SYST BP GE 130 - 139MM HG: CPT | Mod: CPTII,,, | Performed by: STUDENT IN AN ORGANIZED HEALTH CARE EDUCATION/TRAINING PROGRAM

## 2024-04-25 PROCEDURE — 3008F BODY MASS INDEX DOCD: CPT | Mod: CPTII,,, | Performed by: STUDENT IN AN ORGANIZED HEALTH CARE EDUCATION/TRAINING PROGRAM

## 2024-04-25 PROCEDURE — 3080F DIAST BP >= 90 MM HG: CPT | Mod: CPTII,,, | Performed by: STUDENT IN AN ORGANIZED HEALTH CARE EDUCATION/TRAINING PROGRAM

## 2024-04-25 NOTE — PROGRESS NOTES
History & Physical    Subjective     History of Present Illness:  Patient is a 59 y.o. male presents with mild right groin pain and bulging.  No prior hernia repairs.  No prostate surgery or pelvic radiation.  Patient does have a history of open heart surgery with mitral valve replacement and Maze for AFib.    Chief Complaint   Patient presents with    Consult     Rt Inguinal Hernia       Review of patient's allergies indicates:   Allergen Reactions    Latex, natural rubber Rash       Current Outpatient Medications   Medication Sig Dispense Refill    aspirin (ECOTRIN) 325 MG EC tablet Take 1 tablet (325 mg total) by mouth once daily. 30 tablet 11    atorvastatin (LIPITOR) 20 MG tablet Take 1 tablet (20 mg total) by mouth every evening. 90 tablet 3    blood sugar diagnostic Strp To check BG qd times daily, to use with insurance preferred meter 30 strip 11    fluticasone propionate (FLONASE) 50 mcg/actuation nasal spray 1 spray by Each Nostril route daily as needed.      lancets Misc To check BG qd times daily, to use with insurance preferred meter 100 each 2    metFORMIN (GLUCOPHAGE) 500 MG tablet Take 1 tablet (500 mg total) by mouth 2 (two) times daily with meals. 180 tablet 0    metoprolol tartrate (LOPRESSOR) 25 MG tablet TAKE 1 TABLET BY MOUTH TWICE A  tablet 3    multivitamin (THERAGRAN) per tablet Take 1 tablet by mouth every evening.      pramipexole (MIRAPEX) 0.25 MG tablet TAKE 1 TABLET BY MOUTH EVERY DAY 90 tablet 3    simethicone (MYLICON) 80 MG chewable tablet Chew and swallow 1 tablet (80 mg total) by mouth 3 (three) times daily. 30 tablet 0    traZODone (DESYREL) 100 MG tablet Take 2 tablets (200 mg total) by mouth every evening. 180 tablet 1    blood-glucose meter kit To check BG qd times daily, to use with insurance preferred meter 1 each 0    buPROPion (WELLBUTRIN XL) 300 MG 24 hr tablet Take 1 tablet (300 mg total) by mouth once daily. (Patient taking differently: Take 300 mg by mouth every  evening.) 90 tablet 3     No current facility-administered medications for this visit.       Past Medical History:   Diagnosis Date    ADHD (attention deficit hyperactivity disorder)     Anticoagulant long-term use     Anxiety     CHF (congestive heart failure)     Depression     Diabetes mellitus     Diverticulitis     Hyperlipidemia     Insomnia     Mitral regurgitation     Mitral valve disease 08/2023    PAF (paroxysmal atrial fibrillation)      Past Surgical History:   Procedure Laterality Date    ANGIOGRAM, CORONARY, WITH LEFT HEART CATHETERIZATION  8/30/2023    Procedure: Left Heart Cath;  Surgeon: Hilario Pretty MD;  Location: [facility] CATH;  Service: Cardiology;;    CORONARY ANGIOGRAPHY  8/30/2023    Procedure: Coronary angiogram study;  Surgeon: Hilario Pretty MD;  Location: [facility] CATH;  Service: Cardiology;;    SCHNEIDER MAZE PROCEDURE N/A 11/9/2023    Procedure: SCHNEIDER MAZE PROCEDURE;  Surgeon: Goran Bennett MD;  [facility];  Service: Cardiovascular;  Laterality: N/A;    EXCLUSION, LEFT ATRIAL APPENDAGE, OPEN, AS PART OF OPEN CHEST SURGERY N/A 11/9/2023    Procedure: EXCLUSION, LEFT ATRIAL APPENDAGE, OPEN, AS PART OF OPEN CHEST SURGERY;  Surgeon: Goran Benntet MD;  [facility];  Service: Cardiovascular;  Laterality: N/A;    KNEE ARTHROSCOPY W/ ACL RECONSTRUCTION AND HAMSTRING GRAFT  2003    right x 2; redo Verliner in [place] 1/2022    LUMBAR LAMINECTOMY FOR DECOMPRESSION  12/2022    REPAIR, MITRAL VALVE, OPEN N/A 11/9/2023    Procedure: REPAIR, MITRAL VALVE, OPEN;  Surgeon: Goran Bennett MD;  Location: [facility] OR [facility] FLR;  Service: Cardiovascular;  Laterality: N/A;    TRANSESOPHAGEAL ECHOCARDIOGRAM WITH POSSIBLE CARDIOVERSION (JASON W/ POSS CARDIOVERSION) Bilateral 08/21/2023    Procedure: Transesophageal echo (JASON) intra-procedure log documentation;  Surgeon: Jesu Flores MD;  Location: [facility] CATH/EP LAB;  Service: Cardiology;  Laterality:  "Bilateral;     Family History   Problem Relation Name Age of Onset    Stroke Mother 80     Heart attack Father      Heart disease Father      No Known Problems Sister      No Known Problems Brother      No Known Problems Daughter      No Known Problems Son      Alzheimer's disease Maternal Grandmother      Heart attack Paternal Grandfather       Social History     Tobacco Use    Smoking status: Never    Smokeless tobacco: Current     Types: Chew    Tobacco comments:     2 dips per day down from can per day   Substance Use Topics    Alcohol use: No    Drug use: No        Review of Systems:  Review of Systems   Constitutional: Negative.  Negative for fatigue and fever.   HENT: Negative.     Eyes: Negative.    Respiratory: Negative.  Negative for shortness of breath.    Cardiovascular: Negative.  Negative for chest pain.   Gastrointestinal: Negative.    Endocrine: Negative.    Genitourinary:  Positive for scrotal swelling.   Musculoskeletal: Negative.    Skin: Negative.    Allergic/Immunologic: Negative.    Neurological: Negative.    Hematological: Negative.    Psychiatric/Behavioral: Negative.            Objective     Vital Signs (Most Recent)  Temp: 97.8 °F (36.6 °C) (04/25/24 0941)  Pulse: 83 (04/25/24 0941)  BP: (!) 133/93 (04/25/24 0941)  5' 11" (1.803 m)  93.9 kg (207 lb)     Physical Exam:  Physical Exam  Constitutional:       General: He is not in acute distress.     Appearance: Normal appearance. He is not ill-appearing, toxic-appearing or diaphoretic.   HENT:      Head: Normocephalic.      Nose: Nose normal.   Eyes:      Conjunctiva/sclera: Conjunctivae normal.   Cardiovascular:      Rate and Rhythm: Normal rate and regular rhythm.   Pulmonary:      Effort: Pulmonary effort is normal.   Abdominal:      Palpations: Abdomen is soft.   Genitourinary:     Comments: Small right inguinal hernia on the right.  Normal left exam.  Musculoskeletal:         General: Normal range of motion.      Cervical back: Normal " range of motion.   Skin:     General: Skin is warm.   Neurological:      General: No focal deficit present.      Mental Status: He is alert.   Psychiatric:         Mood and Affect: Mood normal.             Diagnostic Results:  Prior echo was reviewed.       Assessment and Plan   59-year-old male with a history of mitral valve repair who was no longer on anticoagulation, who presents with a mildly symptomatic right inguinal hernia.    PLAN:  Risks versus benefits of open repair with mesh versus robotic repair with mesh versus observation were all discussed.  Given that patient has minimal symptoms currently, he elected to observe for now.  Patient will call the set up follow up with me in the future or if symptoms should worsen.

## 2024-04-26 ENCOUNTER — TELEPHONE (OUTPATIENT)
Dept: CARDIOLOGY | Facility: CLINIC | Age: 59
End: 2024-04-26
Payer: MEDICAID

## 2024-04-26 NOTE — TELEPHONE ENCOUNTER
----- Message from Perez Jennings sent at 4/26/2024  3:22 PM CDT -----  Regarding: new order  Contact: Zonia Middleton  Type:  Patient Returning Call    Who Called:Zonia Middleton  Who Left Message for Patient:office nurse  Does the patient know what this is regarding?:Eliquis medication  Would the patient rather a call back or a response via [facility]?   [id number] - [place] - [address]  [address] [place] [address]  Phone: [phone] Fax: [phone]  Best Call Back Number:please send new order  Additional Information:

## 2024-05-01 ENCOUNTER — TELEPHONE (OUTPATIENT)
Dept: SURGERY | Facility: CLINIC | Age: 59
End: 2024-05-01
Payer: MEDICAID

## 2024-05-01 NOTE — TELEPHONE ENCOUNTER
Message sent to Dr Myles, patient has not had surgery and has not scheduled surgery.  No restrictions that I am aware of but verifying with Dr myles

## 2024-05-01 NOTE — TELEPHONE ENCOUNTER
correct. no restrictions. Certainly no disability stuff. Until after surgery     Attempted to contact [facility] [employer], left message as above

## 2024-05-01 NOTE — TELEPHONE ENCOUNTER
----- Message from Maliha Daly sent at 5/1/2024  9:58 AM CDT -----  Contact: joshua camara  Type: Needs Medical Advice  Who Called:  joshua camara [employer]  Best Call Back Number: [phone]  Additional Information: claim # [id number] please call needs to know if pt has any restrictions or limitations for pt

## 2024-06-12 DIAGNOSIS — E11.9 TYPE 2 DIABETES MELLITUS WITHOUT COMPLICATION, UNSPECIFIED WHETHER LONG TERM INSULIN USE: ICD-10-CM

## 2024-07-01 ENCOUNTER — LAB VISIT (OUTPATIENT)
Dept: LAB | Facility: HOSPITAL | Age: 59
End: 2024-07-01
Attending: INTERNAL MEDICINE
Payer: MEDICAID

## 2024-07-01 DIAGNOSIS — I10 PRIMARY HYPERTENSION: ICD-10-CM

## 2024-07-01 DIAGNOSIS — E11.9 TYPE 2 DIABETES MELLITUS WITHOUT COMPLICATION, WITHOUT LONG-TERM CURRENT USE OF INSULIN: ICD-10-CM

## 2024-07-01 DIAGNOSIS — E78.5 DYSLIPIDEMIA: ICD-10-CM

## 2024-07-01 DIAGNOSIS — I50.9 ACUTE HEART FAILURE, UNSPECIFIED HEART FAILURE TYPE: ICD-10-CM

## 2024-07-01 LAB
ALBUMIN SERPL BCP-MCNC: 4.2 G/DL (ref 3.5–5.2)
ALP SERPL-CCNC: 96 U/L (ref 55–135)
ALT SERPL W/O P-5'-P-CCNC: 30 U/L (ref 10–44)
ANION GAP SERPL CALC-SCNC: 8 MMOL/L (ref 8–16)
ANION GAP SERPL CALC-SCNC: 8 MMOL/L (ref 8–16)
AST SERPL-CCNC: 27 U/L (ref 10–40)
BILIRUB SERPL-MCNC: 0.6 MG/DL (ref 0.1–1)
BUN SERPL-MCNC: 18 MG/DL (ref 6–20)
BUN SERPL-MCNC: 18 MG/DL (ref 6–20)
CALCIUM SERPL-MCNC: 9.4 MG/DL (ref 8.7–10.5)
CALCIUM SERPL-MCNC: 9.4 MG/DL (ref 8.7–10.5)
CHLORIDE SERPL-SCNC: 105 MMOL/L (ref 95–110)
CHLORIDE SERPL-SCNC: 105 MMOL/L (ref 95–110)
CHOLEST SERPL-MCNC: 114 MG/DL (ref 120–199)
CHOLEST/HDLC SERPL: 4.4 {RATIO} (ref 2–5)
CO2 SERPL-SCNC: 27 MMOL/L (ref 23–29)
CO2 SERPL-SCNC: 27 MMOL/L (ref 23–29)
CREAT SERPL-MCNC: 1.1 MG/DL (ref 0.5–1.4)
CREAT SERPL-MCNC: 1.1 MG/DL (ref 0.5–1.4)
EST. GFR  (NO RACE VARIABLE): >60 ML/MIN/1.73 M^2
EST. GFR  (NO RACE VARIABLE): >60 ML/MIN/1.73 M^2
ESTIMATED AVG GLUCOSE: 151 MG/DL (ref 68–131)
GLUCOSE SERPL-MCNC: 124 MG/DL (ref 70–110)
GLUCOSE SERPL-MCNC: 124 MG/DL (ref 70–110)
HBA1C MFR BLD: 6.9 % (ref 4–5.6)
HDLC SERPL-MCNC: 26 MG/DL (ref 40–75)
HDLC SERPL: 22.8 % (ref 20–50)
LDLC SERPL CALC-MCNC: 40.6 MG/DL (ref 63–159)
NONHDLC SERPL-MCNC: 88 MG/DL
POTASSIUM SERPL-SCNC: 4.5 MMOL/L (ref 3.5–5.1)
POTASSIUM SERPL-SCNC: 4.5 MMOL/L (ref 3.5–5.1)
PROT SERPL-MCNC: 7.1 G/DL (ref 6–8.4)
SODIUM SERPL-SCNC: 140 MMOL/L (ref 136–145)
SODIUM SERPL-SCNC: 140 MMOL/L (ref 136–145)
TRIGL SERPL-MCNC: 237 MG/DL (ref 30–150)

## 2024-07-01 PROCEDURE — 36415 COLL VENOUS BLD VENIPUNCTURE: CPT | Mod: PO | Performed by: FAMILY MEDICINE

## 2024-07-01 PROCEDURE — 80053 COMPREHEN METABOLIC PANEL: CPT | Performed by: FAMILY MEDICINE

## 2024-07-01 PROCEDURE — 83036 HEMOGLOBIN GLYCOSYLATED A1C: CPT | Performed by: FAMILY MEDICINE

## 2024-07-01 PROCEDURE — 80061 LIPID PANEL: CPT | Performed by: FAMILY MEDICINE

## 2024-07-10 ENCOUNTER — HOSPITAL ENCOUNTER (OUTPATIENT)
Dept: CARDIOLOGY | Facility: HOSPITAL | Age: 59
Discharge: HOME | End: 2024-07-10
Attending: INTERNAL MEDICINE
Payer: MEDICAID

## 2024-07-10 VITALS — BODY MASS INDEX: 28.98 KG/M2 | WEIGHT: 207 LBS | HEIGHT: 71 IN

## 2024-07-10 DIAGNOSIS — Z86.79 S/P MAZE OPERATION FOR ATRIAL FIBRILLATION: ICD-10-CM

## 2024-07-10 DIAGNOSIS — I10 PRIMARY HYPERTENSION: ICD-10-CM

## 2024-07-10 DIAGNOSIS — E11.9 TYPE 2 DIABETES MELLITUS WITHOUT COMPLICATION, WITHOUT LONG-TERM CURRENT USE OF INSULIN: ICD-10-CM

## 2024-07-10 DIAGNOSIS — Z98.890 S/P MAZE OPERATION FOR ATRIAL FIBRILLATION: ICD-10-CM

## 2024-07-10 DIAGNOSIS — Z98.890 S/P MVR (MITRAL VALVE REPAIR): ICD-10-CM

## 2024-07-10 DIAGNOSIS — E78.5 DYSLIPIDEMIA: ICD-10-CM

## 2024-07-10 PROCEDURE — 93306 TTE W/DOPPLER COMPLETE: CPT | Mod: PO

## 2024-07-10 PROCEDURE — 93306 TTE W/DOPPLER COMPLETE: CPT | Mod: 26,,, | Performed by: INTERNAL MEDICINE

## 2024-07-10 RX ORDER — METFORMIN HYDROCHLORIDE 500 MG/1
500 TABLET ORAL 2 TIMES DAILY WITH MEALS
Qty: 180 TABLET | Refills: 1 | Status: SHIPPED | OUTPATIENT
Start: 2024-07-10

## 2024-07-10 NOTE — TELEPHONE ENCOUNTER
No care due was identified.  [facility] Embedded Care Due Messages. Reference number: [id number].   7/10/2024 1:00:27 AM CDT

## 2024-07-10 NOTE — TELEPHONE ENCOUNTER
Refill Decision Note   Abimael Armendariz  is requesting a refill authorization.  Brief Assessment and Rationale for Refill:  Approve     Medication Therapy Plan:         Comments:     Note composed:3:31 AM 07/10/2024

## 2024-07-12 LAB
ASCENDING AORTA: 2.71 CM
AV INDEX (PROSTH): 0.77
AV MEAN GRADIENT: 3 MMHG
AV PEAK GRADIENT: 5 MMHG
AV VALVE AREA BY VELOCITY RATIO: 3.39 CM²
AV VALVE AREA: 3.59 CM²
AV VELOCITY RATIO: 0.72
BSA FOR ECHO PROCEDURE: 2.17 M2
CV ECHO LV RWT: 0.46 CM
DOP CALC AO PEAK VEL: 1.09 M/S
DOP CALC AO VTI: 23.7 CM
DOP CALC LVOT AREA: 4.7 CM2
DOP CALC LVOT DIAMETER: 2.44 CM
DOP CALC LVOT PEAK VEL: 0.79 M/S
DOP CALC LVOT STROKE VOLUME: 85.06 CM3
DOP CALC MV VTI: 33.3 CM
DOP CALCLVOT PEAK VEL VTI: 18.2 CM
E WAVE DECELERATION TIME: 217.9 MSEC
E/A RATIO: 1.47
E/E' RATIO: 16 M/S
ECHO LV POSTERIOR WALL: 1.2 CM (ref 0.6–1.1)
FRACTIONAL SHORTENING: 28 % (ref 28–44)
INTERVENTRICULAR SEPTUM: 1.2 CM (ref 0.6–1.1)
IVRT: 98.95 MSEC
LEFT ATRIUM AREA SYSTOLIC (APICAL 2 CHAMBER): 25.37 CM2
LEFT ATRIUM AREA SYSTOLIC (APICAL 4 CHAMBER): 26.91 CM2
LEFT ATRIUM SIZE: 4.85 CM
LEFT ATRIUM VOLUME INDEX MOD: 45.8 ML/M2
LEFT ATRIUM VOLUME MOD: 98.04 CM3
LEFT INTERNAL DIMENSION IN SYSTOLE: 3.74 CM (ref 2.1–4)
LEFT VENTRICLE DIASTOLIC VOLUME INDEX: 60.68 ML/M2
LEFT VENTRICLE DIASTOLIC VOLUME: 129.86 ML
LEFT VENTRICLE END SYSTOLIC VOLUME APICAL 2 CHAMBER: 93.65 ML
LEFT VENTRICLE END SYSTOLIC VOLUME APICAL 4 CHAMBER: 90.75 ML
LEFT VENTRICLE MASS INDEX: 117 G/M2
LEFT VENTRICLE SYSTOLIC VOLUME INDEX: 27.9 ML/M2
LEFT VENTRICLE SYSTOLIC VOLUME: 59.78 ML
LEFT VENTRICULAR INTERNAL DIMENSION IN DIASTOLE: 5.21 CM (ref 3.5–6)
LEFT VENTRICULAR MASS: 249.61 G
LV LATERAL E/E' RATIO: 16 M/S
LV SEPTAL E/E' RATIO: 16 M/S
LVED V (TEICH): 129.86 ML
LVES V (TEICH): 59.78 ML
LVOT MG: 1.49 MMHG
LVOT MV: 0.58 CM/S
MV MEAN GRADIENT: 3 MMHG
MV PEAK A VEL: 0.76 M/S
MV PEAK E VEL: 1.12 M/S
MV PEAK GRADIENT: 8 MMHG
MV VALVE AREA BY CONTINUITY EQUATION: 2.55 CM2
OHS CV RV/LV RATIO: 0.8 CM
PISA MRMAX VEL: 5.83 M/S
PISA TR MAX VEL: 2.62 M/S
PULM VEIN S/D RATIO: 0.72
PV PEAK D VEL: 0.64 M/S
PV PEAK S VEL: 0.46 M/S
RA PRESSURE ESTIMATED: 3 MMHG
RA VOL SYS: 79.29 ML
RIGHT ATRIAL AREA: 23.3 CM2
RIGHT ATRIUM VOLUME AREA LENGTH APICAL 4 CHAMBER: 69.98 ML
RIGHT VENTRICLE DIASTOLIC LENGTH: 8.3 CM
RIGHT VENTRICLE DIASTOLIC MID DIMENSION: 2.9 CM
RIGHT VENTRICULAR END-DIASTOLIC DIMENSION: 4.19 CM
RIGHT VENTRICULAR LENGTH IN DIASTOLE (APICAL 4-CHAMBER VIEW): 8.25 CM
RV MID DIAMA: 2.91 CM
RV TB RVSP: 6 MMHG
RV TISSUE DOPPLER FREE WALL SYSTOLIC VELOCITY 1 (APICAL 4 CHAMBER VIEW): 12.24 CM/S
SINUS: 3.14 CM
STJ: 2.78 CM
TDI LATERAL: 0.07 M/S
TDI SEPTAL: 0.07 M/S
TDI: 0.07 M/S
TR MAX PG: 27 MMHG
TRICUSPID ANNULAR PLANE SYSTOLIC EXCURSION: 1.57 CM
TV REST PULMONARY ARTERY PRESSURE: 30 MMHG
Z-SCORE OF LEFT VENTRICULAR DIMENSION IN END DIASTOLE: -2.78
Z-SCORE OF LEFT VENTRICULAR DIMENSION IN END SYSTOLE: -0.91

## 2024-07-12 NOTE — PROGRESS NOTES
Echocardiogram shows normal heart function, mitral valve repaired appears to be working fine.  Echocardiogram is unchanged compared to previous 1.  If anything, the heart function has improved somewhat

## 2024-07-19 DIAGNOSIS — G47.00 INSOMNIA, UNSPECIFIED TYPE: ICD-10-CM

## 2024-07-19 RX ORDER — TRAZODONE HYDROCHLORIDE 100 MG/1
200 TABLET ORAL NIGHTLY
Qty: 180 TABLET | Refills: 2 | Status: SHIPPED | OUTPATIENT
Start: 2024-07-19 | End: 2025-04-15

## 2024-07-19 NOTE — TELEPHONE ENCOUNTER
No care due was identified.  [facility] Embedded Care Due Messages. Reference number: [id number].   7/19/2024 2:48:33 PM CDT

## 2024-07-19 NOTE — TELEPHONE ENCOUNTER
Refill Decision Note   Abimael Armendariz  is requesting a refill authorization.  Brief Assessment and Rationale for Refill:  Approve     Medication Therapy Plan:         Comments:     Note composed:6:42 PM 07/19/2024

## 2024-07-30 ENCOUNTER — PATIENT MESSAGE (OUTPATIENT)
Dept: FAMILY MEDICINE | Facility: CLINIC | Age: 59
End: 2024-07-30
Payer: MEDICAID

## 2024-07-30 ENCOUNTER — TELEPHONE (OUTPATIENT)
Dept: CARDIOTHORACIC SURGERY | Facility: CLINIC | Age: 59
End: 2024-07-30
Payer: MEDICAID

## 2024-07-30 DIAGNOSIS — E11.9 TYPE 2 DIABETES MELLITUS WITHOUT COMPLICATION, WITHOUT LONG-TERM CURRENT USE OF INSULIN: ICD-10-CM

## 2024-07-30 RX ORDER — METFORMIN HYDROCHLORIDE 750 MG/1
750 TABLET, EXTENDED RELEASE ORAL 2 TIMES DAILY WITH MEALS
Qty: 180 TABLET | Refills: 1 | Status: SHIPPED | OUTPATIENT
Start: 2024-07-30

## 2024-07-30 NOTE — TELEPHONE ENCOUNTER
Spoke with SHAYLA De Souza. They are waiting on signed FMLA paperwork. Explained process to [employer].     Called pt to make sure paperwork has been completed and sent to the disability office. Left message with callback number.    Julie Haase RN  CTS Nurse Navigator       ----- Message from Charlotte Andrade sent at 7/30/2024  1:10 PM CDT -----  Contact: [employer]  Type:  Needs Medical Advice    Who Called: SHAYLA GOODWIN   Symptoms (please be specific):  PLEASE FAX THE DISABILITY PAPERWORK BACK ASAP  INCLUDE  REF # [id number]  Would the patient rather a call back or a response via [facility]? CALL   Best Call Back Number:  [phone] / FAX # [phone]   Additional Information: THANK YOU

## 2024-07-30 NOTE — TELEPHONE ENCOUNTER
Gave pt the number to disability desk so he can follow up with his medical leave. All medical leave paperwork goes through the disability desk. He said he thinks his wife has everything signed.    Also gave him number, fax, and reference ID for his claim at "[employer].    Julie Haase RN  CTS Nurse Navigator      ----- Message from Amber Dooley sent at 7/30/2024  2:44 PM CDT -----  Regarding: pt advice  Contact: pt [phone]  Type:  Patient Returning Call    Who Called:pt   Who Left Message for Patient:Gogo   Does the patient know what this is regarding?:regarding paperwork  Would the patient rather a call back or a response via [facility]? Callback   Best Call Back Number:[phone]

## 2024-07-31 ENCOUNTER — TELEPHONE (OUTPATIENT)
Dept: CARDIOLOGY | Facility: CLINIC | Age: 59
End: 2024-07-31
Payer: MEDICAID

## 2024-07-31 NOTE — TELEPHONE ENCOUNTER
Pt having Lumbar radiofrequency ablation Left L5-S1  Anesthesia: TIVA   Pt taking ASA  Last office visit: 01/12/24     [facility]  P: [phone]  F: [phone]

## 2024-10-05 DIAGNOSIS — E11.9 TYPE 2 DIABETES MELLITUS WITHOUT COMPLICATION, WITHOUT LONG-TERM CURRENT USE OF INSULIN: ICD-10-CM

## 2024-10-05 NOTE — TELEPHONE ENCOUNTER
Care Due:                  Date            Visit Type   Department     Provider  --------------------------------------------------------------------------------                                EP -                              PRIMARY      [facility]  Last Visit: 04-      CARE ([facility])   PRACTICE       Sammy Trinidad  Next Visit: None Scheduled  None         None Found                                                            Last  Test          Frequency    Reason                     Performed    Due Date  --------------------------------------------------------------------------------    HBA1C.......  6 months...  metFORMIN................  07- 12-    Health [facility] Embedded Care Due Messages. Reference number: [id number].   10/05/2024 5:36:01 PM CDT

## 2024-10-06 RX ORDER — METFORMIN HYDROCHLORIDE 750 MG/1
750 TABLET, EXTENDED RELEASE ORAL 2 TIMES DAILY WITH MEALS
Qty: 180 TABLET | Refills: 0 | Status: CANCELLED | OUTPATIENT
Start: 2024-10-06

## 2024-10-06 RX ORDER — METFORMIN HYDROCHLORIDE 500 MG/1
500 TABLET ORAL 2 TIMES DAILY WITH MEALS
Qty: 180 TABLET | Refills: 1 | OUTPATIENT
Start: 2024-10-06

## 2024-10-06 NOTE — TELEPHONE ENCOUNTER
Refill Routing Note   Medication(s) are not appropriate for processing by [facility] for the following reason(s):        New or recently adjusted medication:  less than 90 days under the signature of responsible provider    [facility] action(s):  Defer     Requires labs : Yes      Medication Therapy Plan: Metformin 500 mg qd  discontinued on 7/30/2024 by Sammy Trinidad MD; dose adjustment      Appointments  past 12m or future 3m with PCP    Date Provider   Last Visit   4/15/2024 Sammy Trinidad MD   Next Visit   Visit date not found Sammy Trinidad MD   ED visits in past 90 days: 0        Note composed:1:29 PM 10/06/2024

## 2024-11-04 ENCOUNTER — OFFICE VISIT (OUTPATIENT)
Dept: CARDIOLOGY | Facility: CLINIC | Age: 59
End: 2024-11-04
Payer: MEDICAID

## 2024-11-04 VITALS
HEART RATE: 88 BPM | DIASTOLIC BLOOD PRESSURE: 77 MMHG | WEIGHT: 207.88 LBS | HEIGHT: 71 IN | BODY MASS INDEX: 29.1 KG/M2 | SYSTOLIC BLOOD PRESSURE: 118 MMHG

## 2024-11-04 DIAGNOSIS — I48.0 PAROXYSMAL ATRIAL FIBRILLATION: Primary | ICD-10-CM

## 2024-11-04 DIAGNOSIS — E78.5 DYSLIPIDEMIA: ICD-10-CM

## 2024-11-04 DIAGNOSIS — R00.2 PALPITATIONS: ICD-10-CM

## 2024-11-04 DIAGNOSIS — Z98.890 S/P MVR (MITRAL VALVE REPAIR): Primary | ICD-10-CM

## 2024-11-04 DIAGNOSIS — I48.0 PAROXYSMAL ATRIAL FIBRILLATION: ICD-10-CM

## 2024-11-04 DIAGNOSIS — I10 PRIMARY HYPERTENSION: ICD-10-CM

## 2024-11-04 PROCEDURE — 99999 PR PBB SHADOW E&M-EST. PATIENT-LVL III: CPT | Mod: PBBFAC,,, | Performed by: INTERNAL MEDICINE

## 2024-11-04 PROCEDURE — 99213 OFFICE O/P EST LOW 20 MIN: CPT | Mod: PBBFAC,PO | Performed by: INTERNAL MEDICINE

## 2024-11-04 NOTE — PROGRESS NOTES
Subjective:    Patient ID:  Abimael Armendariz is a 59 y.o. male who presents for follow-up of mitral valve replacement, atrial fibrillation    HPI  He comes for follow up stating that his heart rate goes up to 160 when he gets on the cause way.  Not in the twin-span, otherwise he feels fine    Review of Systems   Constitutional: Negative for decreased appetite, malaise/fatigue, weight gain and weight loss.   Cardiovascular:  Negative for chest pain, dyspnea on exertion, leg swelling, palpitations and syncope.   Respiratory:  Negative for cough and shortness of breath.    Gastrointestinal: Negative.    Neurological:  Negative for weakness.   All other systems reviewed and are negative.     Objective:      Physical Exam  Vitals and nursing note reviewed.   Constitutional:       Appearance: Normal appearance. He is well-developed.   HENT:      Head: Normocephalic.   Eyes:      Pupils: Pupils are equal, round, and reactive to light.   Neck:      Thyroid: No thyromegaly.      Vascular: No carotid bruit or JVD.   Cardiovascular:      Rate and Rhythm: Normal rate and regular rhythm.      Chest Wall: PMI is not displaced.      Pulses: Normal pulses and intact distal pulses.      Heart sounds: Normal heart sounds. No murmur heard.     No gallop.   Pulmonary:      Effort: Pulmonary effort is normal.      Breath sounds: Normal breath sounds.   Abdominal:      Palpations: Abdomen is soft. There is no mass.      Tenderness: There is no abdominal tenderness.   Musculoskeletal:         General: Normal range of motion.      Cervical back: Normal range of motion and neck supple.   Skin:     General: Skin is warm.   Neurological:      Mental Status: He is alert and oriented to person, place, and time.      Sensory: No sensory deficit.      Deep Tendon Reflexes: Reflexes are normal and symmetric.         Most Recent EKG Results  Results for orders placed or performed during the hospital encounter of 12/07/23   EKG 12-lead     Collection Time: 12/07/23  2:34 PM    Narrative    Test Reason : Z98.890,Z98.890,Z86.79,    Vent. Rate : 084 BPM     Atrial Rate : 084 BPM     P-R Int : 224 ms          QRS Dur : 094 ms      QT Int : 394 ms       P-R-T Axes : 058 090 255 degrees     QTc Int : 465 ms    Sinus rhythm with 1st degree A-V block  Rightward axis  T wave abnormality, consider inferolateral ischemia  Abnormal ECG  When compared with ECG of 09-NOV-2023 12:55,  Sinus rhythm has replaced Electronic ventricular pacemaker  Confirmed by Miri Hawk MD ([id number]) on 12/7/2023 4:19:51 PM    Referred By: JENNIFER ZELAYA           Confirmed By:Miri Hawk MD       Most Recent Echocardiogram Results  Results for orders placed during the hospital encounter of 07/10/24    Echo    Interpretation Summary    Left Ventricle: The left ventricle is normal in size. There is mild concentric hypertrophy. There is low normal systolic function with a visually estimated ejection fraction of 50 - 55%. There is normal diastolic function.    Right Ventricle: Normal right ventricular cavity size. Wall thickness is normal. Systolic function is normal.    Left Atrium: Left atrium is dilated.    Mitral Valve: The mitral valve is repaired by annular ring. There is mild regurgitation.    Tricuspid Valve: There is mild regurgitation.    Pulmonary Artery: The estimated pulmonary artery systolic pressure is 30 mmHg.    IVC/SVC: Normal venous pressure at 3 mmHg.      Most Recent Nuclear Stress Test Results  Results for orders placed during the hospital encounter of 08/24/23    Nuclear Stress - Cardiology Interpreted    Interpretation Summary    Abnormal myocardial perfusion scan.    There is a small to moderate sized, fixed perfusion abnormality consistent with scar in the basal to apical inferior wall(s).    There are no other significant perfusion abnormalities.    The gated perfusion images showed an ejection fraction of 47% post stress.    LV cavity size is mildly enlarged  at rest and mildly enlarged at stress.    The ECG portion of the study is negative for ischemia.    The patient reported no chest pain during the stress test.    Equivocal study, fixed inferior defect, can not rule out attenuation artifact versus scar tissue.  Clinical correlation warranted.  Left ventricle does appear to be dilated.  Nuclear EF 47%, correlate with echo.      Most Recent Cardiac PET Stress Test Results  No results found for this or any previous visit.      Most Recent Cardiovascular Angiogram results  Results for orders placed during the hospital encounter of 08/30/23    Cardiac catheterization    Conclusion    There was non-obstructive coronary artery disease..    There was severe (3/4+) mitral regurgitation.    The ejection fraction was greater than 55% by visual estimate.    The left ventricular systolic function was normal.    The left ventricular end diastolic pressure was elevated.    The procedure log was documented by Documenter: Stacie Fuller RN and verified by Hilario Pretty MD.    Date: 8/30/2023  Time: 1:28 PM      Other Most Recent Cardiology Results  Results for orders placed during the hospital encounter of 02/20/24    Holter monitor - 72 hour    Interpretation Summary    The predominant rhythm is sinus.    Very rare episodes of what appears to be ectopic atrial tachycardia versus paroxysmal atrial fibrillation, the longest episode was 1 minute at a maximum heart rate of 150 beats per minute.      Labs reviewed    Assessment:       1. S/P MVR (mitral valve repair)    2. Paroxysmal atrial fibrillation    3. Primary hypertension    4. Dyslipidemia         Plan:   Event monitor for a couple of weeks.  I have told him to get on the causes while he is getting the monitor and take an extra metoprolol before he jumps in the cause way.  Continue:  ASA, Beta blocker, and Statin  Regular exercise program  Weight loss  Low cholesterol diet

## 2024-11-11 ENCOUNTER — HOSPITAL ENCOUNTER (OUTPATIENT)
Dept: CARDIOLOGY | Facility: HOSPITAL | Age: 59
Discharge: HOME | End: 2024-11-11
Attending: INTERNAL MEDICINE
Payer: MEDICAID

## 2024-11-11 DIAGNOSIS — I48.0 PAROXYSMAL ATRIAL FIBRILLATION: ICD-10-CM

## 2024-11-11 PROCEDURE — 93271 ECG/MONITORING AND ANALYSIS: CPT | Mod: PO

## 2024-11-21 DIAGNOSIS — G25.81 RESTLESS LEG SYNDROME: ICD-10-CM

## 2024-11-21 RX ORDER — PRAMIPEXOLE DIHYDROCHLORIDE 0.25 MG/1
TABLET ORAL
Qty: 90 TABLET | Refills: 1 | Status: SHIPPED | OUTPATIENT
Start: 2024-11-21

## 2024-11-21 RX ORDER — PRAMIPEXOLE DIHYDROCHLORIDE 0.25 MG/1
TABLET ORAL
Qty: 90 TABLET | Refills: 3 | OUTPATIENT
Start: 2024-11-21

## 2024-11-21 NOTE — TELEPHONE ENCOUNTER
Refill Decision Note   Abimael Armendariz  is requesting a refill authorization.  Brief Assessment and Rationale for Refill:  Approve     Medication Therapy Plan:         Comments:     Note composed:5:42 PM 11/21/2024

## 2024-11-21 NOTE — TELEPHONE ENCOUNTER
No care due was identified.  [facility] Embedded Care Due Messages. Reference number: [id number].   11/21/2024 7:33:03 AM CST

## 2024-11-25 ENCOUNTER — TELEPHONE (OUTPATIENT)
Dept: CARDIOLOGY | Facility: CLINIC | Age: 59
End: 2024-11-25
Payer: MEDICAID

## 2024-11-25 NOTE — TELEPHONE ENCOUNTER
----- Message from Hilario Pretty MD sent at 11/25/2024  2:27 PM CST -----  Event monitor shows no significant arrhythmias, no evidence of atrial fibrillation

## 2024-11-25 NOTE — TELEPHONE ENCOUNTER
Spoke with patient and informed him ---Event monitor shows no significant arrhythmias, no evidence of atrial fibrillation per Dr. Moreno. Patient verbalized understanding.

## 2024-12-30 DIAGNOSIS — E11.9 TYPE 2 DIABETES MELLITUS WITHOUT COMPLICATION, WITHOUT LONG-TERM CURRENT USE OF INSULIN: ICD-10-CM

## 2024-12-30 NOTE — TELEPHONE ENCOUNTER
No care due was identified.  Health [facility] Embedded Care Due Messages. Reference number: [id number].   12/30/2024 12:12:28 AM CST

## 2024-12-30 NOTE — TELEPHONE ENCOUNTER
Refill Routing Note   Medication(s) are not appropriate for processing by [facility] for the following reason(s):        Non-participating provider    [facility] action(s):  Route               Appointments  past 12m or future 3m with PCP    Date Provider   Last Visit   4/15/2024 Sammy Trinidad MD   Next Visit   Visit date not found Sammy Trinidad MD   ED visits in past 90 days: 0        Note composed:2:49 PM 12/30/2024

## 2025-01-03 ENCOUNTER — TELEPHONE (OUTPATIENT)
Dept: FAMILY MEDICINE | Facility: CLINIC | Age: 60
End: 2025-01-03
Payer: MEDICAID

## 2025-01-03 NOTE — TELEPHONE ENCOUNTER
----- Message from Dot sent at 1/3/2025 12:05 PM CST -----  Contact: self  Type:  Needs Medical Advice    Who Called: pt       Would the patient rather a call back or a response via [facility]? Call back     Best Call Back Number: [phone]      Additional Information: need to make an appt to est care.  Please call back to advise. Thanks!

## 2025-01-08 RX ORDER — METFORMIN HYDROCHLORIDE 500 MG/1
500 TABLET ORAL 2 TIMES DAILY WITH MEALS
Qty: 180 TABLET | Refills: 1 | OUTPATIENT
Start: 2025-01-08

## 2025-01-08 NOTE — TELEPHONE ENCOUNTER
Refill Decision Note   Abimael Armendariz  is requesting a refill authorization.  Brief Assessment and Rationale for Refill:  Quick Discontinue     Medication Therapy Plan:         Comments:     Note composed:2:10 PM 01/08/2025

## 2025-01-17 ENCOUNTER — OFFICE VISIT (OUTPATIENT)
Dept: FAMILY MEDICINE | Facility: CLINIC | Age: 60
End: 2025-01-17
Payer: MEDICAID

## 2025-01-17 VITALS
WEIGHT: 214.5 LBS | HEART RATE: 63 BPM | SYSTOLIC BLOOD PRESSURE: 126 MMHG | BODY MASS INDEX: 30.03 KG/M2 | RESPIRATION RATE: 16 BRPM | DIASTOLIC BLOOD PRESSURE: 78 MMHG | TEMPERATURE: 98 F | OXYGEN SATURATION: 95 % | HEIGHT: 71 IN

## 2025-01-17 DIAGNOSIS — M54.9 DORSALGIA: Primary | ICD-10-CM

## 2025-01-17 DIAGNOSIS — E11.9 TYPE 2 DIABETES MELLITUS WITHOUT COMPLICATION, WITHOUT LONG-TERM CURRENT USE OF INSULIN: ICD-10-CM

## 2025-01-17 DIAGNOSIS — E78.5 DYSLIPIDEMIA: ICD-10-CM

## 2025-01-17 DIAGNOSIS — I10 PRIMARY HYPERTENSION: ICD-10-CM

## 2025-01-17 DIAGNOSIS — G47.00 INSOMNIA, UNSPECIFIED TYPE: ICD-10-CM

## 2025-01-17 DIAGNOSIS — G25.81 RESTLESS LEG SYNDROME: ICD-10-CM

## 2025-01-17 PROCEDURE — 3008F BODY MASS INDEX DOCD: CPT | Mod: CPTII,,, | Performed by: FAMILY MEDICINE

## 2025-01-17 PROCEDURE — 99999 PR PBB SHADOW E&M-EST. PATIENT-LVL III: CPT | Mod: PBBFAC,,, | Performed by: FAMILY MEDICINE

## 2025-01-17 PROCEDURE — G2211 COMPLEX E/M VISIT ADD ON: HCPCS | Mod: S$PBB,,, | Performed by: FAMILY MEDICINE

## 2025-01-17 PROCEDURE — 3074F SYST BP LT 130 MM HG: CPT | Mod: CPTII,,, | Performed by: FAMILY MEDICINE

## 2025-01-17 PROCEDURE — 1159F MED LIST DOCD IN RCRD: CPT | Mod: CPTII,,, | Performed by: FAMILY MEDICINE

## 2025-01-17 PROCEDURE — 99213 OFFICE O/P EST LOW 20 MIN: CPT | Mod: PBBFAC,PO | Performed by: FAMILY MEDICINE

## 2025-01-17 PROCEDURE — 99214 OFFICE O/P EST MOD 30 MIN: CPT | Mod: S$PBB,,, | Performed by: FAMILY MEDICINE

## 2025-01-17 PROCEDURE — 3078F DIAST BP <80 MM HG: CPT | Mod: CPTII,,, | Performed by: FAMILY MEDICINE

## 2025-01-17 RX ORDER — VORTIOXETINE 10 MG/1
1 TABLET, FILM COATED ORAL
COMMUNITY
Start: 2024-12-18

## 2025-01-17 RX ORDER — CELECOXIB 200 MG/1
200 CAPSULE ORAL
COMMUNITY
Start: 2024-09-25 | End: 2025-01-17 | Stop reason: SDUPTHER

## 2025-01-17 RX ORDER — ATORVASTATIN CALCIUM 20 MG/1
20 TABLET, FILM COATED ORAL NIGHTLY
Qty: 90 TABLET | Refills: 3 | Status: SHIPPED | OUTPATIENT
Start: 2025-01-17 | End: 2026-01-17

## 2025-01-17 RX ORDER — TRAZODONE HYDROCHLORIDE 100 MG/1
200 TABLET ORAL NIGHTLY
Qty: 180 TABLET | Refills: 3 | Status: SHIPPED | OUTPATIENT
Start: 2025-01-17 | End: 2025-10-14

## 2025-01-17 RX ORDER — METOPROLOL TARTRATE 25 MG/1
25 TABLET, FILM COATED ORAL 2 TIMES DAILY
Qty: 180 TABLET | Refills: 3 | Status: SHIPPED | OUTPATIENT
Start: 2025-01-17

## 2025-01-17 RX ORDER — CELECOXIB 200 MG/1
200 CAPSULE ORAL DAILY PRN
Qty: 90 CAPSULE | Refills: 3 | Status: SHIPPED | OUTPATIENT
Start: 2025-01-17 | End: 2026-01-17

## 2025-01-17 RX ORDER — METFORMIN HYDROCHLORIDE EXTENDED-RELEASE TABLETS 1000 MG/1
1000 TABLET, FILM COATED, EXTENDED RELEASE ORAL 2 TIMES DAILY WITH MEALS
Qty: 180 TABLET | Refills: 3 | Status: SHIPPED | OUTPATIENT
Start: 2025-01-17 | End: 2026-01-17

## 2025-01-17 RX ORDER — PRAMIPEXOLE DIHYDROCHLORIDE 0.25 MG/1
0.25 TABLET ORAL NIGHTLY
Qty: 90 TABLET | Refills: 3 | Status: SHIPPED | OUTPATIENT
Start: 2025-01-17 | End: 2026-01-17

## 2025-01-17 RX ORDER — AMITRIPTYLINE HYDROCHLORIDE 25 MG/1
25 TABLET, FILM COATED ORAL
COMMUNITY
Start: 2024-10-25 | End: 2025-01-17

## 2025-01-17 RX ORDER — METFORMIN HYDROCHLORIDE 750 MG/1
750 TABLET, EXTENDED RELEASE ORAL 2 TIMES DAILY WITH MEALS
Qty: 180 TABLET | Refills: 3 | Status: SHIPPED | OUTPATIENT
Start: 2025-01-17 | End: 2025-01-17

## 2025-01-17 NOTE — PROGRESS NOTES
Subjective:   Patient ID: Abimael Armendariz is a 59 y.o. male     Chief Complaint:Establish Care      Here for checkup  Review of Systems   Respiratory:  Negative for shortness of breath.    Cardiovascular:  Negative for chest pain.   Gastrointestinal:  Negative for abdominal pain.   Genitourinary:  Negative for dysuria.   Past Medical History:   Diagnosis Date    ADHD (attention deficit hyperactivity disorder)     Anticoagulant long-term use     Anxiety     CHF (congestive heart failure)     Depression     Diabetes mellitus     Diverticulitis     Hyperlipidemia     Insomnia     Mitral regurgitation     Mitral valve disease 08/2023    PAF (paroxysmal atrial fibrillation)      Past Surgical History:   Procedure Laterality Date    ANGIOGRAM, CORONARY, WITH LEFT HEART CATHETERIZATION  8/30/2023    Procedure: Left Heart Cath;  Surgeon: Hilario Pretty MD;  Location: [facility] CATH;  Service: Cardiology;;    CORONARY ANGIOGRAPHY  8/30/2023    Procedure: Coronary angiogram study;  Surgeon: Hilario Pretty MD;  [facility];  Service: Cardiology;;    SCHNEIDER MAZE PROCEDURE N/A 11/9/2023    Procedure: SCHNEIDER MAZE PROCEDURE;  Surgeon: Goran Bennett MD;  Location: [facility] OR [address] [place] [address];  Service: Cardiovascular;  Laterality: N/A;    EXCLUSION, LEFT ATRIAL APPENDAGE, OPEN, AS PART OF OPEN CHEST SURGERY N/A 11/9/2023    Procedure: EXCLUSION, LEFT ATRIAL APPENDAGE, OPEN, AS PART OF OPEN CHEST SURGERY;  Surgeon: Goran Bennett MD;  [facility];  Service: Cardiovascular;  Laterality: N/A;    KNEE ARTHROSCOPY W/ ACL RECONSTRUCTION AND HAMSTRING GRAFT  2003    right x 2; redo [place] in [place] 1/2022    LUMBAR LAMINECTOMY FOR DECOMPRESSION  12/2022    REPAIR, MITRAL VALVE, OPEN N/A 11/9/2023    Procedure: REPAIR, MITRAL VALVE, OPEN;  Surgeon: Goran Bennett MD;  [facility];  Service: Cardiovascular;  Laterality: N/A;    TRANSESOPHAGEAL ECHOCARDIOGRAM WITH POSSIBLE  CARDIOVERSION (JASON W/ POSS CARDIOVERSION) Bilateral 08/21/2023    Procedure: Transesophageal echo (JASON) intra-procedure log documentation;  Surgeon: Jesu Flores MD;  Location: [facility] CATH/EP LAB;  Service: Cardiology;  Laterality: Bilateral;     Objective:     Vitals:    01/17/25 1257   BP: 126/78   Pulse: 63   Resp: 16   Temp: 97.7 °F (36.5 °C)     Body mass index is 29.92 kg/m².  Physical Exam  Vitals and nursing note reviewed.   Constitutional:       Appearance: He is well-developed.   HENT:      Head: Normocephalic and atraumatic.   Eyes:      General: No scleral icterus.     Conjunctiva/sclera: Conjunctivae normal.   Cardiovascular:      Pulses:           Dorsalis pedis pulses are 2+ on the right side and 2+ on the left side.        Posterior tibial pulses are 2+ on the right side and 2+ on the left side.      Heart sounds: No murmur heard.  Pulmonary:      Effort: Pulmonary effort is normal. No respiratory distress.   Musculoskeletal:         General: No deformity. Normal range of motion.      Cervical back: Normal range of motion and neck supple.      Right foot: Normal range of motion.      Left foot: Normal range of motion.   Feet:      Right foot:      Protective Sensation: 4 sites tested.  4 sites sensed.      Skin integrity: Skin integrity normal.      Left foot:      Protective Sensation: 4 sites tested.  4 sites sensed.      Skin integrity: Skin integrity normal.   Skin:     Coloration: Skin is not pale.      Findings: No rash.   Neurological:      Mental Status: He is alert and oriented to person, place, and time.   Psychiatric:         Behavior: Behavior normal.         Thought Content: Thought content normal.         Judgment: Judgment normal.   Assessment:     1. Dorsalgia    2. Dyslipidemia    3. Type 2 diabetes mellitus without complication, without long-term current use of insulin    4. Primary hypertension    5. Restless leg syndrome    6. Insomnia, unspecified type      Plan:    Dorsalgia  -     celecoxib (CELEBREX) 200 MG capsule; Take 1 capsule (200 mg total) by mouth daily as needed for Pain.  Dispense: 90 capsule; Refill: 3    Dyslipidemia  -     atorvastatin (LIPITOR) 20 MG tablet; Take 1 tablet (20 mg total) by mouth every evening.  Dispense: 90 tablet; Refill: 3    Type 2 diabetes mellitus without complication, without long-term current use of insulin  -     Discontinue: metFORMIN (GLUCOPHAGE-XR) 750 MG ER 24hr tablet; Take 1 tablet (750 mg total) by mouth 2 (two) times daily with meals.  Dispense: 180 tablet; Refill: 3  -     Comprehensive Metabolic Panel; Future; Expected date: 01/17/2025  -     Hemoglobin A1C; Future; Expected date: 01/17/2025  -     Lipid Panel; Future; Expected date: 01/17/2025  -     CBC Auto Differential; Future; Expected date: 01/17/2025  -     Microalbumin/Creatinine Ratio, Urine; Future; Expected date: 01/17/2025  -     metFORMIN (FORTAMET) 1,000 mg 24hr tablet; Take 1 tablet (1,000 mg total) by mouth 2 (two) times daily with meals.  Dispense: 180 tablet; Refill: 3  -     Diabetic Eye Screening Photo; Future    Primary hypertension  -     metoprolol tartrate (LOPRESSOR) 25 MG tablet; Take 1 tablet (25 mg total) by mouth 2 (two) times daily.  Dispense: 180 tablet; Refill: 3    Restless leg syndrome  -     pramipexole (MIRAPEX) 0.25 MG tablet; Take 1 tablet (0.25 mg total) by mouth every evening. TAKE 1 TABLET BY MOUTH EVERY DAY  Dispense: 90 tablet; Refill: 3    Insomnia, unspecified type  -     traZODone (DESYREL) 100 MG tablet; Take 2 tablets (200 mg total) by mouth every evening.  Dispense: 180 tablet; Refill: 3            Total time spent of Greater than 30 minutes minutes on the day of the visit.This includes face to face time and preparing to see the patient, obtaining and reviewing separately obtained history, documenting clinical information in the electronic or other health record, independently interpreting results and communicating results to  the patient/family/caregiver, or care coordinator.    Established patient with me has been instructed that must see me at least 1 time yearly (every 365 days) for refills of medications. Seeing other providers in this clinic is fine but expectation is to see me yearly.    Albaro Curtis MD  01/17/2025    Portions of this note have been dictated with KAREN Kelly

## 2025-01-24 ENCOUNTER — LAB VISIT (OUTPATIENT)
Dept: LAB | Facility: HOSPITAL | Age: 60
End: 2025-01-24
Attending: FAMILY MEDICINE
Payer: MEDICAID

## 2025-01-24 DIAGNOSIS — E11.9 TYPE 2 DIABETES MELLITUS WITHOUT COMPLICATION, WITHOUT LONG-TERM CURRENT USE OF INSULIN: ICD-10-CM

## 2025-01-24 LAB
ALBUMIN SERPL BCP-MCNC: 3.8 G/DL (ref 3.5–5.2)
ALP SERPL-CCNC: 96 U/L (ref 40–150)
ALT SERPL W/O P-5'-P-CCNC: 38 U/L (ref 10–44)
ANION GAP SERPL CALC-SCNC: 10 MMOL/L (ref 8–16)
AST SERPL-CCNC: 25 U/L (ref 10–40)
BASOPHILS # BLD AUTO: 0.05 K/UL (ref 0–0.2)
BASOPHILS NFR BLD: 0.7 % (ref 0–1.9)
BILIRUB SERPL-MCNC: 0.6 MG/DL (ref 0.1–1)
BUN SERPL-MCNC: 12 MG/DL (ref 6–20)
CALCIUM SERPL-MCNC: 9.1 MG/DL (ref 8.7–10.5)
CHLORIDE SERPL-SCNC: 103 MMOL/L (ref 95–110)
CHOLEST SERPL-MCNC: 92 MG/DL (ref 120–199)
CHOLEST/HDLC SERPL: 3.7 {RATIO} (ref 2–5)
CO2 SERPL-SCNC: 27 MMOL/L (ref 23–29)
CREAT SERPL-MCNC: 1 MG/DL (ref 0.5–1.4)
DIFFERENTIAL METHOD BLD: NORMAL
EOSINOPHIL # BLD AUTO: 0.2 K/UL (ref 0–0.5)
EOSINOPHIL NFR BLD: 3.1 % (ref 0–8)
ERYTHROCYTE [DISTWIDTH] IN BLOOD BY AUTOMATED COUNT: 12.2 % (ref 11.5–14.5)
EST. GFR  (NO RACE VARIABLE): >60 ML/MIN/1.73 M^2
ESTIMATED AVG GLUCOSE: 146 MG/DL (ref 68–131)
GLUCOSE SERPL-MCNC: 134 MG/DL (ref 70–110)
HBA1C MFR BLD: 6.7 % (ref 4–5.6)
HCT VFR BLD AUTO: 44.5 % (ref 40–54)
HDLC SERPL-MCNC: 25 MG/DL (ref 40–75)
HDLC SERPL: 27.2 % (ref 20–50)
HGB BLD-MCNC: 14.6 G/DL (ref 14–18)
IMM GRANULOCYTES # BLD AUTO: 0.02 K/UL (ref 0–0.04)
IMM GRANULOCYTES NFR BLD AUTO: 0.3 % (ref 0–0.5)
LDLC SERPL CALC-MCNC: 36.4 MG/DL (ref 63–159)
LYMPHOCYTES # BLD AUTO: 2.2 K/UL (ref 1–4.8)
LYMPHOCYTES NFR BLD: 29.5 % (ref 18–48)
MCH RBC QN AUTO: 30.5 PG (ref 27–31)
MCHC RBC AUTO-ENTMCNC: 32.8 G/DL (ref 32–36)
MCV RBC AUTO: 93 FL (ref 82–98)
MONOCYTES # BLD AUTO: 0.6 K/UL (ref 0.3–1)
MONOCYTES NFR BLD: 8.6 % (ref 4–15)
NEUTROPHILS # BLD AUTO: 4.2 K/UL (ref 1.8–7.7)
NEUTROPHILS NFR BLD: 57.8 % (ref 38–73)
NONHDLC SERPL-MCNC: 67 MG/DL
NRBC BLD-RTO: 0 /100 WBC
PLATELET # BLD AUTO: 179 K/UL (ref 150–450)
PMV BLD AUTO: 10.8 FL (ref 9.2–12.9)
POTASSIUM SERPL-SCNC: 4.6 MMOL/L (ref 3.5–5.1)
PROT SERPL-MCNC: 6.5 G/DL (ref 6–8.4)
RBC # BLD AUTO: 4.78 M/UL (ref 4.6–6.2)
SODIUM SERPL-SCNC: 140 MMOL/L (ref 136–145)
TRIGL SERPL-MCNC: 153 MG/DL (ref 30–150)
WBC # BLD AUTO: 7.33 K/UL (ref 3.9–12.7)

## 2025-01-24 PROCEDURE — 85025 COMPLETE CBC W/AUTO DIFF WBC: CPT | Performed by: FAMILY MEDICINE

## 2025-01-24 PROCEDURE — 83036 HEMOGLOBIN GLYCOSYLATED A1C: CPT | Performed by: FAMILY MEDICINE

## 2025-01-24 PROCEDURE — 80061 LIPID PANEL: CPT | Performed by: FAMILY MEDICINE

## 2025-01-24 PROCEDURE — 80053 COMPREHEN METABOLIC PANEL: CPT | Performed by: FAMILY MEDICINE

## 2025-01-28 ENCOUNTER — PATIENT MESSAGE (OUTPATIENT)
Dept: FAMILY MEDICINE | Facility: CLINIC | Age: 60
End: 2025-01-28
Payer: MEDICAID

## 2025-01-29 ENCOUNTER — TELEPHONE (OUTPATIENT)
Dept: FAMILY MEDICINE | Facility: CLINIC | Age: 60
End: 2025-01-29
Payer: MEDICAID

## 2025-03-06 ENCOUNTER — E-VISIT (OUTPATIENT)
Dept: FAMILY MEDICINE | Facility: CLINIC | Age: 60
End: 2025-03-06
Payer: MEDICAID

## 2025-03-06 DIAGNOSIS — F32.A DEPRESSION, UNSPECIFIED DEPRESSION TYPE: Primary | ICD-10-CM

## 2025-03-07 RX ORDER — VORTIOXETINE 10 MG/1
1 TABLET, FILM COATED ORAL DAILY
Qty: 30 TABLET | Refills: 2 | Status: SHIPPED | OUTPATIENT
Start: 2025-03-07 | End: 2025-06-05

## 2025-03-07 NOTE — PROGRESS NOTES
1/18/2023         RE: Darlene Hudson  [address]  [place] [address]        Dear Colleague,    Thank you for referring your patient, Darlene Hudson, to the [facility] [address]. Please see a copy of my visit note below.    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  Her pathology came back as a T2 N0 M0 cancer which is a stage I. Her father has a history of colon cancer at 54.    In regards to her recovery, the patient has been doing really quite well since her discharge from the hospital.  She is moving around well.  Her pain medication requirement is far less than it was in the hospital.  She is having bowel function daily.  She is still struggling with some nausea and very low appetite.  She is keeping hydrated.  She is having a little bit of drainage from the upper portion of her wound.    On exam she looks is comfortable as I have seen her since surgery.  She is moving about the room quite easily.  Her abdomen is soft and nondistended.  It is minimally tender.  Her midline incision on the upper portion is slightly open with a little bit of fibrinous drainage.  There is no evidence of erythema or infection.    48-year-old woman who is recovering well after her cancer resection.  I have no concerns at this time.  Her recovery is following the expected pattern.  We took some time to review her pathology report in more detail.  We talked about the staging of her cancer and that she should not require any chemotherapy.  I would like her to see oncology to discuss needs for genetic testing and further input on surveillance.  She would definitely need a repeat colonoscopy in 1 year which I will perform.    She is following with her PCP Dr. Bailon for pain management at this point.  I recommended that she try to focus on  Patient ID: Abimael Armendariz is a 60 y.o. male.    Chief Complaint: General Illness (Entered automatically based on patient selection in [employer].)    The patient initiated a request through [employer] on 3/6/2025 for evaluation and management with a chief complaint of General Illness (Entered automatically based on patient selection in [employer].)     I evaluated the questionnaire submission on 3/7/25.    [facility] Evisit [facility]-Medication    3/6/2025  7:56 PM CST - Filed by Patient   What do you need help with? Medication Request   Do you agree to participate in an E-Visit? Yes   If you have any of the following symptoms, please present to your local emergency room or call 911:  I acknowledge   Medication requests for narcotics will not be addressed via an E-Visit.  Please schedule an appointment. I acknowledge   Do you want to address a new or existing medication? I would like to address a medication I currently take   What is the main issue you would like addressed today? A non [facility] provider prescribed me the trintellix i am currently taking. They can no longer renew this due to my litigation coming to an end. Can Doctor Kirsten prescibe it or a differnt one? I used to take Bupropion 300 before my incident in 2021   Would you like to change or continue your medication? Continue medication   What medication would you like to continue?  Trintellix   Are you taking it as prescribed? Yes    What medical condition is the  medication intended to treat? ADHD/PTSD   Is the medication helping your condition? Yes   Are you having any side effects from the medication? No   Provide any additional information you feel is important. I also was prescribed Deplin to supplement the trintellix. I dont know if Medicaid will cover this   Please attach any relevant images or files    Are you able to take your vital signs? No         Encounter Diagnosis   Name Primary?    Depression, unspecified depression type Yes       [employer]  No orders of the defined types were placed in this encounter.           No follow-ups on file.      E-Visit Time Tracking: 15 mins                           protein intake.  I recommended using protein shakes while she is waiting for her appetite to improve.     I will see her back in clinic in 6 weeks to check her wounds and monitor her progress.  We are always available if the need arises before that.    Kris Charles MD  General Surgeon  [facility]  Surgery [address]  [place] [address]?  Office: [phone]        Again, thank you for allowing me to participate in the care of your patient.        Sincerely,        Kris Charles MD

## 2025-03-18 ENCOUNTER — TELEPHONE (OUTPATIENT)
Dept: FAMILY MEDICINE | Facility: CLINIC | Age: 60
End: 2025-03-18
Payer: MEDICAID

## 2025-05-13 ENCOUNTER — TELEPHONE (OUTPATIENT)
Dept: FAMILY MEDICINE | Facility: CLINIC | Age: 60
End: 2025-05-13
Payer: MEDICAID

## 2025-05-15 ENCOUNTER — PATIENT MESSAGE (OUTPATIENT)
Dept: ADMINISTRATIVE | Facility: HOSPITAL | Age: 60
End: 2025-05-15
Payer: MEDICAID

## 2025-06-13 ENCOUNTER — PATIENT OUTREACH (OUTPATIENT)
Dept: ADMINISTRATIVE | Facility: HOSPITAL | Age: 60
End: 2025-06-13
Payer: MEDICAID

## 2025-06-13 ENCOUNTER — PATIENT MESSAGE (OUTPATIENT)
Dept: ADMINISTRATIVE | Facility: HOSPITAL | Age: 60
End: 2025-06-13
Payer: MEDICAID

## 2025-06-13 NOTE — PROGRESS NOTES
Population Health Chart Review & Patient Outreach Details    Outreach Performed: YES Portal Active and/or Letter inactive  Additional [facility] Health Notes:      Routine Dilated Eye Exam  (Diabetic Retinopathy screening)     Non-compliant report chart audits for Eye Exam for Patients with Diabetes     Outreach to patient in reference to a routine Eye Exam                Updates Requested / Reviewed:             Health Maintenance Topics Overdue:      VB Score: 0     Patient is not due for any topics at this time.    Shingles/Zoster Vaccine  RSV Vaccine                  Health Maintenance Topic(s) Outreach Outcomes & Actions Taken:  Eye Exam - Outreach Outcomes & Actions Taken  :

## 2025-07-17 ENCOUNTER — PATIENT OUTREACH (OUTPATIENT)
Dept: ADMINISTRATIVE | Facility: HOSPITAL | Age: 60
End: 2025-07-17
Payer: MEDICAID

## 2025-07-17 ENCOUNTER — OFFICE VISIT (OUTPATIENT)
Dept: FAMILY MEDICINE | Facility: CLINIC | Age: 60
End: 2025-07-17
Payer: MEDICAID

## 2025-07-17 ENCOUNTER — LAB VISIT (OUTPATIENT)
Dept: LAB | Facility: HOSPITAL | Age: 60
End: 2025-07-17
Payer: MEDICAID

## 2025-07-17 VITALS
BODY MASS INDEX: 30.13 KG/M2 | DIASTOLIC BLOOD PRESSURE: 80 MMHG | HEIGHT: 71 IN | RESPIRATION RATE: 18 BRPM | HEART RATE: 81 BPM | SYSTOLIC BLOOD PRESSURE: 134 MMHG | WEIGHT: 215.19 LBS

## 2025-07-17 DIAGNOSIS — I10 PRIMARY HYPERTENSION: ICD-10-CM

## 2025-07-17 DIAGNOSIS — J30.9 ALLERGIC RHINITIS, UNSPECIFIED SEASONALITY, UNSPECIFIED TRIGGER: ICD-10-CM

## 2025-07-17 DIAGNOSIS — B35.1 ONYCHOMYCOSIS: ICD-10-CM

## 2025-07-17 DIAGNOSIS — Z12.5 PROSTATE CANCER SCREENING: ICD-10-CM

## 2025-07-17 DIAGNOSIS — E78.5 DYSLIPIDEMIA: ICD-10-CM

## 2025-07-17 DIAGNOSIS — Z86.79 S/P MAZE OPERATION FOR ATRIAL FIBRILLATION: ICD-10-CM

## 2025-07-17 DIAGNOSIS — E11.9 TYPE 2 DIABETES MELLITUS WITHOUT COMPLICATION, WITHOUT LONG-TERM CURRENT USE OF INSULIN: ICD-10-CM

## 2025-07-17 DIAGNOSIS — I10 PRIMARY HYPERTENSION: Primary | ICD-10-CM

## 2025-07-17 DIAGNOSIS — Z98.890 S/P MAZE OPERATION FOR ATRIAL FIBRILLATION: ICD-10-CM

## 2025-07-17 LAB
ALBUMIN SERPL BCP-MCNC: 4.3 G/DL (ref 3.5–5.2)
ALP SERPL-CCNC: 88 UNIT/L (ref 40–150)
ALT SERPL W/O P-5'-P-CCNC: 44 UNIT/L (ref 10–44)
ANION GAP (OHS): 8 MMOL/L (ref 8–16)
AST SERPL-CCNC: 33 UNIT/L (ref 11–45)
BILIRUB SERPL-MCNC: 1.1 MG/DL (ref 0.1–1)
BUN SERPL-MCNC: 21 MG/DL (ref 8–23)
CALCIUM SERPL-MCNC: 8.8 MG/DL (ref 8.7–10.5)
CHLORIDE SERPL-SCNC: 105 MMOL/L (ref 95–110)
CHOLEST SERPL-MCNC: 152 MG/DL (ref 120–199)
CHOLEST/HDLC SERPL: 7.2 {RATIO} (ref 2–5)
CO2 SERPL-SCNC: 26 MMOL/L (ref 23–29)
CREAT SERPL-MCNC: 0.9 MG/DL (ref 0.5–1.4)
EAG (OHS): 148 MG/DL (ref 68–131)
GFR SERPLBLD CREATININE-BSD FMLA CKD-EPI: >60 ML/MIN/1.73/M2
GLUCOSE SERPL-MCNC: 135 MG/DL (ref 70–110)
HBA1C MFR BLD: 6.8 % (ref 4–5.6)
HDLC SERPL-MCNC: 21 MG/DL (ref 40–75)
HDLC SERPL: 13.8 % (ref 20–50)
LDLC SERPL CALC-MCNC: ABNORMAL MG/DL
NONHDLC SERPL-MCNC: 131 MG/DL
POTASSIUM SERPL-SCNC: 4.2 MMOL/L (ref 3.5–5.1)
PROT SERPL-MCNC: 6.9 GM/DL (ref 6–8.4)
PSA SERPL-MCNC: 0.55 NG/ML
SODIUM SERPL-SCNC: 139 MMOL/L (ref 136–145)
TRIGL SERPL-MCNC: 448 MG/DL (ref 30–150)

## 2025-07-17 PROCEDURE — 3044F HG A1C LEVEL LT 7.0%: CPT | Mod: CPTII,,,

## 2025-07-17 PROCEDURE — 99214 OFFICE O/P EST MOD 30 MIN: CPT | Mod: PBBFAC,PO

## 2025-07-17 PROCEDURE — 83036 HEMOGLOBIN GLYCOSYLATED A1C: CPT

## 2025-07-17 PROCEDURE — 99999 PR PBB SHADOW E&M-EST. PATIENT-LVL IV: CPT | Mod: PBBFAC,,,

## 2025-07-17 PROCEDURE — 80053 COMPREHEN METABOLIC PANEL: CPT

## 2025-07-17 PROCEDURE — 3061F NEG MICROALBUMINURIA REV: CPT | Mod: CPTII,,,

## 2025-07-17 PROCEDURE — 36415 COLL VENOUS BLD VENIPUNCTURE: CPT | Mod: PO

## 2025-07-17 PROCEDURE — 99214 OFFICE O/P EST MOD 30 MIN: CPT | Mod: S$PBB,,,

## 2025-07-17 PROCEDURE — 1159F MED LIST DOCD IN RCRD: CPT | Mod: CPTII,,,

## 2025-07-17 PROCEDURE — 3075F SYST BP GE 130 - 139MM HG: CPT | Mod: CPTII,,,

## 2025-07-17 PROCEDURE — 1160F RVW MEDS BY RX/DR IN RCRD: CPT | Mod: CPTII,,,

## 2025-07-17 PROCEDURE — 3079F DIAST BP 80-89 MM HG: CPT | Mod: CPTII,,,

## 2025-07-17 PROCEDURE — 84153 ASSAY OF PSA TOTAL: CPT

## 2025-07-17 PROCEDURE — 3066F NEPHROPATHY DOC TX: CPT | Mod: CPTII,,,

## 2025-07-17 PROCEDURE — 3008F BODY MASS INDEX DOCD: CPT | Mod: CPTII,,,

## 2025-07-17 PROCEDURE — 80061 LIPID PANEL: CPT

## 2025-07-17 RX ORDER — TERBINAFINE HYDROCHLORIDE 250 MG/1
250 TABLET ORAL DAILY
Qty: 90 TABLET | Refills: 0 | Status: SHIPPED | OUTPATIENT
Start: 2025-07-17

## 2025-07-17 NOTE — LETTER
AUTHORIZATION FOR RELEASE OF   CONFIDENTIAL INFORMATION    Dear [facility],    We are seeing Abimael Armendariz, date of birth 1965, in the clinic at [facility]. Albaro Curtis MD is the patient's PCP. Abimael Armendariz has an outstanding lab/procedure at the time we reviewed his chart. In order to help keep his health information updated, he has authorized us to request the following medical record(s):       Diabetic EYE EXAM      2024 - 2025 (Most Recent Please)     Please include the actual eye exam report along with the significant findings:    ________ No Diabetic Retinopathy  ________ Minimal Background Diabetic Retinopathy  ________ Moderate to Severe Background Diabetic Retinopathy  ________ Clinically Significant Macular Edema  ________ Proliferative Diabetic Retinopathy       Please fax records to [facility], Oschwald, Joseph C., MD,  at [phone] or email to [email or web address].      If you have any questions, please contact [facility] at [phone].      Patient Name: Abimael Armendariz  : 1965  Patient Phone #: [phone]                Abimael Armendariz  MRN: [id number]  : 1965  Age: 59 y.o.  Sex: male         Patient/Legal Guardian Signature  This signature was collected at 2024           _______________________________   Printed Name/Relationship to Patient      Consent for Examination and Treatment: I hereby authorize the providers and employees of [facility] ([facility]) to provide medical treatment/services which includes, but is not limited to, performing and administering tests and diagnostic procedures that are deemed necessary, including, but not limited to, imaging examinations, blood tests and other laboratory procedures as may be required by the hospital, clinic, or may be ordered by my physician(s) or persons working under the general and/or special instructions of my physician(s).      I understand and agree that this  consent covers all authorized persons, including but not limited to physicians, residents, nurse practitioners, physicians' assistants, specialists, consultants, student nurses, and independently contracted physicians, who are called upon by the physician in charge, to carry out the diagnostic procedures and medical or surgical treatment.     I hereby authorize [facility] to retain or dispose of any specimens or tissue, should there be such remaining from any test or procedure.     I hereby authorize and give consent for [facility] providers and employees to take photographs, images or videotapes of such diagnostic, surgical or treatment procedures of Patient as may be required by [facility] or as may be ordered by a physician. I further acknowledge and agree that [facility] may use cameras or other devices for patient monitoring.     I am aware that the practice of medicine is not an exact science, and I acknowledge that no guarantees have been made to me as to the outcome of any tests, procedures or treatment.     Authorization for Release of Information: I understand that my insurance company and/or their agents may need information necessary to make determinations about payment/reimbursement. I hereby provide authorization to release to all insurance companies, their successors, assignees, other parties with whom they may have contracted, or others acting on their behalf, that are involved with payment for any hospital and/or clinic charges incurred by the patient, any information that they request and deem necessary for payment/reimbursement, and/or quality review.  I further authorize the release of my health information to physicians or other health care practitioners on staff who are involved in my health care now and in the future, and to other health care providers, entities, or institutions for the purpose of my continued care and treatment, including referrals.     REGISTRATION AUTHORIZATION  Form No. 21080 (Rev.  3/25/2024)    Page 1 of 3                       Medicare Patient's Certification and Authorization to Release Information and Payment Request:  I certify that the information given by me in applying for payment under Title XVIII of the Social Security Act is correct. I authorize any salgado of medical or other information about me to release to the Social SecurityAdministration, or its intermediaries or carriers, any information needed for this or a related Medicare claim. I request that payment of authorized benefits be made on my behalf.     Assignment of Insurance Benefits:   I hereby authorize any and all insurance companies, health plans, defined   benefit plans, health insurers or any entity that is or may be responsible for payment of my medical expenses to pay all hospital and medical benefits now due, and to become due and payable to me under any hospital benefits, sick benefits, injury benefits or any other benefit for services rendered to me, including Major Medical Benefits, direct to [facility] and all independently contracted physicians. I assign any and all rights that I may have against any and all insurance companies, health plans, defined benefit plans, health insurers or any entity that is or may be responsible for payment of my medical expenses, including, but not limited to any right to appeal a denial of a claim, any right to bring any action, lawsuit, administrative proceeding, or other cause of action on my behalf. I specifically assign my right to pursue litigation against any and all insurance companies, health plans, defined benefit plans, health insurers or any entity that is or may be responsible for payment of my medical expenses based upon a refusal to pay charges.            E. Valuables: It is understood and agreed that [facility] is not liable for the damage to or loss of any money, jewelry,   documents, dentures, eye glasses, hearing aids, prosthetics, or other property of value.     F.  Computer Equipment: I understand and agree that should I choose to use computer equipment owned by [facility] or if I choose to access the Internet via [facility]s network, I do so at my own risk. [facility] is not responsible for any damage to my computer equipment or to any damages of any type that might arise from my loss of equipment or data.     G. Acceptance of Financial Responsibility:  I agree that in consideration of the services and   supplies that have been   or will be furnished to the patient, I am hereby obligated to pay all charges made for or on the account of the patient according to the standard rates (in effect at the time the services and supplies are delivered) established by [facility], including its Patient Financial Assistance Policy to the extent it is applicable. I understand that I am responsible for all charges, or portions thereof, not covered by insurance or other sources. Patient refunds will be distributed only after balances at all [facility] facilities are paid.     H. Communication Authorization:  I hereby authorize [facility] and its representatives, along with any billing service   or  who may work on their behalf, to contact me on   my cell phone and/or home phone using pre- recorded messages, artificial voice messages, automatic telephone dialing devices or other computer assisted technology, or by electronic      mail, text messaging, or by any other form of electronic communication. This includes, but is not limited to, appointment reminders, yearly physical exam reminders, preventive care reminders, patient campaigns, welcome calls, and calls about account balances on my account or any account on which I am listed as a guarantor. I understand I have the right to opt out of these communications at any time.      Relationship  Between  Facility and  Provider:      I understand that some, but not all, providers furnishing services to the patient are not employees or agents of  [facility]. The patient is under the care and supervision of his/her attending physician, and it is the responsibility of the facility and its nursing staff to carry out the instructions of such physicians. It is the responsibility of the patient's physician/designee to obtain the patient's informed consent, when required, for medical or surgical treatment, special diagnostic or therapeutic procedures, or hospital services rendered for the patient under the special instructions of the physician/designee.           REGISTRATION AUTHORIZATION  Form No. 51655 (Rev. 3/25/2024)    Page 2 of 3                       Immunizations: [facility] shares immunization information with state sponsored health departments to help you and your doctor keep track of your immunization records. By signing, you consent to have this information shared with the health department in your state:                                [place] - LINKS ([place] Immunization Network for Kids Statewide)                                [place] - MIIX ([place] Immunization Information eXchange)                                [place] - ImmPRINT (Immunization Patient Registry with Integrated Technology)     TERM: This authorization is valid for this and subsequent care/treatment I receive at [facility] and will remain valid unless/until revoked in writing by me.     [facility]: As used in this document, [facility] means all [facility] owned and managed facilities, including, but not limited to, all health centers, surgery centers, clinics, urgent care centers, and hospitals.         [facility] complies with applicable Federal civil rights laws and does not discriminate on the basis of race, color, national origin, age, disability, or sex.  ATENCIÓN: si habla timañol, tiene a singh disposición servicios gratuitos de asistencia lingüística. Aiden al [phone].  CHÚ Ý: N?u b?n nói Ti?ng Vi?t, có các d?ch v? h? tr? ngôn ng? mi?n phí  dành cho b?n. G?i s? [phone].        REGISTRATION AUTHORIZATION  Form No. 96862 (Rev. 3/25/2024)   Page 3 of 3

## 2025-07-17 NOTE — PROGRESS NOTES
Subjective:       Patient ID: Abimael Badillo is a 60 y.o. male.    Chief Complaint: Follow-up (Refills needed )      History of Present Illness    CHIEF COMPLAINT:  Mr. Badillo presents today for 6-month follow up with new complaint of right ear popping    ENT SYMPTOMS:  He reports right ear popping that started yesterday. He experiences daily sinus problems with nasal congestion without runny nose. He denies fever, chills, or other upper respiratory symptoms. He uses Flonase nasal spray daily and takes Zyrtec, occasionally alternating with Claritin every six months.    DIABETES:  He actively manages blood sugar through dietary modifications, specifically eliminating sweets. His blood sugar consistently range between 120 to 131.    CARDIOVASCULAR HISTORY:  He has history of open heart surgery for mitral valve repair and cardiac ablation for atrial fibrillation.     CURRENT MEDICATIONS:  He recently discontinued Lipitor (1 week ago) and previously discontinued Celebrex. He continues Zyrtec (alternating with Claritin every 6 months), and Flonase nasal spray daily.    OPHTHALMOLOGIC:  Recent eye exam was normal with no reported retinopathy.             Past Medical History:   Diagnosis Date    ADHD (attention deficit hyperactivity disorder)     Anticoagulant long-term use     Anxiety     CHF (congestive heart failure)     Depression     Diabetes mellitus     Diverticulitis     Hyperlipidemia     Insomnia     Mitral regurgitation     Mitral valve disease 08/2023    PAF (paroxysmal atrial fibrillation)        Review of patient's allergies indicates:   Allergen Reactions    Latex, natural rubber Rash       Current Medications[1]    Review of Systems   Constitutional:  Negative for chills and fever.   HENT:  Positive for congestion and ear pain (right ear, popping).    Respiratory:  Negative for cough and shortness of breath.    Cardiovascular:  Negative for chest pain.        Objective:        Physical Exam  Vitals  "reviewed.   Constitutional:       General: He is not in acute distress.     Appearance: Normal appearance.   HENT:      Head: Normocephalic and atraumatic.      Right Ear: Tympanic membrane is injected.      Left Ear: Tympanic membrane is injected.   Eyes:      Conjunctiva/sclera: Conjunctivae normal.   Cardiovascular:      Rate and Rhythm: Normal rate and regular rhythm.      Heart sounds: Normal heart sounds.   Pulmonary:      Effort: Pulmonary effort is normal.      Breath sounds: Normal breath sounds.   Musculoskeletal:         General: Normal range of motion.      Cervical back: Normal range of motion.   Feet:      Right foot:      Toenail Condition: Fungal disease present.     Left foot:      Toenail Condition: Fungal disease present.  Skin:     General: Skin is warm and dry.   Neurological:      Mental Status: He is alert and oriented to person, place, and time.   Psychiatric:         Behavior: Behavior normal.           Visit Vitals  /80 (BP Location: Right arm, Patient Position: Sitting)   Pulse 81   Resp 18   Ht 5' 11" (1.803 m)   Wt 97.6 kg (215 lb 2.7 oz)   PF 96 L/min   BMI 30.01 kg/m²      Assessment:         1. Primary hypertension    2. Type 2 diabetes mellitus without complication, without long-term current use of insulin    3. Dyslipidemia    4. S/P Maze operation for atrial fibrillation    5. Prostate cancer screening    6. Onychomycosis    7. Allergic rhinitis, unspecified seasonality, unspecified trigger        Plan:         Assessment & Plan    Assessed ear complaint - no signs of infection, noted inner ear pressure likely due to allergies.  Evaluated cardiovascular status post-mitral valve surgery and AFib ablation - no current issues reported.  Discussed/ recommend restarting statin therapy pending cholesterol lab results, given diabetic status and increased cardiovascular risk.  Reviewed medication list, noting discontinuation of Lipitor and Celebrex.  Diabetes well-managed with " "current glucose levels.          Abimael Boyd" was seen today for follow-up.    Diagnoses and all orders for this visit:    Primary hypertension  -     Comprehensive Metabolic Panel; Future  -     Well controlled on Metoprolol. Continue to monitor.    Type 2 diabetes mellitus without complication, without long-term current use of insulin  -     HEMOGLOBIN A1C; Future  -     Well controlled on Metformin.     Dyslipidemia  -     Lipid Panel; Future    S/P Maze operation for atrial fibrillation         -    Stable, following with cardiology.    Prostate cancer screening  -     PSA, Screening; Future    Onychomycosis  -     terbinafine HCL (LAMISIL) 250 mg tablet; Take 1 tablet (250 mg total) by mouth once daily.  -     Comprehensive Metabolic Panel; Future  -     Comprehensive Metabolic Panel; Future    Allergic rhinitis, unspecified seasonality, unspecified trigger         -      Continue antihistamine. Consider switching to Xyzal. Continue Flonase.    Follow up in 6 months with PCP.        Family Medicine Physician Assistant     Future Appointments       Date Provider Specialty Appt Notes    7/17/2025 Nicole Brooke PA-C Family Medicine follow up    7/17/2025  Lab .    8/28/2025  Lab .    10/9/2025  Lab .    1/28/2026 Albaro Curtis MD Family Medicine 12 months             Tests to Keep You Healthy    Eye Exam: SCHEDULED  Colon Cancer Screening: Met on 4/10/2025  Last Blood Pressure <= 139/89 (7/17/2025): Yes  Last HbA1c < 8 (01/24/2025): Yes       I spent a total of 20 minutes on the day of the visit.This includes face to face time and non-face to face time preparing to see the patient (eg, review of tests), obtaining and/or reviewing separately obtained history, documenting clinical information in the electronic or other health record, independently interpreting results and communicating results to the patient/family/caregiver, or care coordinator.    This note was generated with the assistance of riley" listening technology. Verbal consent was obtained by the patient and accompanying visitor(s) for the recording of patient appointment to facilitate this note. I attest to having reviewed and edited the generated note for accuracy, though some syntax or spelling errors may persist. Please contact the author of this note for any clarification.         [1]   Current Outpatient Medications:     aspirin (ECOTRIN) 325 MG EC tablet, Take 1 tablet (325 mg total) by mouth once daily., Disp: 30 tablet, Rfl: 11    blood sugar diagnostic Strp, To check BG qd times daily, to use with insurance preferred meter, Disp: 30 strip, Rfl: 11    blood-glucose meter kit, To check BG qd times daily, to use with insurance preferred meter, Disp: 1 each, Rfl: 0    fluticasone propionate (FLONASE) 50 mcg/actuation nasal spray, 1 spray by Each Nostril route daily as needed., Disp: , Rfl:     lancets Misc, To check BG qd times daily, to use with insurance preferred meter, Disp: 100 each, Rfl: 2    metFORMIN (FORTAMET) 1,000 mg 24hr tablet, Take 1 tablet (1,000 mg total) by mouth 2 (two) times daily with meals., Disp: 180 tablet, Rfl: 3    metoprolol tartrate (LOPRESSOR) 25 MG tablet, Take 1 tablet (25 mg total) by mouth 2 (two) times daily., Disp: 180 tablet, Rfl: 3    multivitamin (THERAGRAN) per tablet, Take 1 tablet by mouth every evening., Disp: , Rfl:     pramipexole (MIRAPEX) 0.25 MG tablet, Take 1 tablet (0.25 mg total) by mouth every evening. TAKE 1 TABLET BY MOUTH EVERY DAY, Disp: 90 tablet, Rfl: 3    traZODone (DESYREL) 100 MG tablet, Take 2 tablets (200 mg total) by mouth every evening., Disp: 180 tablet, Rfl: 3    terbinafine HCL (LAMISIL) 250 mg tablet, Take 1 tablet (250 mg total) by mouth once daily., Disp: 90 tablet, Rfl: 0

## 2025-07-17 NOTE — PATIENT INSTRUCTIONS
Xyzal - OTC for allergies     Hi Varghese,     If you are due for any health screening(s) below please notify me so we can arrange them to be ordered and scheduled. Most healthy patients at your age complete them, but you are free to accept or refuse.     If you can't do it, I'll definitely understand. If you can, I'd certainly appreciate it!    Tests to Keep You Healthy    Eye Exam: SCHEDULED  Colon Cancer Screening: Met on 4/10/2025  Last Blood Pressure <= 139/89 (7/17/2025): Yes  Last HbA1c < 8 (01/24/2025): Yes      Your diabetic retinal eye exam is due     Diabetes is the #1 cause of blindness in the [place] - early detection before signs or symptoms develop can prevent debilitating blindness.     Our records indicate that you may be overdue for your annual diabetic eye exam. Eye screening can help identify patients at risk for developing vision loss which is common in diabetes. This simple screening is an important step to keeping you healthy and preventing complications from diabetes.     This recommended diabetic eye exam should take place once per year and can prevent and treat diabetes complications in the eye before developing symptoms. This can be done with a special camera is used to take photographs of the back of your eye without having to dilate them, or you can see an eye doctor for a full dilated exam.     If you recently had your yearly diabetic eye exam performed outside of [facility], please let your Health care team know so that they can update your health record.

## 2025-07-17 NOTE — PROGRESS NOTES
Population Health Chart Review & Patient Outreach Details    Updates Requested / Reviewed:      Updated Care Coordination Note, Care Everywhere, Care Team Updated, and Immunizations Reconciliation Completed or Queried: [facility] Topics Overdue:      [facility] Score: 0     Patient is not due for any topics at this time.    Shingles/Zoster Vaccine  RSV Vaccine              Health Maintenance Topic(s) Outreach Outcomes & Actions Taken:    Eye Exam - Outreach Outcomes & Actions Taken  : External Records Requested & Care Team Updated if Applicable  [facility]

## 2025-08-08 ENCOUNTER — PATIENT MESSAGE (OUTPATIENT)
Dept: FAMILY MEDICINE | Facility: CLINIC | Age: 60
End: 2025-08-08
Payer: MEDICAID

## 2025-08-11 ENCOUNTER — E-VISIT (OUTPATIENT)
Dept: FAMILY MEDICINE | Facility: CLINIC | Age: 60
End: 2025-08-11

## 2025-08-11 DIAGNOSIS — F32.5 MAJOR DEPRESSIVE DISORDER WITH SINGLE EPISODE, IN FULL REMISSION: Primary | ICD-10-CM

## 2025-08-11 RX ORDER — BUPROPION HYDROCHLORIDE 150 MG/1
150 TABLET ORAL DAILY
Qty: 30 TABLET | Refills: 11 | Status: SHIPPED | OUTPATIENT
Start: 2025-08-11 | End: 2026-08-11

## 2025-08-12 DIAGNOSIS — G25.81 RESTLESS LEG SYNDROME: ICD-10-CM

## 2025-08-12 RX ORDER — PRAMIPEXOLE DIHYDROCHLORIDE 0.25 MG/1
0.25 TABLET ORAL
Qty: 90 TABLET | Refills: 1 | Status: SHIPPED | OUTPATIENT
Start: 2025-08-12

## 2025-08-28 ENCOUNTER — LAB VISIT (OUTPATIENT)
Dept: LAB | Facility: HOSPITAL | Age: 60
End: 2025-08-28

## 2025-08-28 DIAGNOSIS — B35.1 ONYCHOMYCOSIS: ICD-10-CM

## 2025-08-28 LAB
ALBUMIN SERPL BCP-MCNC: 4.2 G/DL (ref 3.5–5.2)
ALP SERPL-CCNC: 84 UNIT/L (ref 40–150)
ALT SERPL W/O P-5'-P-CCNC: 26 UNIT/L (ref 0–55)
ANION GAP (OHS): 8 MMOL/L (ref 8–16)
AST SERPL-CCNC: 31 UNIT/L (ref 0–50)
BILIRUB SERPL-MCNC: 0.4 MG/DL (ref 0.1–1)
BUN SERPL-MCNC: 19 MG/DL (ref 8–23)
CALCIUM SERPL-MCNC: 9.2 MG/DL (ref 8.7–10.5)
CHLORIDE SERPL-SCNC: 104 MMOL/L (ref 95–110)
CO2 SERPL-SCNC: 26 MMOL/L (ref 23–29)
CREAT SERPL-MCNC: 1.2 MG/DL (ref 0.5–1.4)
GFR SERPLBLD CREATININE-BSD FMLA CKD-EPI: >60 ML/MIN/1.73/M2
GLUCOSE SERPL-MCNC: 125 MG/DL (ref 70–110)
POTASSIUM SERPL-SCNC: 4.7 MMOL/L (ref 3.5–5.1)
PROT SERPL-MCNC: 6.7 GM/DL (ref 6–8.4)
SODIUM SERPL-SCNC: 138 MMOL/L (ref 136–145)

## 2025-08-28 PROCEDURE — 36415 COLL VENOUS BLD VENIPUNCTURE: CPT | Mod: PO

## 2025-08-28 PROCEDURE — 82040 ASSAY OF SERUM ALBUMIN: CPT

## 2025-09-02 ENCOUNTER — PATIENT OUTREACH (OUTPATIENT)
Dept: ADMINISTRATIVE | Facility: HOSPITAL | Age: 60
End: 2025-09-02

## (undated) DEVICE — GLOVE BIOGEL PI MICRO SZ 7.5

## (undated) DEVICE — GAUZE SPONGE 4X4 12PLY

## (undated) DEVICE — CANNULA RETROGRADE CARDIOPLEG

## (undated) DEVICE — KIT URINARY CATH URINE METER

## (undated) DEVICE — NDL 22GA X1 1/2 REG BEVEL

## (undated) DEVICE — STAPLER ECHELON FLEX 45MM 34CM

## (undated) DEVICE — RELOAD GREEN FOR ECHELON

## (undated) DEVICE — Device

## (undated) DEVICE — BOWL STERILE LARGE 32OZ

## (undated) DEVICE — TRAY HEART OMC

## (undated) DEVICE — DECANTER FLUID TRNSF WHITE 9IN

## (undated) DEVICE — DRAPE CVMAX SPLIT ANES SCRN

## (undated) DEVICE — SOL NACL 0.9% INJ 500ML BG

## (undated) DEVICE — INSERTS STEALTH FIBRA SIZE 5

## (undated) DEVICE — DRESSING ADH ISLAND 3.6 X 14

## (undated) DEVICE — SUT PROLENE 4-0 SH BLU 36IN

## (undated) DEVICE — SOL 9P NACL IRR PIC IL

## (undated) DEVICE — CANNULA 29/29FR VACUUM ASSIST

## (undated) DEVICE — ELECTRODE PAD DEFIB STERILE

## (undated) DEVICE — ELECTRODE REM PLYHSV RETURN 9

## (undated) DEVICE — SOL NS 1000CC

## (undated) DEVICE — COVER SET UP STRL 54X54 20/BX

## (undated) DEVICE — CATH URETHRAL RED RUBBER 18FR

## (undated) DEVICE — BLADE SAW STERNAL 5/BX

## (undated) DEVICE — SUT SILK BLK BR. 2 2-60

## (undated) DEVICE — SUT SILK 2-0 SH 18IN BLACK

## (undated) DEVICE — SUT ETHIBOND EXCEL 2-0 SH-2

## (undated) DEVICE — PAD DEFIB CADENCE ADULT R2

## (undated) DEVICE — KIT SAHARA DRAPE DRAW/LIFT

## (undated) DEVICE — CLAMP LONG JAW ISOLATOR OPEN

## (undated) DEVICE — PROBE CRYO ABLATION 10CM

## (undated) DEVICE — DRAPE INCISE IOBAN 2 23X17IN

## (undated) DEVICE — PROBE CRYOMAZE CLAMP

## (undated) DEVICE — TIP YANKAUERS BULB NO VENT

## (undated) DEVICE — RETRACTOR OCTOBASE INSERT HOLD

## (undated) DEVICE — SUT ETHIBOND XTRA 2-0 SH-2

## (undated) DEVICE — SUT PROLENE 5-0 24 C-1 BL

## (undated) DEVICE — CLOSURE SKIN STERI STRIP 1/2X4

## (undated) DEVICE — SUT PROLENE 3-0 SH DA 36 BL

## (undated) DEVICE — FOGERTY SOFT JAW DISP 2/PK

## (undated) DEVICE — SUT 2/0 30IN ETHIBOND

## (undated) DEVICE — TAPE SURG MEDIPORE 6X72IN

## (undated) DEVICE — SPONGE COTTON TRAY 4X4IN

## (undated) DEVICE — CANNULA MULTIPLE PERFUSIONSET

## (undated) DEVICE — DRAPE SLUSH WARMER WITH DISC

## (undated) DEVICE — STRIP MEDI WND CLSR 1/2X4IN

## (undated) DEVICE — DRESSING AQUACEL SACRAL 9 X 9

## (undated) DEVICE — SUT 2-0 VICRYL / CT-1

## (undated) DEVICE — DRAIN CHEST DRY SUCTION

## (undated) DEVICE — SYR 30CC LUER LOCK

## (undated) DEVICE — CANNULA AORTIC ROOT 12 GAUGE 9

## (undated) DEVICE — SUT PROLENE 4-0 RB-1 BL MO

## (undated) DEVICE — SUT VICRYL BR 1 GEN 27 CT-1

## (undated) DEVICE — DRAIN CHANNEL ROUND 19FR

## (undated) DEVICE — LOOP VESSEL BLUE MAXI

## (undated) DEVICE — SPONGE GAUZE 16PLY 4X4

## (undated) DEVICE — SUT 2/0 36IN COATED VICRYL

## (undated) DEVICE — SUT VICRYL PLUS 3-0 FS1 27

## (undated) DEVICE — BLADE STERN 65.8MM

## (undated) DEVICE — CONTAINER SPECIMEN STRL 4OZ

## (undated) DEVICE — SUT 6 18IN STEEL MONO CCS